# Patient Record
Sex: MALE | Race: WHITE | NOT HISPANIC OR LATINO | Employment: FULL TIME | URBAN - METROPOLITAN AREA
[De-identification: names, ages, dates, MRNs, and addresses within clinical notes are randomized per-mention and may not be internally consistent; named-entity substitution may affect disease eponyms.]

---

## 2017-08-07 ENCOUNTER — HOSPITAL ENCOUNTER (OUTPATIENT)
Dept: RADIOLOGY | Facility: MEDICAL CENTER | Age: 57
End: 2017-08-07
Attending: SURGERY
Payer: COMMERCIAL

## 2017-08-07 DIAGNOSIS — I83.811 VARICOSE VEINS OF RIGHT LOWER EXTREMITY WITH PAIN: ICD-10-CM

## 2017-08-07 PROCEDURE — 93971 EXTREMITY STUDY: CPT

## 2018-04-10 ENCOUNTER — OFFICE VISIT (OUTPATIENT)
Dept: MEDICAL GROUP | Facility: PHYSICIAN GROUP | Age: 58
End: 2018-04-10
Payer: COMMERCIAL

## 2018-04-10 VITALS
BODY MASS INDEX: 38.09 KG/M2 | HEIGHT: 68 IN | WEIGHT: 251.3 LBS | DIASTOLIC BLOOD PRESSURE: 72 MMHG | RESPIRATION RATE: 18 BRPM | HEART RATE: 83 BPM | TEMPERATURE: 98.7 F | OXYGEN SATURATION: 95 % | SYSTOLIC BLOOD PRESSURE: 124 MMHG

## 2018-04-10 DIAGNOSIS — E66.9 OBESITY (BMI 30-39.9): ICD-10-CM

## 2018-04-10 DIAGNOSIS — I82.411 DVT OF DEEP FEMORAL VEIN, RIGHT (HCC): ICD-10-CM

## 2018-04-10 DIAGNOSIS — E78.00 PURE HYPERCHOLESTEROLEMIA: ICD-10-CM

## 2018-04-10 DIAGNOSIS — E11.9 TYPE 2 DIABETES MELLITUS WITHOUT COMPLICATION, WITHOUT LONG-TERM CURRENT USE OF INSULIN (HCC): ICD-10-CM

## 2018-04-10 PROBLEM — Z95.828 PRESENCE OF INFERIOR VENA CAVA FILTER: Status: ACTIVE | Noted: 2018-04-10

## 2018-04-10 LAB
HBA1C MFR BLD: 6.7 % (ref ?–5.8)
INT CON NEG: NEGATIVE
INT CON POS: POSITIVE

## 2018-04-10 PROCEDURE — 99204 OFFICE O/P NEW MOD 45 MIN: CPT | Performed by: FAMILY MEDICINE

## 2018-04-10 PROCEDURE — 83036 HEMOGLOBIN GLYCOSYLATED A1C: CPT | Performed by: FAMILY MEDICINE

## 2018-04-10 RX ORDER — ATORVASTATIN CALCIUM 20 MG/1
20 TABLET, FILM COATED ORAL DAILY
Qty: 30 TAB | Refills: 5 | Status: SHIPPED | OUTPATIENT
Start: 2018-04-10 | End: 2018-09-21 | Stop reason: SDUPTHER

## 2018-04-10 RX ORDER — ATORVASTATIN CALCIUM 20 MG/1
20 TABLET, FILM COATED ORAL NIGHTLY
COMMUNITY
End: 2018-04-10 | Stop reason: SDUPTHER

## 2018-04-10 ASSESSMENT — PATIENT HEALTH QUESTIONNAIRE - PHQ9: CLINICAL INTERPRETATION OF PHQ2 SCORE: 0

## 2018-04-10 NOTE — LETTER
[facility]  Juanito Valdes M.D.  [address]  [place] [address]  Fax: [phone]   Authorization for Release/Disclosure of   Protected Health Information   Name: JENSEN BOB : 1960 SSN: [id number]   Address: [address] [place] [address] [place] [address] Phone:    [phone] (home)    I authorize the entity listed below to release/disclose the PHI below to:   [facility]/Juanito Valdes M.D. and Juanito Valdes M.D.   Provider or Entity Name:     Address   City, State, [facility]   Phone:      Fax:     Reason for request: continuity of care   Information to be released:    [  ] LAST COLONOSCOPY,  including any PATH REPORT and follow-up  [  ] LAST FIT/COLOGUARD RESULT [  ] LAST DEXA  [  ] LAST MAMMOGRAM  [  ] LAST PAP  [  ] LAST LABS [  ] RETINA EXAM REPORT  [  ] IMMUNIZATION RECORDS  [  ] Release all info      [  ] Check here and initial the line next to each item to release ALL health information INCLUDING  _____ Care and treatment for drug and / or alcohol abuse  _____ HIV testing, infection status, or AIDS  _____ Genetic Testing    DATES OF SERVICE OR TIME PERIOD TO BE DISCLOSED: _____________  I understand and acknowledge that:  * This Authorization may be revoked at any time by you in writing, except if your health information has already been used or disclosed.  * Your health information that will be used or disclosed as a result of you signing this authorization could be re-disclosed by the recipient. If this occurs, your re-disclosed health information may no longer be protected by State or Federal laws.  * You may refuse to sign this Authorization. Your refusal will not affect your ability to obtain treatment.  * This Authorization becomes effective upon signing and will  on (date) __________.      If no date is indicated, this Authorization will  one (1) year from the signature date.    Name: Jensen Bob    Signature:   Date:     4/10/2018       PLEASE FAX REQUESTED  RECORDS BACK TO: [phone]

## 2018-04-10 NOTE — ASSESSMENT & PLAN NOTE
History of present illness A1c 6.6 today. He has been compliant with taking his Januvia and Jardiance. He has not had his eyes checked in 2 years. He is edentulous. He gets occasionally itching and rash in his groins. Denies any tingling and numbness in his feet. Has gotten athletes feet in the past but not currently.

## 2018-04-10 NOTE — PROGRESS NOTES
Complaint: Medication refills        Subjective:   Jensen Leigh is a 58 y.o. male here today forFurther medication refills upon the request of his wife. Previously followed at [facility] here in [place].    DVT of deep femoral vein, right (CMS-[facility])  Patient developed a deep venous thrombosis of the right femoral vein last year. Currently on Xarelto and has a vena cava filter in place. Right lower leg is still larger than left. Was unable to wear thigh-high compression stockings for his varicose veins, kept slipping off.    Type 2 diabetes mellitus without complication (CMS-[facility])  History of present illness A1c 6.6 today. He has been compliant with taking his Januvia and Jardiance. He has not had his eyes checked in 2 years. He is edentulous. He gets occasionally itching and rash in his groins. Denies any tingling and numbness in his feet. Has gotten athletes feet in the past but not currently.    Pure hypercholesterolemia  On atorvastatin for his high cholesterol. Complaint with taking this medication.    Obesity (BMI 30-39.9)  Sits in a car hours at a time. He snacks on different kinds of food, mainly nothing healthy. Seen a nutritionist in the past but has been able to comply with recommendations.     He is , works as a security/. Does not smoke    Current medicines (including changes today)  Current Outpatient Prescriptions   Medication Sig Dispense Refill   • Multiple Vitamin (MULTI VITAMIN MENS PO) Take 1 Tab by mouth every day.     • rivaroxaban (XARELTO) 20 MG Tab tablet Take 1 Tab by mouth with dinner. 30 Tab 5   • Empagliflozin 10 MG Tab Take 10 mg by mouth every day. 30 Tab 5   • SITagliptin (JANUVIA) 100 MG Tab Take 1 Tab by mouth every day. 30 Tab 5   • atorvastatin (LIPITOR) 20 MG Tab Take 1 Tab by mouth every day. 30 Tab 5     No current facility-administered medications for this visit.      He  has a past medical history of Eczema; GERD  (gastroesophageal reflux disease); Type II or unspecified type diabetes mellitus without mention of complication, not stated as uncontrolled; and Umbilical hernia.     Health Maintenance: Up-to-date according to patient's file.      Allergies: Patient has no known allergies.    Current Outpatient Prescriptions Ordered in [facility]   Medication Sig Dispense Refill   • Multiple Vitamin (MULTI VITAMIN MENS PO) Take 1 Tab by mouth every day.     • rivaroxaban (XARELTO) 20 MG Tab tablet Take 1 Tab by mouth with dinner. 30 Tab 5   • Empagliflozin 10 MG Tab Take 10 mg by mouth every day. 30 Tab 5   • SITagliptin (JANUVIA) 100 MG Tab Take 1 Tab by mouth every day. 30 Tab 5   • atorvastatin (LIPITOR) 20 MG Tab Take 1 Tab by mouth every day. 30 Tab 5     No current Epic-ordered facility-administered medications on file.        Past Medical History:   Diagnosis Date   • Eczema    • GERD (gastroesophageal reflux disease)    • Type II or unspecified type diabetes mellitus without mention of complication, not stated as uncontrolled    • Umbilical hernia        Past Surgical History:   Procedure Laterality Date   • ROTATOR CUFF REPAIR      rt shoulder   • TONSILLECTOMY AND ADENOIDECTOMY         Social History   Substance Use Topics   • Smoking status: Never Smoker   • Smokeless tobacco: Never Used   • Alcohol use Yes      Comment: rare       Social History     Social History Narrative   • No narrative on file       Family History   Problem Relation Age of Onset   • Cancer Mother      liver   • Cancer Father      lung         ROS [place] of some pain and swelling in the right lower extremity  Patient denies any stroke symptoms, dizziness, headache, nasal congestion, sore-throat, cough, heartburn, chest pain, difficulty breathing, abdominal discomfort, diarrhea/constipation, joint or back pain, skin rashes, depression or anxiety.       Objective:     Blood pressure 124/72, pulse 83, temperature 37.1 °C (98.7 °F), resp. rate 18, height  "1.727 m (5' 8\"), weight 114 kg (251 lb 4.8 oz), SpO2 95 %. Body mass index is 38.21 kg/m².   Physical Exam:  Constitutional: Alert, no distress. Morbidly obese  Skin: Warm, dry, good turgor, no rashes in visible areas.  Eye: Equal, round and reactive, conjunctiva clear, lids normal.  ENMT: Lips without lesions,Edentulous, oropharynx clear.  Neck: Trachea midline, no masses, no thyromegaly. No cervical or supraclavicular lymphadenopathy  Respiratory: Unlabored respiratory effort, lungs clear to auscultation, no wheezes, no ronchi.  Cardiovascular: Normal S1, S2, no murmur, no extremity edema.  Abdomen: Obese soft with large umbilical hernia.   RLE: significantly larger calf than left one. 1+ pre-tibial edemas, some varicose veins bilaterally.  Psych: Alert and oriented x3, appropriate affect and mood.        Assessment and Plan:   The following treatment plan was discussed    1. DVT of deep femoral vein, right (CMS-HCC)  Chronic problem, stable. Recommended patient where at least knee-high stockings to prevent stasis ulcers and other complications. Continue Xarelto. Explained to patient and his wife that it can take a long time before such a large clot is dissolved or recanalized. Since last ultrasound was done in August 2017, will recheck the situation now. Will contact him with results.  - US-EXTREMITY VENOUS UNILATERAL-LOWER RIGHT; Future  - rivaroxaban (XARELTO) 20 MG Tab tablet; Take 1 Tab by mouth with dinner.  Dispense: 30 Tab; Refill: 5    2. Type 2 diabetes mellitus without complication, without long-term current use of insulin (CMS-HCC)  Chronicproblem, controlled on current medical management. Recommended he see an optometrist/ophthalmologist for retinal check. Commended he check his feet daily for breakdown. Recommended he apply Gold Bond or such fady his groins daily to prevent breakdown and infection.  - Empagliflozin 10 MG Tab; Take 10 mg by mouth every day.  Dispense: 30 Tab; Refill: 5  - SITagliptin " (LUCEROUVIA) 100 MG Tab; Take 1 Tab by mouth every day.  Dispense: 30 Tab; Refill: 5  - POCT Hemoglobin A1C    3. Obesity (BMI 30-39.9)  Chronic problem requiring intervention. I recommended patient to cut back on his caloric intake. This applies to treat kindest back seats during the daytime. He should switch to low calorie vegetables.    Refill all his medications today.    Followup: Return in about 4 months (around 8/10/2018), or DM.

## 2018-04-10 NOTE — ASSESSMENT & PLAN NOTE
Sits in a car hours at a time. He snacks on different kinds of food, mainly nothing healthy. Seen a nutritionist in the past but has been able to comply with recommendations.

## 2018-04-10 NOTE — ASSESSMENT & PLAN NOTE
Patient developed a deep venous thrombosis of the right femoral vein last year. Currently on Xarelto and has a vena cava filter in place. Right lower leg is still larger than left. Was unable to wear thigh-high compression stockings for his varicose veins, kept slipping off.

## 2018-04-26 ENCOUNTER — HOSPITAL ENCOUNTER (OUTPATIENT)
Dept: RADIOLOGY | Facility: MEDICAL CENTER | Age: 58
End: 2018-04-26
Attending: FAMILY MEDICINE
Payer: COMMERCIAL

## 2018-04-26 DIAGNOSIS — I82.411 DVT OF DEEP FEMORAL VEIN, RIGHT (HCC): ICD-10-CM

## 2018-04-26 PROCEDURE — 93971 EXTREMITY STUDY: CPT | Mod: RT

## 2018-05-09 DIAGNOSIS — E13.39: ICD-10-CM

## 2018-05-09 NOTE — TELEPHONE ENCOUNTER
----- Message from Jensen Leigh sent at 5/9/2018 10:07 AM PDT -----  Regarding: Non-Urgent Medical Question  Contact: [phone]  Can you please tell me if current blood test strips also need prescription for. Will they be covered I am currently using the True Metrix Montior and have had to order online since buying them over the counter is expensive.    Also can you tell me since Dr. Valdes said no change in current treatment for blood clot in leg does he feel that the Xarelto is working?    This is Molly's  wife asking cause I have concerns I know that the body has to dissolve the clot but it has been over 2 years in June.

## 2018-05-15 ENCOUNTER — TELEPHONE (OUTPATIENT)
Dept: MEDICAL GROUP | Facility: PHYSICIAN GROUP | Age: 58
End: 2018-05-15

## 2018-05-15 NOTE — TELEPHONE ENCOUNTER
P's wife called and states that her  has been having a lot of cramps in his legs. She wanted to know if her  will have to stay on xarelto forever and wanted to know if she should be concerned that the left side of his neck has been bothering him, she states he says he feels like he has pulled a muscle that will not stop hurting

## 2018-05-25 ENCOUNTER — OFFICE VISIT (OUTPATIENT)
Dept: MEDICAL GROUP | Facility: PHYSICIAN GROUP | Age: 58
End: 2018-05-25
Payer: COMMERCIAL

## 2018-05-25 VITALS
DIASTOLIC BLOOD PRESSURE: 82 MMHG | TEMPERATURE: 98.4 F | OXYGEN SATURATION: 96 % | RESPIRATION RATE: 18 BRPM | SYSTOLIC BLOOD PRESSURE: 110 MMHG | BODY MASS INDEX: 38 KG/M2 | HEIGHT: 68 IN | HEART RATE: 83 BPM | WEIGHT: 250.7 LBS

## 2018-05-25 DIAGNOSIS — I82.411 DVT OF DEEP FEMORAL VEIN, RIGHT (HCC): ICD-10-CM

## 2018-05-25 DIAGNOSIS — R25.2 LEG CRAMPS: ICD-10-CM

## 2018-05-25 DIAGNOSIS — S16.1XXA STRAIN OF NECK MUSCLE, INITIAL ENCOUNTER: ICD-10-CM

## 2018-05-25 PROCEDURE — 99214 OFFICE O/P EST MOD 30 MIN: CPT | Performed by: FAMILY MEDICINE

## 2018-05-26 PROBLEM — R25.2 LEG CRAMPS: Status: ACTIVE | Noted: 2018-05-26

## 2018-05-27 NOTE — ASSESSMENT & PLAN NOTE
Needs to know if he needs to continue oral anticoagulation. Latest US shows that clot has not resolved. Had VCF in place.

## 2018-05-27 NOTE — ASSESSMENT & PLAN NOTE
Strained left side of neck doing chores around the house. Hurts to turn head to right. Has not tried any treatment. No pain down arm, no tingling or numbness.

## 2018-05-27 NOTE — PROGRESS NOTES
Complaint: Leg cramps     Subjective:     Jensen Leigh is a 58 y.o. male here today for a couple medical issues.    Leg cramps  Had leg cramps for a couple days. Increased po fluid intake, sxs resolved.    Neck strain  Strained left side of neck doing chores around the house. Hurts to turn head to right. Has not tried any treatment. No pain down arm, no tingling or numbness.    DVT of deep femoral vein, right (HCC)  Needs to know if he needs to continue oral anticoagulation. Latest US shows that clot has not resolved. Had VCF in place.     Has moved to [place], will transfer care to our clinic there.      No other concerns or complaints today.    Current medicines (including changes today)  Current Outpatient Prescriptions   Medication Sig Dispense Refill   • glucose blood (TRUE METRIX BLOOD GLUCOSE TEST) strip 1 Strip by Other route as needed. 300 Strip 1   • Multiple Vitamin (MULTI VITAMIN MENS PO) Take 1 Tab by mouth every day.     • rivaroxaban (XARELTO) 20 MG Tab tablet Take 1 Tab by mouth with dinner. 30 Tab 5   • Empagliflozin 10 MG Tab Take 10 mg by mouth every day. 30 Tab 5   • SITagliptin (JANUVIA) 100 MG Tab Take 1 Tab by mouth every day. 30 Tab 5   • atorvastatin (LIPITOR) 20 MG Tab Take 1 Tab by mouth every day. 30 Tab 5     No current facility-administered medications for this visit.      He  has a past medical history of Eczema; GERD (gastroesophageal reflux disease); Type II or unspecified type diabetes mellitus without mention of complication, not stated as uncontrolled; and Umbilical hernia.      Allergies: Patient has no known allergies.    Current Outpatient Prescriptions Ordered in [facility]   Medication Sig Dispense Refill   • glucose blood (TRUE METRIX BLOOD GLUCOSE TEST) strip 1 Strip by Other route as needed. 300 Strip 1   • Multiple Vitamin (MULTI VITAMIN MENS PO) Take 1 Tab by mouth every day.     • rivaroxaban (XARELTO) 20 MG Tab tablet Take 1 Tab by mouth with dinner. 30 Tab 5   •  "Empagliflozin 10 MG Tab Take 10 mg by mouth every day. 30 Tab 5   • SITagliptin (JANUVIA) 100 MG Tab Take 1 Tab by mouth every day. 30 Tab 5   • atorvastatin (LIPITOR) 20 MG Tab Take 1 Tab by mouth every day. 30 Tab 5     No current Epic-ordered facility-administered medications on file.        Past Medical History:   Diagnosis Date   • Eczema    • GERD (gastroesophageal reflux disease)    • Type II or unspecified type diabetes mellitus without mention of complication, not stated as uncontrolled    • Umbilical hernia        Past Surgical History:   Procedure Laterality Date   • ROTATOR CUFF REPAIR      rt shoulder   • TONSILLECTOMY AND ADENOIDECTOMY         Social History   Substance Use Topics   • Smoking status: Never Smoker   • Smokeless tobacco: Never Used   • Alcohol use Yes      Comment: rare       Social History     Social History Narrative   • No narrative on file       Family History   Problem Relation Age of Onset   • Cancer Mother      liver   • Cancer Father      lung         ROS neck pain, continued swelling RLE  Patient denies any fever, chills, unintentional weight gain/loss, fatigue, stroke symptoms, dizziness, headache, nasal congestion, sore-throat, cough, heartburn, chest pain, difficulty breathing, abdominal discomfort, diarrhea/constipation, burning with urination or frequency, joint or back pain, skin rashes, depression or anxiety.       Objective:     Blood pressure 110/82, pulse 83, temperature 36.9 °C (98.4 °F), resp. rate 18, height 1.727 m (5' 8\"), weight 113.7 kg (250 lb 11.2 oz), SpO2 96 %. Body mass index is 38.12 kg/m².   Physical Exam:    Neck: Trachea midline, no masses, no thyromegaly. No cervical or supraclavicular lymphadenopathy. FROM, tender left trap from base of skull to shoulder.  Respiratory: Unlabored respiratory effort, lungs clear to auscultation, no wheezes, no ronchi.  Cardiovascular: Normal S1, S2, no murmur, no extremity edema.  Abdomen: Soft, non-tender, no masses, " no hepatosplenomegaly.  Psych: Alert and oriented x3, appropriate affect and mood.        Assessment and Plan:     The following treatment plan was discussed    1. DVT of deep femoral vein, right (HCC)  Chronic problem, stable. On Xarelto. Question ongoing need.  - REFERRAL TO VASCULAR SURGERY    2. Leg cramps  Acute problem, resolved. Reminded to keep himself well hydrated. Try MagOx if recurs.    3. Strain of neck muscle, initial encounter  New problem. Offered trigger point injection, pt declines. Recommend light massage, Arnica Gel.      Followup: Return if symptoms worsen or fail to improve.    Please note that this dictation was created using voice recognition software. I have made every reasonable attempt to correct obvious errors, but I expect that there are errors of grammar and possibly content that I did not discover before finalizing the note.

## 2018-06-04 ENCOUNTER — TELEPHONE (OUTPATIENT)
Dept: MEDICAL GROUP | Facility: PHYSICIAN GROUP | Age: 58
End: 2018-06-04

## 2018-06-04 DIAGNOSIS — E13.39: Primary | ICD-10-CM

## 2018-06-04 NOTE — TELEPHONE ENCOUNTER
Pt wife called regarding the rx for the test strips. They state the [facility] in [place], states they have never received that. The rx was sent earlier in May. They still state they dont have the rx. The wife is requesting we resend this to [facility] in [place].

## 2018-06-20 DIAGNOSIS — E11.9 TYPE 2 DIABETES MELLITUS WITHOUT COMPLICATION, WITHOUT LONG-TERM CURRENT USE OF INSULIN (HCC): ICD-10-CM

## 2018-09-21 DIAGNOSIS — E11.9 TYPE 2 DIABETES MELLITUS WITHOUT COMPLICATION, WITHOUT LONG-TERM CURRENT USE OF INSULIN (HCC): ICD-10-CM

## 2018-09-21 DIAGNOSIS — I82.411 DVT OF DEEP FEMORAL VEIN, RIGHT (HCC): ICD-10-CM

## 2018-09-21 DIAGNOSIS — E78.00 PURE HYPERCHOLESTEROLEMIA: ICD-10-CM

## 2018-09-21 RX ORDER — ATORVASTATIN CALCIUM 20 MG/1
20 TABLET, FILM COATED ORAL DAILY
Qty: 30 TAB | Refills: 5 | Status: SHIPPED | OUTPATIENT
Start: 2018-09-21 | End: 2018-12-13 | Stop reason: SDUPTHER

## 2018-09-21 NOTE — TELEPHONE ENCOUNTER
----- Message from JI Loza sent at 9/20/2018 12:17 PM PDT -----  Regarding: FW:Medication refills  Contact: [phone]      ----- Message -----  From: Jensen Leigh  Sent: 9/19/2018   5:57 PM  To: Hannah Tena  Subject: RE:Medication refills                            Atorvastatin 20 mg  Jardiance 10 mg  Xarelto 20mg    all say no refills left    Please advise because he filled these and I don't want him to run out.     Thank You    Hayley Leigh  ----- Message -----  From: Breeanne R. Yeoman, Med Ass't  Sent: 9/19/2018  5:45 PM PDT  To: Jensen Leigh  Subject: Medication refills  Josep Styles,    We have received an call regarding medication refills. Please advise us of what medication you are needing refilled and I will send it to the doctor. Thank you!

## 2018-12-06 ENCOUNTER — TELEPHONE (OUTPATIENT)
Dept: MEDICAL GROUP | Facility: PHYSICIAN GROUP | Age: 58
End: 2018-12-06

## 2018-12-07 ENCOUNTER — TELEPHONE (OUTPATIENT)
Dept: MEDICAL GROUP | Facility: PHYSICIAN GROUP | Age: 58
End: 2018-12-07

## 2018-12-07 DIAGNOSIS — E78.00 PURE HYPERCHOLESTEROLEMIA: ICD-10-CM

## 2018-12-07 DIAGNOSIS — E11.9 TYPE 2 DIABETES MELLITUS WITHOUT COMPLICATION, WITHOUT LONG-TERM CURRENT USE OF INSULIN (HCC): ICD-10-CM

## 2018-12-07 DIAGNOSIS — R25.2 LEG CRAMPS: ICD-10-CM

## 2018-12-07 NOTE — TELEPHONE ENCOUNTER
Patients wife left a message stating his Jardiance is on back order and only has 4 days left. Is there something else that can be prescribed and sent to his pharmacy? Please advise.

## 2018-12-08 NOTE — TELEPHONE ENCOUNTER
Was the patient seen in the last year in this department? Yes    Does patient have an active prescription for medications requested? Yes    Received Request Via: Pharmacy     Last Visit: 5/25/18  Last Lab: n/a

## 2018-12-08 NOTE — TELEPHONE ENCOUNTER
Patient is sscheduled to f/u with you in [place]. Patients wife is requesting a lab order prior but stated he has a hard time fasting. Just an fyi to you.

## 2018-12-10 DIAGNOSIS — E11.9 TYPE 2 DIABETES MELLITUS WITHOUT COMPLICATION, WITHOUT LONG-TERM CURRENT USE OF INSULIN (HCC): ICD-10-CM

## 2018-12-10 NOTE — TELEPHONE ENCOUNTER
The pharmacy this was sent to on Friday does not accept his insurance. They would like this sent to [facility] in [place] today please.    Was the patient seen in the last year in this department? Yes    Does patient have an active prescription for medications requested? Yes    Received Request Via: Patient    Last Visit: 5/25/18  Last Lab: n/a

## 2018-12-13 ENCOUNTER — OFFICE VISIT (OUTPATIENT)
Dept: MEDICAL GROUP | Facility: CLINIC | Age: 58
End: 2018-12-13
Payer: COMMERCIAL

## 2018-12-13 VITALS
WEIGHT: 252 LBS | OXYGEN SATURATION: 94 % | TEMPERATURE: 99.1 F | HEART RATE: 74 BPM | HEIGHT: 68 IN | BODY MASS INDEX: 38.19 KG/M2 | SYSTOLIC BLOOD PRESSURE: 140 MMHG | DIASTOLIC BLOOD PRESSURE: 86 MMHG

## 2018-12-13 DIAGNOSIS — R03.0 ELEVATED BLOOD PRESSURE READING: ICD-10-CM

## 2018-12-13 DIAGNOSIS — R25.2 LEG CRAMPS: ICD-10-CM

## 2018-12-13 DIAGNOSIS — M72.2 PLANTAR FASCIITIS OF LEFT FOOT: ICD-10-CM

## 2018-12-13 DIAGNOSIS — E11.9 TYPE 2 DIABETES MELLITUS WITHOUT COMPLICATION, WITHOUT LONG-TERM CURRENT USE OF INSULIN (HCC): ICD-10-CM

## 2018-12-13 DIAGNOSIS — E78.00 PURE HYPERCHOLESTEROLEMIA: ICD-10-CM

## 2018-12-13 DIAGNOSIS — I82.411 DVT OF DEEP FEMORAL VEIN, RIGHT (HCC): ICD-10-CM

## 2018-12-13 LAB
HBA1C MFR BLD: 7.1 % (ref ?–5.8)
INT CON NEG: NORMAL
INT CON POS: NORMAL

## 2018-12-13 PROCEDURE — 83036 HEMOGLOBIN GLYCOSYLATED A1C: CPT | Performed by: FAMILY MEDICINE

## 2018-12-13 PROCEDURE — 99214 OFFICE O/P EST MOD 30 MIN: CPT | Performed by: FAMILY MEDICINE

## 2018-12-13 RX ORDER — ATORVASTATIN CALCIUM 20 MG/1
20 TABLET, FILM COATED ORAL DAILY
Qty: 30 TAB | Refills: 5 | Status: SHIPPED | OUTPATIENT
Start: 2018-12-13 | End: 2019-10-16 | Stop reason: SDUPTHER

## 2018-12-14 NOTE — PROGRESS NOTES
Complaint: f/u diabetes     Subjective:     Jensen Leigh is a 58 y.o. male here today accompanied by his wife.    Type 2 diabetes mellitus without complication (CMS-[facility])  Did not get his labs done. Compliant with taking his meds. Has not had his eyes checked yet.    DVT of deep femoral vein, right ([facility])  Followed by vascular medicine, scheduled to get angioplasty on right leg vein to increase blood flow.    Plantar fasciitis of left foot  Pain bottom left foot not relieved by inserts. Worse in AM.    Leg cramps  Experiences aching in both thighs in evening after days work.      Current medicines (including changes today)  Current Outpatient Prescriptions   Medication Sig Dispense Refill   • atorvastatin (LIPITOR) 20 MG Tab Take 1 Tab by mouth every day. 30 Tab 5   • Empagliflozin (JARDIANCE) 10 MG Tab Take 1 Tab by mouth every day. 30 Tab 5   • rivaroxaban (XARELTO) 20 MG Tab tablet Take 1 Tab by mouth with dinner. 30 Tab 5   • SITagliptin (JANUVIA) 100 MG Tab Take 1 Tab by mouth every day. 30 Tab 5   • Blood Glucose Monitoring Suppl Supplies Misc Test strips order: Test strips for Contour Next meter. Sig: use 2x/d and prn ssx high or low sugar #100 RF x 3 100 Strip 3   • Blood Glucose Monitoring Suppl Device Meter: Dispense 1 Contour Next Device . Sig. Use as directed for blood sugar monitoring. #1. NR. 1 Device 0   • glucose blood (TRUE METRIX BLOOD GLUCOSE TEST) strip 1 Strip by Other route as needed. 300 Strip 1   • Multiple Vitamin (MULTI VITAMIN MENS PO) Take 1 Tab by mouth every day.       No current facility-administered medications for this visit.      He  has a past medical history of Eczema; GERD (gastroesophageal reflux disease); Type II or unspecified type diabetes mellitus without mention of complication, not stated as uncontrolled; and Umbilical hernia.    Health Maintenance: declines immunizations      Allergies: Patient has no known allergies.    Current Outpatient Prescriptions Ordered in [facility]    Medication Sig Dispense Refill   • atorvastatin (LIPITOR) 20 MG Tab Take 1 Tab by mouth every day. 30 Tab 5   • Empagliflozin (JARDIANCE) 10 MG Tab Take 1 Tab by mouth every day. 30 Tab 5   • rivaroxaban (XARELTO) 20 MG Tab tablet Take 1 Tab by mouth with dinner. 30 Tab 5   • SITagliptin (JANUVIA) 100 MG Tab Take 1 Tab by mouth every day. 30 Tab 5   • Blood Glucose Monitoring Suppl Supplies Misc Test strips order: Test strips for Contour Next meter. Sig: use 2x/d and prn ssx high or low sugar #100 RF x 3 100 Strip 3   • Blood Glucose Monitoring Suppl Device Meter: Dispense 1 Contour Next Device . Sig. Use as directed for blood sugar monitoring. #1. NR. 1 Device 0   • glucose blood (TRUE METRIX BLOOD GLUCOSE TEST) strip 1 Strip by Other route as needed. 300 Strip 1   • Multiple Vitamin (MULTI VITAMIN MENS PO) Take 1 Tab by mouth every day.       No current [facility]-ordered facility-administered medications on file.        Past Medical History:   Diagnosis Date   • Eczema    • GERD (gastroesophageal reflux disease)    • Type II or unspecified type diabetes mellitus without mention of complication, not stated as uncontrolled    • Umbilical hernia        Past Surgical History:   Procedure Laterality Date   • ROTATOR CUFF REPAIR      rt shoulder   • TONSILLECTOMY AND ADENOIDECTOMY         Social History   Substance Use Topics   • Smoking status: Never Smoker   • Smokeless tobacco: Never Used   • Alcohol use Yes      Comment: rare       Social History     Social History Narrative   • No narrative on file       Family History   Problem Relation Age of Onset   • Cancer Mother         liver   • Cancer Father         lung         ROS Positive for pain in sole left foot, swelling in right lower leg, cramps in thighs  Patient denies any fever, chills, unintentional weight gain/loss, fatigue, stroke symptoms, dizziness, headache, nasal congestion, sore-throat, cough, heartburn, chest pain, difficulty breathing, abdominal  "discomfort, diarrhea/constipation, burning with urination or frequency, joint or back pain, skin rashes, depression or anxiety.       Objective:     Blood pressure 140/86, pulse 74, temperature 37.3 °C (99.1 °F), temperature source Temporal, height 1.727 m (5' 8\"), weight 114.3 kg (252 lb), SpO2 94 %. Body mass index is 38.32 kg/m².   Physical Exam:  Constitutional: Alert, no distress.    Respiratory: Unlabored respiratory effort, lungs clear to auscultation, no wheezes, no ronchi.  Cardiovascular: Normal S1, S2, no murmur, no extremity edema.  Left foot: tender insertion plantar fascia at calcaneus.      Assessment and Plan:   The following treatment plan was discussed    1. Pure hypercholesterolemia  Chronic problem. Will notify with FLP result.  - atorvastatin (LIPITOR) 20 MG Tab; Take 1 Tab by mouth every day.  Dispense: 30 Tab; Refill: 5    2. Type 2 diabetes mellitus without complication, without long-term current use of insulin ([facility])  A1c today 7.1%. Needs to lose weight, stick to diet. Increase Jardiance at f/u if over 7% again. Recommend he see optometrist soon for eye exam  - Empagliflozin (JARDIANCE) 10 MG Tab; Take 1 Tab by mouth every day.  Dispense: 30 Tab; Refill: 5  - SITagliptin (JANUVIA) 100 MG Tab; Take 1 Tab by mouth every day.  Dispense: 30 Tab; Refill: 5  - POCT Hemoglobin A1C  - MICROALBUMIN CREAT RATIO URINE; Future    3. DVT of deep femoral vein, right (HCC)  Chronic problem. F/u with vascular medicine.  - rivaroxaban (XARELTO) 20 MG Tab tablet; Take 1 Tab by mouth with dinner.  Dispense: 30 Tab; Refill: 5    4. Plantar fasciitis of left foot  Chronic problem. Discussed options. Will massage locally, stretch foot as demonstrated.    5. Leg cramps  Chronic problem. Recommend stretching exercises, increase fluid intake, otc magnesium supplementation.    6. Elevated blood pressure reading  Recheck at f/u. Treat to keep under 130/80 if still elevated.    Patient to get labs drawn soon. "     Followup: Return in about 3 months (around 3/13/2019).    Please note that this dictation was created using voice recognition software. I have made every reasonable attempt to correct obvious errors, but I expect that there are errors of grammar and possibly content that I did not discover before finalizing the note.

## 2018-12-14 NOTE — ASSESSMENT & PLAN NOTE
Followed by vascular medicine, scheduled to get angioplasty on right leg vein to increase blood flow.

## 2019-01-26 ENCOUNTER — HOSPITAL ENCOUNTER (OUTPATIENT)
Dept: LAB | Facility: MEDICAL CENTER | Age: 59
End: 2019-01-26
Attending: FAMILY MEDICINE
Payer: COMMERCIAL

## 2019-01-26 DIAGNOSIS — R25.2 LEG CRAMPS: ICD-10-CM

## 2019-01-26 DIAGNOSIS — E11.9 TYPE 2 DIABETES MELLITUS WITHOUT COMPLICATION, WITHOUT LONG-TERM CURRENT USE OF INSULIN (HCC): ICD-10-CM

## 2019-01-26 DIAGNOSIS — E78.00 PURE HYPERCHOLESTEROLEMIA: ICD-10-CM

## 2019-01-26 LAB
ALBUMIN SERPL BCP-MCNC: 4.1 G/DL (ref 3.2–4.9)
ALBUMIN/GLOB SERPL: 1.4 G/DL
ALP SERPL-CCNC: 79 U/L (ref 30–99)
ALT SERPL-CCNC: 25 U/L (ref 2–50)
ANION GAP SERPL CALC-SCNC: 6 MMOL/L (ref 0–11.9)
AST SERPL-CCNC: 18 U/L (ref 12–45)
BASOPHILS # BLD AUTO: 0.6 % (ref 0–1.8)
BASOPHILS # BLD: 0.05 K/UL (ref 0–0.12)
BILIRUB SERPL-MCNC: 0.6 MG/DL (ref 0.1–1.5)
BUN SERPL-MCNC: 15 MG/DL (ref 8–22)
CALCIUM SERPL-MCNC: 9.9 MG/DL (ref 8.5–10.5)
CHLORIDE SERPL-SCNC: 104 MMOL/L (ref 96–112)
CHOLEST SERPL-MCNC: 135 MG/DL (ref 100–199)
CO2 SERPL-SCNC: 25 MMOL/L (ref 20–33)
CREAT SERPL-MCNC: 0.83 MG/DL (ref 0.5–1.4)
CREAT UR-MCNC: 75.6 MG/DL
EOSINOPHIL # BLD AUTO: 0.17 K/UL (ref 0–0.51)
EOSINOPHIL NFR BLD: 2.1 % (ref 0–6.9)
ERYTHROCYTE [DISTWIDTH] IN BLOOD BY AUTOMATED COUNT: 47.5 FL (ref 35.9–50)
EST. AVERAGE GLUCOSE BLD GHB EST-MCNC: 171 MG/DL
GLOBULIN SER CALC-MCNC: 3 G/DL (ref 1.9–3.5)
GLUCOSE SERPL-MCNC: 137 MG/DL (ref 65–99)
HBA1C MFR BLD: 7.6 % (ref 0–5.6)
HCT VFR BLD AUTO: 52 % (ref 42–52)
HDLC SERPL-MCNC: 44 MG/DL
HGB BLD-MCNC: 16.4 G/DL (ref 14–18)
IMM GRANULOCYTES # BLD AUTO: 0.02 K/UL (ref 0–0.11)
IMM GRANULOCYTES NFR BLD AUTO: 0.2 % (ref 0–0.9)
LDLC SERPL CALC-MCNC: 72 MG/DL
LYMPHOCYTES # BLD AUTO: 2.78 K/UL (ref 1–4.8)
LYMPHOCYTES NFR BLD: 34.3 % (ref 22–41)
MCH RBC QN AUTO: 28.7 PG (ref 27–33)
MCHC RBC AUTO-ENTMCNC: 31.5 G/DL (ref 33.7–35.3)
MCV RBC AUTO: 90.9 FL (ref 81.4–97.8)
MICROALBUMIN UR-MCNC: <0.7 MG/DL
MICROALBUMIN/CREAT UR: NORMAL MG/G (ref 0–30)
MONOCYTES # BLD AUTO: 0.81 K/UL (ref 0–0.85)
MONOCYTES NFR BLD AUTO: 10 % (ref 0–13.4)
NEUTROPHILS # BLD AUTO: 4.28 K/UL (ref 1.82–7.42)
NEUTROPHILS NFR BLD: 52.8 % (ref 44–72)
NRBC # BLD AUTO: 0 K/UL
NRBC BLD-RTO: 0 /100 WBC
PHOSPHATE SERPL-MCNC: 3.5 MG/DL (ref 2.5–4.5)
PLATELET # BLD AUTO: 186 K/UL (ref 164–446)
PMV BLD AUTO: 10.4 FL (ref 9–12.9)
POTASSIUM SERPL-SCNC: 4.2 MMOL/L (ref 3.6–5.5)
PROT SERPL-MCNC: 7.1 G/DL (ref 6–8.2)
RBC # BLD AUTO: 5.72 M/UL (ref 4.7–6.1)
SODIUM SERPL-SCNC: 135 MMOL/L (ref 135–145)
TRIGL SERPL-MCNC: 96 MG/DL (ref 0–149)
WBC # BLD AUTO: 8.1 K/UL (ref 4.8–10.8)

## 2019-01-26 PROCEDURE — 80061 LIPID PANEL: CPT

## 2019-01-26 PROCEDURE — 82570 ASSAY OF URINE CREATININE: CPT

## 2019-01-26 PROCEDURE — 82043 UR ALBUMIN QUANTITATIVE: CPT

## 2019-01-26 PROCEDURE — 36415 COLL VENOUS BLD VENIPUNCTURE: CPT

## 2019-01-26 PROCEDURE — 85025 COMPLETE CBC W/AUTO DIFF WBC: CPT

## 2019-01-26 PROCEDURE — 80053 COMPREHEN METABOLIC PANEL: CPT

## 2019-01-26 PROCEDURE — 84100 ASSAY OF PHOSPHORUS: CPT

## 2019-01-26 PROCEDURE — 83036 HEMOGLOBIN GLYCOSYLATED A1C: CPT

## 2019-01-28 DIAGNOSIS — E11.9 TYPE 2 DIABETES MELLITUS WITHOUT COMPLICATION, WITHOUT LONG-TERM CURRENT USE OF INSULIN (HCC): ICD-10-CM

## 2019-01-29 DIAGNOSIS — R53.82 CHRONIC FATIGUE: ICD-10-CM

## 2019-02-02 ENCOUNTER — HOSPITAL ENCOUNTER (OUTPATIENT)
Dept: LAB | Facility: MEDICAL CENTER | Age: 59
End: 2019-02-02
Attending: FAMILY MEDICINE
Payer: COMMERCIAL

## 2019-02-02 DIAGNOSIS — R53.82 CHRONIC FATIGUE: ICD-10-CM

## 2019-02-02 LAB — TSH SERPL DL<=0.005 MIU/L-ACNC: 1.29 UIU/ML (ref 0.38–5.33)

## 2019-02-02 PROCEDURE — 36415 COLL VENOUS BLD VENIPUNCTURE: CPT

## 2019-02-02 PROCEDURE — 84443 ASSAY THYROID STIM HORMONE: CPT

## 2019-02-04 ENCOUNTER — TELEPHONE (OUTPATIENT)
Dept: MEDICAL GROUP | Facility: PHYSICIAN GROUP | Age: 59
End: 2019-02-04

## 2019-02-05 NOTE — TELEPHONE ENCOUNTER
Patient notified via phone call. He did hear from the oxygen company and is having the stuff mailed to him.

## 2019-02-05 NOTE — TELEPHONE ENCOUNTER
----- Message from Juanito Valdes M.D. sent at 2/3/2019  5:51 PM PST -----  Notify patient: normal thyroid function. Has he  heard from oxygen re overnight oximetry test yet?

## 2019-02-07 ENCOUNTER — TELEPHONE (OUTPATIENT)
Dept: MEDICAL GROUP | Facility: PHYSICIAN GROUP | Age: 59
End: 2019-02-07

## 2019-02-08 NOTE — TELEPHONE ENCOUNTER
----- Message from Jensen Leigh sent at 2/6/2019  7:13 PM PST -----  Regarding: Non-Urgent Medical Question  Contact: [phone]  I did the sleep test on 02/05/2019 sending back tomorrow. I was wondering if you can give me something for the yeast infection in groin area and for the itch. Also was wondering if any of the medication I am taking could cause erectile dysfunction or is it from the diabetes   if so is there some thing I can take since I have tried other avenues with no success. I had Levitra prescribed several years ago way before the blood clot issue. It didnt appear to be a big problem until several months ago and have been trying other ways with no success. So is there something I can take. Please advise.    Thank You  Molly Leigh

## 2019-02-13 NOTE — TELEPHONE ENCOUNTER
Spoke to the patient;s wife and she stated that they are going to try gold bond and he also had more questions regarding his previous email.

## 2019-02-19 ENCOUNTER — TELEPHONE (OUTPATIENT)
Dept: MEDICAL GROUP | Facility: PHYSICIAN GROUP | Age: 59
End: 2019-02-19

## 2019-02-19 DIAGNOSIS — N52.1 ERECTILE DYSFUNCTION DUE TO DISEASES CLASSIFIED ELSEWHERE: ICD-10-CM

## 2019-02-19 RX ORDER — SILDENAFIL 50 MG/1
50 TABLET, FILM COATED ORAL PRN
Qty: 4 TAB | Refills: 3 | Status: SHIPPED | OUTPATIENT
Start: 2019-02-19 | End: 2019-05-10

## 2019-02-19 NOTE — TELEPHONE ENCOUNTER
----- Message from Jensen Leigh sent at 2/18/2019 10:34 AM PST -----  Regarding: Prescription Question  Contact: [phone]  Received your message regarding the generic Viagra I sent message on 02/14/2019 and have not heard back. was wondering if you got test results back yet on sleep apnia and I asked if you could write prescription for the generic Viagra.       Thank You  Molly Leigh

## 2019-02-19 NOTE — TELEPHONE ENCOUNTER
----- Message from Jensen Leigh sent at 2/14/2019  6:54 PM PST -----  Regarding: RE:(No subject)  Contact: [phone]  Can you write prescription for the generic viagra for me? Also have test results come back from sleep study.     Thank you,  Molly  ----- Message -----  From: Laverne Peters, Med Ass't  Sent: 2/14/2019  4:53 PM PST  To: Jensen Leigh  Subject: (No subject)  None of the current meds is associated with ED.     Generic Viagra is pretty cheap right now.     Elin

## 2019-04-05 ENCOUNTER — TELEPHONE (OUTPATIENT)
Dept: MEDICAL GROUP | Facility: PHYSICIAN GROUP | Age: 59
End: 2019-04-05

## 2019-04-05 NOTE — TELEPHONE ENCOUNTER
----- Message from Jensen Reese sent at 4/4/2019  8:26 PM PDT -----  Regarding: Prescription Question  Contact: [phone]  Ok I had to send 2 messages sorry. I am also very concerned because Molly will come home eat dinner and 20 minutes later will be in the kitchen eating again if I say anything he gets very upset. I read that there are some new appetite suppressants out for diabetics and was wondering if you could possibly give them to him before he ends up having bigger problems. I read the new ones are Qsymia and Belviq I dont know what to do I got him off soda but he will eat then eat more them sleep then eat. He wont say he has a problem but I am concerned he is also craving the sweets bad. Please is there something we can do???????    Thanks   Hayley

## 2019-04-05 NOTE — TELEPHONE ENCOUNTER
----- Message from Jensen Leigh sent at 4/4/2019  8:21 PM PDT -----  Regarding: Prescription Question  Contact: [phone]  This is Molly's wife and I have a couple of questions if you can help. I am starting to get concerned when Molly is at work he is averaging walking about 2 to 3 miler per day and has been complaining about upper thigh cramps. He is completely off all soda and drinking water thru out the day now so I was wondering is it ok to give him Magnesium supplements I read that helps with leg cramps but I didnt want to give to him without asking if it would it would have have problems with other medications like Xarelto etc...

## 2019-05-10 ENCOUNTER — OFFICE VISIT (OUTPATIENT)
Dept: MEDICAL GROUP | Facility: CLINIC | Age: 59
End: 2019-05-10
Payer: COMMERCIAL

## 2019-05-10 VITALS
BODY MASS INDEX: 37.28 KG/M2 | WEIGHT: 246 LBS | RESPIRATION RATE: 14 BRPM | SYSTOLIC BLOOD PRESSURE: 108 MMHG | DIASTOLIC BLOOD PRESSURE: 74 MMHG | TEMPERATURE: 98 F | HEIGHT: 68 IN | HEART RATE: 87 BPM | OXYGEN SATURATION: 94 %

## 2019-05-10 DIAGNOSIS — L60.2 ONYCHOGRYPHOSIS: ICD-10-CM

## 2019-05-10 DIAGNOSIS — N52.1 ERECTILE DYSFUNCTION DUE TO DISEASES CLASSIFIED ELSEWHERE: ICD-10-CM

## 2019-05-10 DIAGNOSIS — E11.9 TYPE 2 DIABETES MELLITUS WITHOUT COMPLICATION, WITHOUT LONG-TERM CURRENT USE OF INSULIN (HCC): ICD-10-CM

## 2019-05-10 DIAGNOSIS — I82.411 DVT OF DEEP FEMORAL VEIN, RIGHT (HCC): ICD-10-CM

## 2019-05-10 PROBLEM — L60.3 DYSTROPHIC NAIL: Status: ACTIVE | Noted: 2019-05-10

## 2019-05-10 LAB
HBA1C MFR BLD: 6.7 % (ref 0–5.6)
INT CON NEG: NEGATIVE
INT CON POS: POSITIVE

## 2019-05-10 PROCEDURE — 99214 OFFICE O/P EST MOD 30 MIN: CPT | Performed by: FAMILY MEDICINE

## 2019-05-10 PROCEDURE — 83036 HEMOGLOBIN GLYCOSYLATED A1C: CPT | Performed by: FAMILY MEDICINE

## 2019-05-10 RX ORDER — TADALAFIL 10 MG/1
10 TABLET ORAL PRN
Qty: 10 TAB | Refills: 3 | Status: SHIPPED | OUTPATIENT
Start: 2019-05-10 | End: 2019-05-14 | Stop reason: SDUPTHER

## 2019-05-10 ASSESSMENT — PATIENT HEALTH QUESTIONNAIRE - PHQ9: CLINICAL INTERPRETATION OF PHQ2 SCORE: 0

## 2019-05-12 DIAGNOSIS — I82.411 DVT OF DEEP FEMORAL VEIN, RIGHT (HCC): ICD-10-CM

## 2019-05-12 NOTE — PROGRESS NOTES
Complaint: f/u DM.     Subjective:     Jensen Leigh is a 59 y.o. male here today for f/u. Doing well.    Type 2 diabetes mellitus without complication ([facility])  Currently on Januvia and Jardiance only. No low spells, No rashes. No tingling or numbness in feet.    DVT of deep femoral vein, right ([facility])  Has been on Xarelto over 6 months now. Unclear how long he should stay on it, given he has a IVC filter in place. Denies any bleeding.    Erectile dysfunction due to diseases classified elsewhere  Viagra has not worked for him, wants to try something else.         Current medicines (including changes today)  Current Outpatient Prescriptions   Medication Sig Dispense Refill   • tadalafil (CIALIS) 10 MG tablet Take 1 Tab by mouth as needed for Erectile Dysfunction. 10 Tab 3   • atorvastatin (LIPITOR) 20 MG Tab Take 1 Tab by mouth every day. 30 Tab 5   • rivaroxaban (XARELTO) 20 MG Tab tablet Take 1 Tab by mouth with dinner. 30 Tab 5   • SITagliptin (JANUVIA) 100 MG Tab Take 1 Tab by mouth every day. 30 Tab 5   • Blood Glucose Monitoring Suppl Supplies Misc Test strips order: Test strips for Contour Next meter. Sig: use 2x/d and prn ssx high or low sugar #100 RF x 3 100 Strip 3   • Blood Glucose Monitoring Suppl Device Meter: Dispense 1 Contour Next Device . Sig. Use as directed for blood sugar monitoring. #1. NR. 1 Device 0   • glucose blood (TRUE METRIX BLOOD GLUCOSE TEST) strip 1 Strip by Other route as needed. 300 Strip 1   • Multiple Vitamin (MULTI VITAMIN MENS PO) Take 1 Tab by mouth every day.     • Empagliflozin 25 MG Tab Take 1 Tab by mouth every day. (Patient not taking: Reported on 5/10/2019) 30 Tab 5     No current facility-administered medications for this visit.      He  has a past medical history of Eczema; GERD (gastroesophageal reflux disease); Type II or unspecified type diabetes mellitus without mention of complication, not stated as uncontrolled; and Umbilical hernia.    Health Maintenance: see below.  Declines vaccinations.      Allergies: Patient has no known allergies.    Current Outpatient Prescriptions Ordered in [facility]   Medication Sig Dispense Refill   • tadalafil (CIALIS) 10 MG tablet Take 1 Tab by mouth as needed for Erectile Dysfunction. 10 Tab 3   • atorvastatin (LIPITOR) 20 MG Tab Take 1 Tab by mouth every day. 30 Tab 5   • rivaroxaban (XARELTO) 20 MG Tab tablet Take 1 Tab by mouth with dinner. 30 Tab 5   • SITagliptin (JANUVIA) 100 MG Tab Take 1 Tab by mouth every day. 30 Tab 5   • Blood Glucose Monitoring Suppl Supplies Misc Test strips order: Test strips for Contour Next meter. Sig: use 2x/d and prn ssx high or low sugar #100 RF x 3 100 Strip 3   • Blood Glucose Monitoring Suppl Device Meter: Dispense 1 Contour Next Device . Sig. Use as directed for blood sugar monitoring. #1. NR. 1 Device 0   • glucose blood (TRUE METRIX BLOOD GLUCOSE TEST) strip 1 Strip by Other route as needed. 300 Strip 1   • Multiple Vitamin (MULTI VITAMIN MENS PO) Take 1 Tab by mouth every day.     • Empagliflozin 25 MG Tab Take 1 Tab by mouth every day. (Patient not taking: Reported on 5/10/2019) 30 Tab 5     No current [facility]-ordered facility-administered medications on file.        Past Medical History:   Diagnosis Date   • Eczema    • GERD (gastroesophageal reflux disease)    • Type II or unspecified type diabetes mellitus without mention of complication, not stated as uncontrolled    • Umbilical hernia        Past Surgical History:   Procedure Laterality Date   • ROTATOR CUFF REPAIR      rt shoulder   • TONSILLECTOMY AND ADENOIDECTOMY         Social History   Substance Use Topics   • Smoking status: Never Smoker   • Smokeless tobacco: Never Used   • Alcohol use Yes      Comment: rare       Social History     Social History Narrative   • No narrative on file       Family History   Problem Relation Age of Onset   • Cancer Mother         liver   • Cancer Father         lung         ROS   Patient denies any fever, chills,  "unintentional weight gain/loss, fatigue, stroke symptoms, dizziness, headache, nasal congestion, sore-throat, cough, heartburn, chest pain, difficulty breathing, abdominal discomfort, diarrhea/constipation, burning with urination or frequency, joint or back pain, skin rashes, depression or anxiety.       Objective:     /74 (BP Location: Left arm, Patient Position: Sitting, BP Cuff Size: Large adult)   Pulse 87   Temp 36.7 °C (98 °F) (Temporal)   Resp 14   Ht 1.727 m (5' 8\")   Wt 111.6 kg (246 lb)   SpO2 94%  Body mass index is 37.4 kg/m².   Physical Exam:  Constitutional: Alert, no distress.  Skin: Warm, dry, good turgor, no rashes in visible areas.  Eye: Equal, round and reactive, conjunctiva clear, lids normal.  ENMT: Lips without lesions, good dentition, oropharynx clear.  Neck: Trachea midline, no masses, no thyromegaly. No cervical or supraclavicular lymphadenopathy  Respiratory: Unlabored respiratory effort, lungs clear to auscultation, no wheezes, no ronchi.  Cardiovascular: Normal S1, S2, no murmur, no extremity edema.  Abdomen: Soft, non-tender, no masses, no hepatosplenomegaly.  Psych: Alert and oriented x3, appropriate affect and mood.    Diabetic Foot Exam:     Normal gross anatomy of bilateral feet without abnormal curvature or arch, no toe deformities  No ulcers/laceration/blisters present on bilateral feet,   Nails on right foot thickened, yellow.  No difference in skin coloration or temperature between feet,   Bilateral dorsalis pedis and posterior tibial pulses 2+ and equal, Refill less than 2 seconds bilaterally,   Howard 10 g monofilament testing normal in bilateral great toes, bilateral balls of feet at medial/lateral/mid ball of foot      Assessment and Plan:   The following treatment plan was discussed    1. Type 2 diabetes mellitus without complication, without long-term current use of insulin (HCC)  A1 6.7% today. Patient congratulated. No change in meds. Monitor only as needed. " Watch diet. Probably would not tolerate ACE or ARB due to low BP.  - POCT A1C  - Diabetic Monofilament Lower Extremity Exam  - REFERRAL TO OPHTHALMOLOGY  - REFERRAL TO PODIATRY    2. Onychogryphosis  - REFERRAL TO PODIATRY    3. Erectile dysfunction due to diseases classified elsewhere  Trial of therapy. May need higher dose.  - tadalafil (CIALIS) 10 MG tablet; Take 1 Tab by mouth as needed for Erectile Dysfunction.  Dispense: 10 Tab; Refill: 3    4. DVT of deep femoral vein, right (HCC)  Will refer to vascular medicine to determine if he can come of Xarelto.      Followup: Return in about 4 months (around 9/10/2019).    Please note that this dictation was created using voice recognition software. I have made every reasonable attempt to correct obvious errors, but I expect that there are errors of grammar and possibly content that I did not discover before finalizing the note.

## 2019-05-12 NOTE — ASSESSMENT & PLAN NOTE
Has been on Xarelto over 6 months now. Unclear how long he should stay on it, given he has a IVC filter in place. Denies any bleeding.

## 2019-05-12 NOTE — ASSESSMENT & PLAN NOTE
Currently on Januvia and Jardiance only. No low spells, No rashes. No tingling or numbness in feet.

## 2019-05-14 DIAGNOSIS — N52.1 ERECTILE DYSFUNCTION DUE TO DISEASES CLASSIFIED ELSEWHERE: ICD-10-CM

## 2019-05-14 RX ORDER — TADALAFIL 10 MG/1
10 TABLET ORAL PRN
Qty: 10 TAB | Refills: 3 | Status: SHIPPED
Start: 2019-05-14 | End: 2020-01-31 | Stop reason: SDUPTHER

## 2019-05-15 ENCOUNTER — TELEPHONE (OUTPATIENT)
Dept: VASCULAR LAB | Facility: MEDICAL CENTER | Age: 59
End: 2019-05-15

## 2019-05-23 ENCOUNTER — OFFICE VISIT (OUTPATIENT)
Dept: VASCULAR LAB | Facility: MEDICAL CENTER | Age: 59
End: 2019-05-23
Attending: FAMILY MEDICINE
Payer: COMMERCIAL

## 2019-05-23 VITALS
HEART RATE: 77 BPM | HEIGHT: 68 IN | DIASTOLIC BLOOD PRESSURE: 73 MMHG | WEIGHT: 247.7 LBS | BODY MASS INDEX: 37.54 KG/M2 | SYSTOLIC BLOOD PRESSURE: 130 MMHG

## 2019-05-23 DIAGNOSIS — Z95.828 PRESENCE OF INFERIOR VENA CAVA FILTER: ICD-10-CM

## 2019-05-23 DIAGNOSIS — I82.411 DVT OF DEEP FEMORAL VEIN, RIGHT (HCC): ICD-10-CM

## 2019-05-23 DIAGNOSIS — E78.00 PURE HYPERCHOLESTEROLEMIA: ICD-10-CM

## 2019-05-23 DIAGNOSIS — E11.9 TYPE 2 DIABETES MELLITUS WITHOUT COMPLICATION, WITHOUT LONG-TERM CURRENT USE OF INSULIN (HCC): ICD-10-CM

## 2019-05-23 DIAGNOSIS — E66.9 OBESITY (BMI 30-39.9): ICD-10-CM

## 2019-05-23 PROCEDURE — 99212 OFFICE O/P EST SF 10 MIN: CPT | Performed by: FAMILY MEDICINE

## 2019-05-23 PROCEDURE — 99204 OFFICE O/P NEW MOD 45 MIN: CPT | Performed by: FAMILY MEDICINE

## 2019-05-23 ASSESSMENT — ENCOUNTER SYMPTOMS
SHORTNESS OF BREATH: 0
HEADACHES: 0
DOUBLE VISION: 0
BLURRED VISION: 0
ORTHOPNEA: 0
SORE THROAT: 0
WEAKNESS: 0
NERVOUS/ANXIOUS: 0
COUGH: 0
INSOMNIA: 0
NAUSEA: 0
BLOOD IN STOOL: 0
FEVER: 0
DEPRESSION: 0
DIARRHEA: 0
FOCAL WEAKNESS: 0
HEMOPTYSIS: 0
DIZZINESS: 0
TREMORS: 0
SEIZURES: 0
CHILLS: 0
PALPITATIONS: 0
BRUISES/BLEEDS EASILY: 0
ABDOMINAL PAIN: 0
VOMITING: 0
MYALGIAS: 0
WHEEZING: 0

## 2019-05-23 NOTE — PATIENT INSTRUCTIONS
"1) stop xarelto, start aspirin 162mg daily   2) check labs in about 3 weeks   3) HERBERT Rose - will call you about the [facility] IVC filter retrieval program [phone]  As reviewed with you, we have opted for IVC filter retrieval. HERBERT Rose, will coordinate your appointment with our interventional radiology staff to complete the retrieval.  She can be reached at [phone] for questions.    - if there are any complexities with retrieval of the filter locally, our staff will discuss other options with you including coordination with your insurance for filter retrieval to be completed at [facility] IVC filter retrieval program   - please review the educational materials I have provided     General healthly nutrition advice:  - the USDA food pattern (https://www.cnpp.usda.gov/USDAFoodPatterns)  - plate method (https://www.choosemyplate.gov/)  - consume diet that emphasizes intake of vegetables, fruits, and whole grains,  - use low fat diary products, poultry, fish, legumes, nontropical vegetable oils, nuts  - limit intake of sweets, sugar-sweetned beverages, and red meats   - reduce saturated and trans fats to <6% of your daily calories   - consume no more than 2,400mg of sodium daily (look at food labels) or if you have high BP then reduce to no more than 1,500mg of sodium daily     BP lowering diet:   - DASH diet (https://www.heart.org/en/health-topics/high-blood-pressure/changes-you-can-make-to-manage-high-blood-pressure/managing-blood-pressure-with-a-heart-healthy-diet)    Diabetes diet:  - visit diabetes.org to review \"food and fitness\" section and \"Create your plate\" for plate method education (http://www.diabetes.org/food-and-fitness/)     Cholesterol-reducing diets:   - AHA diet  (http://www.heart.org/en/healthy-living/healthy-eating/eat-smart/nutrition-basics/aha-diet-and-lifestyle-recommendations)   - Mediterranean diet " (http://www.heart.org/en/healthy-living/healthy-eating/eat-smart/nutrition-basics/mediterranean-diet)   - lowering triglycerides: (http://my.americanheart.org/idc/groups/ahamah-public/@wcm/@sop/@[facility]/documents/downloadable/[facility]_425988.pdf)     - engage in moderate to vigorous physical activity such as brisk walking, swimming, cycling, >150 minutes per week at 30-40 minutes per session, 3 to 5 times weekly or as directed at today's visit

## 2019-05-23 NOTE — PROGRESS NOTES
INITIAL VASCULAR MED VISIT  Subjective:   Jensen Leigh is a 59 y.o. male who presents today 5/23/2019 for   Chief Complaint   Patient presents with   • New Patient     Length of Therapy   Referred for LOT determination of anticoagulation in context of acute RLE DVT    HPI:    IVC filter:  Has a Ritchie retrievable in place  Date of placement: 2016  Reason for placement: After extensive DVT for PE prevention     VTE disease / Anticoagulation:   Current symptoms:  Denies current SOB, CP, leg swelling, edema, leg pain, cyanosis of digits, redness of extremities.  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  Was unable to walk had severe swelling of leg after 72h.  No CP, SOB.    VTE tx course:  Admitted to ICU in [place], had thrombolysis.  Tried VKA unable to attain.    Any personal VTE hx? No  Any family VTE hx? No    UNPROVOKED VS PROVOKED:   Recent surgery ? No  Recent trauma ? No  Smoker? No  Extended travel? Yes, Details: as noted, intermittent long drives for work 2-3h  MEN:  Colorectal CA screening:no              Prostate CA screening: no   Hx of Testosterone therapy: no  Hypercoaguability work-up completed?  NO    Past Medical History:   Diagnosis Date   • Eczema    • GERD (gastroesophageal reflux disease)    • Type II or unspecified type diabetes mellitus without mention of complication, not stated as uncontrolled    • Umbilical hernia      Past Surgical History:   Procedure Laterality Date   • ROTATOR CUFF REPAIR      rt shoulder   • TONSILLECTOMY AND ADENOIDECTOMY       Family History   Problem Relation Age of Onset   • Cancer Mother         liver   • Cancer Father         lung     History   Smoking  Status   • Never Smoker   Smokeless Tobacco   • Never Used     Social History   Substance Use Topics   • Smoking status: Never Smoker   • Smokeless tobacco: Never Used   • Alcohol use Yes      Comment: rare     Outpatient Encounter Prescriptions as of 5/23/2019   Medication Sig Dispense Refill   • aspirin EC (ECOTRIN) 81 MG Tablet Delayed Response Take 2 Tabs by mouth every day. 30 Tab    • tadalafil (CIALIS) 10 MG tablet Take 1 Tab by mouth as needed for Erectile Dysfunction. 10 Tab 3   • Empagliflozin 25 MG Tab Take 1 Tab by mouth every day. 30 Tab 5   • atorvastatin (LIPITOR) 20 MG Tab Take 1 Tab by mouth every day. 30 Tab 5   • SITagliptin (JANUVIA) 100 MG Tab Take 1 Tab by mouth every day. 30 Tab 5   • Multiple Vitamin (MULTI VITAMIN MENS PO) Take 1 Tab by mouth every day.     • [DISCONTINUED] rivaroxaban (XARELTO) 20 MG Tab tablet Take 1 Tab by mouth with dinner. 30 Tab 5   • Blood Glucose Monitoring Suppl Supplies Misc Test strips order: Test strips for Contour Next meter. Sig: use 2x/d and prn ssx high or low sugar #100 RF x 3 100 Strip 3   • Blood Glucose Monitoring Suppl Device Meter: Dispense 1 Contour Next Device . Sig. Use as directed for blood sugar monitoring. #1. NR. 1 Device 0   • glucose blood (TRUE METRIX BLOOD GLUCOSE TEST) strip 1 Strip by Other route as needed. 300 Strip 1     No facility-administered encounter medications on file as of 5/23/2019.      No Known Allergies     DIET AND EXERCISE:  Weight Change: stable   BMI Readings from Last 4 Encounters:   05/23/19 37.66 kg/m²   05/10/19 37.40 kg/m²   12/13/18 38.32 kg/m²   05/25/18 38.12 kg/m²      Diet: common adult  Exercise: sporadic irregular exercise     Review of Systems   Constitutional: Negative for chills, fever and malaise/fatigue.   HENT: Negative for nosebleeds, sore throat and tinnitus.    Eyes: Negative for blurred vision and double vision.   Respiratory: Negative for cough, hemoptysis, shortness of breath and wheezing.   "  Cardiovascular: Negative for chest pain, palpitations, orthopnea and leg swelling.   Gastrointestinal: Negative for abdominal pain, blood in stool, diarrhea, melena, nausea and vomiting.   Genitourinary: Negative for hematuria.   Musculoskeletal: Negative for joint pain and myalgias.   Skin: Negative for itching and rash.   Neurological: Negative for dizziness, tremors, focal weakness, seizures, weakness and headaches.   Endo/Heme/Allergies: Does not bruise/bleed easily.   Psychiatric/Behavioral: Negative for depression. The patient is not nervous/anxious and does not have insomnia.       Objective:     Vitals:    05/23/19 1619   BP: 130/73   BP Location: Left arm   Patient Position: Sitting   BP Cuff Size: Adult   Pulse: 77   Weight: 112.4 kg (247 lb 11.2 oz)   Height: 1.727 m (5' 8\")      Body mass index is 37.66 kg/m².  Physical Exam   Constitutional: He is oriented to person, place, and time. He appears well-developed and well-nourished. He is cooperative. No distress.   HENT:   Head: Normocephalic and atraumatic.   Mouth/Throat: Oropharynx is clear and moist and mucous membranes are normal.   Eyes: Pupils are equal, round, and reactive to light. Conjunctivae are normal.   Neck: Trachea normal and normal range of motion. Neck supple. Normal carotid pulses and no JVD present. Carotid bruit is not present. No thyromegaly present.   Cardiovascular: Normal rate, regular rhythm, normal heart sounds and intact distal pulses.  Exam reveals no gallop and no friction rub.    No murmur heard.  Pulses:       Carotid pulses are 2+ on the right side, and 2+ on the left side.       Radial pulses are 2+ on the right side, and 2+ on the left side.        Dorsalis pedis pulses are 2+ on the right side, and 2+ on the left side.        Posterior tibial pulses are 2+ on the right side, and 2+ on the left side.   Edema:    RLE: none     LLE: none   Spider telangectasia:       RLE:  None      LLE: none   Varicosities:         RLE: " none      LLE: none   Corona phlebectatica:      RLE:  None        LLE:  None   Cording:         RLE:  None     LLE: None    Pulmonary/Chest: Effort normal and breath sounds normal. No respiratory distress.   Abdominal: Soft. Bowel sounds are normal. He exhibits no distension and no mass. There is no hepatosplenomegaly. There is no tenderness. There is no rebound and no guarding.   Musculoskeletal: He exhibits no edema.   Lymphadenopathy:     He has no cervical adenopathy.   Neurological: He is alert and oriented to person, place, and time. No cranial nerve deficit. Coordination and gait normal.   Skin: Skin is warm and dry. He is not diaphoretic. No cyanosis. No pallor. Nails show no clubbing.   Psychiatric: He has a normal mood and affect.     Lab Results   Component Value Date    CHOLSTRLTOT 135 01/26/2019    LDL 72 01/26/2019    HDL 44 01/26/2019    TRIGLYCERIDE 96 01/26/2019         Lab Results   Component Value Date    HBA1C 6.7 (A) 05/10/2019      Lab Results   Component Value Date    SODIUM 135 01/26/2019    POTASSIUM 4.2 01/26/2019    CHLORIDE 104 01/26/2019    CO2 25 01/26/2019    GLUCOSE 137 (H) 01/26/2019    BUN 15 01/26/2019    CREATININE 0.83 01/26/2019    IFAFRICA >60 01/26/2019    IFNOTAFR >60 01/26/2019        Lab Results   Component Value Date    WBC 8.1 01/26/2019    RBC 5.72 01/26/2019    HEMOGLOBIN 16.4 01/26/2019    HEMATOCRIT 52.0 01/26/2019    MCV 90.9 01/26/2019    MCH 28.7 01/26/2019    MCHC 31.5 (L) 01/26/2019    MPV 10.4 01/26/2019      VASCULAR IMAGING:     Last EKG: No results found for this or any previous visit.    RLE venous 4/26/18  1.  No evidence of acute right lower extremity deep venous thrombosis.  2.  There is evidence of chronic thrombus or fibrosis in the common femoral vein, proximal femoral vein and profunda.    Medical Decision Making:  Today's Assessment / Status / Plan:     1. DVT of deep femoral vein, right (HCC)  FACTOR II DNA ANALYSIS    FACTOR V LEIDEN MUTATION     BETA-2 GLYCOPROTEIN AB,IGG,IGM    ANTICARDIOLIPIN AB IGG IGM    D-DIMER    ANTITHROMBIN III FUNC/IMMUNOL    PROTEIN C FUNCTIONAL    PROTEIN S FUNCTIONAL    LUPUS ANTICOAGULANT   2. Presence of inferior vena cava filter     3. Pure hypercholesterolemia     4. Obesity (BMI 30-39.9)     5. Type 2 diabetes mellitus without complication, without long-term current use of insulin (HCC)       Patient Type: Primary Prevention    Etiology of Established CVD if Present: none     Anticoagulation:  Indication for anticoagulation: acute LLE DVT   Anti-Platelet/Anti-Coagulant Tx: yes  Pt had extensive iliofem DVT requiring thrombolysis in 2016.  Had no prior VTE.  Appear unprovoked but possibly minor provoking of long drives and sedentary.  Had completed close to 3 years of consistent AC.  Standard guideline would be 3-6mo and then determination if further tx needed based upon risks.  At this time pt does have risk of estimated 15% over next 5 years of recurrent VTE off AC, however, he no longer wishes to take the medication.  Through shared decision-making and reviewing of tx options, pt acknowledges risk of d/c of AC and wishes to proceed.  In light of the large and potentially unprovoked nature of the DVT, we have opted to complete a hypercoag w/u as well.    Anti-Coagulation Plan:  - stop xarelto, start asa 162mg daily indefinitely for now   - update hypercoag labs   - counseled on signs and symptoms of acute VTE that require seeking prompt attention in the ED to include shortness of breath, chest pain, pain with deep inhalation, acute leg swelling and/or pain in calf or leg     IVC filter  Discussed the risks and benefits of leaving the IVC filter in place versus removing it.  Risks of leaving filter in place, although rare in occurrence, include filter fracture, development of clot above filter causing PE, and/or mobility of filter.  Explained procedure for removal.  Discussed possibility of complications such as inability  to remove filter if a thrombosis is found within filter or if it is embeded in the vessel it will be left in place.    Future Surgeries: none  Bleeding complications while on anticoagulation: none, completed   Decision:  Pt has retained a retrievable izzy IVC filter placed in 6/2016.    - recommended for IVC filter retrieval as no longer has indications  - stable for IVC filter removal.    - reviewed of options including maintenance of filter and retrieval, and through [id number] has opted for retrieval in line with current FDA recommendations for filter retrieval when no longer indicated   - APRN will coordinate with IR to verify if they can remove locally and/or coordinate with [place] IVC filter retrieval program.   - I have provided info on the [place] program, and pt will actively review    - provided review of anatomy and basics of retrieval procedure in addition to a pt educ handout.    Lipid Management: Qualifies for Statin Therapy Based on 2013 ACC/AHA Guidelines: yes  Calculated 10-Year Risk of ASCVD: N/A  Currently on Statin: yes  NLA Risk Category:   High:  3+ major RFs, T2D and 0-1 RF and no evidence of end-org damage  Tx threshold: non-HDL-C >129, LDL-C >99  Tx goal:  non-HDL <130, LDL-C <100 (optional: apoB<90)   At goal:  yes  Plan:  - reinforced ongoing TLC measures   - continue atorva 20mg   - defer mgmt to PCP for ongoing care     Blood Pressure Management:Goal: ACC/AHA (2017) goal <130/80  Home BP at goal:  yes  Office BP at goal:  yes  Echo: N/A  ACR: normal  Plan:   Monitoring:   - continue home BP monitoring, reviewed correct technique:  Yes   - order 24h ABPM:  NO  - monitor lytes/gfr routinely   - defer ongoing mgmt to PCP   Medications:  ACEi/ARB:  A low dose RAAS blocker agent would be considered in the setting of T2D especially if elevated ACR   DHP-CCB: none  Thiazide: none  Other: none     Glycemic Status: Diabetic, T2  Last A1c = 6.7  Goal A1c <7.0  ACR:  normal  Plan:  - continue current meds  - recommmend for routine care with PCP (or endocrine) to include regular A1c monitoring, annual albumin/creatinine ratio (ACR), annual diabetic retinopathy screening, foot exams, annual flu vaccine, and updates to pneumonia vaccines as appropriate     Smoking: continued complete avoidance of all tobacco products     Physical Activity: continue healthy activity to improve CV fitness, see care instructions for additional details     Weight Management and Nutrition: Dietary plan was discussed with patient at this visit including DASH, low sodium and/or as outlined in care instructions     Instructed to follow-up with PCP for remainder of adult medical needs: yes  We will partner with other providers in the management of established vascular disease and cardiometabolic risk factors.    Studies to Be Obtained: none   Labs to Be Obtained:  Defer to PCP     Follow up in: [facility] to coordinate for IVC retrieval as noted above     Bal Medley M.D.

## 2019-05-28 ENCOUNTER — TELEPHONE (OUTPATIENT)
Dept: VASCULAR LAB | Facility: MEDICAL CENTER | Age: 59
End: 2019-05-28

## 2019-05-28 DIAGNOSIS — Z95.828 PRESENCE OF IVC FILTER: ICD-10-CM

## 2019-05-28 NOTE — TELEPHONE ENCOUNTER
Spoke with pt over phone regarding IVC filter retrieval program at [facility].  Explained referral process and placed referral to [place] [facility].    Christi GODINEZ  [place] [facility]

## 2019-06-14 ENCOUNTER — TELEPHONE (OUTPATIENT)
Dept: VASCULAR LAB | Facility: MEDICAL CENTER | Age: 59
End: 2019-06-14

## 2019-06-14 DIAGNOSIS — Z95.828 PRESENCE OF IVC FILTER: ICD-10-CM

## 2019-06-14 NOTE — TELEPHONE ENCOUNTER
Pt originally referred to [place] for IVC filter retrieval.    Recommend pt be evaluated here in [place] by Vascular surgery.  Order placed in system and left msg for pt regarding plan.    Christi GODINEZ  [place] [facility]

## 2019-06-17 ENCOUNTER — HOSPITAL ENCOUNTER (OUTPATIENT)
Dept: LAB | Facility: MEDICAL CENTER | Age: 59
End: 2019-06-17
Attending: FAMILY MEDICINE
Payer: COMMERCIAL

## 2019-06-17 DIAGNOSIS — I82.411 DVT OF DEEP FEMORAL VEIN, RIGHT (HCC): ICD-10-CM

## 2019-06-17 LAB — D DIMER PPP IA.FEU-MCNC: 1 UG/ML (FEU) (ref 0–0.5)

## 2019-06-17 PROCEDURE — 81240 F2 GENE: CPT

## 2019-06-17 PROCEDURE — 86146 BETA-2 GLYCOPROTEIN ANTIBODY: CPT | Mod: 91

## 2019-06-17 PROCEDURE — 85610 PROTHROMBIN TIME: CPT

## 2019-06-17 PROCEDURE — 85300 ANTITHROMBIN III ACTIVITY: CPT

## 2019-06-17 PROCEDURE — 86147 CARDIOLIPIN ANTIBODY EA IG: CPT

## 2019-06-17 PROCEDURE — 36415 COLL VENOUS BLD VENIPUNCTURE: CPT

## 2019-06-17 PROCEDURE — 81241 F5 GENE: CPT

## 2019-06-17 PROCEDURE — 85520 HEPARIN ASSAY: CPT

## 2019-06-17 PROCEDURE — 85306 CLOT INHIBIT PROT S FREE: CPT

## 2019-06-17 PROCEDURE — 85303 CLOT INHIBIT PROT C ACTIVITY: CPT

## 2019-06-17 PROCEDURE — 85613 RUSSELL VIPER VENOM DILUTED: CPT

## 2019-06-17 PROCEDURE — 85379 FIBRIN DEGRADATION QUANT: CPT

## 2019-06-17 PROCEDURE — 85730 THROMBOPLASTIN TIME PARTIAL: CPT

## 2019-06-18 LAB
APTT PPP: 28.7 SEC (ref 24.7–36)
INR PPP: 1.01 (ref 0.87–1.13)
LA PPP-IMP: NORMAL
PROTHROMBIN TIME: 13.5 SEC (ref 12–14.6)
SCREEN DRVVT: 38.1 SEC (ref 28–48)
UFH PPP CHRO-ACNC: <0.1 U/ML

## 2019-06-19 LAB
AT III ACT/NOR PPP CHRO: 90 % (ref 76–128)
B2 GLYCOPROT1 IGG SERPL IA-ACNC: 2 SGU (ref 0–20)
B2 GLYCOPROT1 IGM SERPL IA-ACNC: 1 SMU (ref 0–20)
CARDIOLIPIN IGG SER IA-ACNC: 14 GPL (ref 0–14)
CARDIOLIPIN IGM SER IA-ACNC: 5 MPL (ref 0–12)
PROT C ACT/NOR PPP: 120 % (ref 83–168)
PROT S ACT/NOR PPP: 107 % (ref 66–143)

## 2019-06-21 LAB
F2 C.20210G>A GENO BLD/T: NEGATIVE
F5 P.R506Q BLD/T QL: NEGATIVE

## 2019-06-26 ENCOUNTER — TELEPHONE (OUTPATIENT)
Dept: VASCULAR LAB | Facility: MEDICAL CENTER | Age: 59
End: 2019-06-26

## 2019-06-26 NOTE — TELEPHONE ENCOUNTER
Spoke with pt's wife over phone regarding recent hypercoag panel which came back negative.    Recommended he continue ASA with close monitoring for any s/s of recurrent VTE as discussed.  He also has an IVC filter which he would like to see a vascular surgeon for possible removal.  His insurance requires the referral come from his PCP-- his wife states she will contact him for the referral.    Christi Snider APRMONY  [place] [facility]

## 2019-07-17 ENCOUNTER — OFFICE VISIT (OUTPATIENT)
Dept: MEDICAL GROUP | Facility: CLINIC | Age: 59
End: 2019-07-17
Payer: COMMERCIAL

## 2019-07-17 VITALS
HEIGHT: 68 IN | DIASTOLIC BLOOD PRESSURE: 60 MMHG | TEMPERATURE: 98.8 F | OXYGEN SATURATION: 94 % | HEART RATE: 77 BPM | BODY MASS INDEX: 37.13 KG/M2 | SYSTOLIC BLOOD PRESSURE: 110 MMHG | WEIGHT: 245 LBS

## 2019-07-17 DIAGNOSIS — L60.2 ONYCHOGRYPHOSIS: ICD-10-CM

## 2019-07-17 DIAGNOSIS — R63.2 EXCESSIVE HUNGER: ICD-10-CM

## 2019-07-17 DIAGNOSIS — R45.86 MOOD SWINGS: ICD-10-CM

## 2019-07-17 PROBLEM — R41.840 ATTENTION DEFICIT: Status: ACTIVE | Noted: 2019-07-17

## 2019-07-17 PROBLEM — F15.11 HISTORY OF METHAMPHETAMINE ABUSE (HCC): Status: ACTIVE | Noted: 2019-07-17

## 2019-07-17 PROCEDURE — 99214 OFFICE O/P EST MOD 30 MIN: CPT | Performed by: FAMILY MEDICINE

## 2019-07-17 RX ORDER — TERBINAFINE HYDROCHLORIDE 250 MG/1
TABLET ORAL
Qty: 28 TAB | Refills: 0 | Status: SHIPPED
Start: 2019-07-17 | End: 2020-01-31

## 2019-07-18 DIAGNOSIS — E11.9 TYPE 2 DIABETES MELLITUS WITHOUT COMPLICATION, WITHOUT LONG-TERM CURRENT USE OF INSULIN (HCC): ICD-10-CM

## 2019-07-18 NOTE — ASSESSMENT & PLAN NOTE
Wife concerned about his eating habits. Compulsive eater. I reviewed his BS diary, all running in low 100's.

## 2019-07-18 NOTE — ASSESSMENT & PLAN NOTE
Wife concerned about his mood swings. Trouble concentrating. He did not have this problem in past when he was on meth and cocaine. Recently took one of her stimulants, became calm, less argumentative, more focussed. Might have adult adhd. Is however very functional and performs well at his job.

## 2019-07-18 NOTE — PROGRESS NOTES
Complaint: Eating too much, grumpy, toenail fungus     Subjective:     Jensen Leigh is a 59 y.o. male here today accompanied by his wife.    Excessive hunger  Wife concerned about his eating habits. Compulsive eater. I reviewed his BS diary, all running in low 100's.     Onychogryphosis  Saw podiatrist, did not want to start antifungal treatment, concerned about his DM.    Mood swings  Wife concerned about his mood swings. Trouble concentrating. He did not have this problem in past when he was on meth and cocaine. Recently took one of her stimulants, became calm, less argumentative, more focussed. Might have adult adhd. Is however very functional and performs well at his job.     No other concerns or complaints today.    Current medicines (including changes today)  Current Outpatient Prescriptions   Medication Sig Dispense Refill   • terbinafine (LAMISIL) 250 MG Tab Take 1 tab daily for 1 week /month, x 4 months. 28 Tab 0   • aspirin EC (ECOTRIN) 81 MG Tablet Delayed Response Take 2 Tabs by mouth every day. 30 Tab    • Empagliflozin 25 MG Tab Take 1 Tab by mouth every day. 30 Tab 5   • atorvastatin (LIPITOR) 20 MG Tab Take 1 Tab by mouth every day. 30 Tab 5   • SITagliptin (JANUVIA) 100 MG Tab Take 1 Tab by mouth every day. 30 Tab 5   • Blood Glucose Monitoring Suppl Supplies Misc Test strips order: Test strips for Contour Next meter. Sig: use 2x/d and prn ssx high or low sugar #100 RF x 3 100 Strip 3   • Blood Glucose Monitoring Suppl Device Meter: Dispense 1 Contour Next Device . Sig. Use as directed for blood sugar monitoring. #1. NR. 1 Device 0   • Multiple Vitamin (MULTI VITAMIN MENS PO) Take 1 Tab by mouth every day.     • tadalafil (CIALIS) 10 MG tablet Take 1 Tab by mouth as needed for Erectile Dysfunction. 10 Tab 3   • glucose blood (TRUE METRIX BLOOD GLUCOSE TEST) strip 1 Strip by Other route as needed. 300 Strip 1     No current facility-administered medications for this visit.      He  has a past medical  history of Eczema; GERD (gastroesophageal reflux disease); Type II or unspecified type diabetes mellitus without mention of complication, not stated as uncontrolled; and Umbilical hernia.    Health Maintenance: making appointment to get eyes checked.      Allergies: Patient has no known allergies.    Current Outpatient Prescriptions Ordered in [facility]   Medication Sig Dispense Refill   • terbinafine (LAMISIL) 250 MG Tab Take 1 tab daily for 1 week /month, x 4 months. 28 Tab 0   • aspirin EC (ECOTRIN) 81 MG Tablet Delayed Response Take 2 Tabs by mouth every day. 30 Tab    • Empagliflozin 25 MG Tab Take 1 Tab by mouth every day. 30 Tab 5   • atorvastatin (LIPITOR) 20 MG Tab Take 1 Tab by mouth every day. 30 Tab 5   • SITagliptin (JANUVIA) 100 MG Tab Take 1 Tab by mouth every day. 30 Tab 5   • Blood Glucose Monitoring Suppl Supplies Misc Test strips order: Test strips for Contour Next meter. Sig: use 2x/d and prn ssx high or low sugar #100 RF x 3 100 Strip 3   • Blood Glucose Monitoring Suppl Device Meter: Dispense 1 Contour Next Device . Sig. Use as directed for blood sugar monitoring. #1. NR. 1 Device 0   • Multiple Vitamin (MULTI VITAMIN MENS PO) Take 1 Tab by mouth every day.     • tadalafil (CIALIS) 10 MG tablet Take 1 Tab by mouth as needed for Erectile Dysfunction. 10 Tab 3   • glucose blood (TRUE METRIX BLOOD GLUCOSE TEST) strip 1 Strip by Other route as needed. 300 Strip 1     No current Epic-ordered facility-administered medications on file.        Past Medical History:   Diagnosis Date   • Eczema    • GERD (gastroesophageal reflux disease)    • Type II or unspecified type diabetes mellitus without mention of complication, not stated as uncontrolled    • Umbilical hernia        Past Surgical History:   Procedure Laterality Date   • ROTATOR CUFF REPAIR      rt shoulder   • TONSILLECTOMY AND ADENOIDECTOMY         Social History   Substance Use Topics   • Smoking status: Never Smoker   • Smokeless tobacco: Never  "Used   • Alcohol use Yes      Comment: rare       Social History     Social History Narrative   • No narrative on file       Family History   Problem Relation Age of Onset   • Cancer Mother         liver   • Cancer Father         lung         ROS Positive for inattentiveness, mood swings, discolored and thickended nails.  Patient denies any fever, chills, unintentional weight gain/loss, fatigue, stroke symptoms, dizziness, headache, nasal congestion, sore-throat, cough, heartburn, chest pain, difficulty breathing, abdominal discomfort, diarrhea/constipation, burning with urination or frequency, joint or back pain, skin rashes, depression or anxiety.       Objective:     /60 (BP Location: Left arm, Patient Position: Sitting, BP Cuff Size: Large adult)   Pulse 77   Temp 37.1 °C (98.8 °F) (Temporal)   Ht 1.727 m (5' 8\")   Wt 111.1 kg (245 lb)   SpO2 94%  Body mass index is 37.25 kg/m².   Physical Exam:  Constitutional: Alert, no distress.  No formal examPsych: Alert and oriented x3, appropriate affect and mood.        Assessment and Plan:     The following treatment plan was discussed    1. Mood swings  Discussed options. Patient agreeable to formal evaluation by psych. I would be ok to assume rx of stimulants if psych ok's.  - REFERRAL TO PSYCHIATRY    2. Excessive hunger  Since no affect on lipids or BS, no action for time being.    3. Onychogryphosis  Discussed treatment options. Continue daily trimming of nail. Pulse therapy for fungus infection. Made aware of effects on liver, when to stop. Last CMP with normal TA's.  - terbinafine (LAMISIL) 250 MG Tab; Take 1 tab daily for 1 week /month, x 4 months.  Dispense: 28 Tab; Refill: 0      Followup: Return in about 3 months (around 10/17/2019).    Please note that this dictation was created using voice recognition software. I have made every reasonable attempt to correct obvious errors, but I expect that there are errors of grammar and possibly content that I " did not discover before finalizing the note.

## 2019-07-22 DIAGNOSIS — E11.9 TYPE 2 DIABETES MELLITUS WITHOUT COMPLICATION, WITHOUT LONG-TERM CURRENT USE OF INSULIN (HCC): ICD-10-CM

## 2019-07-22 NOTE — TELEPHONE ENCOUNTER
----- Message from Jensen Leigh sent at 7/19/2019  4:24 PM PDT -----  Regarding: Prescription Question  Contact: [phone]  Can you please call in refill of Jardiance he has no refills left and pharmacy says they have not received anything from your office. Thank you  Hayley Leigh

## 2019-10-16 DIAGNOSIS — E78.00 PURE HYPERCHOLESTEROLEMIA: ICD-10-CM

## 2019-10-17 RX ORDER — ATORVASTATIN CALCIUM 20 MG/1
TABLET, FILM COATED ORAL
Qty: 90 TAB | Refills: 0 | Status: SHIPPED | OUTPATIENT
Start: 2019-10-17 | End: 2020-01-15

## 2019-11-04 ENCOUNTER — TELEMEDICINE2 (OUTPATIENT)
Dept: BEHAVIORAL HEALTH | Facility: CLINIC | Age: 59
End: 2019-11-04
Payer: COMMERCIAL

## 2019-11-04 VITALS
HEART RATE: 88 BPM | SYSTOLIC BLOOD PRESSURE: 134 MMHG | HEIGHT: 68 IN | OXYGEN SATURATION: 98 % | TEMPERATURE: 98.8 F | BODY MASS INDEX: 36.98 KG/M2 | RESPIRATION RATE: 16 BRPM | DIASTOLIC BLOOD PRESSURE: 78 MMHG | WEIGHT: 244 LBS

## 2019-11-04 DIAGNOSIS — F32.A DEPRESSIVE DISORDER: ICD-10-CM

## 2019-11-04 DIAGNOSIS — F90.2 ADHD (ATTENTION DEFICIT HYPERACTIVITY DISORDER), COMBINED TYPE: ICD-10-CM

## 2019-11-04 DIAGNOSIS — F63.81 INTERMITTENT EXPLOSIVE DISORDER: ICD-10-CM

## 2019-11-04 PROCEDURE — 99204 OFFICE O/P NEW MOD 45 MIN: CPT | Performed by: NURSE PRACTITIONER

## 2019-11-04 RX ORDER — DEXTROAMPHETAMINE SACCHARATE, AMPHETAMINE ASPARTATE MONOHYDRATE, DEXTROAMPHETAMINE SULFATE AND AMPHETAMINE SULFATE 1.25; 1.25; 1.25; 1.25 MG/1; MG/1; MG/1; MG/1
5 CAPSULE, EXTENDED RELEASE ORAL EVERY MORNING
Qty: 30 CAP | Refills: 0 | Status: SHIPPED
Start: 2019-11-04 | End: 2019-11-04 | Stop reason: CLARIF

## 2019-11-04 RX ORDER — DEXTROAMPHETAMINE SACCHARATE, AMPHETAMINE ASPARTATE MONOHYDRATE, DEXTROAMPHETAMINE SULFATE AND AMPHETAMINE SULFATE 1.25; 1.25; 1.25; 1.25 MG/1; MG/1; MG/1; MG/1
5 CAPSULE, EXTENDED RELEASE ORAL EVERY MORNING
Qty: 30 CAP | Refills: 0 | Status: SHIPPED | OUTPATIENT
Start: 2019-11-04 | End: 2019-12-02 | Stop reason: SDUPTHER

## 2019-11-04 ASSESSMENT — PATIENT HEALTH QUESTIONNAIRE - PHQ9
SUM OF ALL RESPONSES TO PHQ QUESTIONS 1-9: 7
CLINICAL INTERPRETATION OF PHQ2 SCORE: 2
5. POOR APPETITE OR OVEREATING: 2 - MORE THAN HALF THE DAYS
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
## 2019-12-02 DIAGNOSIS — F90.2 ADHD (ATTENTION DEFICIT HYPERACTIVITY DISORDER), COMBINED TYPE: ICD-10-CM

## 2019-12-02 RX ORDER — DEXTROAMPHETAMINE SACCHARATE, AMPHETAMINE ASPARTATE MONOHYDRATE, DEXTROAMPHETAMINE SULFATE AND AMPHETAMINE SULFATE 1.25; 1.25; 1.25; 1.25 MG/1; MG/1; MG/1; MG/1
5 CAPSULE, EXTENDED RELEASE ORAL EVERY MORNING
Qty: 15 CAP | Refills: 0 | Status: SHIPPED | OUTPATIENT
Start: 2019-12-09 | End: 2019-12-17

## 2019-12-02 RX ORDER — DEXTROAMPHETAMINE SACCHARATE, AMPHETAMINE ASPARTATE MONOHYDRATE, DEXTROAMPHETAMINE SULFATE AND AMPHETAMINE SULFATE 1.25; 1.25; 1.25; 1.25 MG/1; MG/1; MG/1; MG/1
5 CAPSULE, EXTENDED RELEASE ORAL EVERY MORNING
Qty: 10 CAP | Refills: 0 | Status: SHIPPED | OUTPATIENT
Start: 2019-12-02 | End: 2019-12-02

## 2019-12-02 NOTE — TELEPHONE ENCOUNTER
15-day supply refill of Adderall to get him prior to his next appointment with this provider.  Mailed to [facility], [id number] in [facility].    Was the patient seen in the last year in this department? Yes    Does patient have an active prescription for medications requested? Yes    Received Request Via: Patient

## 2019-12-17 ENCOUNTER — OFFICE VISIT (OUTPATIENT)
Dept: BEHAVIORAL HEALTH | Facility: CLINIC | Age: 59
End: 2019-12-17
Payer: COMMERCIAL

## 2019-12-17 VITALS
BODY MASS INDEX: 35.92 KG/M2 | RESPIRATION RATE: 16 BRPM | DIASTOLIC BLOOD PRESSURE: 89 MMHG | SYSTOLIC BLOOD PRESSURE: 128 MMHG | HEART RATE: 79 BPM | HEIGHT: 68 IN | WEIGHT: 237 LBS

## 2019-12-17 DIAGNOSIS — F90.2 ADHD (ATTENTION DEFICIT HYPERACTIVITY DISORDER), COMBINED TYPE: ICD-10-CM

## 2019-12-17 PROCEDURE — 99213 OFFICE O/P EST LOW 20 MIN: CPT | Performed by: NURSE PRACTITIONER

## 2019-12-17 RX ORDER — DEXTROAMPHETAMINE SACCHARATE, AMPHETAMINE ASPARTATE MONOHYDRATE, DEXTROAMPHETAMINE SULFATE AND AMPHETAMINE SULFATE 2.5; 2.5; 2.5; 2.5 MG/1; MG/1; MG/1; MG/1
10 CAPSULE, EXTENDED RELEASE ORAL EVERY MORNING
Qty: 30 CAP | Refills: 0 | Status: SHIPPED | OUTPATIENT
Start: 2019-12-17 | End: 2020-01-16

## 2019-12-17 RX ORDER — DEXTROAMPHETAMINE SACCHARATE, AMPHETAMINE ASPARTATE MONOHYDRATE, DEXTROAMPHETAMINE SULFATE AND AMPHETAMINE SULFATE 2.5; 2.5; 2.5; 2.5 MG/1; MG/1; MG/1; MG/1
10 CAPSULE, EXTENDED RELEASE ORAL EVERY MORNING
Qty: 30 CAP | Refills: 0 | Status: SHIPPED | OUTPATIENT
Start: 2020-01-15 | End: 2020-01-28
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
## 2020-01-27 PROBLEM — F63.81 INTERMITTENT EXPLOSIVE DISORDER: Status: ACTIVE | Noted: 2020-01-27
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
## 2020-01-28 ENCOUNTER — OFFICE VISIT (OUTPATIENT)
Dept: BEHAVIORAL HEALTH | Facility: CLINIC | Age: 60
End: 2020-01-28
Payer: COMMERCIAL

## 2020-01-28 VITALS
HEART RATE: 74 BPM | SYSTOLIC BLOOD PRESSURE: 125 MMHG | RESPIRATION RATE: 16 BRPM | DIASTOLIC BLOOD PRESSURE: 87 MMHG | HEIGHT: 68 IN | WEIGHT: 236 LBS | BODY MASS INDEX: 35.77 KG/M2

## 2020-01-28 DIAGNOSIS — F90.2 ADHD (ATTENTION DEFICIT HYPERACTIVITY DISORDER), COMBINED TYPE: ICD-10-CM

## 2020-01-28 DIAGNOSIS — F32.A DEPRESSIVE DISORDER: ICD-10-CM

## 2020-01-28 DIAGNOSIS — F63.81 INTERMITTENT EXPLOSIVE DISORDER: ICD-10-CM

## 2020-01-28 PROCEDURE — 99213 OFFICE O/P EST LOW 20 MIN: CPT | Performed by: NURSE PRACTITIONER

## 2020-01-28 RX ORDER — DEXTROAMPHETAMINE SACCHARATE, AMPHETAMINE ASPARTATE MONOHYDRATE, DEXTROAMPHETAMINE SULFATE AND AMPHETAMINE SULFATE 5; 5; 5; 5 MG/1; MG/1; MG/1; MG/1
20 CAPSULE, EXTENDED RELEASE ORAL EVERY MORNING
Qty: 30 CAP | Refills: 0 | Status: SHIPPED | OUTPATIENT
Start: 2020-02-05 | End: 2020-03-04 | Stop reason: SDUPTHER

## 2020-01-31 ENCOUNTER — OFFICE VISIT (OUTPATIENT)
Dept: MEDICAL GROUP | Facility: CLINIC | Age: 60
End: 2020-01-31
Payer: COMMERCIAL

## 2020-01-31 VITALS
HEIGHT: 68 IN | DIASTOLIC BLOOD PRESSURE: 74 MMHG | TEMPERATURE: 97.3 F | RESPIRATION RATE: 16 BRPM | SYSTOLIC BLOOD PRESSURE: 120 MMHG | OXYGEN SATURATION: 95 % | BODY MASS INDEX: 35.61 KG/M2 | WEIGHT: 235 LBS | HEART RATE: 82 BPM

## 2020-01-31 DIAGNOSIS — N52.1 ERECTILE DYSFUNCTION DUE TO DISEASES CLASSIFIED ELSEWHERE: ICD-10-CM

## 2020-01-31 DIAGNOSIS — E78.00 PURE HYPERCHOLESTEROLEMIA: ICD-10-CM

## 2020-01-31 DIAGNOSIS — E11.9 TYPE 2 DIABETES MELLITUS WITHOUT COMPLICATION, WITHOUT LONG-TERM CURRENT USE OF INSULIN (HCC): ICD-10-CM

## 2020-01-31 DIAGNOSIS — L60.2 ONYCHOGRYPHOSIS: ICD-10-CM

## 2020-01-31 PROBLEM — R63.2 EXCESSIVE HUNGER: Status: RESOLVED | Noted: 2019-07-17 | Resolved: 2020-01-31

## 2020-01-31 PROBLEM — R45.86 MOOD SWINGS: Status: RESOLVED | Noted: 2019-07-17 | Resolved: 2020-01-31

## 2020-01-31 PROBLEM — R25.2 LEG CRAMPS: Status: RESOLVED | Noted: 2018-05-26 | Resolved: 2020-01-31

## 2020-01-31 PROBLEM — S16.1XXA NECK STRAIN: Status: RESOLVED | Noted: 2018-05-25 | Resolved: 2020-01-31

## 2020-01-31 PROBLEM — M72.2 PLANTAR FASCIITIS OF LEFT FOOT: Status: RESOLVED | Noted: 2018-12-13 | Resolved: 2020-01-31

## 2020-01-31 LAB
HBA1C MFR BLD: 6.2 % (ref 0–5.6)
INT CON NEG: ABNORMAL
INT CON POS: ABNORMAL

## 2020-01-31 PROCEDURE — 83036 HEMOGLOBIN GLYCOSYLATED A1C: CPT | Performed by: FAMILY MEDICINE

## 2020-01-31 PROCEDURE — 99214 OFFICE O/P EST MOD 30 MIN: CPT | Performed by: FAMILY MEDICINE

## 2020-01-31 RX ORDER — LISINOPRIL 2.5 MG/1
2.5 TABLET ORAL DAILY
Qty: 90 TAB | Refills: 1 | Status: SHIPPED | OUTPATIENT
Start: 2020-01-31 | End: 2020-08-04 | Stop reason: SDUPTHER

## 2020-01-31 RX ORDER — ATORVASTATIN CALCIUM 20 MG/1
TABLET, FILM COATED ORAL
Qty: 90 TAB | Refills: 1 | Status: SHIPPED | OUTPATIENT
Start: 2020-01-31 | End: 2020-08-12 | Stop reason: SDUPTHER

## 2020-01-31 RX ORDER — TADALAFIL 10 MG/1
10 TABLET ORAL PRN
Qty: 10 TAB | Refills: 3 | Status: SHIPPED | OUTPATIENT
Start: 2020-01-31 | End: 2020-08-12 | Stop reason: SDUPTHER

## 2020-01-31 NOTE — ASSESSMENT & PLAN NOTE
On Januvia and Jardaince. Checks BS twice a day. No rashes, no tingling or numbness in hands or feet.

## 2020-01-31 NOTE — PROGRESS NOTES
Complaint: 6 month f/u DM, needs refills     Subjective:     Jensen Leigh is a 59 y.o. male here today for f/u. Doing well. Sees Tom at [facility], now on Adderall. No more mood swings or explosive reactions.    Type 2 diabetes mellitus without complication (CMS-HCC)  On Januvia and Jardaince. Checks BS twice a day. No rashes, no tingling or numbness in hands or feet.    Pure hypercholesterolemia  Currently on Lipitor 20 mg qd. Needs refill.    Onychogryphosis  Went thru his complete Lamisil treatment, still yellow and flaky.     Erectile dysfunction due to diseases classified elsewhere  Needs refill Cialis.     No other concerns or complaints today.    Current medicines (including changes today)  Current Outpatient Medications   Medication Sig Dispense Refill   • tadalafil (CIALIS) 10 MG tablet Take 1 Tab by mouth as needed for Erectile Dysfunction. 10 Tab 3   • atorvastatin (LIPITOR) 20 MG Tab TAKE 1 TABLET BY MOUTH ONCE DAILY 90 Tab 1   • Empagliflozin (JARDIANCE) 25 MG Tab Take 1 Tab by mouth every morning. 90 Tab 1   • SITagliptin (JANUVIA) 100 MG Tab Take 1 Tab by mouth every day. 90 Tab 1   • lisinopril (PRINIVIL) 2.5 MG Tab Take 1 Tab by mouth every day. (for kidney protection) 90 Tab 1   • [START ON 2/5/2020] amphetamine-dextroamphetamine (ADDERALL XR) 20 MG per XR capsule Take 1 Cap by mouth every morning for 30 days. 30 Cap 0   • sertraline (ZOLOFT) 50 MG Tab Take 1 Tab by mouth every day. 90 Tab 0   • aspirin EC (ECOTRIN) 81 MG Tablet Delayed Response Take 2 Tabs by mouth every day. 30 Tab    • Multiple Vitamin (MULTI VITAMIN MENS PO) Take 1 Tab by mouth every day.     • Blood Glucose Test Strips Test strips order: Test strips for Contour Next meter. Sig: use 2x/d and prn ssx high or low sugar #100 RF x 3 100 Strip 3   • Blood Glucose Monitoring Suppl Device Meter: Dispense 1 Contour Next Device . Sig. Use as directed for blood sugar monitoring. #1. NR. 1 Device 0   • glucose blood (TRUE METRIX BLOOD GLUCOSE  TEST) strip 1 Strip by Other route as needed. 300 Strip 1     No current facility-administered medications for this visit.      He  has a past medical history of Eczema, GERD (gastroesophageal reflux disease), Type II or unspecified type diabetes mellitus without mention of complication, not stated as uncontrolled, and Umbilical hernia.    Health Maintenance: declines all vacciantions      Allergies: Patient has no known allergies.    Current Outpatient Medications Ordered in Epic   Medication Sig Dispense Refill   • tadalafil (CIALIS) 10 MG tablet Take 1 Tab by mouth as needed for Erectile Dysfunction. 10 Tab 3   • atorvastatin (LIPITOR) 20 MG Tab TAKE 1 TABLET BY MOUTH ONCE DAILY 90 Tab 1   • Empagliflozin (JARDIANCE) 25 MG Tab Take 1 Tab by mouth every morning. 90 Tab 1   • SITagliptin (JANUVIA) 100 MG Tab Take 1 Tab by mouth every day. 90 Tab 1   • lisinopril (PRINIVIL) 2.5 MG Tab Take 1 Tab by mouth every day. (for kidney protection) 90 Tab 1   • [START ON 2/5/2020] amphetamine-dextroamphetamine (ADDERALL XR) 20 MG per XR capsule Take 1 Cap by mouth every morning for 30 days. 30 Cap 0   • sertraline (ZOLOFT) 50 MG Tab Take 1 Tab by mouth every day. 90 Tab 0   • aspirin EC (ECOTRIN) 81 MG Tablet Delayed Response Take 2 Tabs by mouth every day. 30 Tab    • Multiple Vitamin (MULTI VITAMIN MENS PO) Take 1 Tab by mouth every day.     • Blood Glucose Test Strips Test strips order: Test strips for Contour Next meter. Sig: use 2x/d and prn ssx high or low sugar #100 RF x 3 100 Strip 3   • Blood Glucose Monitoring Suppl Device Meter: Dispense 1 Contour Next Device . Sig. Use as directed for blood sugar monitoring. #1. NR. 1 Device 0   • glucose blood (TRUE METRIX BLOOD GLUCOSE TEST) strip 1 Strip by Other route as needed. 300 Strip 1     No current Epic-ordered facility-administered medications on file.        Past Medical History:   Diagnosis Date   • Eczema    • GERD (gastroesophageal reflux disease)    • Type II or  "unspecified type diabetes mellitus without mention of complication, not stated as uncontrolled    • Umbilical hernia        Past Surgical History:   Procedure Laterality Date   • ROTATOR CUFF REPAIR      rt shoulder   • TONSILLECTOMY AND ADENOIDECTOMY         Social History     Tobacco Use   • Smoking status: Never Smoker   • Smokeless tobacco: Never Used   Substance Use Topics   • Alcohol use: Not Currently     Comment: 1 drink/year   • Drug use: No       Social History     Patient does not qualify to have social determinant information on file (likely too young).   Social History Narrative   • Not on file       Family History   Problem Relation Age of Onset   • Cancer Mother         liver   • Depression Mother    • Alcohol abuse Mother    • Cancer Father         lung   • Alcohol abuse Father    • Depression Sister          ROS   Patient denies any fever, chills, unintentional weight gain/loss, fatigue, stroke symptoms, dizziness, headache, nasal congestion, sore-throat, cough, heartburn, chest pain, difficulty breathing, abdominal discomfort, diarrhea/constipation, burning with urination or frequency, joint or back pain, skin rashes, depression or anxiety.       Objective:     /74   Pulse 82   Temp 36.3 °C (97.3 °F)   Resp 16   Ht 1.727 m (5' 8\")   Wt 106.6 kg (235 lb)   SpO2 95%  Body mass index is 35.73 kg/m².   Physical Exam:  Constitutional: Alert, no distress.  Skin: Warm, dry, good turgor, no rashes in visible areas. Right big toe nail yellow easily crumbling.    Psych: Alert and oriented x3, appropriate affect and mood.        Assessment and Plan:   The following treatment plan was discussed    1. Erectile dysfunction due to diseases classified elsewhere  Improved with drug.  - tadalafil (CIALIS) 10 MG tablet; Take 1 Tab by mouth as needed for Erectile Dysfunction.  Dispense: 10 Tab; Refill: 3    2. Pure hypercholesterolemia  Controlled, will notify if dosage increase needed.  - atorvastatin " (LIPITOR) 20 MG Tab; TAKE 1 TABLET BY MOUTH ONCE DAILY  Dispense: 90 Tab; Refill: 1  - Lipid Profile; Future    3. Type 2 diabetes mellitus without complication, without long-term current use of insulin ([facility])  A1c 6.2% today. May d/c monitoring. Will notify with lab test results  - Empagliflozin (JARDIANCE) 25 MG Tab; Take 1 Tab by mouth every morning.  Dispense: 90 Tab; Refill: 1  - SITagliptin (JANUVIA) 100 MG Tab; Take 1 Tab by mouth every day.  Dispense: 90 Tab; Refill: 1  - CBC WITH DIFFERENTIAL; Future  - Comp Metabolic Panel; Future  - MICROALBUMIN CREAT RATIO URINE; Future  - lisinopril (PRINIVIL) 2.5 MG Tab; Take 1 Tab by mouth every day. (for kidney protection)  Dispense: 90 Tab; Refill: 1    4. Onychogryphosis & - mycosis.  Continue trimming daily.      Followup: Return in about 6 months (around 7/31/2020), or if symptoms worsen or fail to improve.    Please note that this dictation was created using voice recognition software. I have made every reasonable attempt to correct obvious errors, but I expect that there are errors of grammar and possibly content that I did not discover before finalizing the note.
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
## 2020-02-20 ENCOUNTER — HOSPITAL ENCOUNTER (OUTPATIENT)
Dept: LAB | Facility: MEDICAL CENTER | Age: 60
End: 2020-02-20
Attending: FAMILY MEDICINE
Payer: COMMERCIAL

## 2020-02-20 DIAGNOSIS — E11.9 TYPE 2 DIABETES MELLITUS WITHOUT COMPLICATION, WITHOUT LONG-TERM CURRENT USE OF INSULIN (HCC): ICD-10-CM

## 2020-02-20 DIAGNOSIS — E78.00 PURE HYPERCHOLESTEROLEMIA: ICD-10-CM

## 2020-02-20 LAB
ALBUMIN SERPL BCP-MCNC: 4.3 G/DL (ref 3.2–4.9)
ALBUMIN/GLOB SERPL: 1.3 G/DL
ALP SERPL-CCNC: 82 U/L (ref 30–99)
ALT SERPL-CCNC: 21 U/L (ref 2–50)
ANION GAP SERPL CALC-SCNC: 9 MMOL/L (ref 0–11.9)
AST SERPL-CCNC: 21 U/L (ref 12–45)
BASOPHILS # BLD AUTO: 0.6 % (ref 0–1.8)
BASOPHILS # BLD: 0.05 K/UL (ref 0–0.12)
BILIRUB SERPL-MCNC: 0.6 MG/DL (ref 0.1–1.5)
BUN SERPL-MCNC: 18 MG/DL (ref 8–22)
CALCIUM SERPL-MCNC: 9.5 MG/DL (ref 8.5–10.5)
CHLORIDE SERPL-SCNC: 105 MMOL/L (ref 96–112)
CHOLEST SERPL-MCNC: 136 MG/DL (ref 100–199)
CO2 SERPL-SCNC: 26 MMOL/L (ref 20–33)
CREAT SERPL-MCNC: 1 MG/DL (ref 0.5–1.4)
CREAT UR-MCNC: 83.3 MG/DL
EOSINOPHIL # BLD AUTO: 0.15 K/UL (ref 0–0.51)
EOSINOPHIL NFR BLD: 1.9 % (ref 0–6.9)
ERYTHROCYTE [DISTWIDTH] IN BLOOD BY AUTOMATED COUNT: 46.4 FL (ref 35.9–50)
FASTING STATUS PATIENT QL REPORTED: NORMAL
GLOBULIN SER CALC-MCNC: 3.4 G/DL (ref 1.9–3.5)
GLUCOSE SERPL-MCNC: 132 MG/DL (ref 65–99)
HCT VFR BLD AUTO: 51.8 % (ref 42–52)
HDLC SERPL-MCNC: 45 MG/DL
HGB BLD-MCNC: 17.2 G/DL (ref 14–18)
IMM GRANULOCYTES # BLD AUTO: 0.02 K/UL (ref 0–0.11)
IMM GRANULOCYTES NFR BLD AUTO: 0.3 % (ref 0–0.9)
LDLC SERPL CALC-MCNC: 76 MG/DL
LYMPHOCYTES # BLD AUTO: 2.19 K/UL (ref 1–4.8)
LYMPHOCYTES NFR BLD: 27.7 % (ref 22–41)
MCH RBC QN AUTO: 30.1 PG (ref 27–33)
MCHC RBC AUTO-ENTMCNC: 33.2 G/DL (ref 33.7–35.3)
MCV RBC AUTO: 90.7 FL (ref 81.4–97.8)
MICROALBUMIN UR-MCNC: <0.7 MG/DL
MICROALBUMIN/CREAT UR: NORMAL MG/G (ref 0–30)
MONOCYTES # BLD AUTO: 0.73 K/UL (ref 0–0.85)
MONOCYTES NFR BLD AUTO: 9.2 % (ref 0–13.4)
NEUTROPHILS # BLD AUTO: 4.76 K/UL (ref 1.82–7.42)
NEUTROPHILS NFR BLD: 60.3 % (ref 44–72)
NRBC # BLD AUTO: 0 K/UL
NRBC BLD-RTO: 0 /100 WBC
PLATELET # BLD AUTO: 208 K/UL (ref 164–446)
PMV BLD AUTO: 10.4 FL (ref 9–12.9)
POTASSIUM SERPL-SCNC: 4.4 MMOL/L (ref 3.6–5.5)
PROT SERPL-MCNC: 7.7 G/DL (ref 6–8.2)
RBC # BLD AUTO: 5.71 M/UL (ref 4.7–6.1)
SODIUM SERPL-SCNC: 140 MMOL/L (ref 135–145)
TRIGL SERPL-MCNC: 76 MG/DL (ref 0–149)
WBC # BLD AUTO: 7.9 K/UL (ref 4.8–10.8)

## 2020-02-20 PROCEDURE — 80061 LIPID PANEL: CPT

## 2020-02-20 PROCEDURE — 85025 COMPLETE CBC W/AUTO DIFF WBC: CPT

## 2020-02-20 PROCEDURE — 36415 COLL VENOUS BLD VENIPUNCTURE: CPT

## 2020-02-20 PROCEDURE — 82570 ASSAY OF URINE CREATININE: CPT

## 2020-02-20 PROCEDURE — 82043 UR ALBUMIN QUANTITATIVE: CPT

## 2020-02-20 PROCEDURE — 80053 COMPREHEN METABOLIC PANEL: CPT

## 2020-03-04 ENCOUNTER — OFFICE VISIT (OUTPATIENT)
Dept: BEHAVIORAL HEALTH | Facility: CLINIC | Age: 60
End: 2020-03-04
Payer: COMMERCIAL

## 2020-03-04 VITALS
DIASTOLIC BLOOD PRESSURE: 83 MMHG | BODY MASS INDEX: 35.61 KG/M2 | HEART RATE: 76 BPM | WEIGHT: 235 LBS | RESPIRATION RATE: 15 BRPM | HEIGHT: 68 IN | SYSTOLIC BLOOD PRESSURE: 143 MMHG

## 2020-03-04 DIAGNOSIS — F90.2 ADHD (ATTENTION DEFICIT HYPERACTIVITY DISORDER), COMBINED TYPE: ICD-10-CM

## 2020-03-04 DIAGNOSIS — F63.81 INTERMITTENT EXPLOSIVE DISORDER: ICD-10-CM

## 2020-03-04 PROCEDURE — 99213 OFFICE O/P EST LOW 20 MIN: CPT | Performed by: NURSE PRACTITIONER

## 2020-03-04 RX ORDER — DEXTROAMPHETAMINE SACCHARATE, AMPHETAMINE ASPARTATE MONOHYDRATE, DEXTROAMPHETAMINE SULFATE AND AMPHETAMINE SULFATE 5; 5; 5; 5 MG/1; MG/1; MG/1; MG/1
20 CAPSULE, EXTENDED RELEASE ORAL EVERY MORNING
Qty: 30 CAP | Refills: 0 | Status: SHIPPED | OUTPATIENT
Start: 2020-03-04 | End: 2020-04-03

## 2020-03-04 RX ORDER — DEXTROAMPHETAMINE SACCHARATE, AMPHETAMINE ASPARTATE MONOHYDRATE, DEXTROAMPHETAMINE SULFATE AND AMPHETAMINE SULFATE 5; 5; 5; 5 MG/1; MG/1; MG/1; MG/1
20 CAPSULE, EXTENDED RELEASE ORAL EVERY MORNING
Qty: 30 CAP | Refills: 0 | Status: SHIPPED | OUTPATIENT
Start: 2020-04-02 | End: 2020-05-02

## 2020-03-04 ASSESSMENT — FIBROSIS 4 INDEX: FIB4 SCORE: 1.32

## 2020-05-04 ENCOUNTER — TELEMEDICINE (OUTPATIENT)
Dept: BEHAVIORAL HEALTH | Facility: CLINIC | Age: 60
End: 2020-05-04
Payer: COMMERCIAL

## 2020-05-04 VITALS — BODY MASS INDEX: 34.86 KG/M2 | WEIGHT: 230 LBS | HEIGHT: 68 IN

## 2020-05-04 DIAGNOSIS — F90.2 ADHD (ATTENTION DEFICIT HYPERACTIVITY DISORDER), COMBINED TYPE: ICD-10-CM

## 2020-05-04 DIAGNOSIS — F32.A DEPRESSIVE DISORDER: ICD-10-CM

## 2020-05-04 DIAGNOSIS — F63.81 INTERMITTENT EXPLOSIVE DISORDER: ICD-10-CM

## 2020-05-04 PROCEDURE — 99214 OFFICE O/P EST MOD 30 MIN: CPT | Mod: 95,CR | Performed by: PSYCHIATRY & NEUROLOGY

## 2020-05-04 RX ORDER — DEXTROAMPHETAMINE SACCHARATE, AMPHETAMINE ASPARTATE MONOHYDRATE, DEXTROAMPHETAMINE SULFATE AND AMPHETAMINE SULFATE 6.25; 6.25; 6.25; 6.25 MG/1; MG/1; MG/1; MG/1
25 CAPSULE, EXTENDED RELEASE ORAL EVERY MORNING
Qty: 30 CAP | Refills: 0 | Status: SHIPPED | OUTPATIENT
Start: 2020-07-06 | End: 2020-06-05 | Stop reason: SDUPTHER

## 2020-05-04 RX ORDER — DEXTROAMPHETAMINE SACCHARATE, AMPHETAMINE ASPARTATE MONOHYDRATE, DEXTROAMPHETAMINE SULFATE AND AMPHETAMINE SULFATE 6.25; 6.25; 6.25; 6.25 MG/1; MG/1; MG/1; MG/1
25 CAPSULE, EXTENDED RELEASE ORAL EVERY MORNING
Qty: 30 CAP | Refills: 0 | Status: SHIPPED | OUTPATIENT
Start: 2020-05-05 | End: 2020-06-04

## 2020-05-04 RX ORDER — DEXTROAMPHETAMINE SACCHARATE, AMPHETAMINE ASPARTATE MONOHYDRATE, DEXTROAMPHETAMINE SULFATE AND AMPHETAMINE SULFATE 6.25; 6.25; 6.25; 6.25 MG/1; MG/1; MG/1; MG/1
25 CAPSULE, EXTENDED RELEASE ORAL EVERY MORNING
Qty: 30 CAP | Refills: 0 | Status: SHIPPED | OUTPATIENT
Start: 2020-06-05 | End: 2020-06-05 | Stop reason: SDUPTHER

## 2020-05-04 ASSESSMENT — FIBROSIS 4 INDEX: FIB4 SCORE: 1.34

## 2020-05-04 NOTE — PROGRESS NOTES
"This encounter was conducted via Zoom .   Verbal consent was obtained. Patient's identity was verified.    INITIAL PSYCHIATRIC EVALUATION      This provider informed the patient their medical records are totally confidential except for the use by other providers involved in their care, or if the patient signs a release, or to report instances of child or elder abuse, or if it is determined they are an immediate risk to harm themselves or others.      CHIEF COMPLAINT  \"dong better\" \"adderall wears off soon\"    HISTORY OF PRESENT ILLNESS  Jensen Leigh is a 60 y.o. old male comes in today to establish care and for evaluation of depression, intermittent explosive disorder and ADHD.  I did reviewed all outpatient psychiatry follow up notes over last 3 years. Patient was following with Tom Barton in this clinic, with following treatment planning recommendation done during last visit on 3/4/2020:  1. Continue Adderall XR 20 mg p.o. every morning for continued symptoms of ADHD, 2 month supply given.  2. Continue Zoloft 50 mg p.o. daily for improved symptoms of depression and anxiety.    Patient is stable on this combination and describes no signs consistent with depression, anxiety, lola or psychosis.  Patient described Zoloft as helpful to keep his mood stable.  Patient also has a diagnosis of intermittent explosive disorder but he denies any episodes suggestive of intermittent explosive episodes on these medication combination.    Patient was started on Adderall XR for ADHD management with gradual improvement in symptoms of inattention and hyperactivity.  With Adderall XR 20 mg daily dose he reports marked improvement but this medication dose wears off between 12-1 PM every day and reports struggling with attention after this time.  Patient denies any side effect from Adderall including changes in appetite, weight changes, sleep, psychosis or new onset of obsessive/compulsive behaviors.  Patient agreed with plan of " considering increasing Adderall XR dosing to 25 mg daily and monitoring for both response and side effects.    Adult ADHD Self-Report Scale (ASRS-v1.1) Symptom    1. How often do you have trouble wrapping up the final details of a project, once the challenging parts have been done?   Rarely (5/4/20)  2. How often do you have difficulty getting things in order when you have to do a task that requires organization?   Sometime (5/4/20)  3. How often do you have problems remembering appointments or obligations? Often (5/4/20)  4. When you have a task that requires a lot of thought, how often do you avoid or delay getting started?   Never (5/4/20)  5. How often do you fidget or squirm with your hands or feet when you have to sit down for a long time?   Very often (5/4/20)  6. How often do you feel overly active and compelled to do things, like you were driven by a motor?   Rarely (5/4/20)  7. How often do you make careless mistakes when you have to work on a boring or difficult project?   Sometime (5/4/20)  8. How often do you have difficulty keeping your attention when you are doing boring or repetitive work?   Sometime (5/4/20)  9. How often do you have difficulty concentrating on what people say to you, even when they are speaking to you directly?   Sometime (5/4/20)  10. How often do you misplace or have difficulty finding things at home or at work?   Rarely (5/4/20)  11. How often are you distracted by activity or noise around you?   Rarely (5/4/20)  12. How often do you leave your seat in meetings or other situations in which you are expected to remain seated?   Never (5/4/20)  13. How often do you feel restless or fidgety?   Sometime (5/4/20)  14. How often do you have difficulty unwinding and relaxing when you have time to yourself?   Never (5/4/20)  15. How often do you find yourself talking too much when you are in social situations?   Rarely (5/4/20)  16. When you're in a conversation, how often do you find  yourself finishing the sentences of the people you are talking to, before they can finish them themselves?   Never (5/4/20)  17. How often do you have difficulty waiting your turn in situations when turn taking is required?   Rarely (5/4/20)  18. How often do you interrupt others when they are busy?   Rarely (5/4/20)      PSYCHIATRIC REVIEW OF SYSTEMS: denies depressive symptoms, denies symptoms of anxiety that interfere with functioning or are overwhelming, denies manic symptoms, denies psychotic symptoms including AH / VH, denies OCD symptoms, denies restrictive eating or purging and denies trauma related symptoms      MEDICAL REVIEW OF SYSTEMS:   Constitutional negative   Eyes negative   Ears/Nose/Mouth/Throat negative   Cardiovascular negative   Respiratory negative   Gastrointestinal negative   Genitourinary negative   Muscular negative   Integumentary negative   Neurological negative   Endocrine negative   Hematologic/Lymphatic negative     CURRENT MEDICATIONS:  Current Outpatient Medications   Medication Sig Dispense Refill   • sertraline (ZOLOFT) 50 MG Tab Take 1 Tab by mouth every day. 30 Tab 2   • [START ON 5/5/2020] amphetamine-dextroamphetamine (ADDERALL XR, 25MG,) 25 MG XR capsule Take 1 Cap by mouth every morning for 30 days. 30 Cap 0   • [START ON 6/5/2020] amphetamine-dextroamphetamine (ADDERALL XR, 25MG,) 25 MG XR capsule Take 1 Cap by mouth every morning for 30 days. 30 Cap 0   • [START ON 7/6/2020] amphetamine-dextroamphetamine (ADDERALL XR, 25MG,) 25 MG XR capsule Take 1 Cap by mouth every morning for 30 days. 30 Cap 0   • tadalafil (CIALIS) 10 MG tablet Take 1 Tab by mouth as needed for Erectile Dysfunction. 10 Tab 3   • atorvastatin (LIPITOR) 20 MG Tab TAKE 1 TABLET BY MOUTH ONCE DAILY 90 Tab 1   • Empagliflozin (JARDIANCE) 25 MG Tab Take 1 Tab by mouth every morning. 90 Tab 1   • SITagliptin (JANUVIA) 100 MG Tab Take 1 Tab by mouth every day. 90 Tab 1   • lisinopril (PRINIVIL) 2.5 MG Tab Take  1 Tab by mouth every day. (for kidney protection) 90 Tab 1   • Blood Glucose Test Strips Test strips order: Test strips for Contour Next meter. Sig: use 2x/d and prn ssx high or low sugar #100 RF x 3 100 Strip 3   • aspirin EC (ECOTRIN) 81 MG Tablet Delayed Response Take 2 Tabs by mouth every day. 30 Tab    • Blood Glucose Monitoring Suppl Device Meter: Dispense 1 Contour Next Device . Sig. Use as directed for blood sugar monitoring. #1. NR. 1 Device 0   • glucose blood (TRUE METRIX BLOOD GLUCOSE TEST) strip 1 Strip by Other route as needed. 300 Strip 1   • Multiple Vitamin (MULTI VITAMIN MENS PO) Take 1 Tab by mouth every day.       No current facility-administered medications for this visit.          ALLERGIES:  Patient has no known allergies.      PAST PSYCHIATRIC HISTORY  Prior psychiatric hospitalization: none  Prior Self harm/suicide attempt: no  Prior Diagnosis: Depression, IED, ADHD    PAST PSYCHIATRIC MEDICATIONS  One antidepressant in the past     FAMILY HISTORY  Psychiatric diagnosis:  none  History of suicide attempts:  no  Substance abuse history:  no    SUBSTANCE USE HISTORY:  ALCOHOL: no  TOBACCO: no  CANNABIS: no  OPIOIDS: no  PRESCRIPTION MEDICATIONS: no  OTHERS: experimented with methamphetamine in his 20s.  History of inpatient/outpatient rehab treatment: none    SOCIAL HISTORY  Childhood: born in [place] and describes childhood as ok  Education: [employer] (dropped out early)  in Special Education: no  Intellectual Disability: no  Employment: employed  at [employer] (for > 40 yr)  Relationship:  for > 40 yr  Kids: no  Current living situation: lives with wife  Current/past legal issues: no  History of emotional/physical/sexual abuse - no    MEDICAL HISTORY  Past Medical History:   Diagnosis Date   • Eczema    • GERD (gastroesophageal reflux disease)    • Type II or unspecified type diabetes mellitus without mention of complication, not stated as uncontrolled    • Umbilical hernia   "    Past Surgical History:   Procedure Laterality Date   • ROTATOR CUFF REPAIR      rt shoulder   • TONSILLECTOMY AND ADENOIDECTOMY           PHYSICAL EXAMINAION:  Vital signs: Ht 1.727 m (5' 8\") Comment: pt stated  Wt 104.3 kg (230 lb) Comment: pt stated  BMI 34.97 kg/m²   Musculoskeletal: Normal gait.   Abnormal movements: none    MENTAL STATUS EXAMINATION      General:   - Grooming and hygiene: Casual,   - Apparent distress: none,   - Behavior: Calm  - Eye Contact:  Good,   - no psychomotor agitation or retardation    - Participation: Active verbal participation  Orientation: Alert and Fully Oriented to person, place and time  Mood: Euthymic  Affect: Full range,  Thought Process: Logical and Goal-directed  Thought Content: Denies suicidal or homicidal ideations, intent or plan Within normal limits  Perception: Denies auditory or visual hallucinations. No delusions noted Within normal limits  Attention span and concentration: Intact   Speech:Rate within normal limits and Volume within normal limits  Language: Appropriate   Insight: Good  Judgment: Good  Recent and remote memory: No gross evidence of memory deficits      DEPRESSION SCREENING:  Depression Screen (PHQ-2/PHQ-9) 4/10/2018 5/10/2019 11/4/2019   PHQ-2 Total Score 0 0 2   PHQ-9 Total Score - - 7       Interpretation of PHQ-9 Total Score   Score Severity   1-4 No Depression   5-9 Mild Depression   10-14 Moderate Depression   15-19 Moderately Severe Depression   20-27 Severe Depression      SAFETY ASSESSMENT - SELF:    Does patient acknowledge current or past symptoms of dangerousness to self? no  History of suicide by family member: no  History of suicide by friend/significant other: no  Recent change in amount/specificity/intensity of suicidal thoughts or self-harm behavior? no  Current access to firearms, medications, or other identified means of suicide/self-harm? no  Protective factors present: wife, job       SAFETY ASSESSMENT - OTHERS:    Does " patient acknowledge current or past symptoms of aggressive behavior or risk to others? no  Recent change in amount/specificity/intensity of thoughts or threats to harm others? no  Current access to firearms/other identified means of harm? no       CURRENT RISK:       Suicidal: Low       Homicidal: Low       Self-Harm: Low       Relapse: Low       Crisis Safety Plan Reviewed Not Indicated    MEDICAL RECORDS/LABS/DIAGNOSTIC TESTS REVIEWED:  Labs from 2/20/2020 reviewed: CMP, CBC      [place]  records -     Fill Date ID Written Drug Qty Days Prescriber Rx # Pharmacy Refill Daily Dose * Pymt Type    04/06/2020  1   03/04/2020  Dextroamp-Amphet Er 20 Mg Cap  30.00 30 No Yae  [id number]  [facility] ([facility])  0/0  [facility]  [place]   03/06/2020  1   03/04/2020  Dextroamp-Amphet Er 20 Mg Cap  30.00 30 No Yae  [id number]  [facility] (2500)  0/0  [facility]  [place]   02/05/2020  1   01/28/2020  Dextroamp-Amphet Er 20 Mg Cap  30.00 30 No Yae  [id number]  [facility] (2500)  0/0  [facility]  [place]   01/17/2020  1   12/17/2019  Dextroamp-Amphet Er 10 Mg Cap  30.00 30 No Yae  [id number]  [facility] (2500)  0/0  [facility]  [place]   12/18/2019  1   12/17/2019  Dextroamp-Amphet Er 10 Mg Cap  30.00 30 No Yae  [id number]  [facility] (2500)  0/0  [facility]  [place]   11/10/2019  1   11/04/2019  Dextroamp-Amphet Er 5 Mg Cap  30.00 30 No Yae  [id number]  [facility] (2500)  0/0  [facility]  [place]         ASSESSMENT  Patient is a 60-year-old male with history of depressive disorder, intermittent explosive disorder and ADHD is stable on combination of sertraline and Adderall XR.  Patient is showing stability of depression, anxiety and ADHD symptoms but reports Adderall X are showing benefit until 12-1 PM and it wears off.  We will give patient a trial of increased dose of Adderall XR to assess for persistence of improvement.      DIFFERENTIAL DIAGNOSES  1. Depressive Disorder  2. Intermittent Explosive Disorder  3. ADHD      PLAN:  (1) Depressive Disorder, in remission  • Stable  • Continue Sertraline 50 mg daily for depression and  anxiety management.  Given 1 month supply with 2 refill.  • Medication options, alternatives (including no medications) and medication risks/benefits/side effects were discussed in detail.  • The patient was advised to call, message provider on MyChart, or come in to the clinic if symptoms worsen or if any future questions/issues regarding their medications arise; the patient verbalized understanding and agreement.    • The patient was educated to call 911, call the suicide hotline, or go to local ER if having thoughts of suicide or homicide; verbalized understanding.    (2) Intermittent Explosive Disorder:  • Stable  • Continue Sertraline 50 mg daily for depression and anxiety management.  Given 1 month supply with 2 refill.    (3) ADHD  • Improving but improvement wearing off by 12-1 pm  • Increase Adderall XR to 25 mg daily for depression and anxiety management.  3 scripts sent to pharmacy: 5/5-6/4; 6/5-7/5; 7/6- 8/5/20.      Return to clinic in 3 months or sooner if symptoms worsen.  Next Appointment: Aug 6 at 3 pm.     The proposed treatment plan was discussed with the patient who was provided the opportunity to ask questions and make suggestions regarding alternative treatment. Patient verbalized understanding and expressed agreement with the plan.     Thank you for allowing me to participate in the care of this patient.    Eliazar Prasad M.D.  05/04/20    CC:   Juanito Valdes M.D.    This note was created using voice recognition software (Dragon). The accuracy of the dictation is limited by the abilities of the software. I have reviewed the note prior to signing, however some errors in grammar and context are still possible. If you have any questions related to this note please do not hesitate to contact our office.

## 2020-06-05 DIAGNOSIS — F90.2 ADHD (ATTENTION DEFICIT HYPERACTIVITY DISORDER), COMBINED TYPE: ICD-10-CM

## 2020-06-05 RX ORDER — DEXTROAMPHETAMINE SACCHARATE, AMPHETAMINE ASPARTATE MONOHYDRATE, DEXTROAMPHETAMINE SULFATE AND AMPHETAMINE SULFATE 6.25; 6.25; 6.25; 6.25 MG/1; MG/1; MG/1; MG/1
25 CAPSULE, EXTENDED RELEASE ORAL EVERY MORNING
Qty: 30 CAP | Refills: 0 | Status: SHIPPED | OUTPATIENT
Start: 2020-06-06 | End: 2020-07-06

## 2020-06-05 RX ORDER — DEXTROAMPHETAMINE SACCHARATE, AMPHETAMINE ASPARTATE MONOHYDRATE, DEXTROAMPHETAMINE SULFATE AND AMPHETAMINE SULFATE 6.25; 6.25; 6.25; 6.25 MG/1; MG/1; MG/1; MG/1
25 CAPSULE, EXTENDED RELEASE ORAL EVERY MORNING
Qty: 30 CAP | Refills: 0 | Status: SHIPPED | OUTPATIENT
Start: 2020-07-07 | End: 2020-08-06 | Stop reason: SDUPTHER

## 2020-06-05 NOTE — PROGRESS NOTES
Patient requested paper script because his pharmacy did not have the adderall 25 mg dose supply. He will try other pharmacies.

## 2020-08-04 DIAGNOSIS — E11.9 TYPE 2 DIABETES MELLITUS WITHOUT COMPLICATION, WITHOUT LONG-TERM CURRENT USE OF INSULIN (HCC): ICD-10-CM

## 2020-08-04 RX ORDER — LISINOPRIL 2.5 MG/1
2.5 TABLET ORAL DAILY
Qty: 90 TAB | Refills: 0 | Status: SHIPPED | OUTPATIENT
Start: 2020-08-04 | End: 2020-08-12 | Stop reason: SDUPTHER

## 2020-08-04 NOTE — TELEPHONE ENCOUNTER
Received request via: Patient    Was the patient seen in the last year in this department? Yes    Does the patient have an active prescription (recently filled or refills available) for medication(s) requested? No     Last OV 1/2020

## 2020-08-06 ENCOUNTER — TELEMEDICINE (OUTPATIENT)
Dept: BEHAVIORAL HEALTH | Facility: CLINIC | Age: 60
End: 2020-08-06
Payer: COMMERCIAL

## 2020-08-06 VITALS — WEIGHT: 228 LBS | HEIGHT: 68 IN | BODY MASS INDEX: 34.56 KG/M2

## 2020-08-06 DIAGNOSIS — F90.2 ADHD (ATTENTION DEFICIT HYPERACTIVITY DISORDER), COMBINED TYPE: ICD-10-CM

## 2020-08-06 DIAGNOSIS — F32.A DEPRESSIVE DISORDER: ICD-10-CM

## 2020-08-06 DIAGNOSIS — F63.81 INTERMITTENT EXPLOSIVE DISORDER: ICD-10-CM

## 2020-08-06 PROCEDURE — 99213 OFFICE O/P EST LOW 20 MIN: CPT | Mod: 95,CR | Performed by: PSYCHIATRY & NEUROLOGY

## 2020-08-06 RX ORDER — DEXTROAMPHETAMINE SACCHARATE, AMPHETAMINE ASPARTATE MONOHYDRATE, DEXTROAMPHETAMINE SULFATE AND AMPHETAMINE SULFATE 6.25; 6.25; 6.25; 6.25 MG/1; MG/1; MG/1; MG/1
25 CAPSULE, EXTENDED RELEASE ORAL EVERY MORNING
Qty: 30 CAP | Refills: 0 | Status: SHIPPED | OUTPATIENT
Start: 2020-08-24 | End: 2020-09-22 | Stop reason: SDUPTHER

## 2020-08-06 RX ORDER — DEXTROAMPHETAMINE SACCHARATE, AMPHETAMINE ASPARTATE MONOHYDRATE, DEXTROAMPHETAMINE SULFATE AND AMPHETAMINE SULFATE 6.25; 6.25; 6.25; 6.25 MG/1; MG/1; MG/1; MG/1
25 CAPSULE, EXTENDED RELEASE ORAL EVERY MORNING
Qty: 30 CAP | Refills: 0 | Status: SHIPPED | OUTPATIENT
Start: 2020-10-25 | End: 2020-08-12

## 2020-08-06 RX ORDER — DEXTROAMPHETAMINE SACCHARATE, AMPHETAMINE ASPARTATE MONOHYDRATE, DEXTROAMPHETAMINE SULFATE AND AMPHETAMINE SULFATE 6.25; 6.25; 6.25; 6.25 MG/1; MG/1; MG/1; MG/1
25 CAPSULE, EXTENDED RELEASE ORAL EVERY MORNING
Qty: 30 CAP | Refills: 0 | Status: SHIPPED | OUTPATIENT
Start: 2020-09-24 | End: 2020-08-12

## 2020-08-06 ASSESSMENT — FIBROSIS 4 INDEX: FIB4 SCORE: 1.34

## 2020-08-06 NOTE — PROGRESS NOTES
This encounter was conducted via Zoom .   Verbal consent was obtained. Patient's identity was verified.    PSYCHIATRY FOLLOW-UP NOTE      Name: Jensen Leigh  MRN: [id number]  : 1960  Age: 60 y.o.  Date of assessment: 2020  PCP: Juanito Valdes M.D.  Persons in attendance: Patient  Total face-to-face time: 10 minutes    REASON FOR VISIT/CHIEF COMPLAINT (as stated by Patient):  Jensen Leigh is a 60 y.o., White male, attending follow-up appointment for mood and anxiety management.      HISTORY OF PRESENT ILLNESS:  Jensen Leigh is a 60 y.o. old male with depressive disorder, IED & ADHD comes in today for follow up. Patient was last seen 3 months ago, and following treatment planning recommendations were done:  · Continue Sertraline 50 mg daily for depression and anxiety management.  Given 1 month supply with 2 refill  · Increase Adderall XR to 25 mg daily for depression and anxiety management.  3 scripts sent to pharmacy: -; -; - 20.    Patient is compliant with medications with no acute side effects.  Patient describes mood, anxiety and anger as stable on Zoloft 50 mg.  Patient is able to tolerate recent increase in Adderall XR dosing to 25 mg daily with improvement in symptoms of inattention.  Patient is describing no symptoms associated with changes in sleep, appetite, irritability or new onset of physical symptom, psychosis or obsessive/compulsive behaviors.  Patient is able to deal with stressors more effectively and describes doing well at his current job.  Patient agreed with plan of not titrating medications further and continuing them at current dosage.    RESPONSE TO TREATMENT:  [facility]      MEDICATION SIDE EFFECTS:  none    Adult ADHD Self-Report Scale (ASRS-v1.1) Symptom     1. How often do you have trouble wrapping up the final details of a project, once the challenging parts have been done?   Rarely (20)  2. How often do you have difficulty getting things in order when  you have to do a task that requires organization?   Sometime (5/4/20)  3. How often do you have problems remembering appointments or obligations? Often (5/4/20)  4. When you have a task that requires a lot of thought, how often do you avoid or delay getting started?   Never (5/4/20)  5. How often do you fidget or squirm with your hands or feet when you have to sit down for a long time?   Very often (5/4/20)  6. How often do you feel overly active and compelled to do things, like you were driven by a motor?   Rarely (5/4/20)  7. How often do you make careless mistakes when you have to work on a boring or difficult project?   Sometime (5/4/20)  8. How often do you have difficulty keeping your attention when you are doing boring or repetitive work?   Sometime (5/4/20)  9. How often do you have difficulty concentrating on what people say to you, even when they are speaking to you directly?   Sometime (5/4/20)  10. How often do you misplace or have difficulty finding things at home or at work?   Rarely (5/4/20)  11. How often are you distracted by activity or noise around you?   Rarely (5/4/20)  12. How often do you leave your seat in meetings or other situations in which you are expected to remain seated?   Never (5/4/20)  13. How often do you feel restless or fidgety?   Sometime (5/4/20)  14. How often do you have difficulty unwinding and relaxing when you have time to yourself?   Never (5/4/20)  15. How often do you find yourself talking too much when you are in social situations?   Rarely (5/4/20)  16. When you're in a conversation, how often do you find yourself finishing the sentences of the people you are talking to, before they can finish them themselves?   Never (5/4/20)  17. How often do you have difficulty waiting your turn in situations when turn taking is required?   Rarely (5/4/20)  18. How often do you interrupt others when they are busy?   Rarely (5/4/20)    CURRENT MEDICATIONS:  Current Outpatient  Medications   Medication Sig Dispense Refill   • lisinopril (PRINIVIL) 2.5 MG Tab Take 1 Tab by mouth every day. (for kidney protection) 90 Tab 0   • amphetamine-dextroamphetamine (ADDERALL XR, 25MG,) 25 MG XR capsule Take 1 Cap by mouth every morning for 30 days. 30 Cap 0   • sertraline (ZOLOFT) 50 MG Tab Take 1 Tab by mouth every day. 30 Tab 2   • tadalafil (CIALIS) 10 MG tablet Take 1 Tab by mouth as needed for Erectile Dysfunction. 10 Tab 3   • atorvastatin (LIPITOR) 20 MG Tab TAKE 1 TABLET BY MOUTH ONCE DAILY 90 Tab 1   • Empagliflozin (JARDIANCE) 25 MG Tab Take 1 Tab by mouth every morning. 90 Tab 1   • SITagliptin (JANUVIA) 100 MG Tab Take 1 Tab by mouth every day. 90 Tab 1   • Blood Glucose Test Strips Test strips order: Test strips for Contour Next meter. Sig: use 2x/d and prn ssx high or low sugar #100 RF x 3 100 Strip 3   • aspirin EC (ECOTRIN) 81 MG Tablet Delayed Response Take 2 Tabs by mouth every day. 30 Tab    • Blood Glucose Monitoring Suppl Device Meter: Dispense 1 Contour Next Device . Sig. Use as directed for blood sugar monitoring. #1. NR. 1 Device 0   • glucose blood (TRUE METRIX BLOOD GLUCOSE TEST) strip 1 Strip by Other route as needed. 300 Strip 1   • Multiple Vitamin (MULTI VITAMIN MENS PO) Take 1 Tab by mouth every day.       No current facility-administered medications for this visit.        MEDICAL HISTORY  Past Medical History:   Diagnosis Date   • Eczema    • GERD (gastroesophageal reflux disease)    • Type II or unspecified type diabetes mellitus without mention of complication, not stated as uncontrolled    • Umbilical hernia      Past Surgical History:   Procedure Laterality Date   • ROTATOR CUFF REPAIR      rt shoulder   • TONSILLECTOMY AND ADENOIDECTOMY         PAST PSYCHIATRIC HISTORY  Prior psychiatric hospitalization: none  Prior Self harm/suicide attempt: no  Prior Diagnosis: Depression, IED, ADHD     PAST PSYCHIATRIC MEDICATIONS  One antidepressant in the past      FAMILY  "HISTORY  Psychiatric diagnosis:  none  History of suicide attempts:  no  Substance abuse history:  no     SUBSTANCE USE HISTORY:  ALCOHOL: no  TOBACCO: no  CANNABIS: no  OPIOIDS: no  PRESCRIPTION MEDICATIONS: no  OTHERS: experimented with methamphetamine in his 20s.  History of inpatient/outpatient rehab treatment: none     SOCIAL HISTORY  Childhood: born in [place] and describes childhood as ok  Education: [employer] (dropped out early)  in Special Education: no  Intellectual Disability: no  Employment: employed  at [employer] (for > 40 yr)  Relationship:  for > 40 yr  Kids: no  Current living situation: lives with wife  Current/past legal issues: no  History of emotional/physical/sexual abuse - no    REVIEW OF SYSTEMS:        Constitutional negative   Eyes negative   Ears/Nose/Mouth/Throat negative   Cardiovascular negative   Respiratory negative   Gastrointestinal negative   Genitourinary negative   Muscular negative   Integumentary negative   Neurological negative   Endocrine negative   Hematologic/Lymphatic negative     PHYSICAL EXAMINAION:  Vital signs: Ht 1.727 m (5' 8\")   Wt 103.4 kg (228 lb)   BMI 34.67 kg/m²   Musculoskeletal: Normal gait.   Abnormal movements: none      MENTAL STATUS EXAMINATION      General:   - Grooming and hygiene: Casual,   - Apparent distress: none,   - Behavior: Calm  - Eye Contact:  Good,   - no psychomotor agitation or retardation    - Participation: Active verbal participation  Orientation: Alert and Fully Oriented to person, place and time  Mood: Euthymic  Affect: Flexible and Full range,  Thought Process: Logical and Goal-directed  Thought Content: Denies suicidal or homicidal ideations, intent or plan Within normal limits  Perception: Denies auditory or visual hallucinations. No delusions noted Within normal limits  Attention span and concentration: Intact   Speech:Rate within normal limits and Volume within normal limits  Language: Appropriate   Insight: " Good  Judgment: Good  Recent and remote memory: No gross evidence of memory deficits        DEPRESSION SCREENING:  Depression Screen (PHQ-2/PHQ-9) 4/10/2018 5/10/2019 11/4/2019   PHQ-2 Total Score 0 0 2   PHQ-9 Total Score - - 7       Interpretation of PHQ-9 Total Score   Score Severity   1-4 No Depression   5-9 Mild Depression   10-14 Moderate Depression   15-19 Moderately Severe Depression   20-27 Severe Depression    CURRENT RISK:       Suicidal: Low       Homicidal: Low       Self-Harm: Low       Relapse: Low       Crisis Safety Plan Reviewed Not Indicated       If evidence of imminent risk is present, intervention/plan:      MEDICAL RECORDS/LABS/DIAGNOSTIC TESTS REVIEWED:  No new lab since last visit     [place]  records -   Fill Date ID   Written Drug Qty Days Prescriber Rx # Pharmacy Refill   Daily Dose* Pymt Type      07/25/2020  2   05/04/2020  Dextroamp-Amphet Er 25 MG Cap  30.00 30 Mathew Sin   [id number]   [facility] (2500)   0   [facility]   [place]   07/06/2020  2   05/04/2020  Dextroamp-Amphet Er 25 MG Cap  20.00 20 Ha Sin   [id number]   [facility] (2500)   0   [facility]   [place]   06/06/2020  2   06/05/2020  Dextroamp-Amphet Er 25 MG Cap  30.00 30 Mathew Sin   [id number]   [facility] (5479)   0   [facility]   [place]   05/05/2020  1   05/04/2020  Dextroamp-Amphet Er 25 MG Cap  30.00 30 Ha Sin   [id number]   [facility] (2500)   0   [facility]   [place]   04/06/2020  1   03/04/2020  Dextroamp-Amphet Er 20 MG Cap  30.00 30 No Yae   [id number]   [facility] (2500)   0   [facility]   [place]   03/06/2020  1   03/04/2020  Dextroamp-Amphet Er 20 MG Cap  30.00 30 No Yae   [id number]   [facility] (2500)   0   [facility]   [place]   02/05/2020  1   01/28/2020  Dextroamp-Amphet Er 20 MG Cap  30.00 30 No Yae   [id number]   [facility] (2500)   0   [facility]   [place]   01/17/2020  1   12/17/2019  Dextroamp-Amphet Er 10 MG Cap  30.00 30 No Yae   [id number]   [facility] (2500)   0   [facility]   [place]   12/18/2019  1   12/17/2019  Dextroamp-Amphet Er 10 MG Cap  30.00 30 No Yae   [id number]   [facility] (2100)   0   [facility]   [place]   11/10/2019  1   11/04/2019   Dextroamp-Amphet Er 5 MG Cap  30.00 30 No Yae   [id number]   [facility] (2500)   0   [facility]   [place]       DIAGNOSTIC IMPRESSION(S):  1. Depressive Disorder  2. Intermittent Explosive Disorder  3. ADHD        PLAN:  (1) Depressive Disorder, in remission  · Stable  · Continue Sertraline 50 mg daily for depression and anxiety management.  Given 3month supply with 0 refill.  · Medication options, alternatives (including no medications) and medication risks/benefits/side effects were discussed in detail.  · The patient was advised to call, message provider on [facility], or come in to the clinic if symptoms worsen or if any future questions/issues regarding their medications arise; the patient verbalized understanding and agreement.    · The patient was educated to call 911, call the suicide hotline, or go to local ER if having thoughts of suicide or homicide; verbalized understanding.     (2) Intermittent Explosive Disorder:  · Stable  · Continue Sertraline 50 mg daily for depression and anxiety management.  Given 3 month supply with 0 refill.     (3) ADHD  · Improving but improvement wearing off by 12-1 pm  · Increase Adderall XR to 25 mg daily for depression and anxiety management.  3 scripts sent to pharmacy: 8/24-9/23/20; 9/24-10/24/20; 10/25-11/24/20.       Return to clinic in 3 months or sooner if symptoms worsen.  Next Appointment: instruction provided on how to make the next appointment.     The proposed treatment plan was discussed with the patient who was provided the opportunity to ask questions and make suggestions regarding alternative treatment. Patient verbalized understanding and expressed agreement with the plan.       Eliazar Prasad M.D.  08/06/20    This note was created using voice recognition software (Dragon). The accuracy of the dictation is limited by the abilities of the software. I have reviewed the note prior to signing, however some errors in grammar and context are still possible. If you have any  questions related to this note please do not hesitate to contact our office.

## 2020-08-12 ENCOUNTER — OFFICE VISIT (OUTPATIENT)
Dept: MEDICAL GROUP | Facility: CLINIC | Age: 60
End: 2020-08-12
Payer: COMMERCIAL

## 2020-08-12 VITALS
WEIGHT: 236 LBS | RESPIRATION RATE: 16 BRPM | DIASTOLIC BLOOD PRESSURE: 78 MMHG | HEIGHT: 68 IN | TEMPERATURE: 98.1 F | SYSTOLIC BLOOD PRESSURE: 122 MMHG | HEART RATE: 88 BPM | OXYGEN SATURATION: 94 % | BODY MASS INDEX: 35.77 KG/M2

## 2020-08-12 DIAGNOSIS — F63.81 INTERMITTENT EXPLOSIVE DISORDER: ICD-10-CM

## 2020-08-12 DIAGNOSIS — E78.00 PURE HYPERCHOLESTEROLEMIA: ICD-10-CM

## 2020-08-12 DIAGNOSIS — E11.9 TYPE 2 DIABETES MELLITUS WITHOUT COMPLICATION, WITHOUT LONG-TERM CURRENT USE OF INSULIN (HCC): ICD-10-CM

## 2020-08-12 DIAGNOSIS — Z12.5 PROSTATE CANCER SCREENING: ICD-10-CM

## 2020-08-12 DIAGNOSIS — F32.A DEPRESSIVE DISORDER: ICD-10-CM

## 2020-08-12 DIAGNOSIS — N52.1 ERECTILE DYSFUNCTION DUE TO DISEASES CLASSIFIED ELSEWHERE: ICD-10-CM

## 2020-08-12 DIAGNOSIS — Z12.11 COLON CANCER SCREENING: ICD-10-CM

## 2020-08-12 PROCEDURE — 99214 OFFICE O/P EST MOD 30 MIN: CPT | Performed by: PHYSICIAN ASSISTANT

## 2020-08-12 RX ORDER — LISINOPRIL 2.5 MG/1
2.5 TABLET ORAL DAILY
Qty: 90 TAB | Refills: 1 | Status: SHIPPED | OUTPATIENT
Start: 2020-08-12 | End: 2021-04-26 | Stop reason: SDUPTHER

## 2020-08-12 RX ORDER — TADALAFIL 10 MG/1
10 TABLET ORAL PRN
Qty: 10 TAB | Refills: 3 | Status: SHIPPED | OUTPATIENT
Start: 2020-08-12 | End: 2021-04-26 | Stop reason: SDUPTHER

## 2020-08-12 RX ORDER — ATORVASTATIN CALCIUM 20 MG/1
TABLET, FILM COATED ORAL
Qty: 90 TAB | Refills: 1 | Status: SHIPPED | OUTPATIENT
Start: 2020-08-12 | End: 2021-03-15 | Stop reason: SDUPTHER

## 2020-08-12 RX ORDER — EMPAGLIFLOZIN 25 MG/1
1 TABLET, FILM COATED ORAL EVERY MORNING
Qty: 90 TAB | Refills: 1 | Status: SHIPPED | OUTPATIENT
Start: 2020-08-12 | End: 2021-03-15 | Stop reason: SDUPTHER

## 2020-08-12 ASSESSMENT — FIBROSIS 4 INDEX: FIB4 SCORE: 1.34

## 2020-08-12 NOTE — PROGRESS NOTES
cc:  Medication refills    Subjective:     Jensen Leigh is a 60 y.o. male presenting for medication refills      Patient presents to the office for medication refills .  Patient states that he sees psychiatry for his adderrall refill.  He denies any symptoms such as fever, chills and nausea.  He sees ophthamology next year.     Patient does have a history of type 2 diabetes without complications, hyperlipidemia and erectile dysfunction due to other diseases/complications.  His last lab work drawn in February indicates that his sugar levels and his cholesterol are well controlled at this time.  He will be due for repeat lab work within the next couple months but is needing medication refills at this time.    Patient is also needing a refill of Zoloft as he has a history of intermittent explosive disorder as well as depressive disorder.    Review of systems:  See above.   Denies any symptoms unless previously indicated.        Current Outpatient Medications:   •  Empagliflozin (JARDIANCE) 25 MG Tab, Take 1 Tab by mouth every morning., Disp: 90 Tab, Rfl: 1  •  tadalafil (CIALIS) 10 MG tablet, Take 1 Tab by mouth as needed for Erectile Dysfunction., Disp: 10 Tab, Rfl: 3  •  lisinopril (PRINIVIL) 2.5 MG Tab, Take 1 Tab by mouth every day. (for kidney protection), Disp: 90 Tab, Rfl: 1  •  sertraline (ZOLOFT) 50 MG Tab, Take 1 Tab by mouth every day., Disp: 90 Tab, Rfl: 1  •  atorvastatin (LIPITOR) 20 MG Tab, TAKE 1 TABLET BY MOUTH ONCE DAILY, Disp: 90 Tab, Rfl: 1  •  [START ON 8/24/2020] amphetamine-dextroamphetamine (ADDERALL XR, 25MG,) 25 MG XR capsule, Take 1 Cap by mouth every morning for 30 days., Disp: 30 Cap, Rfl: 0  •  SITagliptin (JANUVIA) 100 MG Tab, Take 1 Tab by mouth every day., Disp: 90 Tab, Rfl: 1  •  Blood Glucose Test Strips, Test strips order: Test strips for Contour Next meter. Sig: use 2x/d and prn ssx high or low sugar #100 RF x 3, Disp: 100 Strip, Rfl: 3  •  aspirin EC (ECOTRIN) 81 MG Tablet Delayed  "Response, Take 2 Tabs by mouth every day., Disp: 30 Tab, Rfl:   •  Blood Glucose Monitoring Suppl Device, Meter: Dispense 1 Contour Next Device . Sig. Use as directed for blood sugar monitoring. #1. NR., Disp: 1 Device, Rfl: 0  •  glucose blood (TRUE METRIX BLOOD GLUCOSE TEST) strip, 1 Strip by Other route as needed., Disp: 300 Strip, Rfl: 1  •  Multiple Vitamin (MULTI VITAMIN MENS PO), Take 1 Tab by mouth every day., Disp: , Rfl:     Allergies, past medical history, past surgical history, family history, social history reviewed and updated    Objective:     Vitals: /78 (BP Location: Left arm, Patient Position: Sitting, BP Cuff Size: Adult)   Pulse 88   Temp 36.7 °C (98.1 °F) (Temporal)   Resp 16   Ht 1.727 m (5' 8\")   Wt 107 kg (236 lb)   SpO2 94%   BMI 35.88 kg/m²   General: Alert, pleasant, NAD  EYES:   PERRL, EOMI, no icterus or pallor.  Conjunctivae and lids normal.   HENT:  Normocephalic.  External ears normal.  Facial mask in place.  Neck supple.    Heart: Regular rate and rhythm.  S1 and S2 normal.  No murmurs appreciated.  Respiratory: Normal respiratory effort.  Clear to auscultation bilaterally.  Abdomen:  obese  Skin: Warm, dry, no rashes.  Musculoskeletal: Gait is normal.  Moves all extremities well.    Extremities: normal range of motion all extremities.   Neurological: No tremors, sensation grossly intact,  gait is normal, CN2-12 intact.  Psych:  Affect/mood is normal, judgement is good, memory is intact, grooming is appropriate.    Assessment/Plan:     Jensen was seen today for establish care and medication refill.    Diagnoses and all orders for this visit:    Type 2 diabetes mellitus without complication, without long-term current use of insulin (HCC)  -     Empagliflozin (JARDIANCE) 25 MG Tab; Take 1 Tab by mouth every morning.  -     lisinopril (PRINIVIL) 2.5 MG Tab; Take 1 Tab by mouth every day. (for kidney protection)  -     Lipid Profile; Future  -     Comp Metabolic Panel; " Future  -     MICROALBUMIN CREAT RATIO URINE; Future  -     HEMOGLOBIN A1C; Future  Pure hypercholesterolemia  -     atorvastatin (LIPITOR) 20 MG Tab; TAKE 1 TABLET BY MOUTH ONCE DAILY  -     Lipid Profile; Future  -     Comp Metabolic Panel; Future  Erectile dysfunction due to diseases classified elsewhere  -     tadalafil (CIALIS) 10 MG tablet; Take 1 Tab by mouth as needed for Erectile Dysfunction.    Labs as indicated.  Medication refills as indicated.  The plan is to follow-up in 6 months.  Labs will be drawn sooner and will notify patient of any issues or changes that need to be made as a result of results    Depressive disorder  -     sertraline (ZOLOFT) 50 MG Tab; Take 1 Tab by mouth every day.  Intermittent explosive disorder  -     sertraline (ZOLOFT) 50 MG Tab; Take 1 Tab by mouth every day.    Medication refills provided.    Prostate cancer screening  -     PROSTATE SPECIFIC AG SCREENING; Future  Colon cancer screening  -     OCCULT BLOOD FECES IMMUNOASSAY; Future    Patient declines colon cancer screening.  He is willing to proceed with FIT testing which is been ordered.  PSA ordered for prostate cancer screening.  Will notify patient of results when received.        No follow-ups on file.    Please note that this dictation was created using voice recognition software. I have made every reasonable attempt to correct obvious errors, but expect that there are errors of grammar and possible content that I did not discover before finalizing note.

## 2020-09-22 DIAGNOSIS — F90.2 ADHD (ATTENTION DEFICIT HYPERACTIVITY DISORDER), COMBINED TYPE: ICD-10-CM

## 2020-09-22 RX ORDER — DEXTROAMPHETAMINE SACCHARATE, AMPHETAMINE ASPARTATE MONOHYDRATE, DEXTROAMPHETAMINE SULFATE AND AMPHETAMINE SULFATE 6.25; 6.25; 6.25; 6.25 MG/1; MG/1; MG/1; MG/1
25 CAPSULE, EXTENDED RELEASE ORAL EVERY MORNING
Qty: 30 CAP | Refills: 0 | Status: SHIPPED | OUTPATIENT
Start: 2020-10-25 | End: 2020-11-06

## 2020-09-22 RX ORDER — DEXTROAMPHETAMINE SACCHARATE, AMPHETAMINE ASPARTATE MONOHYDRATE, DEXTROAMPHETAMINE SULFATE AND AMPHETAMINE SULFATE 6.25; 6.25; 6.25; 6.25 MG/1; MG/1; MG/1; MG/1
25 CAPSULE, EXTENDED RELEASE ORAL EVERY MORNING
Qty: 30 CAP | Refills: 0 | OUTPATIENT
Start: 2020-09-22 | End: 2020-10-22

## 2020-09-22 RX ORDER — DEXTROAMPHETAMINE SACCHARATE, AMPHETAMINE ASPARTATE MONOHYDRATE, DEXTROAMPHETAMINE SULFATE AND AMPHETAMINE SULFATE 6.25; 6.25; 6.25; 6.25 MG/1; MG/1; MG/1; MG/1
25 CAPSULE, EXTENDED RELEASE ORAL EVERY MORNING
Qty: 30 CAP | Refills: 0 | Status: SHIPPED | OUTPATIENT
Start: 2020-09-24 | End: 2020-10-24

## 2020-09-30 ENCOUNTER — TELEMEDICINE (OUTPATIENT)
Dept: BEHAVIORAL HEALTH | Facility: CLINIC | Age: 60
End: 2020-09-30
Payer: COMMERCIAL

## 2020-09-30 VITALS — HEIGHT: 68 IN | WEIGHT: 236 LBS | BODY MASS INDEX: 35.77 KG/M2

## 2020-09-30 DIAGNOSIS — F32.A DEPRESSIVE DISORDER: ICD-10-CM

## 2020-09-30 DIAGNOSIS — F63.81 INTERMITTENT EXPLOSIVE DISORDER: ICD-10-CM

## 2020-09-30 DIAGNOSIS — F90.2 ADHD (ATTENTION DEFICIT HYPERACTIVITY DISORDER), COMBINED TYPE: ICD-10-CM

## 2020-09-30 PROCEDURE — 99213 OFFICE O/P EST LOW 20 MIN: CPT | Mod: 95,CR | Performed by: PSYCHIATRY & NEUROLOGY

## 2020-09-30 ASSESSMENT — FIBROSIS 4 INDEX: FIB4 SCORE: 1.34

## 2020-09-30 NOTE — PROGRESS NOTES
This evaluation was conducted via Zoom using secure and encrypted videoconferencing technology. The patient was in a private location in the [facility].    The patient's identity was confirmed and verbal consent was obtained for this virtual visit.     PSYCHIATRY FOLLOW-UP NOTE      Name: Jensen Leigh  MRN: [id number]  : 1960  Age: 60 y.o.  Date of assessment: 2020  PCP: Aubrie Bryatn P.A.-C.  Persons in attendance: Patient  Total face-to-face time: 15 minutes    REASON FOR VISIT/CHIEF COMPLAINT (as stated by Patient):  Jensen Leigh is a 60 y.o., White male, attending follow-up appointment for mood and anxiety management.      HISTORY OF PRESENT ILLNESS:  Jensen Leigh is a 60 y.o. old male with depression, IED and ADHD comes in today for follow up. Patient was last seen 1 month ago, and following treatment planning recommendations were done:  · Continue Sertraline 50 mg daily for depression and anxiety management.  Given 3month supply with 0 refill.  · Increase Adderall XR to 25 mg daily for depression and anxiety management.  3 scripts sent to pharmacy: -20; -10/24/20; 10/25-20.    Patient later contacted the clinic for having issues with getting refills.  Pharmacy informed us that the future refills were discontinued.  Following 30-day prescriptions were sent to pharmacy with following dates: -10/24/20; 10/25-20.    Patient is compliant with both medications sertraline and Adderall with no side effects including any worsening of mood, anxiety or changes in sleep, appetite and denies onset of new physical symptoms including chest pain, racing heart.  Patient reports no episode of irritability and is able to handle recent increase in stress from work with no mood destabilization.  Patient reports making this appointment because of issues with the prescription but now he was able to fill the recent prescription and was educated on second prescription that he can take on  10/25/2020.  Instructed to make her next appointment at the last week of November for further treatment planning.  Patient reports understanding and remained appropriate during entire evaluation with no questions or concerns.    CURRENT MEDICATIONS:  Current Outpatient Medications   Medication Sig Dispense Refill   • amphetamine-dextroamphetamine (ADDERALL XR, 25MG,) 25 MG XR capsule Take 1 Cap by mouth every morning for 30 days. 30 Cap 0   • [START ON 10/25/2020] amphetamine-dextroamphetamine (ADDERALL XR, 25MG,) 25 MG XR capsule Take 1 Cap by mouth every morning for 30 days. 30 Cap 0   • Empagliflozin (JARDIANCE) 25 MG Tab Take 1 Tab by mouth every morning. 90 Tab 1   • tadalafil (CIALIS) 10 MG tablet Take 1 Tab by mouth as needed for Erectile Dysfunction. 10 Tab 3   • lisinopril (PRINIVIL) 2.5 MG Tab Take 1 Tab by mouth every day. (for kidney protection) 90 Tab 1   • sertraline (ZOLOFT) 50 MG Tab Take 1 Tab by mouth every day. 90 Tab 1   • atorvastatin (LIPITOR) 20 MG Tab TAKE 1 TABLET BY MOUTH ONCE DAILY 90 Tab 1   • SITagliptin (JANUVIA) 100 MG Tab Take 1 Tab by mouth every day. 90 Tab 1   • Blood Glucose Test Strips Test strips order: Test strips for Contour Next meter. Sig: use 2x/d and prn ssx high or low sugar #100 RF x 3 100 Strip 3   • aspirin EC (ECOTRIN) 81 MG Tablet Delayed Response Take 2 Tabs by mouth every day. 30 Tab    • Blood Glucose Monitoring Suppl Device Meter: Dispense 1 Contour Next Device . Sig. Use as directed for blood sugar monitoring. #1. NR. 1 Device 0   • glucose blood (TRUE METRIX BLOOD GLUCOSE TEST) strip 1 Strip by Other route as needed. 300 Strip 1   • Multiple Vitamin (MULTI VITAMIN MENS PO) Take 1 Tab by mouth every day.       No current facility-administered medications for this visit.        MEDICAL HISTORY  Past Medical History:   Diagnosis Date   • Eczema    • GERD (gastroesophageal reflux disease)    • Type II or unspecified type diabetes mellitus without mention of  "complication, not stated as uncontrolled    • Umbilical hernia      Past Surgical History:   Procedure Laterality Date   • ROTATOR CUFF REPAIR      rt shoulder   • TONSILLECTOMY AND ADENOIDECTOMY         PAST PSYCHIATRIC HISTORY  Prior psychiatric hospitalization: none  Prior Self harm/suicide attempt: no  Prior Diagnosis: Depression, IED, ADHD     PAST PSYCHIATRIC MEDICATIONS  One antidepressant in the past      FAMILY HISTORY  Psychiatric diagnosis:  none  History of suicide attempts:  no  Substance abuse history:  no     SUBSTANCE USE HISTORY:  ALCOHOL: no  TOBACCO: no  CANNABIS: no  OPIOIDS: no  PRESCRIPTION MEDICATIONS: no  OTHERS: experimented with methamphetamine in his 20s.  History of inpatient/outpatient rehab treatment: none     SOCIAL HISTORY  Childhood: born in [place] and describes childhood as ok  Education: [employer] (dropped out early)  in Special Education: no  Intellectual Disability: no  Employment: employed  at [employer] (for > 40 yr)  Relationship:  for > 40 yr  Kids: no  Current living situation: lives with wife  Current/past legal issues: no  History of emotional/physical/sexual abuse - no      REVIEW OF SYSTEMS:        Constitutional negative   Eyes negative   Ears/Nose/Mouth/Throat negative   Cardiovascular negative   Respiratory negative   Gastrointestinal negative   Genitourinary negative   Muscular negative   Integumentary negative   Neurological negative   Endocrine negative   Hematologic/Lymphatic negative     PHYSICAL EXAMINAION:  Vital signs: Ht 1.727 m (5' 8\")   Wt 107 kg (236 lb)   BMI 35.88 kg/m²   Musculoskeletal: Normal gait.   Abnormal movements: none      MENTAL STATUS EXAMINATION      General:   - Grooming and hygiene: Casual,   - Apparent distress: none,   - Behavior: Calm  - Eye Contact:  Good,   - no psychomotor agitation or retardation    - Participation: Active verbal participation  Orientation: Alert and Fully Oriented to person, place and " time  Mood: Euthymic  Affect: Flexible and Full range,  Thought Process: Logical and Goal-directed  Thought Content: Denies suicidal or homicidal ideations, intent or plan Within normal limits  Perception: Denies auditory or visual hallucinations. No delusions noted Within normal limits  Attention span and concentration: Intact   Speech:Rate within normal limits and Volume within normal limits  Language: Appropriate   Insight: Good  Judgment: Good  Recent and remote memory: No gross evidence of memory deficits        DEPRESSION SCREENING:  Depression Screen (PHQ-2/PHQ-9) 4/10/2018 5/10/2019 11/4/2019   PHQ-2 Total Score 0 0 2   PHQ-9 Total Score - - 7       Interpretation of PHQ-9 Total Score   Score Severity   1-4 No Depression   5-9 Mild Depression   10-14 Moderate Depression   15-19 Moderately Severe Depression   20-27 Severe Depression    CURRENT RISK:       Suicidal: Low       Homicidal: Low       Self-Harm: Low       Relapse: Low       Crisis Safety Plan Reviewed Not Indicated       If evidence of imminent risk is present, intervention/plan:      MEDICAL RECORDS/LABS/DIAGNOSTIC TESTS REVIEWED:  No new lab since last visit     [place]  records -   Reviewed       DIAGNOSTIC IMPRESSION(S):  1. Depressive Disorder  2. Intermittent Explosive Disorder  3. ADHD        PLAN:  (1) Depressive Disorder, in remission  · Stable  · Continue Sertraline 50 mg daily for depression and anxiety management.  Given 3 month supply with 0 refill in last session 1 month ago.  · Medication options, alternatives (including no medications) and medication risks/benefits/side effects were discussed in detail.  · The patient was advised to call, message provider on [employer], or come in to the clinic if symptoms worsen or if any future questions/issues regarding their medications arise; the patient verbalized understanding and agreement.    · The patient was educated to call 911, call the suicide hotline, or go to local ER if having thoughts  of suicide or homicide; verbalized understanding.     (2) Intermittent Explosive Disorder:  · Stable  · Continue Sertraline 50 mg daily for depression and anxiety management.  Given 3 month supply with 0 refill in last session 1 month ago.     (3) ADHD  · stable  · Continue Adderall XR 25 mg daily for depression and anxiety management.  2 scripts were sent to pharmacy last week: 9/24-10/24/20; 10/25-11/24/20..       Return to clinic in 2 months or sooner if symptoms worsen.  Next Appointment: instruction provided on how to make the next appointment.     The proposed treatment plan was discussed with the patient who was provided the opportunity to ask questions and make suggestions regarding alternative treatment. Patient verbalized understanding and expressed agreement with the plan.       Eliazar Prasad M.D.  09/30/20    This note was created using voice recognition software (Dragon). The accuracy of the dictation is limited by the abilities of the software. I have reviewed the note prior to signing, however some errors in grammar and context are still possible. If you have any questions related to this note please do not hesitate to contact our office.

## 2020-11-06 ENCOUNTER — TELEMEDICINE (OUTPATIENT)
Dept: BEHAVIORAL HEALTH | Facility: CLINIC | Age: 60
End: 2020-11-06
Payer: COMMERCIAL

## 2020-11-06 VITALS — HEIGHT: 68 IN | WEIGHT: 230 LBS | BODY MASS INDEX: 34.86 KG/M2

## 2020-11-06 DIAGNOSIS — F63.81 INTERMITTENT EXPLOSIVE DISORDER: ICD-10-CM

## 2020-11-06 DIAGNOSIS — F32.A DEPRESSIVE DISORDER: ICD-10-CM

## 2020-11-06 DIAGNOSIS — F90.2 ADHD (ATTENTION DEFICIT HYPERACTIVITY DISORDER), COMBINED TYPE: Primary | ICD-10-CM

## 2020-11-06 PROCEDURE — 99442 PR PHYSICIAN TELEPHONE EVALUATION 11-20 MIN: CPT | Mod: CR | Performed by: PSYCHIATRY & NEUROLOGY

## 2020-11-06 RX ORDER — DEXTROAMPHETAMINE SACCHARATE, AMPHETAMINE ASPARTATE MONOHYDRATE, DEXTROAMPHETAMINE SULFATE AND AMPHETAMINE SULFATE 7.5; 7.5; 7.5; 7.5 MG/1; MG/1; MG/1; MG/1
30 CAPSULE, EXTENDED RELEASE ORAL EVERY MORNING
Qty: 30 CAP | Refills: 0 | Status: SHIPPED | OUTPATIENT
Start: 2020-11-25 | End: 2020-12-25

## 2020-11-06 RX ORDER — DEXTROAMPHETAMINE SACCHARATE, AMPHETAMINE ASPARTATE MONOHYDRATE, DEXTROAMPHETAMINE SULFATE AND AMPHETAMINE SULFATE 7.5; 7.5; 7.5; 7.5 MG/1; MG/1; MG/1; MG/1
30 CAPSULE, EXTENDED RELEASE ORAL EVERY MORNING
Qty: 30 CAP | Refills: 0 | Status: SHIPPED | OUTPATIENT
Start: 2020-12-26 | End: 2021-01-22 | Stop reason: SDUPTHER

## 2020-11-06 ASSESSMENT — PATIENT HEALTH QUESTIONNAIRE - PHQ9: CLINICAL INTERPRETATION OF PHQ2 SCORE: 0

## 2020-11-06 ASSESSMENT — FIBROSIS 4 INDEX: FIB4 SCORE: 1.34

## 2020-11-06 NOTE — PROGRESS NOTES
Telephone Appointment Visit   As a means of avoiding spread of COVID-19, this visit is being conducted by telephone. This telephone visit was initiated by the patient and they verbally consented.    Time at start of call: 11:25  Time at end of call: 11:38  Total Time Spent: 11-20 minutes      PSYCHIATRY FOLLOW-UP NOTE      Name: Jensen Leigh  MRN: [id number]  : 1960  Age: 60 y.o.  Date of assessment: 2020  PCP: Aubrie Bryant P.A.-C.  Persons in attendance: Patient    REASON FOR VISIT/CHIEF COMPLAINT (as stated by Patient):  Jensen Leigh is a 60 y.o., White male, attending follow-up appointment for mood and anxiety management.      HISTORY OF PRESENT ILLNESS:  Jensen Leigh is a 60 y.o. old male with depression and ADHD comes in today for follow up. Patient was last seen 2 months ago, and following treatment planning recommendations were done:  · Continue Sertraline 50 mg daily for depression and anxiety management.  Given 3 month supply with 0 refill in last session 1 month ago.  · Continue Adderall XR 25 mg daily for depression and anxiety management.  2 scripts were sent to pharmacy last week: -10/24/20; 10/25-20.    Patient is compliant with Zoloft with no side effect and reports stable mood and anxiety and able to deal with work-related stressors more effectively with no irritability or impulsive behaviors.  Patient does report taking Adderall XR close to 6 in the morning and medication is wearing off between 1-2 PM.  Discussed that mostly Adderall lasts for 7 to 8 hours but agreed with plan of considering slow increase in medication dose to assess if improvement can last longer but extensive education given on importance of monitoring for side effects.  Patient is denying any side effects from Adderall including changes in sleep, decline in appetite, weight loss, psychosis, worsening anxiety, obsessive/compulsive behavior or any new physical symptoms including chest pain or racing  heart.    CURRENT MEDICATIONS:  Current Outpatient Medications   Medication Sig Dispense Refill   • SITagliptin (JANUVIA) 100 MG Tab Take 1 Tab by mouth every day. 90 Tab 1   • amphetamine-dextroamphetamine (ADDERALL XR, 25MG,) 25 MG XR capsule Take 1 Cap by mouth every morning for 30 days. 30 Cap 0   • Empagliflozin (JARDIANCE) 25 MG Tab Take 1 Tab by mouth every morning. 90 Tab 1   • tadalafil (CIALIS) 10 MG tablet Take 1 Tab by mouth as needed for Erectile Dysfunction. 10 Tab 3   • lisinopril (PRINIVIL) 2.5 MG Tab Take 1 Tab by mouth every day. (for kidney protection) 90 Tab 1   • sertraline (ZOLOFT) 50 MG Tab Take 1 Tab by mouth every day. 90 Tab 1   • atorvastatin (LIPITOR) 20 MG Tab TAKE 1 TABLET BY MOUTH ONCE DAILY 90 Tab 1   • Blood Glucose Test Strips Test strips order: Test strips for Contour Next meter. Sig: use 2x/d and prn ssx high or low sugar #100 RF x 3 100 Strip 3   • aspirin EC (ECOTRIN) 81 MG Tablet Delayed Response Take 2 Tabs by mouth every day. 30 Tab    • Blood Glucose Monitoring Suppl Device Meter: Dispense 1 Contour Next Device . Sig. Use as directed for blood sugar monitoring. #1. NR. 1 Device 0   • glucose blood (TRUE METRIX BLOOD GLUCOSE TEST) strip 1 Strip by Other route as needed. 300 Strip 1   • Multiple Vitamin (MULTI VITAMIN MENS PO) Take 1 Tab by mouth every day.       No current facility-administered medications for this visit.        MEDICAL HISTORY  Past Medical History:   Diagnosis Date   • Eczema    • GERD (gastroesophageal reflux disease)    • Type II or unspecified type diabetes mellitus without mention of complication, not stated as uncontrolled    • Umbilical hernia      Past Surgical History:   Procedure Laterality Date   • ROTATOR CUFF REPAIR      rt shoulder   • TONSILLECTOMY AND ADENOIDECTOMY         PAST PSYCHIATRIC HISTORY  Prior psychiatric hospitalization: none  Prior Self harm/suicide attempt: no  Prior Diagnosis: Depression, IED, ADHD     PAST PSYCHIATRIC  MEDICATIONS  One antidepressant in the past      FAMILY HISTORY  Psychiatric diagnosis:  none  History of suicide attempts:  no  Substance abuse history:  no     SUBSTANCE USE HISTORY:  ALCOHOL: no  TOBACCO: no  CANNABIS: no  OPIOIDS: no  PRESCRIPTION MEDICATIONS: no  OTHERS: experimented with methamphetamine in his 20s.  History of inpatient/outpatient rehab treatment: none     SOCIAL HISTORY  Childhood: born in [place] and describes childhood as ok  Education: [employer] (dropped out early)  in Special Education: no  Intellectual Disability: no  Employment: employed  at [employer] (for > 40 yr)  Relationship:  for > 40 yr  Kids: no  Current living situation: lives with wife  Current/past legal issues: no  History of emotional/physical/sexual abuse - no    REVIEW OF SYSTEMS:        Constitutional negative   Eyes negative   Ears/Nose/Mouth/Throat negative   Cardiovascular negative   Respiratory negative   Gastrointestinal negative   Genitourinary negative   Muscular negative   Integumentary negative   Neurological negative   Endocrine negative   Hematologic/Lymphatic negative     PHYSICAL EXAMINAION:  Vital signs: There were no vitals taken for this visit.  Musculoskeletal: not done (phone session)  Abnormal movements: not done (phone session)      MENTAL STATUS EXAMINATION      General:   - Grooming and hygiene: not done (phone session),   - Apparent distress: none,   - Behavior: Calm  - Eye Contact:  not done (phone session),   - no psychomotor agitation or retardation    - Participation: Active verbal participation  Orientation: Alert and Fully Oriented to person, place and time  Mood: Euthymic  Affect: not done (phone session),  Thought Process: Logical and Goal-directed  Thought Content: Denies suicidal or homicidal ideations, intent or plan Within normal limits  Perception: Denies auditory or visual hallucinations. No delusions noted Within normal limits  Attention span and concentration:  Intact   Speech:Rate within normal limits and Volume within normal limits  Language: Appropriate   Insight: Good  Judgment: Good  Recent and remote memory: No gross evidence of memory deficits        DEPRESSION SCREENING:  Depression Screen (PHQ-2/PHQ-9) 4/10/2018 5/10/2019 11/4/2019   PHQ-2 Total Score 0 0 2   PHQ-9 Total Score - - 7       Interpretation of PHQ-9 Total Score   Score Severity   1-4 No Depression   5-9 Mild Depression   10-14 Moderate Depression   15-19 Moderately Severe Depression   20-27 Severe Depression    CURRENT RISK:       Suicidal: Low       Homicidal: Low       Self-Harm: Low       Relapse: Low       Crisis Safety Plan Reviewed Not Indicated       If evidence of imminent risk is present, intervention/plan:      MEDICAL RECORDS/LABS/DIAGNOSTIC TESTS REVIEWED:  No new lab since last visit     [place]  records -   Reviewed       DIAGNOSTIC IMPRESSION(S):  1. Depressive Disorder  2. Intermittent Explosive Disorder  3. ADHD        PLAN:  (1) Depressive Disorder, in remission  · Stable  · Continue Sertraline 50 mg daily for depression and anxiety management.  Given 3 month supply with 1 refill.  · Medication options, alternatives (including no medications) and medication risks/benefits/side effects were discussed in detail.  · The patient was advised to call, message provider on [employer], or come in to the clinic if symptoms worsen or if any future questions/issues regarding their medications arise; the patient verbalized understanding and agreement.    · The patient was educated to call 911, call the suicide hotline, or go to local ER if having thoughts of suicide or homicide; verbalized understanding.     (2) Intermittent Explosive Disorder:  · Stable  · Continue Sertraline 50 mg daily for depression and anxiety management.  Given 3 month supply with 1 refill.     (3) ADHD  · Medication wearing off by 1-2 pm  · Increase Adderall XR to 30 mg daily for depression and anxiety management. Recent refill  of 25 mg dose on 10/25/20. Following two 1 month prescriptions sent to pharmacy: 11/25-12/25/20; 12/26/20-1/25/21.  · Controlled medication treatment agreement mailed to him by my chart.       Return to clinic in 3 months or sooner if symptoms worsen.  Next Appointment: instruction provided on how to make the next appointment.     The proposed treatment plan was discussed with the patient who was provided the opportunity to ask questions and make suggestions regarding alternative treatment. Patient verbalized understanding and expressed agreement with the plan.       Eliazar Prasad M.D.  11/06/20    This note was created using voice recognition software (Dragon). The accuracy of the dictation is limited by the abilities of the software. I have reviewed the note prior to signing, however some errors in grammar and context are still possible. If you have any questions related to this note please do not hesitate to contact our office.

## 2021-01-14 ENCOUNTER — NURSE TRIAGE (OUTPATIENT)
Dept: HEALTH INFORMATION MANAGEMENT | Facility: OTHER | Age: 61
End: 2021-01-14

## 2021-01-14 ENCOUNTER — HOSPITAL ENCOUNTER (OUTPATIENT)
Facility: MEDICAL CENTER | Age: 61
End: 2021-01-14
Attending: PHYSICIAN ASSISTANT
Payer: COMMERCIAL

## 2021-01-14 ENCOUNTER — OFFICE VISIT (OUTPATIENT)
Dept: URGENT CARE | Facility: PHYSICIAN GROUP | Age: 61
End: 2021-01-14
Payer: COMMERCIAL

## 2021-01-14 VITALS
HEIGHT: 68 IN | HEART RATE: 86 BPM | SYSTOLIC BLOOD PRESSURE: 128 MMHG | RESPIRATION RATE: 16 BRPM | OXYGEN SATURATION: 94 % | DIASTOLIC BLOOD PRESSURE: 80 MMHG | WEIGHT: 234 LBS | TEMPERATURE: 99.2 F | BODY MASS INDEX: 35.46 KG/M2

## 2021-01-14 DIAGNOSIS — R05.9 COUGH: ICD-10-CM

## 2021-01-14 DIAGNOSIS — Z20.822 SUSPECTED COVID-19 VIRUS INFECTION: ICD-10-CM

## 2021-01-14 DIAGNOSIS — Z20.822 EXPOSURE TO COVID-19 VIRUS: ICD-10-CM

## 2021-01-14 LAB
COVID ORDER STATUS COVID19: NORMAL
SARS-COV-2 RNA RESP QL NAA+PROBE: DETECTED
SPECIMEN SOURCE: ABNORMAL

## 2021-01-14 PROCEDURE — U0005 INFEC AGEN DETEC AMPLI PROBE: HCPCS

## 2021-01-14 PROCEDURE — U0003 INFECTIOUS AGENT DETECTION BY NUCLEIC ACID (DNA OR RNA); SEVERE ACUTE RESPIRATORY SYNDROME CORONAVIRUS 2 (SARS-COV-2) (CORONAVIRUS DISEASE [COVID-19]), AMPLIFIED PROBE TECHNIQUE, MAKING USE OF HIGH THROUGHPUT TECHNOLOGIES AS DESCRIBED BY CMS-2020-01-R: HCPCS

## 2021-01-14 PROCEDURE — 99203 OFFICE O/P NEW LOW 30 MIN: CPT | Mod: CS | Performed by: PHYSICIAN ASSISTANT

## 2021-01-14 RX ORDER — DEXTROMETHORPHAN HYDROBROMIDE AND PROMETHAZINE HYDROCHLORIDE 15; 6.25 MG/5ML; MG/5ML
5 SYRUP ORAL
Qty: 50 ML | Refills: 0 | Status: SHIPPED | OUTPATIENT
Start: 2021-01-14 | End: 2021-04-26

## 2021-01-14 ASSESSMENT — ENCOUNTER SYMPTOMS
PALPITATIONS: 0
EYE PAIN: 0
NECK PAIN: 0
DIZZINESS: 0
BLURRED VISION: 0
SINUS PAIN: 0
CHILLS: 0
WEAKNESS: 0
FOCAL WEAKNESS: 0
SORE THROAT: 0
ABDOMINAL PAIN: 0
TINGLING: 0
EYE REDNESS: 0
FEVER: 0
SHORTNESS OF BREATH: 0
WHEEZING: 0
HEADACHES: 1
VOMITING: 0
MYALGIAS: 0
SENSORY CHANGE: 0
DIARRHEA: 0
NAUSEA: 0
SPUTUM PRODUCTION: 0
COUGH: 1

## 2021-01-14 ASSESSMENT — FIBROSIS 4 INDEX: FIB4 SCORE: 1.34

## 2021-01-14 NOTE — PROGRESS NOTES
Subjective:   Jensen Leigh is a 60 y.o. male who presents for Cough and Headache      HPI  60 y.o. male presents to urgent care with new problem to provider of cough, mild congestion, and headache onset about 1 week ago.  Patient denies fever, body aches, sore throat, chest pain, shortness of breath, or nausea/vomiting/diarrhea.  He has been taking Tylenol for headache with minimal relief.  He denies associated dizziness, visual acuity changes, or unilateral weakness.  Patient denies history of hypertension.  Patient denies confirmed exposure to COVID-19 or sick contacts in his home. Denies other associated aggravating or alleviating factors.     Review of Systems   Constitutional: Negative for chills, fever and malaise/fatigue.   HENT: Positive for congestion. Negative for ear pain, sinus pain and sore throat.    Eyes: Negative for blurred vision, pain and redness.   Respiratory: Positive for cough. Negative for sputum production, shortness of breath and wheezing.    Cardiovascular: Negative for chest pain, palpitations and leg swelling.   Gastrointestinal: Negative for abdominal pain, diarrhea, nausea and vomiting.   Musculoskeletal: Negative for myalgias and neck pain.   Skin: Negative for rash.   Neurological: Positive for headaches. Negative for dizziness, tingling, sensory change, focal weakness and weakness.   Endo/Heme/Allergies: Negative for environmental allergies.   All other systems reviewed and are negative.      Patient Active Problem List   Diagnosis   • Umbilical hernia   • Type 2 diabetes mellitus without complication (CMS-HCC)   • DVT of deep femoral vein, right ([facility])   • Presence of inferior vena cava filter   • Obesity (BMI 30-39.9)   • Pure hypercholesterolemia   • Onychogryphosis   • Erectile dysfunction due to diseases classified elsewhere   • Attention deficit   • History of methamphetamine abuse ([facility])   • ADHD (attention deficit hyperactivity disorder), combined type   • Depressive disorder  "  • Intermittent explosive disorder   • Prostate cancer screening   • Colon cancer screening     Past Surgical History:   Procedure Laterality Date   • ROTATOR CUFF REPAIR      rt shoulder   • TONSILLECTOMY AND ADENOIDECTOMY       Social History     Tobacco Use   • Smoking status: Never Smoker   • Smokeless tobacco: Never Used   Substance Use Topics   • Alcohol use: Not Currently     Comment: 1 drink/year   • Drug use: No      Family History   Problem Relation Age of Onset   • Cancer Mother         liver   • Depression Mother    • Alcohol abuse Mother    • Cancer Father         lung   • Alcohol abuse Father    • Depression Sister       (Allergies, Medications, & Tobacco/Substance Use were reconciled by the Medical Assistant and reviewed by myself. The family history is prepopulated)     Objective:     /80   Pulse 86   Temp 37.3 °C (99.2 °F)   Resp 16   Ht 1.727 m (5' 8\")   Wt 106.1 kg (234 lb)   SpO2 94%   BMI 35.58 kg/m²     Physical Exam  Vitals signs reviewed.   Constitutional:       General: He is not in acute distress.     Appearance: Normal appearance. He is well-developed. He is not ill-appearing or diaphoretic.   HENT:      Head: Normocephalic and atraumatic.      Nose: Nose normal.      Mouth/Throat:      Mouth: Mucous membranes are moist.      Pharynx: Posterior oropharyngeal erythema present. No oropharyngeal exudate.   Eyes:      General: No scleral icterus.     Conjunctiva/sclera: Conjunctivae normal.   Neck:      Musculoskeletal: Normal range of motion and neck supple.   Cardiovascular:      Rate and Rhythm: Normal rate and regular rhythm.      Heart sounds: Normal heart sounds.   Pulmonary:      Effort: Pulmonary effort is normal. No respiratory distress.      Breath sounds: Normal breath sounds. No wheezing, rhonchi or rales.   Lymphadenopathy:      Cervical: No cervical adenopathy.   Skin:     General: Skin is warm and dry.      Capillary Refill: Capillary refill takes less than 2 " seconds.      Findings: No rash.   Neurological:      General: No focal deficit present.      Mental Status: He is alert and oriented to person, place, and time.   Psychiatric:         Mood and Affect: Mood normal.         Behavior: Behavior normal.         Thought Content: Thought content normal.         Judgment: Judgment normal.         Assessment/Plan:     1. Cough  COVID/SARS CoV-2 PCR    promethazine-dextromethorphan (PROMETHAZINE-DM) 6.25-15 MG/5ML syrup   2. Suspected COVID-19 virus infection  COVID/SARS CoV-2 PCR   3. Exposure to COVID-19 virus       Patient instructed to self-isolate/quarantine per CDC guidelines.  I will follow-up pending COVID-19 testing. Discussed with patient may obtain hard copy of results on [employer].   Advised patient symptoms are most likely viral in etiology. Increased fluids and rest. Discussed use of OTC cough and cold medication and Tylenol/Motrin for symptomatic relief.  Return for reevaluation or proceed to ED if symptoms persist or worsen. Supportive care, differential diagnoses, and indications for immediate follow-up discussed with patient. Patient should to proceed to ED for development of symptoms including but not limited to shortness of breath breath, difficulty breathing, or worsening symptoms not manageable at home.   The patient demonstrated a good understanding and agreed with the treatment plan and has no further questions regarding care.   Vital signs stable, patient in no acute respiratory distress.  Discussed with patient at length importance of communal effort to help decrease the infection rate of COVID 19. Patient advised to avoid large gatherings of people and practice good hand hygiene and respiratory precautions. COVID-19 discharge instructions and CDC guidelines provided to patient in AVS.      This patient is evaluated under [facility] isolation protocols in urgent care.  Out of an abundance of caution I am wearing a N95 mask, protective eye gear, gloves and  gown through all interaction with patient.    Please note that this dictation was created using voice recognition software. I have made a reasonable attempt to correct obvious errors, but I expect that there are errors of grammar and possibly content that I did not discover before finalizing the note.    This note was electronically signed by Philly Valenzuela PA-C

## 2021-01-14 NOTE — TELEPHONE ENCOUNTER
1. Caller Name: Jensen Leigh                 Call Back Number: [phone]  [facility] PCP or Specialty Provider: Yes Aubrie Bryant        2.  Has the patient previously tested positive for COVID-19? No    3.  In the last two weeks, has the patient had any new or worsening symptoms (not explained by alternative diagnosis)? Yes, the patient reports the following COVID-19 consistent symptoms: cough, congestion or runny nose and headache.    4.  Does patient have any comoribidities? DM    5.  Has the patient had any known contact with someone who is suspected or confirmed to have COVID-19? No.    5. Disposition: Offered patient virtual visit to limit potential exposure to others; patient also given self care instructions    Note routed to [facility] Provider: ALBERTA only.     DECLINED VV AT 1600 TODAY W/ PCP  SCHEDULED AT     Reason for Disposition  • Continuous (nonstop) coughing interferes with work or school and no improvement using cough treatment per Care Advice    Additional Information  • Negative: Bluish (or gray) lips or face  • Negative: SEVERE difficulty breathing (e.g., struggling for each breath, speaks in single words)  • Negative: Rapid onset of cough and has hives  • Negative: Coughing started suddenly after medicine, an allergic food or bee sting  • Negative: Difficulty breathing after exposure to flames, smoke, or fumes  • Negative: Sounds like a life-threatening emergency to the triager  • Negative: Previous asthma attacks and this feels like asthma attack  • Negative: Chest pain present when not coughing  • Negative: Difficulty breathing  • Negative: Passed out (i.e., fainted, collapsed and was not responding)  • Negative: Patient sounds very sick or weak to the triager  • Negative: Coughed up > 1 tablespoon (15 ml) blood (Exception: blood-tinged sputum)  • Negative: Fever > 103 F (39.4 C)  • Negative: Fever > 101 F (38.3 C) and over 60 years of age  • Negative: Fever > 100.0 F (37.8 C) and has diabetes  "mellitus or a weak immune system (e.g., HIV positive, cancer chemotherapy, organ transplant, splenectomy, chronic steroids)  • Negative: Fever > 100.0 F (37.8 C) and bedridden (e.g., nursing home patient, stroke, chronic illness, recovering from surgery)  • Negative: Increasing ankle swelling  • Negative: Wheezing is present  • Negative: SEVERE coughing spells (e.g., whooping sound after coughing, vomiting after coughing)  • Negative: Coughing up connor-colored (reddish-brown) or blood-tinged sputum  • Negative: Fever present > 3 days (72 hours)  • Negative: Fever returns after gone for over 24 hours and symptoms worse or not improved  • Negative: Using nasal washes and pain medicine > 24 hours and sinus pain persists  • Negative: Known COPD or other severe lung disease (i.e., bronchiectasis, cystic fibrosis, lung surgery) and worsening symptoms (i.e., increased sputum purulence or amount, increased breathing difficulty)    Answer Assessment - Initial Assessment Questions  1. ONSET: \"When did the cough begin?\"       Approx. 2 days ago  2. SEVERITY: \"How bad is the cough today?\"       moderate  3. RESPIRATORY DISTRESS: \"Describe your breathing.\"       No problems  4. FEVER: \"Do you have a fever?\" If so, ask: \"What is your temperature, how was it measured, and when did it start?\"      No   5. HEMOPTYSIS: \"Are you coughing up any blood?\" If so ask: \"How much?\" (flecks, streaks, tablespoons, etc.)      no  6. TREATMENT: \"What have you done so far to treat the cough?\" (e.g., meds, fluids, humidifier)      nothing  7. CARDIAC HISTORY: \"Do you have any history of heart disease?\" (e.g., heart attack, congestive heart failure)       no  8. LUNG HISTORY: \"Do you have any history of lung disease?\"  (e.g., pulmonary embolus, asthma, emphysema)      No   9. PE RISK FACTORS: \"Do you have a history of blood clots?\" (or: recent major surgery, recent prolonged travel, bedridden)      Hx. DVT, off blood thinners  10. OTHER SYMPTOMS: " "\"Do you have any other symptoms? (e.g., runny nose, wheezing, chest pain)        Runny nose  11. PREGNANCY: \"Is there any chance you are pregnant?\" \"When was your last menstrual period?\"        n/a  12. TRAVEL: \"Have you traveled out of the country in the last month?\" (e.g., travel history, exposures)        no    Protocols used: COUGH-A-[place]      "

## 2021-01-22 DIAGNOSIS — F90.2 ADHD (ATTENTION DEFICIT HYPERACTIVITY DISORDER), COMBINED TYPE: ICD-10-CM

## 2021-01-22 RX ORDER — DEXTROAMPHETAMINE SACCHARATE, AMPHETAMINE ASPARTATE MONOHYDRATE, DEXTROAMPHETAMINE SULFATE AND AMPHETAMINE SULFATE 7.5; 7.5; 7.5; 7.5 MG/1; MG/1; MG/1; MG/1
30 CAPSULE, EXTENDED RELEASE ORAL EVERY MORNING
Qty: 30 CAP | Refills: 0 | Status: SHIPPED | OUTPATIENT
Start: 2021-01-22 | End: 2021-02-21

## 2021-02-08 ENCOUNTER — DOCUMENTATION (OUTPATIENT)
Dept: BEHAVIORAL HEALTH | Facility: CLINIC | Age: 61
End: 2021-02-08

## 2021-02-10 ENCOUNTER — TELEMEDICINE (OUTPATIENT)
Dept: BEHAVIORAL HEALTH | Facility: CLINIC | Age: 61
End: 2021-02-10
Payer: COMMERCIAL

## 2021-02-10 DIAGNOSIS — F63.81 INTERMITTENT EXPLOSIVE DISORDER: ICD-10-CM

## 2021-02-10 DIAGNOSIS — F90.2 ADHD (ATTENTION DEFICIT HYPERACTIVITY DISORDER), COMBINED TYPE: ICD-10-CM

## 2021-02-10 DIAGNOSIS — F32.A DEPRESSIVE DISORDER: ICD-10-CM

## 2021-02-10 PROCEDURE — 99213 OFFICE O/P EST LOW 20 MIN: CPT | Mod: 95,CR | Performed by: PSYCHIATRY & NEUROLOGY

## 2021-02-10 RX ORDER — DEXTROAMPHETAMINE SACCHARATE, AMPHETAMINE ASPARTATE MONOHYDRATE, DEXTROAMPHETAMINE SULFATE AND AMPHETAMINE SULFATE 7.5; 7.5; 7.5; 7.5 MG/1; MG/1; MG/1; MG/1
30 CAPSULE, EXTENDED RELEASE ORAL EVERY MORNING
Qty: 30 CAPSULE | Refills: 0 | Status: SHIPPED | OUTPATIENT
Start: 2021-02-22 | End: 2021-03-24

## 2021-02-10 RX ORDER — DEXTROAMPHETAMINE SACCHARATE, AMPHETAMINE ASPARTATE MONOHYDRATE, DEXTROAMPHETAMINE SULFATE AND AMPHETAMINE SULFATE 7.5; 7.5; 7.5; 7.5 MG/1; MG/1; MG/1; MG/1
30 CAPSULE, EXTENDED RELEASE ORAL EVERY MORNING
Qty: 30 CAPSULE | Refills: 0 | Status: SHIPPED | OUTPATIENT
Start: 2021-04-25 | End: 2021-05-25

## 2021-02-10 RX ORDER — DEXTROAMPHETAMINE SACCHARATE, AMPHETAMINE ASPARTATE MONOHYDRATE, DEXTROAMPHETAMINE SULFATE AND AMPHETAMINE SULFATE 7.5; 7.5; 7.5; 7.5 MG/1; MG/1; MG/1; MG/1
30 CAPSULE, EXTENDED RELEASE ORAL EVERY MORNING
Qty: 30 CAPSULE | Refills: 0 | Status: SHIPPED | OUTPATIENT
Start: 2021-03-25 | End: 2021-04-24

## 2021-02-10 NOTE — PROGRESS NOTES
This evaluation was conducted via [employer] using secure and encrypted videoconferencing technology. The patient was in a private location in the [facility].    The patient's identity was confirmed and verbal consent was obtained for this virtual visit.     PSYCHIATRY FOLLOW-UP NOTE      Name: Jensen Leigh  MRN: [id number]  : 1960  Age: 60 y.o.  Date of assessment: 2/10/2021  PCP: Aubrie Bryant P.A.-C.  Persons in attendance: Patient  Total face-to-face time: 15 minutes  Time for documentation and reviewing past records:  15 minutes    REASON FOR VISIT/CHIEF COMPLAINT (as stated by Patient):  Jensen Leigh is a 60 y.o., White male, attending follow-up appointment for mood and anxiety management.      HISTORY OF PRESENT ILLNESS:  Jensen Leigh is a 60 y.o. old male with depression, IED and ADHD comes in today for follow up. Patient was last seen 3 months ago, and following treatment planning recommendations were done:  · Continue Sertraline 50 mg daily for depression and anxiety management.  Given 3 month supply with 1 refill.  · Increase Adderall XR to 30 mg daily for depression and anxiety management. Recent refill of 25 mg dose on 10/25/20. Following two 1 month prescriptions sent to pharmacy: -20; 20-21.  · Controlled medication treatment agreement mailed to him by my chart.      Patient is compliant with medicines with no side effect.  Patient has noticed improvement in focus and inattention after increased dose of Adderall to 30 mg and denies any side effects including changes in sleep, appetite, weight, anxiety, obsessive/compulsive behavior, psychosis or any new physical symptoms including chest pain or racing heart.  Patient describes mood and anxiety as stable on Zoloft 50 mg daily with no signs of worsening depression or changes in energy, motivation or interest.  Patient denies any signs of increased irritability or impulsivity.  Patient describes work is busy and describes  this as positive and reports a good relationship with wife at home.  Patient recently had a COVID-19 3 weeks ago and is recovering from that.  Patient agreed with plan of not titrating medications further and continuing 3 monthly follow-up at this time.      CURRENT MEDICATIONS:  Current Outpatient Medications   Medication Sig Dispense Refill   • amphetamine-dextroamphetamine ER (ADDERALL XR, 30MG,) 30 MG XR capsule Take 1 Cap by mouth every morning for 30 days. 30 Cap 0   • promethazine-dextromethorphan (PROMETHAZINE-DM) 6.25-15 MG/5ML syrup Take 5 mL by mouth at bedtime as needed.  May repeat 1 time. for Cough. 50 mL 0   • sertraline (ZOLOFT) 50 MG Tab Take 1 Tab by mouth every day. 90 Tab 1   • SITagliptin (JANUVIA) 100 MG Tab Take 1 Tab by mouth every day. 90 Tab 1   • Empagliflozin (JARDIANCE) 25 MG Tab Take 1 Tab by mouth every morning. 90 Tab 1   • tadalafil (CIALIS) 10 MG tablet Take 1 Tab by mouth as needed for Erectile Dysfunction. 10 Tab 3   • lisinopril (PRINIVIL) 2.5 MG Tab Take 1 Tab by mouth every day. (for kidney protection) 90 Tab 1   • atorvastatin (LIPITOR) 20 MG Tab TAKE 1 TABLET BY MOUTH ONCE DAILY 90 Tab 1   • Blood Glucose Test Strips Test strips order: Test strips for Contour Next meter. Sig: use 2x/d and prn ssx high or low sugar #100 RF x 3 100 Strip 3   • aspirin EC (ECOTRIN) 81 MG Tablet Delayed Response Take 2 Tabs by mouth every day. 30 Tab    • Blood Glucose Monitoring Suppl Device Meter: Dispense 1 Contour Next Device . Sig. Use as directed for blood sugar monitoring. #1. NR. 1 Device 0   • glucose blood (TRUE METRIX BLOOD GLUCOSE TEST) strip 1 Strip by Other route as needed. 300 Strip 1   • Multiple Vitamin (MULTI VITAMIN MENS PO) Take 1 Tab by mouth every day.       No current facility-administered medications for this visit.       MEDICAL HISTORY  Past Medical History:   Diagnosis Date   • Eczema    • GERD (gastroesophageal reflux disease)    • Type II or unspecified type diabetes  mellitus without mention of complication, not stated as uncontrolled    • Umbilical hernia      Past Surgical History:   Procedure Laterality Date   • ROTATOR CUFF REPAIR      rt shoulder   • TONSILLECTOMY AND ADENOIDECTOMY         PAST PSYCHIATRIC HISTORY  Prior psychiatric hospitalization: none  Prior Self harm/suicide attempt: no  Prior Diagnosis: Depression, IED, ADHD     PAST PSYCHIATRIC MEDICATIONS  One antidepressant in the past      FAMILY HISTORY  Psychiatric diagnosis:  none  History of suicide attempts:  no  Substance abuse history:  no     SUBSTANCE USE HISTORY:  ALCOHOL: no  TOBACCO: no  CANNABIS: no  OPIOIDS: no  PRESCRIPTION MEDICATIONS: no  OTHERS: experimented with methamphetamine in his 20s.  History of inpatient/outpatient rehab treatment: none     SOCIAL HISTORY  Childhood: born in [place] and describes childhood as ok  Education: [employer] (dropped out early)  in Special Education: no  Intellectual Disability: no  Employment: employed  at [employer] (for > 40 yr)  Relationship:  for > 40 yr  Kids: no  Current living situation: lives with wife  Current/past legal issues: no  History of emotional/physical/sexual abuse - no      REVIEW OF SYSTEMS:        Constitutional negative   Eyes negative   Ears/Nose/Mouth/Throat negative   Cardiovascular negative   Respiratory negative   Gastrointestinal negative   Genitourinary negative   Muscular negative   Integumentary negative   Neurological negative   Endocrine negative   Hematologic/Lymphatic negative     PHYSICAL EXAMINAION:  Vital signs: There were no vitals taken for this visit.  Musculoskeletal: Normal gait.   Abnormal movements: none      MENTAL STATUS EXAMINATION      General:   - Grooming and hygiene: Casual,   - Apparent distress: none,   - Behavior: Calm  - Eye Contact:  Good,   - no psychomotor agitation or retardation    - Participation: Active verbal participation  Orientation: Alert and Fully Oriented to person, place and  time  Mood: Euthymic  Affect: Flexible and Full range,  Thought Process: Logical and Goal-directed  Thought Content: Denies suicidal or homicidal ideations, intent or plan Within normal limits  Perception: Denies auditory or visual hallucinations. No delusions noted Within normal limits  Attention span and concentration: Intact   Speech:Rate within normal limits and Volume within normal limits  Language: Appropriate   Insight: Good  Judgment: Good  Recent and remote memory: No gross evidence of memory deficits        DEPRESSION SCREENING:  Depression Screen (PHQ-2/PHQ-9) 5/10/2019 11/4/2019 11/6/2020   PHQ-2 Total Score 0 2 0   PHQ-9 Total Score - 7 -       Interpretation of PHQ-9 Total Score   Score Severity   1-4 No Depression   5-9 Mild Depression   10-14 Moderate Depression   15-19 Moderately Severe Depression   20-27 Severe Depression    CURRENT RISK:       Suicidal: Low       Homicidal: Low       Self-Harm: Low       Relapse: Low       Crisis Safety Plan Reviewed Not Indicated       If evidence of imminent risk is present, intervention/plan:      MEDICAL RECORDS/LABS/DIAGNOSTIC TESTS REVIEWED:  No new lab since last visit     [place]  records -   Reviewed     DIAGNOSTIC IMPRESSION(S):  1. Depressive Disorder  2. Intermittent Explosive Disorder  3. ADHD        PLAN:  (1) Depressive Disorder, in remission  · Stable  · Continue Sertraline 50 mg daily for depression and anxiety management.  Given 3 month supply with 1 refill.  · Medication options, alternatives (including no medications) and medication risks/benefits/side effects were discussed in detail.  · The patient was advised to call, message provider on [employer], or come in to the clinic if symptoms worsen or if any future questions/issues regarding their medications arise; the patient verbalized understanding and agreement.    · The patient was educated to call 911, call the suicide hotline, or go to local ER if having thoughts of suicide or homicide;  verbalized understanding.     (2) Intermittent Explosive Disorder:  · Stable  · Continue Sertraline 50 mg daily for depression and anxiety management.  Given 3 month supply with 1 refill.     (3) ADHD  · Stable  · Continue Adderall XR 30 mg daily for depression and anxiety management. Recent refill on 1/22/21. Following three 1 month prescriptions sent to pharmacy: 2/22-3/24/21 (fill on 2/21/21); 3/25-4/24/21 (fill on 3/24/21); 4/25-5/25 (fill on 4/24/21)  · Controlled medication treatment agreement mailed to him by my chart.       Return to clinic in 3 months or sooner if symptoms worsen.  Next Appointment: instruction provided on how to make the next appointment.     The proposed treatment plan was discussed with the patient who was provided the opportunity to ask questions and make suggestions regarding alternative treatment. Patient verbalized understanding and expressed agreement with the plan.       Eliazar Prasad M.D.  02/10/21    This note was created using voice recognition software (Dragon). The accuracy of the dictation is limited by the abilities of the software. I have reviewed the note prior to signing, however some errors in grammar and context are still possible. If you have any questions related to this note please do not hesitate to contact our office.

## 2021-04-15 ENCOUNTER — PATIENT MESSAGE (OUTPATIENT)
Dept: MEDICAL GROUP | Facility: CLINIC | Age: 61
End: 2021-04-15

## 2021-04-15 DIAGNOSIS — E78.00 PURE HYPERCHOLESTEROLEMIA: ICD-10-CM

## 2021-04-15 DIAGNOSIS — E11.9 TYPE 2 DIABETES MELLITUS WITHOUT COMPLICATION, WITHOUT LONG-TERM CURRENT USE OF INSULIN (HCC): ICD-10-CM

## 2021-04-15 RX ORDER — EMPAGLIFLOZIN 25 MG/1
TABLET, FILM COATED ORAL
Qty: 30 TABLET | Refills: 0 | Status: SHIPPED | OUTPATIENT
Start: 2021-04-15 | End: 2021-04-26 | Stop reason: SDUPTHER

## 2021-04-15 RX ORDER — ATORVASTATIN CALCIUM 20 MG/1
20 TABLET, FILM COATED ORAL DAILY
Qty: 30 TABLET | Refills: 0 | Status: SHIPPED | OUTPATIENT
Start: 2021-04-15 | End: 2021-12-20

## 2021-04-16 ENCOUNTER — TELEPHONE (OUTPATIENT)
Dept: MEDICAL GROUP | Facility: CLINIC | Age: 61
End: 2021-04-16

## 2021-04-16 NOTE — TELEPHONE ENCOUNTER
VOICEMAIL  1. Caller Name: Jensen Leigh        Call Back Number: [phone]      2. Message: Pt wife called stating pt needs his RX for his labs. Pt is out of med's and pharmacy is wanting to charge 80.00 for 14 tabs. Pt wife states she will have to pay 80.00 again with the start of the new month coming up and does not want to pay 80.00 for 14 tabs. Would like to know if he needs the rx to do blood work or what should they do?     3. Patient approves office to leave a detailed voicemail/MyChart message: no

## 2021-04-19 ENCOUNTER — TELEPHONE (OUTPATIENT)
Dept: MEDICAL GROUP | Facility: CLINIC | Age: 61
End: 2021-04-19

## 2021-04-19 NOTE — TELEPHONE ENCOUNTER
Pt wife would like a note on my chart stating what the pt needs to have done before his next visit. Pt wife would like to make sure everything is ready for pt at the time of visit.

## 2021-04-20 ENCOUNTER — TELEPHONE (OUTPATIENT)
Dept: MEDICAL GROUP | Facility: CLINIC | Age: 61
End: 2021-04-20

## 2021-04-20 DIAGNOSIS — R53.83 OTHER FATIGUE: ICD-10-CM

## 2021-04-20 NOTE — TELEPHONE ENCOUNTER
Spoke to pt wife and she would like to see if there is anything that you could do about pt being so tired. She would also like to know what the normals for his blood sugar should be in the morning and how could she help pt getting his sugar levels down. Would like a mychart message.

## 2021-04-20 NOTE — TELEPHONE ENCOUNTER
I ordered further labs for fatigue, but this would all need to be discussed in office. I recommend wife also come to the visit if patient permits as it sounds as though she has questions.

## 2021-04-22 ENCOUNTER — HOSPITAL ENCOUNTER (OUTPATIENT)
Dept: LAB | Facility: MEDICAL CENTER | Age: 61
End: 2021-04-22
Attending: PHYSICIAN ASSISTANT
Payer: COMMERCIAL

## 2021-04-22 DIAGNOSIS — E11.9 TYPE 2 DIABETES MELLITUS WITHOUT COMPLICATION, WITHOUT LONG-TERM CURRENT USE OF INSULIN (HCC): ICD-10-CM

## 2021-04-22 DIAGNOSIS — R53.83 OTHER FATIGUE: ICD-10-CM

## 2021-04-22 PROCEDURE — 84439 ASSAY OF FREE THYROXINE: CPT

## 2021-04-22 PROCEDURE — 84443 ASSAY THYROID STIM HORMONE: CPT

## 2021-04-22 PROCEDURE — 36415 COLL VENOUS BLD VENIPUNCTURE: CPT

## 2021-04-22 PROCEDURE — 83036 HEMOGLOBIN GLYCOSYLATED A1C: CPT

## 2021-04-22 PROCEDURE — 80061 LIPID PANEL: CPT

## 2021-04-22 PROCEDURE — 80053 COMPREHEN METABOLIC PANEL: CPT

## 2021-04-22 PROCEDURE — 85025 COMPLETE CBC W/AUTO DIFF WBC: CPT

## 2021-04-23 ENCOUNTER — HOSPITAL ENCOUNTER (OUTPATIENT)
Facility: MEDICAL CENTER | Age: 61
End: 2021-04-23
Attending: PHYSICIAN ASSISTANT
Payer: COMMERCIAL

## 2021-04-23 DIAGNOSIS — E11.9 TYPE 2 DIABETES MELLITUS WITHOUT COMPLICATION, WITHOUT LONG-TERM CURRENT USE OF INSULIN (HCC): ICD-10-CM

## 2021-04-23 LAB
ALBUMIN SERPL BCP-MCNC: 4.4 G/DL (ref 3.2–4.9)
ALBUMIN/GLOB SERPL: 1.3 G/DL
ALP SERPL-CCNC: 100 U/L (ref 30–99)
ALT SERPL-CCNC: 27 U/L (ref 2–50)
ANION GAP SERPL CALC-SCNC: 7 MMOL/L (ref 7–16)
APPEARANCE UR: CLEAR
AST SERPL-CCNC: 24 U/L (ref 12–45)
BASOPHILS # BLD AUTO: 0.6 % (ref 0–1.8)
BASOPHILS # BLD: 0.04 K/UL (ref 0–0.12)
BILIRUB SERPL-MCNC: 0.6 MG/DL (ref 0.1–1.5)
BILIRUB UR QL STRIP.AUTO: NEGATIVE
BUN SERPL-MCNC: 14 MG/DL (ref 8–22)
CALCIUM SERPL-MCNC: 9.6 MG/DL (ref 8.5–10.5)
CHLORIDE SERPL-SCNC: 104 MMOL/L (ref 96–112)
CHOLEST SERPL-MCNC: 146 MG/DL (ref 100–199)
CO2 SERPL-SCNC: 29 MMOL/L (ref 20–33)
COLOR UR: YELLOW
CREAT SERPL-MCNC: 0.82 MG/DL (ref 0.5–1.4)
EOSINOPHIL # BLD AUTO: 0.12 K/UL (ref 0–0.51)
EOSINOPHIL NFR BLD: 1.8 % (ref 0–6.9)
ERYTHROCYTE [DISTWIDTH] IN BLOOD BY AUTOMATED COUNT: 49 FL (ref 35.9–50)
EST. AVERAGE GLUCOSE BLD GHB EST-MCNC: 151 MG/DL
FASTING STATUS PATIENT QL REPORTED: NORMAL
GLOBULIN SER CALC-MCNC: 3.3 G/DL (ref 1.9–3.5)
GLUCOSE SERPL-MCNC: 132 MG/DL (ref 65–99)
GLUCOSE UR STRIP.AUTO-MCNC: >=1000 MG/DL
HBA1C MFR BLD: 6.9 % (ref 4–5.6)
HCT VFR BLD AUTO: 53.7 % (ref 42–52)
HDLC SERPL-MCNC: 41 MG/DL
HGB BLD-MCNC: 17.1 G/DL (ref 14–18)
IMM GRANULOCYTES # BLD AUTO: 0.01 K/UL (ref 0–0.11)
IMM GRANULOCYTES NFR BLD AUTO: 0.2 % (ref 0–0.9)
KETONES UR STRIP.AUTO-MCNC: NEGATIVE MG/DL
LDLC SERPL CALC-MCNC: 84 MG/DL
LEUKOCYTE ESTERASE UR QL STRIP.AUTO: NEGATIVE
LYMPHOCYTES # BLD AUTO: 1.99 K/UL (ref 1–4.8)
LYMPHOCYTES NFR BLD: 30 % (ref 22–41)
MCH RBC QN AUTO: 29.7 PG (ref 27–33)
MCHC RBC AUTO-ENTMCNC: 31.8 G/DL (ref 33.7–35.3)
MCV RBC AUTO: 93.4 FL (ref 81.4–97.8)
MICRO URNS: ABNORMAL
MONOCYTES # BLD AUTO: 0.76 K/UL (ref 0–0.85)
MONOCYTES NFR BLD AUTO: 11.4 % (ref 0–13.4)
NEUTROPHILS # BLD AUTO: 3.72 K/UL (ref 1.82–7.42)
NEUTROPHILS NFR BLD: 56 % (ref 44–72)
NITRITE UR QL STRIP.AUTO: NEGATIVE
NRBC # BLD AUTO: 0 K/UL
NRBC BLD-RTO: 0 /100 WBC
PH UR STRIP.AUTO: 6.5 [PH] (ref 5–8)
PLATELET # BLD AUTO: 206 K/UL (ref 164–446)
PMV BLD AUTO: 10.6 FL (ref 9–12.9)
POTASSIUM SERPL-SCNC: 4.8 MMOL/L (ref 3.6–5.5)
PROT SERPL-MCNC: 7.7 G/DL (ref 6–8.2)
PROT UR QL STRIP: NEGATIVE MG/DL
RBC # BLD AUTO: 5.75 M/UL (ref 4.7–6.1)
RBC UR QL AUTO: NEGATIVE
SODIUM SERPL-SCNC: 140 MMOL/L (ref 135–145)
SP GR UR STRIP.AUTO: 1.02
T4 FREE SERPL-MCNC: 0.93 NG/DL (ref 0.93–1.7)
TRIGL SERPL-MCNC: 104 MG/DL (ref 0–149)
TSH SERPL DL<=0.005 MIU/L-ACNC: 1.42 UIU/ML (ref 0.38–5.33)
UROBILINOGEN UR STRIP.AUTO-MCNC: 0.2 MG/DL
WBC # BLD AUTO: 6.6 K/UL (ref 4.8–10.8)

## 2021-04-23 PROCEDURE — 81003 URINALYSIS AUTO W/O SCOPE: CPT

## 2021-04-23 PROCEDURE — 82570 ASSAY OF URINE CREATININE: CPT

## 2021-04-23 PROCEDURE — 82043 UR ALBUMIN QUANTITATIVE: CPT

## 2021-04-24 LAB
CREAT UR-MCNC: 101.2 MG/DL
MICROALBUMIN UR-MCNC: <1.2 MG/DL
MICROALBUMIN/CREAT UR: NORMAL MG/G (ref 0–30)

## 2021-04-26 ENCOUNTER — OFFICE VISIT (OUTPATIENT)
Dept: MEDICAL GROUP | Facility: CLINIC | Age: 61
End: 2021-04-26
Payer: COMMERCIAL

## 2021-04-26 VITALS
BODY MASS INDEX: 36.98 KG/M2 | HEART RATE: 85 BPM | DIASTOLIC BLOOD PRESSURE: 72 MMHG | SYSTOLIC BLOOD PRESSURE: 136 MMHG | WEIGHT: 244 LBS | TEMPERATURE: 97.2 F | RESPIRATION RATE: 16 BRPM | OXYGEN SATURATION: 96 % | HEIGHT: 68 IN

## 2021-04-26 DIAGNOSIS — Z12.5 PROSTATE CANCER SCREENING: ICD-10-CM

## 2021-04-26 DIAGNOSIS — Z11.59 ENCOUNTER FOR HEPATITIS C SCREENING TEST FOR LOW RISK PATIENT: ICD-10-CM

## 2021-04-26 DIAGNOSIS — E78.00 PURE HYPERCHOLESTEROLEMIA: ICD-10-CM

## 2021-04-26 DIAGNOSIS — I82.411 DVT OF DEEP FEMORAL VEIN, RIGHT (HCC): ICD-10-CM

## 2021-04-26 DIAGNOSIS — F90.2 ADHD (ATTENTION DEFICIT HYPERACTIVITY DISORDER), COMBINED TYPE: ICD-10-CM

## 2021-04-26 DIAGNOSIS — E11.9 TYPE 2 DIABETES MELLITUS WITHOUT COMPLICATION, WITHOUT LONG-TERM CURRENT USE OF INSULIN (HCC): ICD-10-CM

## 2021-04-26 DIAGNOSIS — N52.1 ERECTILE DYSFUNCTION DUE TO DISEASES CLASSIFIED ELSEWHERE: ICD-10-CM

## 2021-04-26 DIAGNOSIS — Z12.11 COLON CANCER SCREENING: ICD-10-CM

## 2021-04-26 DIAGNOSIS — Z95.828 PRESENCE OF INFERIOR VENA CAVA FILTER: ICD-10-CM

## 2021-04-26 DIAGNOSIS — F32.A DEPRESSIVE DISORDER: ICD-10-CM

## 2021-04-26 PROCEDURE — 99214 OFFICE O/P EST MOD 30 MIN: CPT | Performed by: PHYSICIAN ASSISTANT

## 2021-04-26 RX ORDER — ATORVASTATIN CALCIUM 40 MG/1
40 TABLET, FILM COATED ORAL DAILY
Qty: 30 TABLET | Refills: 9 | Status: SHIPPED | OUTPATIENT
Start: 2021-04-26 | End: 2022-12-14 | Stop reason: SDUPTHER

## 2021-04-26 RX ORDER — EMPAGLIFLOZIN 25 MG/1
TABLET, FILM COATED ORAL
Qty: 30 TABLET | Refills: 9 | Status: SHIPPED | OUTPATIENT
Start: 2021-04-26 | End: 2021-12-20 | Stop reason: SDUPTHER

## 2021-04-26 RX ORDER — TADALAFIL 10 MG/1
10 TABLET ORAL PRN
Qty: 10 TABLET | Refills: 9 | Status: SHIPPED | OUTPATIENT
Start: 2021-04-26 | End: 2024-01-23

## 2021-04-26 RX ORDER — LISINOPRIL 2.5 MG/1
2.5 TABLET ORAL DAILY
Qty: 30 TABLET | Refills: 9 | Status: SHIPPED | OUTPATIENT
Start: 2021-04-26 | End: 2021-12-20 | Stop reason: SDUPTHER

## 2021-04-26 ASSESSMENT — FIBROSIS 4 INDEX: FIB4 SCORE: 1.39

## 2021-04-26 NOTE — PROGRESS NOTES
cc:  diabetes    Subjective:     Jensen Leigh is a 61 y.o. male presenting for diabetes      Patient presents to the office for diabetes.  He presents for his 6-month checkup.  He does have hypercholesterolemia along with type 2 diabetes without insulin use.  He did have blood work drawn before coming in to be seen today.  He is also needing medications refilled.  Labs do show that his LDL is mildly elevated above 80.    Patient has ED.  He is requesting a refill of Cialis.    Patient is due for routine screening including hepatitis C screening, colon cancer screening and a PSA for prostate cancer.  He declines doing a colonoscopy at this time but is willing to proceed with FIT testing.    Patient has a reported DVT.  He states that this is a continual issue and has an implanted IVC filter.  He indicates that he is not taking any type of blood thinner at this time.    Patient sees psychiatry for ADHD and depression medication refills.    Review of systems:  See above.   Denies any symptoms unless previously indicated.        Current Outpatient Medications:   •  atorvastatin (LIPITOR) 40 MG Tab, Take 1 tablet by mouth every day., Disp: 30 tablet, Rfl: 9  •  Empagliflozin (JARDIANCE) 25 MG Tab, TAKE 1 TABLET BY MOUTH ONCE DAILY IN THE MORNING ., Disp: 30 tablet, Rfl: 9  •  tadalafil (CIALIS) 10 MG tablet, Take 1 tablet by mouth as needed for Erectile Dysfunction., Disp: 10 tablet, Rfl: 9  •  SITagliptin (JANUVIA) 100 MG Tab, Take 1 tablet by mouth every day., Disp: 30 tablet, Rfl: 9  •  lisinopril (PRINIVIL) 2.5 MG Tab, Take 1 tablet by mouth every day. (for kidney protection), Disp: 30 tablet, Rfl: 9  •  atorvastatin (LIPITOR) 20 MG Tab, Take 1 tablet by mouth every day., Disp: 30 tablet, Rfl: 0  •  sertraline (ZOLOFT) 50 MG Tab, Take 1 tablet by mouth every day., Disp: 90 tablet, Rfl: 1  •  amphetamine-dextroamphetamine ER (ADDERALL XR) 30 MG XR capsule, Take 1 capsule by mouth every morning for 30 days., Disp: 30  "capsule, Rfl: 0  •  Blood Glucose Test Strips, Test strips order: Test strips for Contour Next meter. Sig: use 2x/d and prn ssx high or low sugar #100 RF x 3, Disp: 100 Strip, Rfl: 3  •  aspirin EC (ECOTRIN) 81 MG Tablet Delayed Response, Take 2 Tabs by mouth every day., Disp: 30 Tab, Rfl:   •  Blood Glucose Monitoring Suppl Device, Meter: Dispense 1 Contour Next Device . Sig. Use as directed for blood sugar monitoring. #1. NR., Disp: 1 Device, Rfl: 0  •  glucose blood (TRUE METRIX BLOOD GLUCOSE TEST) strip, 1 Strip by Other route as needed., Disp: 300 Strip, Rfl: 1  •  Multiple Vitamin (MULTI VITAMIN MENS PO), Take 1 Tab by mouth every day., Disp: , Rfl:     Allergies, past medical history, past surgical history, family history, social history reviewed and updated    Objective:     Vitals: /72   Pulse 85   Temp 36.2 °C (97.2 °F) (Temporal)   Resp 16   Ht 1.727 m (5' 8\")   Wt 111 kg (244 lb)   SpO2 96%   BMI 37.10 kg/m²   General: Alert, pleasant, NAD  EYES:   PERRL, EOMI, no icterus or pallor.  Conjunctivae and lids normal.   HENT:  Normocephalic.  External ears normal.  Neck supple.     Respiratory: Normal respiratory effort.    Abdomen: obese.   Skin: Warm, dry, no rashes.  Musculoskeletal: Gait is normal.  Moves all extremities well.    Extremities: Negative monofilament screening with multiple point discrimination.  2+ DP pulses bilaterally..   Neurological: No tremors, sensation grossly intact,  gait is normal, CN2-12 intact.  Psych:  Affect/mood is normal, judgement is good, memory is intact, grooming is appropriate.    Assessment/Plan:     Jensen was seen today for medication refill.    Diagnoses and all orders for this visit:    Pure hypercholesterolemia  -     atorvastatin (LIPITOR) 40 MG Tab; Take 1 tablet by mouth every day.  -     Lipid Profile; Future  -     Comp Metabolic Panel; Future  Type 2 diabetes mellitus without complication, without long-term current use of insulin (HCC)  -     " Empagliflozin (JARDIANCE) 25 MG Tab; TAKE 1 TABLET BY MOUTH ONCE DAILY IN THE MORNING .  -     SITagliptin (JANUVIA) 100 MG Tab; Take 1 tablet by mouth every day.  -     lisinopril (PRINIVIL) 2.5 MG Tab; Take 1 tablet by mouth every day. (for kidney protection)  -     Comp Metabolic Panel; Future  -     HEMOGLOBIN A1C; Future  Erectile dysfunction due to diseases classified elsewhere  -     tadalafil (CIALIS) 10 MG tablet; Take 1 tablet by mouth as needed for Erectile Dysfunction.    Labs have been ordered to be repeated in 6 months.  Medications have been refilled at this time.    Prostate cancer screening  -     PROSTATE SPECIFIC AG SCREENING; Future  Encounter for hepatitis C screening test for low risk patient  -     HEP C VIRUS ANTIBODY; Future  Colon cancer screening  -     OCCULT BLOOD FECES IMMUNOASSAY; Future    Hepatitis C and PSA have been ordered.  Fecal occult testing has been ordered as patient declines colonoscopy.    DVT of deep femoral vein, right (HCC)  Presence of inferior vena cava filter    Patient is no longer on blood thinner and patient does have an implanted IVC.  No further treatment needed at this time.    ADHD (attention deficit hyperactivity disorder), combined type  Depressive disorder    Follow-up with psychiatry        No follow-ups on file.    Please note that this dictation was created using voice recognition software. I have made every reasonable attempt to correct obvious errors, but expect that there are errors of grammar and possible content that I did not discover before finalizing note.

## 2021-05-11 ENCOUNTER — TELEMEDICINE (OUTPATIENT)
Dept: BEHAVIORAL HEALTH | Facility: CLINIC | Age: 61
End: 2021-05-11
Payer: COMMERCIAL

## 2021-05-11 DIAGNOSIS — F90.2 ADHD (ATTENTION DEFICIT HYPERACTIVITY DISORDER), COMBINED TYPE: ICD-10-CM

## 2021-05-11 DIAGNOSIS — F32.A DEPRESSIVE DISORDER: ICD-10-CM

## 2021-05-11 DIAGNOSIS — F63.81 INTERMITTENT EXPLOSIVE DISORDER: ICD-10-CM

## 2021-05-11 PROCEDURE — 99214 OFFICE O/P EST MOD 30 MIN: CPT | Mod: 95 | Performed by: PSYCHIATRY & NEUROLOGY

## 2021-05-11 RX ORDER — DEXTROAMPHETAMINE SACCHARATE, AMPHETAMINE ASPARTATE MONOHYDRATE, DEXTROAMPHETAMINE SULFATE AND AMPHETAMINE SULFATE 7.5; 7.5; 7.5; 7.5 MG/1; MG/1; MG/1; MG/1
30 CAPSULE, EXTENDED RELEASE ORAL EVERY MORNING
Qty: 30 CAPSULE | Refills: 0 | Status: SHIPPED | OUTPATIENT
Start: 2021-07-26 | End: 2021-08-25

## 2021-05-11 RX ORDER — DEXTROAMPHETAMINE SACCHARATE, AMPHETAMINE ASPARTATE MONOHYDRATE, DEXTROAMPHETAMINE SULFATE AND AMPHETAMINE SULFATE 7.5; 7.5; 7.5; 7.5 MG/1; MG/1; MG/1; MG/1
30 CAPSULE, EXTENDED RELEASE ORAL EVERY MORNING
Qty: 30 CAPSULE | Refills: 0 | Status: SHIPPED | OUTPATIENT
Start: 2021-06-25 | End: 2021-07-25

## 2021-05-11 RX ORDER — DEXTROAMPHETAMINE SACCHARATE, AMPHETAMINE ASPARTATE MONOHYDRATE, DEXTROAMPHETAMINE SULFATE AND AMPHETAMINE SULFATE 7.5; 7.5; 7.5; 7.5 MG/1; MG/1; MG/1; MG/1
30 CAPSULE, EXTENDED RELEASE ORAL EVERY MORNING
Qty: 30 CAPSULE | Refills: 0 | Status: SHIPPED | OUTPATIENT
Start: 2021-05-25 | End: 2021-06-24

## 2021-05-11 NOTE — PROGRESS NOTES
This evaluation was conducted via Zoom using secure and encrypted videoconferencing technology. The patient was in a private location in the [facility].    The patient's identity was confirmed and verbal consent was obtained for this virtual visit.     PSYCHIATRY FOLLOW-UP NOTE      Name: Jensen Leigh  MRN: [id number]  : 1960  Age: 61 y.o.  Date of assessment: 2021  PCP: Aubrie Bryant P.A.-C.  Persons in attendance: Patient      REASON FOR VISIT/CHIEF COMPLAINT (as stated by Patient):  Jensen Leigh is a 61 y.o., White male, attending follow-up appointment for mood and anxiety management.      HISTORY OF PRESENT ILLNESS:  Jensen Leigh is a 61 y.o. old male with depression, IED & ADHD comes in today for follow up. Patient was last seen 3 months2 ago, and following treatment planning recommendations were done:  · Continue Sertraline 50 mg daily for depression and anxiety management.  Given 3 month supply with 1 refill.  · Continue Adderall XR 30 mg daily for depression and anxiety management. Recent refill on 21. Following three 1 month prescriptions sent to pharmacy: -3/24/21 (fill on 21); 3/25-21 (fill on 3/24/21); - (fill on 21)  · Controlled medication treatment agreement mailed to him by my chart.      Patient is compliant with medication with no side effect.  He describes mood, anxiety, anger and impulsivity as stable on Zoloft.  Patient describes Adderall is helpful for focus and inattention and is able to function well at his job.  He describes job is stressful but he is able to handle stressors more effectively on these medications.  Patient agreed with plan of continuing current medications.  Patient denies any side effect from Adderall including changes in sleep, appetite, weight, anger, anxiety, obsession, compulsion, psychosis or any new physical symptoms including chest pain or racing heart.      CURRENT MEDICATIONS:  Current Outpatient Medications    Medication Sig Dispense Refill   • atorvastatin (LIPITOR) 40 MG Tab Take 1 tablet by mouth every day. 30 tablet 9   • Empagliflozin (JARDIANCE) 25 MG Tab TAKE 1 TABLET BY MOUTH ONCE DAILY IN THE MORNING . 30 tablet 9   • tadalafil (CIALIS) 10 MG tablet Take 1 tablet by mouth as needed for Erectile Dysfunction. 10 tablet 9   • SITagliptin (JANUVIA) 100 MG Tab Take 1 tablet by mouth every day. 30 tablet 9   • lisinopril (PRINIVIL) 2.5 MG Tab Take 1 tablet by mouth every day. (for kidney protection) 30 tablet 9   • atorvastatin (LIPITOR) 20 MG Tab Take 1 tablet by mouth every day. 30 tablet 0   • sertraline (ZOLOFT) 50 MG Tab Take 1 tablet by mouth every day. 90 tablet 1   • amphetamine-dextroamphetamine ER (ADDERALL XR) 30 MG XR capsule Take 1 capsule by mouth every morning for 30 days. 30 capsule 0   • Blood Glucose Test Strips Test strips order: Test strips for Contour Next meter. Sig: use 2x/d and prn ssx high or low sugar #100 RF x 3 100 Strip 3   • aspirin EC (ECOTRIN) 81 MG Tablet Delayed Response Take 2 Tabs by mouth every day. 30 Tab    • Blood Glucose Monitoring Suppl Device Meter: Dispense 1 Contour Next Device . Sig. Use as directed for blood sugar monitoring. #1. NR. 1 Device 0   • glucose blood (TRUE METRIX BLOOD GLUCOSE TEST) strip 1 Strip by Other route as needed. 300 Strip 1   • Multiple Vitamin (MULTI VITAMIN MENS PO) Take 1 Tab by mouth every day.       No current facility-administered medications for this visit.       MEDICAL HISTORY  Past Medical History:   Diagnosis Date   • Eczema    • GERD (gastroesophageal reflux disease)    • Type II or unspecified type diabetes mellitus without mention of complication, not stated as uncontrolled    • Umbilical hernia      Past Surgical History:   Procedure Laterality Date   • ROTATOR CUFF REPAIR      rt shoulder   • TONSILLECTOMY AND ADENOIDECTOMY         PAST PSYCHIATRIC HISTORY  Prior psychiatric hospitalization: none  Prior Self harm/suicide  attempt: no  Prior Diagnosis: Depression, IED, ADHD     PAST PSYCHIATRIC MEDICATIONS  One antidepressant in the past      FAMILY HISTORY  Psychiatric diagnosis:  none  History of suicide attempts:  no  Substance abuse history:  no     SUBSTANCE USE HISTORY:  ALCOHOL: no  TOBACCO: no  CANNABIS: no  OPIOIDS: no  PRESCRIPTION MEDICATIONS: no  OTHERS: experimented with methamphetamine in his 20s.  History of inpatient/outpatient rehab treatment: none     SOCIAL HISTORY  Childhood: born in [place] and describes childhood as ok  Education: [employer] (dropped out early)  in Special Education: no  Intellectual Disability: no  Employment: employed  at [employer] (for > 40 yr)  Relationship:  for > 40 yr  Kids: no  Current living situation: lives with wife  Current/past legal issues: no  History of emotional/physical/sexual abuse - no      REVIEW OF SYSTEMS:        Constitutional negative   Eyes negative   Ears/Nose/Mouth/Throat negative   Cardiovascular negative   Respiratory negative   Gastrointestinal negative   Genitourinary negative   Muscular negative   Integumentary negative   Neurological negative   Endocrine negative   Hematologic/Lymphatic negative     PHYSICAL EXAMINAION:  Vital signs: There were no vitals taken for this visit.  Musculoskeletal: Normal gait.   Abnormal movements: none      MENTAL STATUS EXAMINATION      General:   - Grooming and hygiene: Casual,   - Apparent distress: none,   - Behavior: Calm  - Eye Contact:  Good,   - no psychomotor agitation or retardation    - Participation: Active verbal participation  Orientation: Alert and Fully Oriented to person, place and time  Mood: Euthymic  Affect: Flexible and Full range,  Thought Process: Logical and Goal-directed  Thought Content: Denies suicidal or homicidal ideations, intent or plan Within normal limits  Perception: Denies auditory or visual hallucinations. No delusions noted Within normal limits  Attention span and concentration:  Intact   Speech:Rate within normal limits and Volume within normal limits  Language: Appropriate   Insight: Good  Judgment: Good  Recent and remote memory: No gross evidence of memory deficits        DEPRESSION SCREENING:  Depression Screen (PHQ-2/PHQ-9) 5/10/2019 11/4/2019 11/6/2020   PHQ-2 Total Score 0 2 0   PHQ-9 Total Score - 7 -       Interpretation of PHQ-9 Total Score   Score Severity   1-4 No Depression   5-9 Mild Depression   10-14 Moderate Depression   15-19 Moderately Severe Depression   20-27 Severe Depression    CURRENT RISK:       Suicidal: Low       Homicidal: Low       Self-Harm: Low       Relapse: Low       Crisis Safety Plan Reviewed Not Indicated       If evidence of imminent risk is present, intervention/plan:      MEDICAL RECORDS/LABS/DIAGNOSTIC TESTS REVIEWED:  Component      Latest Ref Rng & Units 4/22/2021           6:39 AM   Sodium      135 - 145 mmol/L 140   Potassium      3.6 - 5.5 mmol/L 4.8   Chloride      96 - 112 mmol/L 104   Co2      20 - 33 mmol/L 29   Anion Gap      7.0 - 16.0 7.0   Glucose      65 - 99 mg/dL 132 (H)   Bun      8 - 22 mg/dL 14   Creatinine      0.50 - 1.40 mg/dL 0.82   Calcium      8.5 - 10.5 mg/dL 9.6   AST(SGOT)      12 - 45 U/L 24   ALT(SGPT)      2 - 50 U/L 27   Alkaline Phosphatase      30 - 99 U/L 100 (H)   Total Bilirubin      0.1 - 1.5 mg/dL 0.6   Albumin      3.2 - 4.9 g/dL 4.4   Total Protein      6.0 - 8.2 g/dL 7.7   Globulin      1.9 - 3.5 g/dL 3.3   A-G Ratio      g/dL 1.3     Component      Latest Ref Rng & Units 4/22/2021           6:39 AM   TSH      0.380 - 5.330 uIU/mL 1.420     Component      Latest Ref Rng & Units 4/22/2021           6:39 AM   WBC      4.8 - 10.8 K/uL 6.6   RBC      4.70 - 6.10 M/uL 5.75   Hemoglobin      14.0 - 18.0 g/dL 17.1   Hematocrit      42.0 - 52.0 % 53.7 (H)   MCV      81.4 - 97.8 fL 93.4   MCH      27.0 - 33.0 pg 29.7   MCHC      33.7 - 35.3 g/dL 31.8 (L)   RDW      35.9 - 50.0 fL 49.0   Platelet Count      164 - 446  K/uL 206   MPV      9.0 - 12.9 fL 10.6   Neutrophils-Polys      44.00 - 72.00 % 56.00   Lymphocytes      22.00 - 41.00 % 30.00   Monocytes      0.00 - 13.40 % 11.40   Eosinophils      0.00 - 6.90 % 1.80   Basophils      0.00 - 1.80 % 0.60   Immature Granulocytes      0.00 - 0.90 % 0.20   Nucleated RBC      /100 WBC 0.00   Neutrophils (Absolute)      1.82 - 7.42 K/uL 3.72   Lymphs (Absolute)      1.00 - 4.80 K/uL 1.99   Monos (Absolute)      0.00 - 0.85 K/uL 0.76   Eos (Absolute)      0.00 - 0.51 K/uL 0.12   Baso (Absolute)      0.00 - 0.12 K/uL 0.04   Immature Granulocytes (abs)      0.00 - 0.11 K/uL 0.01   NRBC (Absolute)      K/uL 0.00       [place]  records -   Reviewed     DIAGNOSTIC IMPRESSION(S):  1. Depressive Disorder  2. Intermittent Explosive Disorder  3. ADHD        PLAN:  (1) Depressive Disorder, in remission  · Stable  · Continue Sertraline 50 mg daily for depression and anxiety management.  Given 3 month supply with 1 refill.  · Medication options, alternatives (including no medications) and medication risks/benefits/side effects were discussed in detail.  · The patient was advised to call, message provider on [facility], or come in to the clinic if symptoms worsen or if any future questions/issues regarding their medications arise; the patient verbalized understanding and agreement.    · The patient was educated to call 911, call the suicide hotline, or go to local ER if having thoughts of suicide or homicide; verbalized understanding.     (2) Intermittent Explosive Disorder:  · Stable  · Continue Sertraline 50 mg daily for depression and anxiety management.  Given 3 month supply with 1 refill.     (3) ADHD  · Stable  · Continue Adderall XR 30 mg daily for depression and anxiety management. Recent refill on 4/24/21. Following three 1 month prescriptions sent to pharmacy: 5/25-6/24; 6/25-7/25; 7/26-8/25/21  · Controlled medication treatment agreement signed in Feb 2021.     Billing Coding based on:  06634:  based on MDM    Return to clinic in 3 months or sooner if symptoms worsen.  Next Appointment: instruction provided on how to make the next appointment.     The proposed treatment plan was discussed with the patient who was provided the opportunity to ask questions and make suggestions regarding alternative treatment. Patient verbalized understanding and expressed agreement with the plan.       Eliazar Prasad M.D.  05/11/21    This note was created using voice recognition software (Dragon). The accuracy of the dictation is limited by the abilities of the software. I have reviewed the note prior to signing, however some errors in grammar and context are still possible. If you have any questions related to this note please do not hesitate to contact our office.

## 2021-08-03 ENCOUNTER — TELEMEDICINE (OUTPATIENT)
Dept: BEHAVIORAL HEALTH | Facility: CLINIC | Age: 61
End: 2021-08-03
Payer: COMMERCIAL

## 2021-08-03 DIAGNOSIS — F32.A DEPRESSIVE DISORDER: ICD-10-CM

## 2021-08-03 DIAGNOSIS — F90.2 ADHD (ATTENTION DEFICIT HYPERACTIVITY DISORDER), COMBINED TYPE: ICD-10-CM

## 2021-08-03 DIAGNOSIS — F63.81 INTERMITTENT EXPLOSIVE DISORDER: ICD-10-CM

## 2021-08-03 PROCEDURE — 99214 OFFICE O/P EST MOD 30 MIN: CPT | Mod: 95 | Performed by: PSYCHIATRY & NEUROLOGY

## 2021-08-03 RX ORDER — DEXTROAMPHETAMINE SACCHARATE, AMPHETAMINE ASPARTATE MONOHYDRATE, DEXTROAMPHETAMINE SULFATE AND AMPHETAMINE SULFATE 7.5; 7.5; 7.5; 7.5 MG/1; MG/1; MG/1; MG/1
30 CAPSULE, EXTENDED RELEASE ORAL EVERY MORNING
Qty: 30 CAPSULE | Refills: 0 | Status: SHIPPED | OUTPATIENT
Start: 2021-08-26 | End: 2021-09-23

## 2021-08-03 RX ORDER — DEXTROAMPHETAMINE SACCHARATE, AMPHETAMINE ASPARTATE MONOHYDRATE, DEXTROAMPHETAMINE SULFATE AND AMPHETAMINE SULFATE 7.5; 7.5; 7.5; 7.5 MG/1; MG/1; MG/1; MG/1
30 CAPSULE, EXTENDED RELEASE ORAL EVERY MORNING
Qty: 30 CAPSULE | Refills: 0 | Status: SHIPPED | OUTPATIENT
Start: 2021-09-26 | End: 2021-09-23

## 2021-08-03 RX ORDER — DEXTROAMPHETAMINE SACCHARATE, AMPHETAMINE ASPARTATE MONOHYDRATE, DEXTROAMPHETAMINE SULFATE AND AMPHETAMINE SULFATE 7.5; 7.5; 7.5; 7.5 MG/1; MG/1; MG/1; MG/1
30 CAPSULE, EXTENDED RELEASE ORAL EVERY MORNING
Qty: 30 CAPSULE | Refills: 0 | Status: SHIPPED | OUTPATIENT
Start: 2021-10-27 | End: 2021-09-23

## 2021-08-03 NOTE — PROGRESS NOTES
This evaluation was conducted via Zoom using secure and encrypted videoconferencing technology. The patient was in a private location in the [facility].    The patient's identity was confirmed and verbal consent was obtained for this virtual visit.     PSYCHIATRY FOLLOW-UP NOTE      Name: Jensen Leigh  MRN: [id number]  : 1960  Age: 61 y.o.  Date of assessment: 8/3/2021  PCP: Aubrie Bryant P.A.-C.  Persons in attendance: Patient      REASON FOR VISIT/CHIEF COMPLAINT (as stated by Patient):  Jensen Leigh is a 61 y.o., White male, attending follow-up appointment for ADHD, mood and anxiety management.      HISTORY OF PRESENT ILLNESS:  Jensen Leigh is a 61 y.o. old male with depression, IED and ADHD comes in today for follow up. Patient was last seen 3 months ago, and following treatment planning recommendations were done:  · Continue Sertraline 50 mg daily for depression and anxiety management.  Given 3 month supply with 1 refill.  · Continue Adderall XR 30 mg daily for depression and anxiety management. Recent refill on 21. Following three 1 month prescriptions sent to pharmacy: -; -; -21  · Controlled medication treatment agreement signed in 2021.    Patient is compliant with medication with no side effect and reports mood, anger and anxiety is stable on Zoloft.  Patient reports Adderall is effective for attention and focus at work.  Denies any negative effect on sleep, anger, anxiety, mood, obsession, psychosis or any new physical symptoms including chest pain or racing heart.  Patient agreed with plan of continuing current medication at current dosage with no adjustment.    Patient reports his work is busy but reports doing well and able to deal with stressors more effectively.  Discussed the future plan of retiring and how life will be different and patient acknowledged that he likes to keep self busy and will work on new projects after retiring in future.      CURRENT  MEDICATIONS:  Current Outpatient Medications   Medication Sig Dispense Refill   • sertraline (ZOLOFT) 50 MG Tab Take 1 tablet by mouth every day. 90 tablet 1   • amphetamine-dextroamphetamine ER (ADDERALL XR) 30 MG XR capsule Take 1 capsule by mouth every morning for 30 days. 30 capsule 0   • atorvastatin (LIPITOR) 40 MG Tab Take 1 tablet by mouth every day. 30 tablet 9   • Empagliflozin (JARDIANCE) 25 MG Tab TAKE 1 TABLET BY MOUTH ONCE DAILY IN THE MORNING . 30 tablet 9   • tadalafil (CIALIS) 10 MG tablet Take 1 tablet by mouth as needed for Erectile Dysfunction. 10 tablet 9   • SITagliptin (JANUVIA) 100 MG Tab Take 1 tablet by mouth every day. 30 tablet 9   • lisinopril (PRINIVIL) 2.5 MG Tab Take 1 tablet by mouth every day. (for kidney protection) 30 tablet 9   • atorvastatin (LIPITOR) 20 MG Tab Take 1 tablet by mouth every day. 30 tablet 0   • Blood Glucose Test Strips Test strips order: Test strips for Contour Next meter. Sig: use 2x/d and prn ssx high or low sugar #100 RF x 3 100 Strip 3   • aspirin EC (ECOTRIN) 81 MG Tablet Delayed Response Take 2 Tabs by mouth every day. 30 Tab    • Blood Glucose Monitoring Suppl Device Meter: Dispense 1 Contour Next Device . Sig. Use as directed for blood sugar monitoring. #1. NR. 1 Device 0   • glucose blood (TRUE METRIX BLOOD GLUCOSE TEST) strip 1 Strip by Other route as needed. 300 Strip 1   • Multiple Vitamin (MULTI VITAMIN MENS PO) Take 1 Tab by mouth every day.       No current facility-administered medications for this visit.       MEDICAL HISTORY  Past Medical History:   Diagnosis Date   • Eczema    • GERD (gastroesophageal reflux disease)    • Type II or unspecified type diabetes mellitus without mention of complication, not stated as uncontrolled    • Umbilical hernia      Past Surgical History:   Procedure Laterality Date   • ROTATOR CUFF REPAIR      rt shoulder   • TONSILLECTOMY AND ADENOIDECTOMY            PAST PSYCHIATRIC HISTORY  Prior psychiatric  hospitalization: none  Prior Self harm/suicide attempt: no  Prior Diagnosis: Depression, IED, ADHD     PAST PSYCHIATRIC MEDICATIONS  One antidepressant in the past      FAMILY HISTORY  Psychiatric diagnosis:  none  History of suicide attempts:  no  Substance abuse history:  no     SUBSTANCE USE HISTORY:  ALCOHOL: no  TOBACCO: no  CANNABIS: no  OPIOIDS: no  PRESCRIPTION MEDICATIONS: no  OTHERS: experimented with methamphetamine in his 20s.  History of inpatient/outpatient rehab treatment: none     SOCIAL HISTORY  Childhood: born in [place] and describes childhood as ok  Education: [employer] (dropped out early)  in Special Education: no  Intellectual Disability: no  Employment: employed  at [employer] (for > 40 yr)  Relationship:  for > 40 yr  Kids: no  Current living situation: lives with wife  Current/past legal issues: no  History of emotional/physical/sexual abuse - no    REVIEW OF SYSTEMS:        Constitutional negative   Eyes negative   Ears/Nose/Mouth/Throat negative   Cardiovascular negative   Respiratory negative   Gastrointestinal negative   Genitourinary negative   Muscular negative   Integumentary negative   Neurological negative   Endocrine negative   Hematologic/Lymphatic negative     PHYSICAL EXAMINAION:  Vital signs: There were no vitals taken for this visit.  Musculoskeletal: Normal gait.   Abnormal movements: none      MENTAL STATUS EXAMINATION      General:   - Grooming and hygiene: Casual,   - Apparent distress: none,   - Behavior: Calm  - Eye Contact:  Good,   - no psychomotor agitation or retardation    - Participation: Active verbal participation  Orientation: Alert and Fully Oriented to person, place and time  Mood: Euthymic  Affect: Flexible and Full range,  Thought Process: Logical and Goal-directed  Thought Content: Denies suicidal or homicidal ideations, intent or plan Within normal limits  Perception: Denies auditory or visual hallucinations. No delusions noted Within  normal limits  Attention span and concentration: Intact   Speech:Rate within normal limits and Volume within normal limits  Language: Appropriate   Insight: Good  Judgment: Good  Recent and remote memory: No gross evidence of memory deficits        DEPRESSION SCREENING:  Depression Screen (PHQ-2/PHQ-9) 5/10/2019 11/4/2019 11/6/2020   PHQ-2 Total Score 0 2 0   PHQ-9 Total Score - 7 -       Interpretation of PHQ-9 Total Score   Score Severity   1-4 No Depression   5-9 Mild Depression   10-14 Moderate Depression   15-19 Moderately Severe Depression   20-27 Severe Depression    CURRENT RISK:       Suicidal: Low       Homicidal: Low       Self-Harm: Low       Relapse: Low       Crisis Safety Plan Reviewed Not Indicated       If evidence of imminent risk is present, intervention/plan:      MEDICAL RECORDS/LABS/DIAGNOSTIC TESTS REVIEWED:  No new lab since last visit     [facility] records -   Reviewed     DIAGNOSTIC IMPRESSION(S):  1. Depressive Disorder  2. Intermittent Explosive Disorder  3. ADHD        PLAN:  (1) Depressive Disorder, in remission  · Stable  · Continue Sertraline 50 mg daily for depression and anxiety management.  Given 3 month supply with 1 refill.  · Medication options, alternatives (including no medications) and medication risks/benefits/side effects were discussed in detail.  · The patient was advised to call, message provider on [employer], or come in to the clinic if symptoms worsen or if any future questions/issues regarding their medications arise; the patient verbalized understanding and agreement.    · The patient was educated to call 911, call the suicide hotline, or go to local ER if having thoughts of suicide or homicide; verbalized understanding.     (2) Intermittent Explosive Disorder:  · Stable  · Continue Sertraline 50 mg daily for depression and anxiety management.  Given 3 month supply with 1 refill.     (3) ADHD  · Stable  · Continue Adderall XR 30 mg daily for depression and anxiety  management. Recent refill on 4/24/21. Following three 1 month prescriptions sent to pharmacy: 8/26-9/25; 9/26-10/26; 10/27-11/26/21.  · Controlled medication treatment agreement signed in Feb 2021.     Billing Coding based on:  66724: based on MDM    Return to clinic in 3 months or sooner if symptoms worsen.  Next Appointment: instruction provided on how to make the next appointment.     The proposed treatment plan was discussed with the patient who was provided the opportunity to ask questions and make suggestions regarding alternative treatment. Patient verbalized understanding and expressed agreement with the plan.       Eliazar Prasad M.D.  08/03/21    This note was created using voice recognition software (Dragon). The accuracy of the dictation is limited by the abilities of the software. I have reviewed the note prior to signing, however some errors in grammar and context are still possible. If you have any questions related to this note please do not hesitate to contact our office.

## 2021-09-23 DIAGNOSIS — F90.2 ADHD (ATTENTION DEFICIT HYPERACTIVITY DISORDER), COMBINED TYPE: ICD-10-CM

## 2021-09-23 RX ORDER — DEXTROAMPHETAMINE SACCHARATE, AMPHETAMINE ASPARTATE MONOHYDRATE, DEXTROAMPHETAMINE SULFATE AND AMPHETAMINE SULFATE 7.5; 7.5; 7.5; 7.5 MG/1; MG/1; MG/1; MG/1
30 CAPSULE, EXTENDED RELEASE ORAL EVERY MORNING
Qty: 30 CAPSULE | Refills: 0 | Status: SHIPPED | OUTPATIENT
Start: 2021-10-26 | End: 2021-09-24

## 2021-09-23 RX ORDER — DEXTROAMPHETAMINE SACCHARATE, AMPHETAMINE ASPARTATE MONOHYDRATE, DEXTROAMPHETAMINE SULFATE AND AMPHETAMINE SULFATE 7.5; 7.5; 7.5; 7.5 MG/1; MG/1; MG/1; MG/1
30 CAPSULE, EXTENDED RELEASE ORAL EVERY MORNING
Qty: 30 CAPSULE | Refills: 0 | Status: SHIPPED | OUTPATIENT
Start: 2021-09-25 | End: 2021-10-20 | Stop reason: SDUPTHER

## 2021-09-23 RX ORDER — DEXTROAMPHETAMINE SACCHARATE, AMPHETAMINE ASPARTATE MONOHYDRATE, DEXTROAMPHETAMINE SULFATE AND AMPHETAMINE SULFATE 7.5; 7.5; 7.5; 7.5 MG/1; MG/1; MG/1; MG/1
30 CAPSULE, EXTENDED RELEASE ORAL EVERY MORNING
Qty: 30 CAPSULE | Refills: 0 | Status: SHIPPED | OUTPATIENT
Start: 2021-11-26 | End: 2021-09-24

## 2021-09-23 NOTE — PROGRESS NOTES
New Adderall XR 30 mg prescription sent to pharmacy for following duration and fill date:    · 9/25-10/25 (fill on 9/24/21)  · 10/26-11/25 (fill on 10/25/21)  · 11/26-12/26 (fill on 11/25/21)

## 2021-10-20 DIAGNOSIS — F90.2 ADHD (ATTENTION DEFICIT HYPERACTIVITY DISORDER), COMBINED TYPE: ICD-10-CM

## 2021-10-20 RX ORDER — DEXTROAMPHETAMINE SACCHARATE, AMPHETAMINE ASPARTATE MONOHYDRATE, DEXTROAMPHETAMINE SULFATE AND AMPHETAMINE SULFATE 7.5; 7.5; 7.5; 7.5 MG/1; MG/1; MG/1; MG/1
30 CAPSULE, EXTENDED RELEASE ORAL EVERY MORNING
Qty: 30 CAPSULE | Refills: 0 | Status: SHIPPED | OUTPATIENT
Start: 2021-10-25 | End: 2021-11-22 | Stop reason: SDUPTHER

## 2021-11-03 ENCOUNTER — TELEMEDICINE (OUTPATIENT)
Dept: BEHAVIORAL HEALTH | Facility: CLINIC | Age: 61
End: 2021-11-03
Payer: COMMERCIAL

## 2021-11-03 DIAGNOSIS — F32.A DEPRESSIVE DISORDER: ICD-10-CM

## 2021-11-03 DIAGNOSIS — F90.2 ADHD (ATTENTION DEFICIT HYPERACTIVITY DISORDER), COMBINED TYPE: ICD-10-CM

## 2021-11-03 DIAGNOSIS — F63.81 INTERMITTENT EXPLOSIVE DISORDER: ICD-10-CM

## 2021-11-03 PROCEDURE — 99214 OFFICE O/P EST MOD 30 MIN: CPT | Mod: 95 | Performed by: PSYCHIATRY & NEUROLOGY

## 2021-11-03 ASSESSMENT — PATIENT HEALTH QUESTIONNAIRE - PHQ9: CLINICAL INTERPRETATION OF PHQ2 SCORE: 0

## 2021-11-22 DIAGNOSIS — F90.2 ADHD (ATTENTION DEFICIT HYPERACTIVITY DISORDER), COMBINED TYPE: ICD-10-CM

## 2021-11-22 NOTE — TELEPHONE ENCOUNTER
Patient is asking for an early pick-up since the pharmacy will be closing during the holidays.     Received request via: Patient    Was the patient seen in the last year in this department? Yes    Does the patient have an active prescription (recently filled or refills available) for medication(s) requested? No

## 2021-11-23 RX ORDER — DEXTROAMPHETAMINE SACCHARATE, AMPHETAMINE ASPARTATE MONOHYDRATE, DEXTROAMPHETAMINE SULFATE AND AMPHETAMINE SULFATE 7.5; 7.5; 7.5; 7.5 MG/1; MG/1; MG/1; MG/1
30 CAPSULE, EXTENDED RELEASE ORAL EVERY MORNING
Qty: 30 CAPSULE | Refills: 0 | Status: SHIPPED | OUTPATIENT
Start: 2021-11-24 | End: 2021-12-20 | Stop reason: SDUPTHER

## 2021-12-09 ENCOUNTER — HOSPITAL ENCOUNTER (OUTPATIENT)
Dept: LAB | Facility: MEDICAL CENTER | Age: 61
End: 2021-12-09
Attending: PHYSICIAN ASSISTANT
Payer: COMMERCIAL

## 2021-12-09 DIAGNOSIS — E11.9 TYPE 2 DIABETES MELLITUS WITHOUT COMPLICATION, WITHOUT LONG-TERM CURRENT USE OF INSULIN (HCC): ICD-10-CM

## 2021-12-09 DIAGNOSIS — Z11.59 ENCOUNTER FOR HEPATITIS C SCREENING TEST FOR LOW RISK PATIENT: ICD-10-CM

## 2021-12-09 DIAGNOSIS — E78.00 PURE HYPERCHOLESTEROLEMIA: ICD-10-CM

## 2021-12-09 LAB
ALBUMIN SERPL BCP-MCNC: 4.3 G/DL (ref 3.2–4.9)
ALBUMIN/GLOB SERPL: 1.3 G/DL
ALP SERPL-CCNC: 98 U/L (ref 30–99)
ALT SERPL-CCNC: 25 U/L (ref 2–50)
ANION GAP SERPL CALC-SCNC: 10 MMOL/L (ref 7–16)
AST SERPL-CCNC: 21 U/L (ref 12–45)
BILIRUB SERPL-MCNC: 0.7 MG/DL (ref 0.1–1.5)
BUN SERPL-MCNC: 16 MG/DL (ref 8–22)
CALCIUM SERPL-MCNC: 9.3 MG/DL (ref 8.5–10.5)
CHLORIDE SERPL-SCNC: 104 MMOL/L (ref 96–112)
CHOLEST SERPL-MCNC: 162 MG/DL (ref 100–199)
CO2 SERPL-SCNC: 24 MMOL/L (ref 20–33)
CREAT SERPL-MCNC: 0.8 MG/DL (ref 0.5–1.4)
FASTING STATUS PATIENT QL REPORTED: NORMAL
GLOBULIN SER CALC-MCNC: 3.2 G/DL (ref 1.9–3.5)
GLUCOSE SERPL-MCNC: 149 MG/DL (ref 65–99)
HCV AB SER QL: NORMAL
HDLC SERPL-MCNC: 45 MG/DL
LDLC SERPL CALC-MCNC: 104 MG/DL
POTASSIUM SERPL-SCNC: 4.6 MMOL/L (ref 3.6–5.5)
PROT SERPL-MCNC: 7.5 G/DL (ref 6–8.2)
SODIUM SERPL-SCNC: 138 MMOL/L (ref 135–145)
TRIGL SERPL-MCNC: 63 MG/DL (ref 0–149)

## 2021-12-09 PROCEDURE — 86803 HEPATITIS C AB TEST: CPT

## 2021-12-09 PROCEDURE — 36415 COLL VENOUS BLD VENIPUNCTURE: CPT

## 2021-12-09 PROCEDURE — 80053 COMPREHEN METABOLIC PANEL: CPT

## 2021-12-09 PROCEDURE — 80061 LIPID PANEL: CPT

## 2021-12-20 ENCOUNTER — OFFICE VISIT (OUTPATIENT)
Dept: MEDICAL GROUP | Facility: CLINIC | Age: 61
End: 2021-12-20
Payer: COMMERCIAL

## 2021-12-20 VITALS
HEART RATE: 83 BPM | DIASTOLIC BLOOD PRESSURE: 64 MMHG | HEIGHT: 68 IN | TEMPERATURE: 98.1 F | SYSTOLIC BLOOD PRESSURE: 114 MMHG | RESPIRATION RATE: 16 BRPM | WEIGHT: 249 LBS | BODY MASS INDEX: 37.74 KG/M2 | OXYGEN SATURATION: 94 %

## 2021-12-20 DIAGNOSIS — E66.9 OBESITY (BMI 30-39.9): ICD-10-CM

## 2021-12-20 DIAGNOSIS — F90.2 ADHD (ATTENTION DEFICIT HYPERACTIVITY DISORDER), COMBINED TYPE: ICD-10-CM

## 2021-12-20 DIAGNOSIS — E78.00 PURE HYPERCHOLESTEROLEMIA: ICD-10-CM

## 2021-12-20 DIAGNOSIS — Z01.83 ENCOUNTER FOR BLOOD TYPING: ICD-10-CM

## 2021-12-20 DIAGNOSIS — E11.9 TYPE 2 DIABETES MELLITUS WITHOUT COMPLICATION, WITHOUT LONG-TERM CURRENT USE OF INSULIN (HCC): ICD-10-CM

## 2021-12-20 PROBLEM — Z11.59 ENCOUNTER FOR HEPATITIS C SCREENING TEST FOR LOW RISK PATIENT: Status: RESOLVED | Noted: 2021-04-26 | Resolved: 2021-12-20

## 2021-12-20 PROCEDURE — 99214 OFFICE O/P EST MOD 30 MIN: CPT | Performed by: PHYSICIAN ASSISTANT

## 2021-12-20 RX ORDER — LISINOPRIL 2.5 MG/1
2.5 TABLET ORAL DAILY
Qty: 30 TABLET | Refills: 9 | Status: SHIPPED | OUTPATIENT
Start: 2021-12-20 | End: 2022-12-14 | Stop reason: SDUPTHER

## 2021-12-20 RX ORDER — BLOOD-GLUCOSE METER
EACH MISCELLANEOUS
Qty: 1 EACH | Refills: 0 | Status: SHIPPED | OUTPATIENT
Start: 2021-12-20 | End: 2024-01-23

## 2021-12-20 RX ORDER — ATORVASTATIN CALCIUM 40 MG/1
40 TABLET, FILM COATED ORAL DAILY
Qty: 30 TABLET | Refills: 9 | Status: SHIPPED | OUTPATIENT
Start: 2021-12-20 | End: 2022-12-14

## 2021-12-20 RX ORDER — EMPAGLIFLOZIN 25 MG/1
TABLET, FILM COATED ORAL
Qty: 30 TABLET | Refills: 9 | Status: SHIPPED | OUTPATIENT
Start: 2021-12-20 | End: 2022-12-14 | Stop reason: SDUPTHER

## 2021-12-20 ASSESSMENT — FIBROSIS 4 INDEX: FIB4 SCORE: 1.26

## 2021-12-20 NOTE — PROGRESS NOTES
cc: Follow-up labs    Subjective:     Jensen Leigh is a 61 y.o. male presenting for follow-up labs    Patient presents to the office for follow-up labs.  He denies any symptoms or issues at this time.  He also acknowledges needing medications refilled.  He is a type II diabetic who is basically been well controlled.  He also has hypercholesterolemia and obesity.  He is interested in having blood typing done.    Review of systems:  See above.   Denies any symptoms unless previously indicated.        Current Outpatient Medications:   •  Empagliflozin (JARDIANCE) 25 MG Tab, TAKE 1 TABLET BY MOUTH ONCE DAILY IN THE MORNING ., Disp: 30 Tablet, Rfl: 9  •  Lancet Devices (LANCET DEVICE WITH EJECTOR) Misc, True metrix lancet ejector device., Disp: 1 Each, Rfl: 0  •  SITagliptin (JANUVIA) 100 MG Tab, Take 1 Tablet by mouth every day., Disp: 30 Tablet, Rfl: 9  •  lisinopril (PRINIVIL) 2.5 MG Tab, Take 1 Tablet by mouth every day. (for kidney protection), Disp: 30 Tablet, Rfl: 9  •  atorvastatin (LIPITOR) 40 MG Tab, Take 1 Tablet by mouth every day., Disp: 30 Tablet, Rfl: 9  •  amphetamine-dextroamphetamine ER (ADDERALL XR) 30 MG XR capsule, Take 1 Capsule by mouth every morning for 30 days., Disp: 30 Capsule, Rfl: 0  •  sertraline (ZOLOFT) 50 MG Tab, Take 1 Tablet by mouth every day., Disp: 90 Tablet, Rfl: 1  •  atorvastatin (LIPITOR) 40 MG Tab, Take 1 tablet by mouth every day., Disp: 30 tablet, Rfl: 9  •  tadalafil (CIALIS) 10 MG tablet, Take 1 tablet by mouth as needed for Erectile Dysfunction., Disp: 10 tablet, Rfl: 9  •  Blood Glucose Test Strips, Test strips order: Test strips for Contour Next meter. Sig: use 2x/d and prn ssx high or low sugar #100 RF x 3, Disp: 100 Strip, Rfl: 3  •  aspirin EC (ECOTRIN) 81 MG Tablet Delayed Response, Take 2 Tabs by mouth every day., Disp: 30 Tab, Rfl:   •  Blood Glucose Monitoring Suppl Device, Meter: Dispense 1 Contour Next Device . Sig. Use as directed for blood sugar monitoring. #1.  "NR., Disp: 1 Device, Rfl: 0  •  glucose blood (TRUE METRIX BLOOD GLUCOSE TEST) strip, 1 Strip by Other route as needed., Disp: 300 Strip, Rfl: 1  •  Multiple Vitamin (MULTI VITAMIN MENS PO), Take 1 Tab by mouth every day., Disp: , Rfl:     Allergies, past medical history, past surgical history, family history, social history reviewed and updated    Objective:     Vitals: /64 (BP Location: Left arm, Patient Position: Sitting, BP Cuff Size: Large adult)   Pulse 83   Temp 36.7 °C (98.1 °F) (Temporal)   Resp 16   Ht 1.727 m (5' 8\") Comment: stated by pt  Wt 113 kg (249 lb) Comment: with shoes on  SpO2 94%   BMI 37.86 kg/m²   General: Alert, pleasant, NAD  EYES:   PERRL, EOMI, no icterus or pallor.  Conjunctivae and lids normal.   HENT:  Normocephalic.  External ears normal.   Neck supple.     Heart: Regular rate and rhythm.  S1 and S2 normal.  No murmurs appreciated.  Respiratory: Normal respiratory effort.  Clear to auscultation bilaterally.  Abdomen: Obese.  Skin: Warm, dry, no rashes.  Musculoskeletal: Gait is normal.  Moves all extremities well.    Extremities: Normal range of motion all extremities.   Neurological: No tremors, sensation grossly intact, gait is normal, CN2-12 intact.  Psych:  Affect/mood is normal, judgement is good, memory is intact, grooming is appropriate.    Assessment/Plan:     Jensen was seen today for medication refill.    Diagnoses and all orders for this visit:    Type 2 diabetes mellitus without complication, without long-term current use of insulin (HCC)  -     Empagliflozin (JARDIANCE) 25 MG Tab; TAKE 1 TABLET BY MOUTH ONCE DAILY IN THE MORNING .  -     Lancet Devices (LANCET DEVICE WITH EJECTOR) Misc; True metrix lancet ejector device.  -     Lipid Profile; Future  -     Comp Metabolic Panel; Future  -     HEMOGLOBIN A1C; Future  -     SITagliptin (JANUVIA) 100 MG Tab; Take 1 Tablet by mouth every day.  -     lisinopril (PRINIVIL) 2.5 MG Tab; Take 1 Tablet by mouth every " day. (for kidney protection)  Pure hypercholesterolemia  -     Lipid Profile; Future  -     Comp Metabolic Panel; Future  -     atorvastatin (LIPITOR) 40 MG Tab; Take 1 Tablet by mouth every day.  Obesity (BMI 30-39.9)  -     Lipid Profile; Future  -     Comp Metabolic Panel; Future    Labs discussed with patient.  We have increased atorvastatin due to elevated LDL.  Repeat labs in 6 to 9 months sooner if needed.  Medications refilled.    Encounter for blood typing  -     ABO AND RH DETERMINATION; Future    ABO testing has been ordered for patient.        Return in about 6 months (around 6/20/2022), or if symptoms worsen or fail to improve.    Please note that this dictation was created using voice recognition software. I have made every reasonable attempt to correct obvious errors, but expect that there are errors of grammar and possible content that I did not discover before finalizing note.

## 2021-12-20 NOTE — TELEPHONE ENCOUNTER
Patient runs out 12/24/21 and would like an early refill since he does not know if the pharmacy would be open.       Received request via: Patient    Was the patient seen in the last year in this department? Yes    Does the patient have an active prescription (recently filled or refills available) for medication(s) requested? No

## 2021-12-21 RX ORDER — DEXTROAMPHETAMINE SACCHARATE, AMPHETAMINE ASPARTATE MONOHYDRATE, DEXTROAMPHETAMINE SULFATE AND AMPHETAMINE SULFATE 7.5; 7.5; 7.5; 7.5 MG/1; MG/1; MG/1; MG/1
30 CAPSULE, EXTENDED RELEASE ORAL EVERY MORNING
Qty: 30 CAPSULE | Refills: 0 | Status: SHIPPED | OUTPATIENT
Start: 2021-12-21 | End: 2022-01-17 | Stop reason: SDUPTHER

## 2022-01-17 DIAGNOSIS — F90.2 ADHD (ATTENTION DEFICIT HYPERACTIVITY DISORDER), COMBINED TYPE: ICD-10-CM

## 2022-01-18 RX ORDER — DEXTROAMPHETAMINE SACCHARATE, AMPHETAMINE ASPARTATE MONOHYDRATE, DEXTROAMPHETAMINE SULFATE AND AMPHETAMINE SULFATE 7.5; 7.5; 7.5; 7.5 MG/1; MG/1; MG/1; MG/1
30 CAPSULE, EXTENDED RELEASE ORAL EVERY MORNING
Qty: 30 CAPSULE | Refills: 0 | Status: SHIPPED | OUTPATIENT
Start: 2022-01-22 | End: 2022-02-15 | Stop reason: SDUPTHER

## 2022-02-15 DIAGNOSIS — F90.2 ADHD (ATTENTION DEFICIT HYPERACTIVITY DISORDER), COMBINED TYPE: ICD-10-CM

## 2022-02-15 RX ORDER — DEXTROAMPHETAMINE SACCHARATE, AMPHETAMINE ASPARTATE MONOHYDRATE, DEXTROAMPHETAMINE SULFATE AND AMPHETAMINE SULFATE 7.5; 7.5; 7.5; 7.5 MG/1; MG/1; MG/1; MG/1
30 CAPSULE, EXTENDED RELEASE ORAL EVERY MORNING
Qty: 30 CAPSULE | Refills: 0 | Status: SHIPPED | OUTPATIENT
Start: 2022-02-21 | End: 2022-03-16 | Stop reason: SDUPTHER

## 2022-02-18 ENCOUNTER — HOSPITAL ENCOUNTER (EMERGENCY)
Facility: MEDICAL CENTER | Age: 62
End: 2022-02-18
Attending: EMERGENCY MEDICINE
Payer: COMMERCIAL

## 2022-02-18 VITALS
RESPIRATION RATE: 18 BRPM | WEIGHT: 243.39 LBS | HEART RATE: 74 BPM | TEMPERATURE: 97 F | SYSTOLIC BLOOD PRESSURE: 166 MMHG | DIASTOLIC BLOOD PRESSURE: 98 MMHG | HEIGHT: 68 IN | BODY MASS INDEX: 36.89 KG/M2 | OXYGEN SATURATION: 97 %

## 2022-02-18 DIAGNOSIS — H57.12 LEFT EYE PAIN: ICD-10-CM

## 2022-02-18 DIAGNOSIS — H20.9 IRITIS: ICD-10-CM

## 2022-02-18 DIAGNOSIS — H57.89 REDNESS OF LEFT EYE: ICD-10-CM

## 2022-02-18 PROCEDURE — 700101 HCHG RX REV CODE 250: Performed by: EMERGENCY MEDICINE

## 2022-02-18 PROCEDURE — 99283 EMERGENCY DEPT VISIT LOW MDM: CPT

## 2022-02-18 RX ORDER — PROPARACAINE HYDROCHLORIDE 5 MG/ML
2 SOLUTION/ DROPS OPHTHALMIC ONCE
Status: COMPLETED | OUTPATIENT
Start: 2022-02-18 | End: 2022-02-18

## 2022-02-18 RX ADMIN — PROPARACAINE HYDROCHLORIDE 2 DROP: 5 SOLUTION/ DROPS OPHTHALMIC at 10:13

## 2022-02-18 RX ADMIN — FLUORESCEIN SODIUM 1 MG: 1 STRIP OPHTHALMIC at 10:14

## 2022-02-18 ASSESSMENT — FIBROSIS 4 INDEX: FIB4 SCORE: 1.26

## 2022-02-18 NOTE — LETTER
"  FORM C-4:  EMPLOYEE’S CLAIM FOR COMPENSATION/ REPORT OF INITIAL TREATMENT  EMPLOYEE’S CLAIM - PROVIDE ALL INFORMATION REQUESTED   First Name Jensen Last Name Reese Birthdate 1960  Sex male Claim Number   Home Address [address]   [place]             [place] [address]                                   Age  61 y.o. Height  1.727 m (5' 8\") Weight  110 kg (243 lb 6.2 oz) N  [id number]   Mailing Address [address]  [facility]              Zip [address] Telephone  [phone] (home)  Primary Language Spoken  english   Insurer   Third Party   [employer] Employee's Occupation (Job Title) When Injury or Occupational Disease Occurred      Employer's Name  [employer] Telephone [phone]    Employer [address] [facility] [place] [address]   Date of Injury  2/17/2022       Hour of Injury  5:00 AM Date Employer Notified  2/18/2022 Last Day of Work after Injury or Occupational Disease  2/17/2022 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 Be specific and answer in detail. 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 N/A Witnesses to the Accident  N/A   Nature of Injury or Occupational Disease  Workers' Compensation Part(s) of Body Injured or Affected  Eye (L), N/A, N/A    I CERTIFY THAT THE ABOVE IS TRUE AND CORRECT TO THE BEST OF MY KNOWLEDGE AND THAT I HAVE PROVIDED THIS INFORMATION IN ORDER TO OBTAIN THE BENEFITS OF [place]  I HEREBY AUTHORIZE ANY " PHYSICIAN, CHIROPRACTOR, SURGEON, PRACTITIONER, OR OTHER PERSON, ANY HOSPITAL, INCLUDING [facility] OR [facility], ANY MEDICAL SERVICE ORGANIZATION, ANY INSURANCE COMPANY, OR OTHER INSTITUTION OR ORGANIZATION TO RELEASE TO EACH OTHER, ANY MEDICAL OR OTHER INFORMATION, INCLUDING BENEFITS PAID OR PAYABLE, PERTINENT TO THIS INJURY OR DISEASE, EXCEPT INFORMATION RELATIVE TO DIAGNOSIS, TREATMENT AND/OR COUNSELING FOR AIDS, PSYCHOLOGICAL CONDITIONS, ALCOHOL OR CONTROLLED SUBSTANCES, FOR WHICH I MUST GIVE SPECIFIC AUTHORIZATION.  A PHOTOSTAT OF THIS AUTHORIZATION SHALL BE AS VALID AS THE ORIGINAL.  Date 02/18/2022                   Place    [facility]                                                       Employee’s Signature   THIS REPORT MUST BE COMPLETED AND MAILED WITHIN 3 WORKING DAYS OF TREATMENT   Place [facility], EMERGENCY DEPT                       Name of Facility [facility]   Date  2/18/2022 Diagnosis  (H57.12) Left eye pain  (H57.89) Redness of left eye  (H20.9) Iritis Is there evidence the injured employee was under the influence of alcohol and/or another controlled substance at the time of accident?   Hour  12:17 PM Description of Injury or Disease  Left eye pain  Redness of left eye  Iritis No   Treatment  Referral to ophthalmology  Have you advised the patient to remain off work five days or more?             X-Ray Findings    If Yes   From Date    To Date      From information given by the employee, together with medical evidence, can you directly connect this injury or occupational disease as job incurred? No If No, is employee capable of: Full Duty    Modified Duty      Is additional medical care by a physician indicated? Yes  Comments:Referral now to ophthalmology for final diagnosis If Modified Duty, Specify any Limitations / Restrictions   To be determined by specialist, ophthalmologist   Do you know of any previous injury or disease contributing  "to this condition or occupational disease? No    Date 2/18/2022 Print Doctor’s Name JunoSuzetteJasiel H I certify the employer’s copy of this form was mailed on:   Address [address] [place] [address]  [place] [address] [phone] INSURER’S USE ONLY   Provider’s Tax ID Number   Telephone Dept: [phone]    Doctor’s Signature JASIEL Thrasher D.O. Degree  M.D      Form C-4 (rev.10/07)                                                                         BRIEF DESCRIPTION OF RIGHTS AND BENEFITS  (Pursuant to [place] 616[place].050)    Notice of Injury or Occupational Disease (Incident Report Form C-1): If an injury or occupational disease (OD) arises out of and in the course of employment, you must provide written notice to your employer as soon as practicable, but no later than 7 days after the accident or OD. Your employer shall maintain a sufficient supply of the required forms.    Claim for Compensation (Form C-4): If medical treatment is sought, the form C-4 is available at the place of initial treatment. A completed \"Claim for Compensation\" (Form C-4) must be filed within 90 days after an accident or OD. The treating physician or chiropractor must, within 3 working days after treatment, complete and mail to the employer, the employer's insurer and third-party , the Claim for Compensation.    Medical Treatment: If you require medical treatment for your on-the-job injury or OD, you may be required to select a physician or chiropractor from a list provided by your workers’ compensation insurer, if it has contracted with an Organization for Managed Care (MCO) or Preferred Provider Organization (PPO) or providers of health care. If your employer has not entered into a contract with an MCO or PPO, you may select a physician or chiropractor from the Panel of Physicians and Chiropractors. Any medical costs related to your industrial injury or OD will be paid by your insurer.    Temporary Total Disability (TTD): If " your doctor has certified that you are unable to work for a period of at least 5 consecutive days, or 5 cumulative days in a 20-day period, or places restrictions on you that your employer does not accommodate, you may be entitled to TTD compensation.    Temporary Partial Disability (TPD): If the wage you receive upon reemployment is less than the compensation for TTD to which you are entitled, the insurer may be required to pay you TPD compensation to make up the difference. TPD can only be paid for a maximum of 24 months.    Permanent Partial Disability (PPD): When your medical condition is stable and there is an indication of a PPD as a result of your injury or OD, within 30 days, your insurer must arrange for an evaluation by a rating physician or chiropractor to determine the degree of your PPD. The amount of your PPD award depends on the date of injury, the results of the PPD evaluation, your age and wage.    Permanent Total Disability (PTD): If you are medically certified by a treating physician or chiropractor as permanently and totally disabled and have been granted a PTD status by your insurer, you are entitled to receive monthly benefits not to exceed 66 2/3% of your average monthly wage. The amount of your PTD payments is subject to reduction if you previously received a lump-sum PPD award.    Vocational Rehabilitation Services: You may be eligible for vocational rehabilitation services if you are unable to return to the job due to a permanent physical impairment or permanent restrictions as a result of your injury or occupational disease.    Transportation and Per Taina Reimbursement: You may be eligible for travel expenses and per taina associated with medical treatment.    Reopening: You may be able to reopen your claim if your condition worsens after claim closure.     Appeal Process: If you disagree with a written determination issued by the insurer or the insurer does not respond to your request, you  may appeal to the Department of Administration, , by following the instructions contained in your determination letter. You must appeal the determination within 70 days from the date of the determination letter at [address], [address], [place], [place] [address], or [address], [address], [place], [place] [address]. If you disagree with the  decision, you may appeal to the Department of Administration, . You must file your appeal within 30 days from the date of the  decision letter at [address], [address], [place], [place] [address], or [address], [address], [place], [place] [address]. If you disagree with a decision of an , you may file a petition for judicial review with the District Court. You must do so within 30 days of the Appeal Officer’s decision. You may be represented by an  at your own expense or you may contact the [facility] for possible representation.    [place]  for Injured Workers (NAIW): If you disagree with a  decision, you may request that NAIW represent you without charge at an  Hearing. For information regarding denial of benefits, you may contact the [facility] at: [address], [address], [place] [address], [phone], or [address], [place] [address], [phone]    To File a Complaint with the Division: If you wish to file a complaint with the  of the Division of Industrial Relations (DIR),  please contact the Workers’ Compensation Section, [address], [address], [place], [place] [address], telephone ([phone]) [phone], or [address], [place], [place] [address], telephone [phone].    For assistance with Workers’ Compensation Issues: You may contact the [facility] Office for Consumer Health Assistance, [address], [place],  [place] [address], Toll Free 1-124.671.5888, Web site: http://[facility].[place].gov/Programs/GHASSAN E-mail: ghassan@[facility].[place].gov  D-2 (rev. 10/20)              __________________________________________________________________                                    ______02/18/2022____            Employee Name / Signature                                                                                                                            Date

## 2022-02-18 NOTE — ED NOTES
Discharge instructions and follow up reviewed. Pt communicated understanding all questions answered at this time

## 2022-02-18 NOTE — ED PROVIDER NOTES
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 Something fell in the eye from ceiling, possible metal or fiberglass. Redness of sclera, blurred vision, 5/10 pain in left eye. Unrelieved by eye wash or drops.       HPI  Jensen Leigh is a 61 y.o. male who presents to the emergency department through triage with wife for left eye pain, redness.  Patient states that yesterday while he was working below a fire alarm/smoke detector he got some debris in his eyes.  He irrigated them and thought he got it all out.  His eyes were \"sore\" but states this is not infrequent for him yesterday evening and before bed.  Awoke feeling okay this morning.  He was watching TV and rubbed his left eye and had immediate pain.  Felt as though he had a foreign body.  Worse with any extraocular movement.  Redness.  Mild tearing.  Light sensitivity.  Now with some blurry vision.  Denies headache, vomiting or fever.  Denies history of similar symptoms.    REVIEW OF SYSTEMS  See HPI for further details. All other systems are negative.     PAST MEDICAL HISTORY   has a past medical history of Eczema, GERD (gastroesophageal reflux disease), Type II or unspecified type diabetes mellitus without mention of complication, not stated as uncontrolled, and Umbilical hernia.    SOCIAL HISTORY  Social History     Tobacco Use   • Smoking status: Never Smoker   • Smokeless tobacco: Never Used   Vaping Use   • Vaping Use: Never used   Substance and Sexual Activity   • Alcohol use: Not Currently     Comment: 1 drink/year   • Drug use: No   • Sexual activity: Yes       SURGICAL HISTORY   has a past surgical history that includes rotator cuff repair and tonsillectomy and adenoidectomy.    CURRENT MEDICATIONS  Home Medications     Reviewed by Ross Wong R.N. (Registered Nurse) on 02/18/22 at 0908  Med List Status: Partial   Medication Last Dose Status " "  amphetamine-dextroamphetamine ER (ADDERALL XR) 30 MG XR capsule  Active   aspirin EC (ECOTRIN) 81 MG Tablet Delayed Response  Active   atorvastatin (LIPITOR) 40 MG Tab  Active   atorvastatin (LIPITOR) 40 MG Tab  Active   Blood Glucose Monitoring Suppl Device  Active   Blood Glucose Test Strips  Active   Empagliflozin (JARDIANCE) 25 MG Tab  Active   glucose blood (TRUE METRIX BLOOD GLUCOSE TEST) strip  Active   Lancet Devices (LANCET DEVICE WITH EJECTOR) Misc  Active   lisinopril (PRINIVIL) 2.5 MG Tab  Active   Multiple Vitamin (MULTI VITAMIN MENS PO)  Active   sertraline (ZOLOFT) 50 MG Tab  Active   SITagliptin (JANUVIA) 100 MG Tab  Active   tadalafil (CIALIS) 10 MG tablet  Active                ALLERGIES  No Known Allergies    PHYSICAL EXAM  VITAL SIGNS: /91   Pulse 79   Temp 36.1 °C (96.9 °F) (Temporal)   Resp 16   Ht 1.727 m (5' 8\")   Wt 110 kg (243 lb 6.2 oz)   SpO2 96%   BMI 37.01 kg/m²   Pulse ox interpretation: I interpret this pulse ox as normal.  Constitutional: Alert in no apparent distress.  HENT: Normocephalic, atraumatic. Bilateral external ears normal, Nose normal. Moist mucous membranes.    Eyes: Pupils are equal and reactive bilaterally.  Left sclera injected.  No chemosis.  No gross foreign body or corneal defect.  No periorbital swelling, erythema.  Patient does have pain of the left eye with extraocular movement with eye open and closed.  No proptosis.  Patient does have mild discomfort with compression over the left eye.  Eye exam: Very temporary relief with proparacaine.  Intraocular pressures left: 20, 20, 23 and right 15, 18, 20.  Fluorescein stain without uptake.  Lids everted and swiped without evidence for foreign body.  Patient poor tolerance with irrigation.  Neck: Normal range of motion, Supple  Cardiovascular: Normal peripheral perfusion.  Thorax & Lungs: Nonlabored respirations.  Skin: Warm, Dry, No erythema, No rash.   Musculoskeletal: Good range of motion in all major " joints.   Neurologic: Alert and orient x4.  Ambulates dependently  Psychiatric: Affect normal, Judgment normal, Mood normal.       COURSE & MEDICAL DECISION MAKING  ED evaluation for painful red left eye, now with some blurry vision is unrevealing.  There is no evidence for foreign body, abrasion or ulceration.  Pressures are just upper limits of normal bilaterally.  Normal pupillary reaction.  Pain is poorly controlled with proparacaine.  Patient did not tolerate more than 50 cc irrigation with normal saline.  Concern for iritis/anterior uveitis.  Will discuss with ophthalmology.    11:59 AM , ophthalmology, is aware of the patient and agreeable consultation.  She will see the patient at her office directly following discharge from this facility now.    Patient is stable for discharge at this time, anticipatory guidance provided, close follow-up is encouraged, and strict ED return instructions have been detailed. Patient and his wife are agreeable to the disposition and plan.    Patient's blood pressure was elevated in the emergency department, and has been referred to primary care for close monitoring.    FINAL IMPRESSION  (H57.12) Left eye pain  (H57.89) Redness of left eye      Electronically signed by: Sarah Fraire D.O., 2/18/2022 11:22 AM      This dictation was created using voice recognition software. The accuracy of the dictation is limited to the abilities of the software. I expect there may be some errors of grammar and possibly content. The nursing notes were reviewed and certain aspects of this information were incorporated into this note.

## 2022-02-18 NOTE — DISCHARGE INSTRUCTIONS
Go directly to Dr. Blanco's (ophthalmologist) office for further evaluation of your left eye pain and redness.

## 2022-02-18 NOTE — ED TRIAGE NOTES
Jensen Leigh  61 y.o. 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 Something fell in the eye from ceiling, possible metal or fiberglass. Redness of sclera, blurred vision, 5/10 pain in left eye. Unrelieved by eye wash or drops.       Pt ambulatory to triage with steady gait for above complaint.     Pt is alert and oriented, speaking in full sentences, follows commands and responds appropriately to questions. Resp are even and unlabored.      Pt placed in lobby. Pt educated on triage process. Pt encouraged to alert staff for any changes. This RN masked and in appropriate PPE during encounter.     Vitals:    02/18/22 0905   BP: 145/99   Pulse: 94   Resp: 16   Temp: (!) 35.7 °C (96.3 °F)   SpO2: 97%

## 2022-02-18 NOTE — LETTER
"  FORM C-4:  EMPLOYEE’S CLAIM FOR COMPENSATION/ REPORT OF INITIAL TREATMENT  EMPLOYEE’S CLAIM - PROVIDE ALL INFORMATION REQUESTED   First Name Jensen Last Name Reese Birthdate 1960  Sex male Claim Number   Home Address [address]             Zip [address]                                   Age  61 y.o. 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 Be specific and answer in detail. 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 N/A Witnesses to the Accident  N/A   Nature of Injury or Occupational Disease  Workers' Compensation Part(s) of Body Injured or Affected  Eye (L), N/A, N/A    I CERTIFY THAT THE ABOVE IS TRUE AND CORRECT TO THE BEST OF MY KNOWLEDGE AND THAT I HAVE PROVIDED THIS INFORMATION IN ORDER TO OBTAIN THE BENEFITS OF [place]  I HEREBY AUTHORIZE ANY PHYSICIAN, CHIROPRACTOR, SURGEON, PRACTITIONER, OR OTHER PERSON, " ANY HOSPITAL, INCLUDING [facility] OR [facility], ANY MEDICAL SERVICE ORGANIZATION, ANY INSURANCE COMPANY, OR OTHER INSTITUTION OR ORGANIZATION TO RELEASE TO EACH OTHER, ANY MEDICAL OR OTHER INFORMATION, INCLUDING BENEFITS PAID OR PAYABLE, PERTINENT TO THIS INJURY OR DISEASE, EXCEPT INFORMATION RELATIVE TO DIAGNOSIS, TREATMENT AND/OR COUNSELING FOR AIDS, PSYCHOLOGICAL CONDITIONS, ALCOHOL OR CONTROLLED SUBSTANCES, FOR WHICH I MUST GIVE SPECIFIC AUTHORIZATION.  A PHOTOSTAT OF THIS AUTHORIZATION SHALL BE AS VALID AS THE ORIGINAL.  Date                                      Place                                                                             Employee’s Signature   THIS REPORT MUST BE COMPLETED AND MAILED WITHIN 3 WORKING DAYS OF TREATMENT   Place [facility], EMERGENCY DEPT                       Name of Facility [facility]   Date  2/18/2022 Diagnosis  (H57.12) Left eye pain  (H57.89) Redness of left eye  (H20.9) Iritis Is there evidence the injured employee was under the influence of alcohol and/or another controlled substance at the time of accident?   Hour  12:03 PM Description of Injury or Disease  Left eye pain  Redness of left eye  Iritis     Treatment     Have you advised the patient to remain off work five days or more?             X-Ray Findings    If Yes   From Date    To Date      From information given by the employee, together with medical evidence, can you directly connect this injury or occupational disease as job incurred?   If No, is employee capable of: Full Duty    Modified Duty      Is additional medical care by a physician indicated?   If Modified Duty, Specify any Limitations / Restrictions       Do you know of any previous injury or disease contributing to this condition or occupational disease?      Date 2/18/2022 Print Doctor’s Name Sarah Fraire I certify the employer’s copy of this form was mailed on:   Address [address]  [place] [address]  [phone] INSURER’S USE ONLY   Provider’s Tax ID Number   Telephone Dept: [phone]    Doctor’s Signature   Degree        Form C-4 (rev.10/07)                                                                         BRIEF DESCRIPTION OF RIGHTS AND BENEFITS  ([place]ursuant to [place] 616C.050)    Notice of Injury or Occupational Disease (Incident Report Form C-1): If an injury or occupational disease (OD) arises out of and in the course of employment, you must provide written notice to your employer as soon as practicable, but no later than 7 days after the accident or OD. Your employer shall maintain a sufficient supply of the required forms.    Claim for Compensation (Form C-4): If medical treatment is sought, the form C-4 is available at the place of initial treatment. A completed \"Claim for Compensation\" (Form C-4) must be filed within 90 days after an accident or OD. The treating physician or chiropractor must, within 3 working days after treatment, complete and mail to the employer, the employer's insurer and third-party , the Claim for Compensation.    Medical Treatment: If you require medical treatment for your on-the-job injury or OD, you may be required to select a physician or chiropractor from a list provided by your workers’ compensation insurer, if it has contracted with an Organization for Managed Care (MCO) or Preferred Provider Organization (PPO) or providers of health care. If your employer has not entered into a contract with an MCO or PPO, you may select a physician or chiropractor from the Panel of Physicians and Chiropractors. Any medical costs related to your industrial injury or OD will be paid by your insurer.    Temporary Total Disability (TTD): If your doctor has certified that you are unable to work for a period of at least 5 consecutive days, or 5 cumulative days in a 20-day period, or places restrictions on you that your employer does not " accommodate, you may be entitled to TTD compensation.    Temporary Partial Disability (TPD): If the wage you receive upon reemployment is less than the compensation for TTD to which you are entitled, the insurer may be required to pay you TPD compensation to make up the difference. TPD can only be paid for a maximum of 24 months.    Permanent Partial Disability (PPD): When your medical condition is stable and there is an indication of a PPD as a result of your injury or OD, within 30 days, your insurer must arrange for an evaluation by a rating physician or chiropractor to determine the degree of your PPD. The amount of your PPD award depends on the date of injury, the results of the PPD evaluation, your age and wage.    Permanent Total Disability (PTD): If you are medically certified by a treating physician or chiropractor as permanently and totally disabled and have been granted a PTD status by your insurer, you are entitled to receive monthly benefits not to exceed 66 2/3% of your average monthly wage. The amount of your PTD payments is subject to reduction if you previously received a lump-sum PPD award.    Vocational Rehabilitation Services: You may be eligible for vocational rehabilitation services if you are unable to return to the job due to a permanent physical impairment or permanent restrictions as a result of your injury or occupational disease.    Transportation and Per Taina Reimbursement: You may be eligible for travel expenses and per taina associated with medical treatment.    Reopening: You may be able to reopen your claim if your condition worsens after claim closure.     Appeal Process: If you disagree with a written determination issued by the insurer or the insurer does not respond to your request, you may appeal to the Department of Administration, , by following the instructions contained in your determination letter. You must appeal the determination within 70 days from the date  of the determination letter at [address], [address], [place], [place] [address], or [address], [address], [place], [place] [address]. If you disagree with the  decision, you may appeal to the Department of Administration, . You must file your appeal within 30 days from the date of the  decision letter at [address], [address], [place], [place] [address], or [address], [place], [place] [address]. If you disagree with a decision of an , you may file a petition for judicial review with the District Court. You must do so within 30 days of the Appeal Officer’s decision. You may be represented by an  at your own expense or you may contact the [facility] for possible representation.    [place]  for Injured Workers (NAIW): If you disagree with a  decision, you may request that NAIW represent you without charge at an  Hearing. For information regarding denial of benefits, you may contact the [facility] at: [address], [address], [place] [address], [phone], or [address], [address], [place] [address], [phone]    To File a Complaint with the Division: If you wish to file a complaint with the  of the Division of Industrial Relations (DIR),  please contact the Workers’ Compensation Section, [address], [address], [place], [place] [address], telephone [phone], or [address], [address], [place], [place] [address], telephone [phone].    For assistance with Workers’ Compensation Issues: You may contact the [facility] Office for Consumer Health Assistance, [address], [address], [place], [place] [address], Toll Free 1-499.387.5850, Web site: http://[facility].[place].gov/Programs/GHASSAN E-mail: [email or web address].[place].gov  D-2 (rev. 10/20)               __________________________________________________________________                                    _________________            Employee Name / Signature                                                                                                                            Date

## 2022-02-18 NOTE — LETTER
"  FORM C-4:  EMPLOYEE’S CLAIM FOR COMPENSATION/ REPORT OF INITIAL TREATMENT  EMPLOYEE’S CLAIM - PROVIDE ALL INFORMATION REQUESTED   First Name Jensen Last Name Reese Birthdate 1960  Sex male Claim Number   Home Address [address]             [place] [address]                                   Age  61 y.o. 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 Be specific and answer in detail. 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 N/A Witnesses to the Accident  N/A   Nature of Injury or Occupational Disease  Workers' Compensation Part(s) of Body Injured or Affected  Eye (L), N/A, N/A    I CERTIFY THAT THE ABOVE IS TRUE AND CORRECT TO THE BEST OF MY KNOWLEDGE AND THAT I HAVE PROVIDED THIS INFORMATION IN ORDER TO OBTAIN THE B[place]EFITS OF [place]  I HEREBY AUTHORIZE ANY PHYSICIAN, CHIROPRACTOR, SURGEON, PRACTITIONER, OR OTHER PERSON, " ANY HOSPITAL, INCLUDING [facility] OR [facility] HOSPITAL, ANY MEDICAL SERVICE ORGANIZATION, ANY INSURANCE COMPANY, OR OTHER INSTITUTION OR ORGANIZATION TO RELEASE TO EACH OTHER, ANY MEDICAL OR OTHER INFORMATION, INCLUDING BENEFITS PAID OR PAYABLE, PERTINENT TO THIS INJURY OR DISEASE, EXCEPT INFORMATION RELATIVE TO DIAGNOSIS, TREATMENT AND/OR COUNSELING FOR AIDS, PSYCHOLOGICAL CONDITIONS, ALCOHOL OR CONTROLLED SUBSTANCES, FOR WHICH I MUST GIVE SPECIFIC AUTHORIZATION.  A PHOTOSTAT OF THIS AUTHORIZATION SHALL BE AS VALID AS THE ORIGINAL.  Date                                      Place                                                                             Employee’s Signature   THIS REPORT MUST BE COMPLETED AND MAILED WITHIN 3 WORKING DAYS OF TREATMENT   Place [facility], EMERGENCY DEPT                       Name of Facility [facility]   Date  2/18/2022 Diagnosis  No diagnosis found. Is there evidence the injured employee was under the influence of alcohol and/or another controlled substance at the time of accident?   Hour  10:21 AM Description of Injury or Disease       Treatment     Have you advised the patient to remain off work five days or more?             X-Ray Findings    If Yes   From Date    To Date      From information given by the employee, together with medical evidence, can you directly connect this injury or occupational disease as job incurred?   If No, is employee capable of: Full Duty    Modified Duty      Is additional medical care by a physician indicated?   If Modified Duty, Specify any Limitations / Restrictions       Do you know of any previous injury or disease contributing to this condition or occupational disease?      Date 2/18/2022 Print Doctor’s Name Sarah Fraire I certify the employer’s copy of this form was mailed on:   Address [address] [place] [id number]  [phone] INSURER’S USE ONLY   Provider’s Tax ID Number    "Telephone Dept: [phone]    Doctor’s Signature   Degree        Form C-4 (rev.10/07)                                                                         BRIEF DESCRIPTION OF RIGHTS AND BENEFITS  ([place] to [place] 616C.050)    Notice of Injury or Occupational Disease (Incident Report Form C-1): If an injury or occupational disease (OD) arises out of and in the course of employment, you must provide written notice to your employer as soon as practicable, but no later than 7 days after the accident or OD. Your employer shall maintain a sufficient supply of the required forms.    Claim for Compensation (Form C-4): If medical treatment is sought, the form C-4 is available at the place of initial treatment. A completed \"Claim for Compensation\" (Form C-4) must be filed within 90 days after an accident or OD. The treating physician or chiropractor must, within 3 working days after treatment, complete and mail to the employer, the employer's insurer and third-party , the Claim for Compensation.    Medical Treatment: If you require medical treatment for your on-the-job injury or OD, you may be required to select a physician or chiropractor from a list provided by your workers’ compensation insurer, if it has contracted with an Organization for Managed Care (MCO) or Preferred Provider Organization (PPO) or providers of health care. If your employer has not entered into a contract with an MCO or PPO, you may select a physician or chiropractor from the Panel of Physicians and Chiropractors. Any medical costs related to your industrial injury or OD will be paid by your insurer.    Temporary Total Disability (TTD): If your doctor has certified that you are unable to work for a period of at least 5 consecutive days, or 5 cumulative days in a 20-day period, or places restrictions on you that your employer does not accommodate, you may be entitled to TTD compensation.    Temporary Partial Disability (TPD): If the " wage you receive upon reemployment is less than the compensation for TTD to which you are entitled, the insurer may be required to pay you TPD compensation to make up the difference. TPD can only be paid for a maximum of 24 months.    Permanent Partial Disability (PPD): When your medical condition is stable and there is an indication of a PPD as a result of your injury or OD, within 30 days, your insurer must arrange for an evaluation by a rating physician or chiropractor to determine the degree of your PPD. The amount of your PPD award depends on the date of injury, the results of the PPD evaluation, your age and wage.    Permanent Total Disability (PTD): If you are medically certified by a treating physician or chiropractor as permanently and totally disabled and have been granted a PTD status by your insurer, you are entitled to receive monthly benefits not to exceed 66 2/3% of your average monthly wage. The amount of your PTD payments is subject to reduction if you previously received a lump-sum PPD award.    Vocational Rehabilitation Services: You may be eligible for vocational rehabilitation services if you are unable to return to the job due to a permanent physical impairment or permanent restrictions as a result of your injury or occupational disease.    Transportation and Per Taina Reimbursement: You may be eligible for travel expenses and per taina associated with medical treatment.    Reopening: You may be able to reopen your claim if your condition worsens after claim closure.     Appeal Process: If you disagree with a written determination issued by the insurer or the insurer does not respond to your request, you may appeal to the Department of Administration, , by following the instructions contained in your determination letter. You must appeal the determination within 70 days from the date of the determination letter at [address], [place], [place] [address], or  [address], [address], [place], [place] [address]. If you disagree with the  decision, you may appeal to the Department of Administration, . You must file your appeal within 30 days from the date of the  decision letter at [address], [address], [place], [place] [address], or [address], [address], [place], [place] [address]. If you disagree with a decision of an , you may file a petition for judicial review with the District Court. You must do so within 30 days of the Appeal Officer’s decision. You may be represented by an  at your own expense or you may contact the [facility] for possible representation.    [place]  for In[place] Workers ([place]IW): If you disagree with a  decision, you may request that NAIW represent you without charge at an  Hearing. For information regarding denial of benefits, you may contact the [facility] at: [address], [address], [place] [address], [phone], or [address], [place] [address], [phone]    To File a Complaint with the Division: If you wish to file a complaint with the  of the Division of Industrial Relations (DIR),  please contact the Workers’ Compensation Section, [address], [address], [place], [place] [address], telephone (050) [phone], or [address], [place], [place] [address], telephone [phone].    For assistance with Workers’ Compensation Issues: You may contact the [facility] Office for Consumer Health Assistance, [address], [place], [place] [address], Toll Free 1-574.830.8018, Web site: http://[facility].[place].gov/Programs/ROCHELLE E-mail: [email or web address]@[email or web address].[place].gov  D-2 (rev. 10/20)              __________________________________________________________________                                    _________________            Employee Name / Signature                                                                                                                             Date

## 2022-02-18 NOTE — LETTER
"  FORM C-4:  EMPLOYEE’S CLAIM FOR COMPENSATION/ REPORT OF INITIAL TREATMENT  EMPLOYEE’S CLAIM - PROVIDE ALL INFORMATION REQUESTED   First Name Jensen Last Name Reese Birthdate 1960  Sex male Claim Number   Home Address [address]   [facility] [place]             Zip [address]                                   Age  61 y.o. Height  1.727 m (5' 8\") Weight  110 kg (243 lb 6.2 oz) N  [id number]   Mailing Address [address]  [facility] [place]              Zip [address] Telephone  [phone] (home)  Primary Language Spoken  ENGLISH   Insurer   Third Party   [employer]OUARD Employee's Occupation (Job Title) When Injury or Occupational Disease Occurred      Employer's Name [employer] Telephone [phone]    Employ[address] [facility]  [place] [address]   Date of Injury  2/17/2022       Hour of Injury  5:00 AM Date Employer Notified  2/18/2022 Last Day of Work after Injury or Occupational Disease  2/17/2022 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 Be specific and answer in detail. 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 N/A Witnesses to the Accident  N/A   Nature of Injury or Occupational Disease  Workers' Compensation Part(s) of Body Injured or Affected  Eye (L), N/A, N/A    I CERTIFY THAT THE ABOVE IS TRUE AND CORRECT TO THE BEST OF MY KNOWLEDGE AND THAT I HAVE PROVIDED THIS INFORMATION IN ORDER TO OBTAIN THE BENEFITS OF [place] IN[place] OR [place] 617 [place]).  I HEREBY AUTHORIZE ANY " PHYSICIAN, CHIROPRACTOR, SURGEON, PRACTITIONER, OR OTHER PERSON, ANY HOSPITAL, INCLUDING [facility] OR [facility], ANY MEDICAL SERVICE ORGANIZATION, ANY INSURANCE COMPANY, OR OTHER INSTITUTION OR ORGANIZATION TO RELEASE TO EACH OTHER, ANY MEDICAL OR OTHER INFORMATION, INCLUDING BENEFITS PAID OR PAYABLE, PERTINENT TO THIS INJURY OR DISEASE, EXCEPT INFORMATION RELATIVE TO DIAGNOSIS, TREATMENT AND/OR COUNSELING FOR AIDS, PSYCHOLOGICAL CONDITIONS, ALCOHOL OR CONTROLLED SUBSTANCES, FOR WHICH I MUST GIVE SPECIFIC AUTHORIZATION.  A PHOTOSTAT OF THIS AUTHORIZATION SHALL BE AS VALID AS THE ORIGINAL.  Date  02/18/2022                   Place  [facility]                                                          Employee’s Signature   THIS REPORT MUST BE COMPLETED AND MAILED WITHIN 3 WORKING DAYS OF TREATMENT   Place [facility], EMERGENCY DEPT                       Name of Facility [facility]   Date  2/18/2022 Diagnosis  (H57.12) Left eye pain  (H57.89) Redness of left eye  (H20.9) Iritis Is there evidence the injured employee was under the influence of alcohol and/or another controlled substance at the time of accident?   Hour  12:11 PM Description of Injury or Disease  Left eye pain  Redness of left eye  Iritis No   Treatment  Referral to ophthalmology  Have you advised the patient to remain off work five days or more?             X-Ray Findings    If Yes   From Date    To Date      From information given by the employee, together with medical evidence, can you directly connect this injury or occupational disease as job incurred? No If No, is employee capable of: Full Duty    Modified Duty      Is additional medical care by a physician indicated? Yes  Comments:Referral now to ophthalmology for final diagnosis If Modified Duty, Specify any Limitations / Restrictions   To be determined by specialist, ophthalmologist   Do you know of any previous injury or disease  "contributing to this condition or occupational disease? No    Date 2/18/2022 Print Doctor’s Name JunoSuzetteJasiel H I certify the employer’s copy of this form was mailed on:   Address [address] [place] [address]  [place] [address] [phone] INSURER’S USE ONLY   Provider’s Tax ID Number   Telephone Dept: [phone]    Doctor’s Signature JASIEL Thrasher D.O. Degree  M.D      Form C-4 (rev.10/07)                                                                         BRIEF DESCRIPTION OF RIGHTS AND BENEFITS  (Pursuant to [place] 616C.050)    Notice of Injury or Occupational Disease (Incident Report Form C-1): If an injury or occupational disease (OD) arises out of and in the course of employment, you must provide written notice to your employer as soon as practicable, but no later than 7 days after the accident or OD. Your employer shall maintain a sufficient supply of the required forms.    Claim for Compensation (Form C-4): If medical treatment is sought, the form C-4 is available at the place of initial treatment. A completed \"Claim for Compensation\" (Form C-4) must be filed within 90 days after an accident or OD. The treating physician or chiropractor must, within 3 working days after treatment, complete and mail to the employer, the employer's insurer and third-party , the Claim for Compensation.    Medical Treatment: If you require medical treatment for your on-the-job injury or OD, you may be required to select a physician or chiropractor from a list provided by your workers’ compensation insurer, if it has contracted with an Organization for Managed Care (MCO) or Preferred Provider Organization (PPO) or providers of health care. If your employer has not entered into a contract with an MCO or PPO, you may select a physician or chiropractor from the Panel of Physicians and Chiropractors. Any medical costs related to your industrial injury or OD will be paid by your insurer.    Temporary Total " Disability (TTD): If your doctor has certified that you are unable to work for a period of at least 5 consecutive days, or 5 cumulative days in a 20-day period, or places restrictions on you that your employer does not accommodate, you may be entitled to TTD compensation.    Temporary Partial Disability (TPD): If the wage you receive upon reemployment is less than the compensation for TTD to which you are entitled, the insurer may be required to pay you TPD compensation to make up the difference. TPD can only be paid for a maximum of 24 months.    Permanent Partial Disability (PPD): When your medical condition is stable and there is an indication of a PPD as a result of your injury or OD, within 30 days, your insurer must arrange for an evaluation by a rating physician or chiropractor to determine the degree of your PPD. The amount of your PPD award depends on the date of injury, the results of the PPD evaluation, your age and wage.    Permanent Total Disability (PTD): If you are medically certified by a treating physician or chiropractor as permanently and totally disabled and have been granted a PTD status by your insurer, you are entitled to receive monthly benefits not to exceed 66 2/3% of your average monthly wage. The amount of your PTD payments is subject to reduction if you previously received a lump-sum PPD award.    Vocational Rehabilitation Services: You may be eligible for vocational rehabilitation services if you are unable to return to the job due to a permanent physical impairment or permanent restrictions as a result of your injury or occupational disease.    Transportation and Per Taina Reimbursement: You may be eligible for travel expenses and per taina associated with medical treatment.    Reopening: You may be able to reopen your claim if your condition worsens after claim closure.     Appeal Process: If you disagree with a written determination issued by the insurer or the insurer does not respond  to your request, you may appeal to the Department of Administration, , by following the instructions contained in your determination letter. You must appeal the determination within 70 days from the date of the determination letter at [address], [address], [place], [place] [address], or [address], [address], [place], [place] [address]. If you disagree with the  decision, you may appeal to the Department of Administration, . You must file your appeal within 30 days from the date of the  decision letter at [address], [address], [place], [place] [address], or [address], [address], [place], [place] [address]. If you disagree with a decision of an , you may file a petition for judicial review with the District Court. You must do so within 30 days of the Appeal Officer’s decision. You may be represented by an  at your own expense or you may contact the [facility] for possible representation.    [place]  for Injured Workers (NAIW): If you disagree with a  decision, you may request that NAIW represent you without charge at an  Hearing. For information regarding denial of benefits, you may contact the [facility] at: [address], [address], [place] [address], [phone], or [address], [address], [place] [address], [phone]    To File a Complaint with the Division: If you wish to file a complaint with the  of the Division of Industrial Relations (DIR),  please contact the Workers’ Compensation Section, [address], [address], [place], [place] [address], telephone [phone], or [address], [address], [place], [place] [address], telephone [phone].    For assistance with Workers’ Compensation Issues: You may contact the [facility] Office for Consumer Health Assistance, [address], [address], [place] [address], Toll Free 1-675.882.7976, Web site: http://[facility].[place].gov/Programs/GHASSAN E-mail: ghassan@[facility].[place].gov  D-2 (rev. 10/20)              __________________________________________________________________                                    _____02/18/2022_______            Employee Name / Signature                                                                                                                            Date

## 2022-03-16 DIAGNOSIS — F90.2 ADHD (ATTENTION DEFICIT HYPERACTIVITY DISORDER), COMBINED TYPE: ICD-10-CM

## 2022-03-16 RX ORDER — DEXTROAMPHETAMINE SACCHARATE, AMPHETAMINE ASPARTATE MONOHYDRATE, DEXTROAMPHETAMINE SULFATE AND AMPHETAMINE SULFATE 7.5; 7.5; 7.5; 7.5 MG/1; MG/1; MG/1; MG/1
30 CAPSULE, EXTENDED RELEASE ORAL EVERY MORNING
Qty: 30 CAPSULE | Refills: 0 | Status: SHIPPED | OUTPATIENT
Start: 2022-03-22 | End: 2022-03-21 | Stop reason: SDUPTHER

## 2022-03-16 NOTE — TELEPHONE ENCOUNTER
Patient runs out of medication 3/20/22.   Patient has appointment 5/5/22.      Received request via: Patient    Was the patient seen in the last year in this department? Yes    Does the patient have an active prescription (recently filled or refills available) for medication(s) requested? No

## 2022-03-21 DIAGNOSIS — F90.2 ADHD (ATTENTION DEFICIT HYPERACTIVITY DISORDER), COMBINED TYPE: ICD-10-CM

## 2022-03-21 NOTE — TELEPHONE ENCOUNTER
Patient would like for this to go to the [facility] pharmacy. Patient states [facility] is having some computer issues and is unable to refill his medication.

## 2022-03-22 RX ORDER — DEXTROAMPHETAMINE SACCHARATE, AMPHETAMINE ASPARTATE MONOHYDRATE, DEXTROAMPHETAMINE SULFATE AND AMPHETAMINE SULFATE 7.5; 7.5; 7.5; 7.5 MG/1; MG/1; MG/1; MG/1
30 CAPSULE, EXTENDED RELEASE ORAL EVERY MORNING
Qty: 30 CAPSULE | Refills: 0 | Status: SHIPPED | OUTPATIENT
Start: 2022-03-22 | End: 2022-04-15 | Stop reason: SDUPTHER

## 2022-05-05 ENCOUNTER — TELEMEDICINE (OUTPATIENT)
Dept: BEHAVIORAL HEALTH | Facility: CLINIC | Age: 62
End: 2022-05-05
Payer: COMMERCIAL

## 2022-05-05 DIAGNOSIS — F90.2 ADHD (ATTENTION DEFICIT HYPERACTIVITY DISORDER), COMBINED TYPE: ICD-10-CM

## 2022-05-05 DIAGNOSIS — F63.81 INTERMITTENT EXPLOSIVE DISORDER: ICD-10-CM

## 2022-05-05 DIAGNOSIS — F32.A DEPRESSIVE DISORDER: ICD-10-CM

## 2022-05-05 PROCEDURE — 99214 OFFICE O/P EST MOD 30 MIN: CPT | Mod: 95 | Performed by: PSYCHIATRY & NEUROLOGY

## 2022-05-05 RX ORDER — DEXTROAMPHETAMINE SACCHARATE, AMPHETAMINE ASPARTATE MONOHYDRATE, DEXTROAMPHETAMINE SULFATE AND AMPHETAMINE SULFATE 7.5; 7.5; 7.5; 7.5 MG/1; MG/1; MG/1; MG/1
30 CAPSULE, EXTENDED RELEASE ORAL EVERY MORNING
Qty: 30 CAPSULE | Refills: 0 | Status: SHIPPED | OUTPATIENT
Start: 2022-06-21 | End: 2022-07-21

## 2022-05-05 RX ORDER — DEXTROAMPHETAMINE SACCHARATE, AMPHETAMINE ASPARTATE MONOHYDRATE, DEXTROAMPHETAMINE SULFATE AND AMPHETAMINE SULFATE 7.5; 7.5; 7.5; 7.5 MG/1; MG/1; MG/1; MG/1
30 CAPSULE, EXTENDED RELEASE ORAL EVERY MORNING
Qty: 30 CAPSULE | Refills: 0 | Status: SHIPPED | OUTPATIENT
Start: 2022-07-22 | End: 2022-07-20 | Stop reason: SDUPTHER

## 2022-05-05 RX ORDER — DEXTROAMPHETAMINE SACCHARATE, AMPHETAMINE ASPARTATE MONOHYDRATE, DEXTROAMPHETAMINE SULFATE AND AMPHETAMINE SULFATE 7.5; 7.5; 7.5; 7.5 MG/1; MG/1; MG/1; MG/1
30 CAPSULE, EXTENDED RELEASE ORAL EVERY MORNING
Qty: 30 CAPSULE | Refills: 0 | Status: SHIPPED | OUTPATIENT
Start: 2022-05-21 | End: 2022-06-20

## 2022-05-05 NOTE — PROGRESS NOTES
This evaluation was conducted via Zoom using secure and encrypted videoconferencing technology. The patient was in their home in the [facility].    The patient's identity was confirmed and verbal consent was obtained for this virtual visit.      PSYCHIATRY FOLLOW-UP NOTE      Name: Jensen Leigh  MRN: [id number]  : 1960  Age: 62 y.o.  Date of assessment: 2022  PCP: Aubrie Bryant P.A.-C.  Persons in attendance: Patient      REASON FOR VISIT/CHIEF COMPLAINT (as stated by Patient):  Jensen Leigh is a 62 y.o., White male, attending follow-up appointment for mood and anxiety management.      HISTORY OF PRESENT ILLNESS:  Jensen Leigh is a 62 y.o. old male with depression and ADHD comes in today for follow up. Patient was last seen 6 months ago, and following treatment planning recommendations were done:  · Continue Sertraline 50 mg daily for depression and anxiety management.  Given 3 month supply with 1 refill.  · Continue Adderall XR 30 mg daily for depression and anxiety management.  Patient will send me a [employer] message 3 days before upcoming refill dates and prescription will be sent to pharmacy accordingly.  · Controlled medication treatment agreement signed in 2021.    Patient is compliant with Zoloft and Adderall with no side effect and reports stable mood, anxiety and focus.  Patient prefers to stay on the same medication and agreed with plan to receive monthly follow-up but understand the plan of contacting me on [employer] after 3 months for the next 3 months of Adderall prescription.  Patient likes to keep self busy and still works but understand the importance of maintaining worklife balance.      CURRENT MEDICATIONS:  Current Outpatient Medications   Medication Sig Dispense Refill   • amphetamine-dextroamphetamine ER (ADDERALL XR) 30 MG XR capsule Take 1 Capsule by mouth every morning for 30 days. 30 Capsule 0   • Empagliflozin (JARDIANCE) 25 MG Tab TAKE 1 TABLET BY MOUTH ONCE DAILY IN THE  MORNING . 30 Tablet 9   • Lancet Devices (LANCET DEVICE WITH EJECTOR) Misc True metrix lancet ejector device. 1 Each 0   • SITagliptin (JANUVIA) 100 MG Tab Take 1 Tablet by mouth every day. 30 Tablet 9   • lisinopril (PRINIVIL) 2.5 MG Tab Take 1 Tablet by mouth every day. (for kidney protection) 30 Tablet 9   • atorvastatin (LIPITOR) 40 MG Tab Take 1 Tablet by mouth every day. 30 Tablet 9   • sertraline (ZOLOFT) 50 MG Tab Take 1 Tablet by mouth every day. 90 Tablet 1   • atorvastatin (LIPITOR) 40 MG Tab Take 1 tablet by mouth every day. 30 tablet 9   • tadalafil (CIALIS) 10 MG tablet Take 1 tablet by mouth as needed for Erectile Dysfunction. 10 tablet 9   • Blood Glucose Test Strips Test strips order: Test strips for Contour Next meter. Sig: use 2x/d and prn ssx high or low sugar #100 RF x 3 100 Strip 3   • aspirin EC (ECOTRIN) 81 MG Tablet Delayed Response Take 2 Tabs by mouth every day. 30 Tab    • Blood Glucose Monitoring Suppl Device Meter: Dispense 1 Contour Next Device . Sig. Use as directed for blood sugar monitoring. #1. NR. 1 Device 0   • glucose blood (TRUE METRIX BLOOD GLUCOSE TEST) strip 1 Strip by Other route as needed. 300 Strip 1   • Multiple Vitamin (MULTI VITAMIN MENS PO) Take 1 Tab by mouth every day.       No current facility-administered medications for this visit.       MEDICAL HISTORY  Past Medical History:   Diagnosis Date   • Eczema    • GERD (gastroesophageal reflux disease)    • Type II or unspecified type diabetes mellitus without mention of complication, not stated as uncontrolled    • Umbilical hernia      Past Surgical History:   Procedure Laterality Date   • ROTATOR CUFF REPAIR      rt shoulder   • TONSILLECTOMY AND ADENOIDECTOMY         PAST PSYCHIATRIC HISTORY  Prior psychiatric hospitalization: none  Prior Self harm/suicide attempt: no  Prior Diagnosis: Depression, IED, ADHD     PAST PSYCHIATRIC MEDICATIONS  One antidepressant in the past      FAMILY HISTORY  Psychiatric  diagnosis:  none  History of suicide attempts:  no  Substance abuse history:  no     SUBSTANCE USE HISTORY:  ALCOHOL: no  TOBACCO: no  CANNABIS: no  OPIOIDS: no  PRESCRIPTION MEDICATIONS: no  OTHERS: experimented with methamphetamine in his 20s.  History of inpatient/outpatient rehab treatment: none     SOCIAL HISTORY  Childhood: [place] in [place] and describes childhood as ok  Education: [employer] (dropped out early)  in Special Education: no  Intellectual Disability: no  Employment: employed  at [employer] (for > 40 yr)  Relationship:  for > 40 yr  Kids: no  Current living situation: lives with wife  Current/past legal issues: no  History of emotional/physical/sexual abuse - no      REVIEW OF SYSTEMS:        Constitutional negative   Eyes negative   Ears/Nose/Mouth/Throat negative   Cardiovascular negative   Respiratory negative   Gastrointestinal negative   Genitourinary negative   Muscular negative   Integumentary negative   Neurological negative   Endocrine negative   Hematologic/Lymphatic negative     PHYSICAL EXAMINAION:  Vital signs: There were no vitals taken for this visit.  Musculoskeletal: Normal gait.   Abnormal movements: none      MENTAL STATUS EXAMINATION      General:   - Grooming and hygiene: Casual,   - Apparent distress: none,   - Behavior: Calm  - Eye Contact:  Good,   - no psychomotor agitation or retardation    - Participation: Active verbal participation  Orientation: Alert and Fully Oriented to person, place and time  Mood: Euthymic  Affect: Flexible and Full range,  Thought Process: Logical and Goal-directed  Thought Content: Denies suicidal or homicidal ideations, intent or plan Within normal limits  Perception: Denies auditory or visual hallucinations. No delusions noted Within normal limits  Attention span and concentration: Intact   Speech:Rate within normal limits and Volume within normal limits  Language: Appropriate   Insight: Good  Judgment: Good  Recent and remote  memory: No gross evidence of memory deficits        DEPRESSION SCREENING:  Depression Screen (PHQ-2/PHQ-9) 11/4/2019 11/6/2020 11/3/2021   PHQ-2 Total Score 2 0 0   PHQ-9 Total Score 7 - -       Interpretation of PHQ-9 Total Score   Score Severity   1-4 No Depression   5-9 Mild Depression   10-14 Moderate Depression   15-19 Moderately Severe Depression   20-27 Severe Depression    CURRENT RISK:       Suicidal: Low       Homicidal: Low       Self-Harm: Low       Relapse: Low       Crisis Safety Plan Reviewed Not Indicated       If evidence of imminent risk is present, intervention/plan:      MEDICAL RECORDS/LABS/DIAGNOSTIC TESTS REVIEWED:  No new lab since last visit     [place]  records -   Reviewed     PLAN:  (1) Depressive Disorder; (2) Intermittent Explosive Disorder; (3) ADHD  · Stable  · Continue Sertraline 50 mg daily for depression and anxiety management.  Given 3 month supply with 1 refill.  · Continue Adderall XR 30 mg daily for ADHD management. 30 days with 0 refill given for: 5/21-6/20; 6/21-7/21; 7/22-8/21.  Patient will send me a [employer] message 3 days before upcoming refill dates and prescription will be sent to pharmacy accordingly.  · Controlled medication treatment agreement signed in Feb 2021.  · Medication options, alternatives (including no medications) and medication risks/benefits/side effects were discussed in detail.  · The patient was advised to call, message provider on [employer], or come in to the clinic if symptoms worsen or if any future questions/issues regarding their medications arise; the patient verbalized understanding and agreement.    · The patient was educated to call 911, call the suicide hotline, or go to local ER if having thoughts of suicide or homicide; verbalized understanding.    Billing Coding based on:  83624: based on MDM    Return to clinic in 6 months or sooner if symptoms worsen.  Next Appointment: instruction provided on how to make the next appointment.     The  proposed treatment plan was discussed with the patient who was provided the opportunity to ask questions and make suggestions regarding alternative treatment. Patient verbalized understanding and expressed agreement with the plan.       Eliazar Prasad M.D.  05/05/22    This note was created using voice recognition software (Dragon). The accuracy of the dictation is limited by the abilities of the software. I have reviewed the note prior to signing, however some errors in grammar and context are still possible. If you have any questions related to this note please do not hesitate to contact our office.

## 2022-05-10 ENCOUNTER — TELEPHONE (OUTPATIENT)
Dept: MEDICAL GROUP | Facility: CLINIC | Age: 62
End: 2022-05-10
Payer: COMMERCIAL

## 2022-05-10 DIAGNOSIS — S05.90XA EYE INJURY, UNSPECIFIED LATERALITY, INITIAL ENCOUNTER: ICD-10-CM

## 2022-05-10 NOTE — TELEPHONE ENCOUNTER
VOICEMAIL  1. Caller Name: Thelma                      Call Back Number: [phone]    2. Message: Thelma from [facility] stating that she has requested a referral from the office back in February when pt was seen after an ER visit. She is requesting a referral for the days 2/18 & 2/22 for the initial visit as well as a fv    3. Patient approves office to leave a detailed voicemail/MyChart message: N\A

## 2022-05-10 NOTE — TELEPHONE ENCOUNTER
Was not aware a referral was needed.  I have submitted but cannot guarantee a backdated referral will be approved.

## 2022-07-20 ENCOUNTER — PATIENT MESSAGE (OUTPATIENT)
Dept: BEHAVIORAL HEALTH | Facility: CLINIC | Age: 62
End: 2022-07-20
Payer: COMMERCIAL

## 2022-07-20 DIAGNOSIS — F90.2 ADHD (ATTENTION DEFICIT HYPERACTIVITY DISORDER), COMBINED TYPE: ICD-10-CM

## 2022-07-20 RX ORDER — DEXTROAMPHETAMINE SACCHARATE, AMPHETAMINE ASPARTATE MONOHYDRATE, DEXTROAMPHETAMINE SULFATE AND AMPHETAMINE SULFATE 7.5; 7.5; 7.5; 7.5 MG/1; MG/1; MG/1; MG/1
30 CAPSULE, EXTENDED RELEASE ORAL EVERY MORNING
Qty: 30 CAPSULE | Refills: 0 | Status: SHIPPED | OUTPATIENT
Start: 2022-07-22 | End: 2022-08-21

## 2022-08-16 DIAGNOSIS — F90.2 ADHD (ATTENTION DEFICIT HYPERACTIVITY DISORDER), COMBINED TYPE: Primary | ICD-10-CM

## 2022-08-16 RX ORDER — DEXTROAMPHETAMINE SACCHARATE, AMPHETAMINE ASPARTATE MONOHYDRATE, DEXTROAMPHETAMINE SULFATE AND AMPHETAMINE SULFATE 7.5; 7.5; 7.5; 7.5 MG/1; MG/1; MG/1; MG/1
30 CAPSULE, EXTENDED RELEASE ORAL EVERY MORNING
Qty: 30 CAPSULE | Refills: 0 | Status: SHIPPED | OUTPATIENT
Start: 2022-09-20 | End: 2022-10-20

## 2022-08-16 RX ORDER — DEXTROAMPHETAMINE SACCHARATE, AMPHETAMINE ASPARTATE MONOHYDRATE, DEXTROAMPHETAMINE SULFATE AND AMPHETAMINE SULFATE 7.5; 7.5; 7.5; 7.5 MG/1; MG/1; MG/1; MG/1
30 CAPSULE, EXTENDED RELEASE ORAL EVERY MORNING
Qty: 30 CAPSULE | Refills: 0 | Status: SHIPPED | OUTPATIENT
Start: 2022-10-21 | End: 2022-11-20

## 2022-08-16 RX ORDER — DEXTROAMPHETAMINE SACCHARATE, AMPHETAMINE ASPARTATE MONOHYDRATE, DEXTROAMPHETAMINE SULFATE AND AMPHETAMINE SULFATE 7.5; 7.5; 7.5; 7.5 MG/1; MG/1; MG/1; MG/1
30 CAPSULE, EXTENDED RELEASE ORAL EVERY MORNING
Qty: 30 CAPSULE | Refills: 0 | Status: SHIPPED | OUTPATIENT
Start: 2022-08-20 | End: 2022-09-19

## 2022-08-19 DIAGNOSIS — F90.2 ADHD (ATTENTION DEFICIT HYPERACTIVITY DISORDER), COMBINED TYPE: ICD-10-CM

## 2022-08-19 DIAGNOSIS — F32.A DEPRESSIVE DISORDER: ICD-10-CM

## 2022-08-19 RX ORDER — DEXTROAMPHETAMINE SACCHARATE, AMPHETAMINE ASPARTATE MONOHYDRATE, DEXTROAMPHETAMINE SULFATE AND AMPHETAMINE SULFATE 7.5; 7.5; 7.5; 7.5 MG/1; MG/1; MG/1; MG/1
30 CAPSULE, EXTENDED RELEASE ORAL EVERY MORNING
Qty: 30 CAPSULE | Refills: 0 | Status: SHIPPED | OUTPATIENT
Start: 2022-08-19 | End: 2022-09-18

## 2022-10-01 ENCOUNTER — OFFICE VISIT (OUTPATIENT)
Dept: URGENT CARE | Facility: PHYSICIAN GROUP | Age: 62
End: 2022-10-01
Payer: COMMERCIAL

## 2022-10-01 VITALS
WEIGHT: 240 LBS | OXYGEN SATURATION: 98 % | RESPIRATION RATE: 14 BRPM | HEIGHT: 66 IN | DIASTOLIC BLOOD PRESSURE: 78 MMHG | TEMPERATURE: 97.9 F | HEART RATE: 74 BPM | SYSTOLIC BLOOD PRESSURE: 134 MMHG | BODY MASS INDEX: 38.57 KG/M2

## 2022-10-01 DIAGNOSIS — S16.1XXA STRAIN OF NECK MUSCLE, INITIAL ENCOUNTER: ICD-10-CM

## 2022-10-01 PROCEDURE — 99213 OFFICE O/P EST LOW 20 MIN: CPT | Performed by: FAMILY MEDICINE

## 2022-10-01 RX ORDER — TIZANIDINE HYDROCHLORIDE 4 MG/1
4 CAPSULE, GELATIN COATED ORAL EVERY 8 HOURS PRN
Qty: 30 CAPSULE | Refills: 0 | Status: SHIPPED | OUTPATIENT
Start: 2022-10-01 | End: 2024-01-23

## 2022-10-01 ASSESSMENT — FIBROSIS 4 INDEX: FIB4 SCORE: 1.28

## 2022-10-01 ASSESSMENT — PAIN SCALES - GENERAL: PAINLEVEL: 3=SLIGHT PAIN

## 2022-10-01 NOTE — PROGRESS NOTES
Subjective:      Chief Complaint   Patient presents with    Neck Injury    Shoulder Pain     Unknown reason pt states                 Neck Pain   This is a new problem. Episode onset: 3 d ago. The problem occurs constantly. The problem has been unchanged. The pain is associated with - sleep position. The pain is present in the left  side. The quality of the pain is described as achy and stiff.  The pain is moderate. The symptoms are aggravated by position. Stiffness is present in the morning. Associated symptoms include: left shoulder pain. Pertinent negatives include no fever, headaches, numbness, pain with swallowing or tingling. Pt has tried nothing for the symptoms.         Past Medical History:   Diagnosis Date    Eczema     GERD (gastroesophageal reflux disease)     Type II or unspecified type diabetes mellitus without mention of complication, not stated as uncontrolled     Umbilical hernia          Social History     Tobacco Use    Smoking status: Never    Smokeless tobacco: Never   Vaping Use    Vaping Use: Never used   Substance Use Topics    Alcohol use: Not Currently     Comment: 1 drink/year    Drug use: No         Family hx was reviewed - no pertinent past family hx        Review of Systems   Constitutional: Negative for fever, chills and weight loss.   HENT - denies cough, ear pain, congestion, sore throat  Eyes: denies vision changes, discharge  Respiratory: Negative for cough and wheezing.    Cardiovascular: Negative for chest pain or PND.   Gastrointestinal:  No abdominal pain,  nausea, vomiting, diarrhea.  Negative for  blood in stool.     Neurological:  . Negative for tingling, weakness, numbnes.   musculoskeletal - denies myalgias, calf pain  Psych - denies anxiety/depression/mood changes.  Skin: no itching or rash  All other systems reviewed and are negative.           Objective:   /78 (BP Location: Right arm, Patient Position: Sitting, BP Cuff Size: Adult)   Pulse 74   Temp 36.6 °C  "(97.9 °F) (Temporal)   Resp 14   Ht 1.676 m (5' 6\")   Wt 109 kg (240 lb)   SpO2 98%         Physical Exam   Constitutional: pt is oriented to person, place, and time. Pt appears well-developed and well-nourished. No distress.   HENT:   Head: Normocephalic and atraumatic.   Eyes: Conjunctivae are normal.   Cardiovascular: Normal rate and regular rhythm.    Pulmonary/Chest: Effort normal and breath sounds normal. No respiratory distress. Pt has no wheezes.   Musculoskeletal:   Left shoulder - full AROM.   No TTP.   No pain with resisted external rotation       Cervical spine : pt exhibits decreased range of motion, tenderness and spasm (left). Pt exhibits no swelling.   Neurological: pt is alert and oriented to person, place, and time.   Strength:    Deltoid - 5/5 on right  5/5 on left     - 5/5 on right, 5/5 on left   Skin: Skin is warm. Pt is not diaphoretic. No erythema.   Psychiatric:  Pt's behavior is normal.   Nursing note and vitals reviewed.          A/P:    1. Strain of neck muscle, initial encounter     - diclofenac sodium (VOLTAREN) 1 % Gel; Apply 4 g topically in the morning, at noon, and at bedtime.  Dispense: 50 g; Refill: 0  - tizanidine (ZANAFLEX) 4 MG capsule; Take 1 Capsule by mouth every 8 hours as needed (muscle spasms).  Dispense: 30 Capsule; Refill: 0      Follow up in one week if no improvement, sooner if symptoms worsen.       "

## 2022-11-04 ENCOUNTER — TELEMEDICINE (OUTPATIENT)
Dept: BEHAVIORAL HEALTH | Facility: CLINIC | Age: 62
End: 2022-11-04
Payer: COMMERCIAL

## 2022-11-04 DIAGNOSIS — F63.81 INTERMITTENT EXPLOSIVE DISORDER: ICD-10-CM

## 2022-11-04 DIAGNOSIS — F32.A DEPRESSIVE DISORDER: ICD-10-CM

## 2022-11-04 DIAGNOSIS — F90.2 ADHD (ATTENTION DEFICIT HYPERACTIVITY DISORDER), COMBINED TYPE: ICD-10-CM

## 2022-11-04 PROCEDURE — 99214 OFFICE O/P EST MOD 30 MIN: CPT | Mod: 95 | Performed by: PSYCHIATRY & NEUROLOGY

## 2022-11-04 RX ORDER — DEXTROAMPHETAMINE SACCHARATE, AMPHETAMINE ASPARTATE MONOHYDRATE, DEXTROAMPHETAMINE SULFATE AND AMPHETAMINE SULFATE 7.5; 7.5; 7.5; 7.5 MG/1; MG/1; MG/1; MG/1
30 CAPSULE, EXTENDED RELEASE ORAL EVERY MORNING
Qty: 30 CAPSULE | Refills: 0 | Status: SHIPPED | OUTPATIENT
Start: 2022-11-16 | End: 2023-07-31 | Stop reason: SDUPTHER

## 2022-11-04 RX ORDER — DEXTROAMPHETAMINE SACCHARATE, AMPHETAMINE ASPARTATE MONOHYDRATE, DEXTROAMPHETAMINE SULFATE AND AMPHETAMINE SULFATE 7.5; 7.5; 7.5; 7.5 MG/1; MG/1; MG/1; MG/1
30 CAPSULE, EXTENDED RELEASE ORAL EVERY MORNING
Qty: 30 CAPSULE | Refills: 0 | Status: SHIPPED | OUTPATIENT
Start: 2023-01-17 | End: 2022-12-14

## 2022-11-04 RX ORDER — DEXTROAMPHETAMINE SACCHARATE, AMPHETAMINE ASPARTATE MONOHYDRATE, DEXTROAMPHETAMINE SULFATE AND AMPHETAMINE SULFATE 7.5; 7.5; 7.5; 7.5 MG/1; MG/1; MG/1; MG/1
30 CAPSULE, EXTENDED RELEASE ORAL EVERY MORNING
Qty: 30 CAPSULE | Refills: 0 | Status: SHIPPED | OUTPATIENT
Start: 2022-12-17 | End: 2022-12-14

## 2022-11-04 NOTE — PROGRESS NOTES
This evaluation was conducted via Zoom using secure and encrypted videoconferencing technology. The patient was in their home in the [facility].    The patient's identity was confirmed and verbal consent was obtained for this virtual visit.      PSYCHIATRY FOLLOW-UP NOTE      Name: Jensen Leigh  MRN: [id number]  : 1960  Age: 62 y.o.  Date of assessment: 2022  PCP: Aubrie Bryant P.A.-C.  Persons in attendance: Patient      REASON FOR VISIT/CHIEF COMPLAINT (as stated by Patient):  Jensen Leigh is a 62 y.o., White male, attending follow-up appointment for ADHD, mood and anxiety management.      HISTORY OF PRESENT ILLNESS:  Jensen Leigh is a 62 y.o. old male with ADHD, mood and anxiety comes in today for follow up. Patient was last seen 6 months ago, and following treatment planning recommendations were done:  Continue Sertraline 50 mg daily for depression and anxiety management.  Given 3 month supply with 1 refill.  Continue Adderall XR 30 mg daily for ADHD management. 30 days with 0 refill given for: -; -; -.  Patient will send me a [employer] message 3 days before upcoming refill dates and prescription will be sent to pharmacy accordingly.  Controlled medication treatment agreement signed in 2021.    Patient is compliant with medications with no side effects and describes stable mood, anxiety, sleep and focus symptoms with no signs of irritability or impulse control issues.  Patient prefers to stay on the same medication and agreed with not titrating medications further.  Patient understand the plan of contacting me on [employer] after 3 months for the next 3 months of Adderall prescription.      CURRENT MEDICATIONS:  Current Outpatient Medications   Medication Sig Dispense Refill    diclofenac sodium (VOLTAREN) 1 % Gel Apply 4 g topically in the morning, at noon, and at bedtime. 50 g 0    tizanidine (ZANAFLEX) 4 MG capsule Take 1 Capsule by mouth every 8 hours as needed (muscle  spasms). 30 Capsule 0    amphetamine-dextroamphetamine ER (ADDERALL XR) 30 MG XR capsule Take 1 Capsule by mouth every morning for 30 days. 30 Capsule 0    sertraline (ZOLOFT) 50 MG Tab Take 1 Tablet by mouth every day. 90 Tablet 1    Empagliflozin (JARDIANCE) 25 MG Tab TAKE 1 TABLET BY MOUTH ONCE DAILY IN THE MORNING . 30 Tablet 9    Lancet Devices (LANCET DEVICE WITH EJECTOR) Misc True metrix lancet ejector device. 1 Each 0    SITagliptin (JANUVIA) 100 MG Tab Take 1 Tablet by mouth every day. 30 Tablet 9    lisinopril (PRINIVIL) 2.5 MG Tab Take 1 Tablet by mouth every day. (for kidney protection) 30 Tablet 9    atorvastatin (LIPITOR) 40 MG Tab Take 1 Tablet by mouth every day. 30 Tablet 9    atorvastatin (LIPITOR) 40 MG Tab Take 1 tablet by mouth every day. 30 tablet 9    tadalafil (CIALIS) 10 MG tablet Take 1 tablet by mouth as needed for Erectile Dysfunction. 10 tablet 9    Blood Glucose Test Strips Test strips order: Test strips for Contour Next meter. Sig: use 2x/d and prn ssx high or low sugar #100 RF x 3 100 Strip 3    aspirin EC (ECOTRIN) 81 MG Tablet Delayed Response Take 2 Tabs by mouth every day. 30 Tab     Blood Glucose Monitoring Suppl Device Meter: Dispense 1 Contour Next Device . Sig. Use as directed for blood sugar monitoring. #1. NR. 1 Device 0    glucose blood (TRUE METRIX BLOOD GLUCOSE TEST) strip 1 Strip by Other route as needed. 300 Strip 1    Multiple Vitamin (MULTI VITAMIN MENS PO) Take 1 Tab by mouth every day.       No current facility-administered medications for this visit.       MEDICAL HISTORY  Past Medical History:   Diagnosis Date    Eczema     GERD (gastroesophageal reflux disease)     Type II or unspecified type diabetes mellitus without mention of complication, not stated as uncontrolled     Umbilical hernia      Past Surgical History:   Procedure Laterality Date    ROTATOR CUFF REPAIR      rt shoulder    TONSILLECTOMY AND ADENOIDECTOMY         Continue Sertraline 50 mg daily for  depression and anxiety management.  Given 3 month supply with 1 refill.  Continue Adderall XR 30 mg daily for ADHD management. 30 days with 0 refill given for: 5/21-6/20; 6/21-7/21; 7/22-8/21.  Patient will send me a [employer] message 3 days before upcoming refill dates and prescription will be sent to pharmacy accordingly.  Controlled medication treatment agreement signed in Feb 2021.      REVIEW OF SYSTEMS:        Constitutional negative   Eyes negative   Ears/Nose/Mouth/Throat negative   Cardiovascular negative   Respiratory negative   Gastrointestinal negative   Genitourinary negative   Muscular negative   Integumentary negative   Neurological negative   Endocrine negative   Hematologic/Lymphatic negative     PHYSICAL EXAMINAION:  Vital signs: There were no vitals taken for this visit.  Musculoskeletal: Normal gait.   Abnormal movements: none      MENTAL STATUS EXAMINATION      General:   - Grooming and hygiene: Casual,   - Apparent distress: none,   - Behavior: Calm  - Eye Contact:  Good,   - no psychomotor agitation or retardation    - Participation: Active verbal participation  Orientation: Alert and Fully Oriented to person, place and time  Mood: Euthymic  Affect: Flexible and Full range,  Thought Process: Logical and Goal-directed  Thought Content: Denies suicidal or homicidal ideations, intent or plan Within normal limits  Perception: Denies auditory or visual hallucinations. No delusions noted   Attention span and concentration: Intact   Speech:Rate within normal limits and Volume within normal limits  Language: Appropriate   Insight: Good  Judgment: Good  Recent and remote memory: No gross evidence of memory deficits        DEPRESSION SCREENING:  Depression Screen (PHQ-2/PHQ-9) 11/4/2019 11/6/2020 11/3/2021   PHQ-2 Total Score 2 0 0   PHQ-9 Total Score 7 - -       Interpretation of PHQ-9 Total Score   Score Severity   1-4 No Depression   5-9 Mild Depression   10-14 Moderate Depression   15-19 Moderately  Severe Depression   20-27 Severe Depression    CURRENT RISK:       Suicidal: Low       Homicidal: Low       Self-Harm: Low       Relapse: Low       Crisis Safety Plan Reviewed Not Indicated       If evidence of imminent risk is present, intervention/plan:      MEDICAL RECORDS/LABS/DIAGNOSTIC TESTS REVIEWED:  No new lab since last visit     [facility] records -   Reviewed     PLAN:  (1) Depressive Disorder; (2) Intermittent Explosive Disorder; (3) ADHD  Stable  Continue Sertraline 50 mg daily for depression and anxiety management.  Given 3 month supply with 1 refill.  Continue Adderall XR 30 mg daily for ADHD management. Patient will send me a [employer] message 3 days before upcoming refill dates and prescription will be sent to pharmacy accordingly.  Controlled medication treatment agreement signed in Feb 2021.  Medication options, alternatives (including no medications) and medication risks/benefits/side effects were discussed in detail.  The patient was advised to call, message provider on [employer], or come in to the clinic if symptoms worsen or if any future questions/issues regarding their medications arise; the patient verbalized understanding and agreement.    The patient was educated to call 911, call the suicide hotline, or go to local ER if having thoughts of suicide or homicide; verbalized understanding.    Billing Coding based on:  87338 based on MDM    Return to clinic in 6 months or sooner if symptoms worsen.  Next Appointment: instruction provided on how to make the next appointment.     The proposed treatment plan was discussed with the patient who was provided the opportunity to ask questions and make suggestions regarding alternative treatment. Patient verbalized understanding and expressed agreement with the plan.       Eliazar Prasad M.D.  11/04/22    This note was created using voice recognition software (Dragon). The accuracy of the dictation is limited by the abilities of the software. I have  reviewed the note prior to signing, however some errors in grammar and context are still possible. If you have any questions related to this note please do not hesitate to contact our office.

## 2022-11-10 DIAGNOSIS — F90.2 ADHD (ATTENTION DEFICIT HYPERACTIVITY DISORDER), COMBINED TYPE: ICD-10-CM

## 2022-11-10 RX ORDER — METHYLPHENIDATE HYDROCHLORIDE 30 MG/1
30 CAPSULE, EXTENDED RELEASE ORAL EVERY MORNING
Qty: 30 CAPSULE | Refills: 0 | Status: SHIPPED | OUTPATIENT
Start: 2022-11-16 | End: 2023-01-10 | Stop reason: SDUPTHER

## 2022-11-29 ENCOUNTER — HOSPITAL ENCOUNTER (OUTPATIENT)
Dept: LAB | Facility: MEDICAL CENTER | Age: 62
End: 2022-11-29
Attending: PHYSICIAN ASSISTANT
Payer: COMMERCIAL

## 2022-11-29 DIAGNOSIS — E78.00 PURE HYPERCHOLESTEROLEMIA: ICD-10-CM

## 2022-11-29 DIAGNOSIS — E11.9 TYPE 2 DIABETES MELLITUS WITHOUT COMPLICATION, WITHOUT LONG-TERM CURRENT USE OF INSULIN (HCC): ICD-10-CM

## 2022-11-29 DIAGNOSIS — Z01.83 ENCOUNTER FOR BLOOD TYPING: ICD-10-CM

## 2022-11-29 DIAGNOSIS — E66.9 OBESITY (BMI 30-39.9): ICD-10-CM

## 2022-11-29 LAB
ABO GROUP BLD: NORMAL
ALBUMIN SERPL BCP-MCNC: 4.3 G/DL (ref 3.2–4.9)
ALBUMIN/GLOB SERPL: 1.5 G/DL
ALP SERPL-CCNC: 108 U/L (ref 30–99)
ALT SERPL-CCNC: 26 U/L (ref 2–50)
ANION GAP SERPL CALC-SCNC: 13 MMOL/L (ref 7–16)
AST SERPL-CCNC: 26 U/L (ref 12–45)
BILIRUB SERPL-MCNC: 0.7 MG/DL (ref 0.1–1.5)
BUN SERPL-MCNC: 17 MG/DL (ref 8–22)
CALCIUM SERPL-MCNC: 9.3 MG/DL (ref 8.5–10.5)
CHLORIDE SERPL-SCNC: 104 MMOL/L (ref 96–112)
CHOLEST SERPL-MCNC: 138 MG/DL (ref 100–199)
CO2 SERPL-SCNC: 24 MMOL/L (ref 20–33)
CREAT SERPL-MCNC: 0.72 MG/DL (ref 0.5–1.4)
FASTING STATUS PATIENT QL REPORTED: NORMAL
GFR SERPLBLD CREATININE-BSD FMLA CKD-EPI: 102 ML/MIN/1.73 M 2
GLOBULIN SER CALC-MCNC: 2.9 G/DL (ref 1.9–3.5)
GLUCOSE SERPL-MCNC: 144 MG/DL (ref 65–99)
HDLC SERPL-MCNC: 45 MG/DL
LDLC SERPL CALC-MCNC: 77 MG/DL
POTASSIUM SERPL-SCNC: 4.5 MMOL/L (ref 3.6–5.5)
PROT SERPL-MCNC: 7.2 G/DL (ref 6–8.2)
RH BLD: NORMAL
SODIUM SERPL-SCNC: 141 MMOL/L (ref 135–145)
TRIGL SERPL-MCNC: 78 MG/DL (ref 0–149)

## 2022-11-29 PROCEDURE — 80061 LIPID PANEL: CPT

## 2022-11-29 PROCEDURE — 86900 BLOOD TYPING SEROLOGIC ABO: CPT

## 2022-11-29 PROCEDURE — 36415 COLL VENOUS BLD VENIPUNCTURE: CPT

## 2022-11-29 PROCEDURE — 80053 COMPREHEN METABOLIC PANEL: CPT

## 2022-11-29 PROCEDURE — 86901 BLOOD TYPING SEROLOGIC RH(D): CPT

## 2022-12-09 DIAGNOSIS — F90.2 ADHD (ATTENTION DEFICIT HYPERACTIVITY DISORDER), COMBINED TYPE: ICD-10-CM

## 2022-12-09 RX ORDER — METHYLPHENIDATE HYDROCHLORIDE 30 MG/1
30 CAPSULE, EXTENDED RELEASE ORAL EVERY MORNING
Qty: 30 CAPSULE | Refills: 0 | Status: SHIPPED | OUTPATIENT
Start: 2022-12-12 | End: 2022-12-14

## 2022-12-14 ENCOUNTER — OFFICE VISIT (OUTPATIENT)
Dept: MEDICAL GROUP | Facility: CLINIC | Age: 62
End: 2022-12-14
Payer: COMMERCIAL

## 2022-12-14 VITALS
TEMPERATURE: 99.2 F | RESPIRATION RATE: 20 BRPM | OXYGEN SATURATION: 96 % | HEIGHT: 66 IN | BODY MASS INDEX: 38.76 KG/M2 | HEART RATE: 78 BPM | SYSTOLIC BLOOD PRESSURE: 118 MMHG | WEIGHT: 241.2 LBS | DIASTOLIC BLOOD PRESSURE: 68 MMHG

## 2022-12-14 DIAGNOSIS — E78.00 PURE HYPERCHOLESTEROLEMIA: ICD-10-CM

## 2022-12-14 DIAGNOSIS — E11.9 TYPE 2 DIABETES MELLITUS WITHOUT COMPLICATION, WITHOUT LONG-TERM CURRENT USE OF INSULIN (HCC): ICD-10-CM

## 2022-12-14 DIAGNOSIS — Z13.21 ENCOUNTER FOR VITAMIN DEFICIENCY SCREENING: ICD-10-CM

## 2022-12-14 DIAGNOSIS — E11.9 TYPE 2 DIABETES MELLITUS WITHOUT COMPLICATION (HCC): ICD-10-CM

## 2022-12-14 PROBLEM — Z12.5 PROSTATE CANCER SCREENING: Status: RESOLVED | Noted: 2020-08-12 | Resolved: 2022-12-14

## 2022-12-14 PROBLEM — Z12.11 COLON CANCER SCREENING: Status: RESOLVED | Noted: 2020-08-12 | Resolved: 2022-12-14

## 2022-12-14 LAB
HBA1C MFR BLD: 7.4 % (ref 0–5.6)
INT CON NEG: NEGATIVE
INT CON POS: POSITIVE

## 2022-12-14 PROCEDURE — 92250 FUNDUS PHOTOGRAPHY W/I&R: CPT | Mod: TC | Performed by: PHYSICIAN ASSISTANT

## 2022-12-14 PROCEDURE — 99214 OFFICE O/P EST MOD 30 MIN: CPT | Performed by: PHYSICIAN ASSISTANT

## 2022-12-14 PROCEDURE — 83036 HEMOGLOBIN GLYCOSYLATED A1C: CPT | Performed by: PHYSICIAN ASSISTANT

## 2022-12-14 RX ORDER — LISINOPRIL 2.5 MG/1
2.5 TABLET ORAL DAILY
Qty: 30 TABLET | Refills: 9 | Status: SHIPPED | OUTPATIENT
Start: 2022-12-14 | End: 2023-07-12 | Stop reason: SDUPTHER

## 2022-12-14 RX ORDER — EMPAGLIFLOZIN 25 MG/1
TABLET, FILM COATED ORAL
Qty: 30 TABLET | Refills: 9 | Status: SHIPPED | OUTPATIENT
Start: 2022-12-14 | End: 2023-07-12 | Stop reason: SDUPTHER

## 2022-12-14 RX ORDER — ATORVASTATIN CALCIUM 40 MG/1
40 TABLET, FILM COATED ORAL DAILY
Qty: 30 TABLET | Refills: 9 | Status: SHIPPED | OUTPATIENT
Start: 2022-12-14 | End: 2023-07-12 | Stop reason: SDUPTHER

## 2022-12-14 ASSESSMENT — PATIENT HEALTH QUESTIONNAIRE - PHQ9: CLINICAL INTERPRETATION OF PHQ2 SCORE: 0

## 2022-12-14 ASSESSMENT — FIBROSIS 4 INDEX: FIB4 SCORE: 1.56

## 2022-12-14 NOTE — PROGRESS NOTES
cc:  diabetes    Subjective:     Jensen Leigh is a 62 y.o. male presenting for diabetes      Patient presents to the office for diabetes.  Patient also has hypercholesterolemia.  He is here mainly to have his medications refilled and discuss labs.  He denies any other significant issues at this time.  He declines a monofilament exam indicating that he took off his shoes for his height and due to back injury he will not be taking him off again a second time today.    Review of systems:  See above.   Denies any symptoms unless previously indicated.        Current Outpatient Medications:     Empagliflozin (JARDIANCE) 25 MG Tab, TAKE 1 TABLET BY MOUTH ONCE DAILY IN THE MORNING ., Disp: 30 Tablet, Rfl: 9    SITagliptin (JANUVIA) 100 MG Tab, Take 1 Tablet by mouth every day., Disp: 30 Tablet, Rfl: 9    lisinopril (PRINIVIL) 2.5 MG Tab, Take 1 Tablet by mouth every day. (for kidney protection), Disp: 30 Tablet, Rfl: 9    atorvastatin (LIPITOR) 40 MG Tab, Take 1 Tablet by mouth every day., Disp: 30 Tablet, Rfl: 9    methylphenidate (RITALIN LA) 30 MG SR capsule, Take 1 Capsule by mouth every morning for 30 days., Disp: 30 Capsule, Rfl: 0    amphetamine-dextroamphetamine ER (ADDERALL XR) 30 MG XR capsule, Take 1 Capsule by mouth every morning for 30 days., Disp: 30 Capsule, Rfl: 0    sertraline (ZOLOFT) 50 MG Tab, Take 1 Tablet by mouth every day., Disp: 90 Tablet, Rfl: 1    diclofenac sodium (VOLTAREN) 1 % Gel, Apply 4 g topically in the morning, at noon, and at bedtime., Disp: 50 g, Rfl: 0    tizanidine (ZANAFLEX) 4 MG capsule, Take 1 Capsule by mouth every 8 hours as needed (muscle spasms)., Disp: 30 Capsule, Rfl: 0    Lancet Devices (LANCET DEVICE WITH EJECTOR) Misc, True metrix lancet ejector device., Disp: 1 Each, Rfl: 0    tadalafil (CIALIS) 10 MG tablet, Take 1 tablet by mouth as needed for Erectile Dysfunction., Disp: 10 tablet, Rfl: 9    Blood Glucose Test Strips, Test strips order: Test strips for Contour Next  "meter. Sig: use 2x/d and prn ssx high or low sugar #100 RF x 3, Disp: 100 Strip, Rfl: 3    aspirin EC (ECOTRIN) 81 MG Tablet Delayed Response, Take 2 Tabs by mouth every day., Disp: 30 Tab, Rfl:     Blood Glucose Monitoring Suppl Device, Meter: Dispense 1 Contour Next Device . Sig. Use as directed for blood sugar monitoring. #1. NR., Disp: 1 Device, Rfl: 0    glucose blood (TRUE METRIX BLOOD GLUCOSE TEST) strip, 1 Strip by Other route as needed., Disp: 300 Strip, Rfl: 1    Multiple Vitamin (MULTI VITAMIN MENS PO), Take 1 Tab by mouth every day., Disp: , Rfl:     Allergies, past medical history, past surgical history, family history, social history reviewed and updated    Objective:     Vitals: /68 (BP Location: Left arm, Patient Position: Sitting, BP Cuff Size: Large adult)   Pulse 78   Temp 37.3 °C (99.2 °F) (Temporal)   Resp 20   Ht 1.676 m (5' 6\")   Wt 109 kg (241 lb 3.2 oz)   SpO2 96%   BMI 38.93 kg/m²   General: Alert, pleasant, NAD  EYES:   PERRL, EOMI, no icterus or pallor.  Conjunctivae and lids normal.   HENT:  Normocephalic.  External ears normal.   Neck supple.     Respiratory: Normal respiratory effort.    Abdomen: Obese  Skin: Warm, dry, no rashes.  Musculoskeletal: Gait is normal.  Moves all extremities well.    Extremities: Monofilament testing with a 10 gram force:declined testing .   Neurological: No tremors, sensation grossly intact,  gait is normal, CN2-12 intact.  Psych:  Affect/mood is normal, judgement is good, memory is intact, grooming is appropriate.   Latest Reference Range & Units 11/29/22 06:13   Sodium 135 - 145 mmol/L 141   Potassium 3.6 - 5.5 mmol/L 4.5   Chloride 96 - 112 mmol/L 104   Co2 20 - 33 mmol/L 24   Anion Gap 7.0 - 16.0  13.0   Glucose 65 - 99 mg/dL 144 (H)   Bun 8 - 22 mg/dL 17   Creatinine 0.50 - 1.40 mg/dL 0.72   GFR (CKD-EPI) >60 mL/min/1.73 m 2 102   Calcium 8.5 - 10.5 mg/dL 9.3   AST(SGOT) 12 - 45 U/L 26   ALT(SGPT) 2 - 50 U/L 26   Alkaline Phosphatase 30 - " 99 U/L 108 (H)   Total Bilirubin 0.1 - 1.5 mg/dL 0.7   Albumin 3.2 - 4.9 g/dL 4.3   Total Protein 6.0 - 8.2 g/dL 7.2   Globulin 1.9 - 3.5 g/dL 2.9   A-G Ratio g/dL 1.5   Fasting Status  Fasting   Cholesterol,Tot 100 - 199 mg/dL 138   Triglycerides 0 - 149 mg/dL 78   HDL >=40 mg/dL 45   LDL <100 mg/dL 77   (H): Data is abnormally high    A1c 7.4    Assessment/Plan:     Jensen was seen today for medication refill.    Diagnoses and all orders for this visit:    Type 2 diabetes mellitus without complication (HCC)  -     POCT Retinal Eye Exam  -     POCT A1C  -     Lipid Profile; Future  -     Comp Metabolic Panel; Future  -     HEMOGLOBIN A1C; Future    Type 2 diabetes mellitus without complication, without long-term current use of insulin (HCC)  -     Empagliflozin (JARDIANCE) 25 MG Tab; TAKE 1 TABLET BY MOUTH ONCE DAILY IN THE MORNING .  -     SITagliptin (JANUVIA) 100 MG Tab; Take 1 Tablet by mouth every day.  -     lisinopril (PRINIVIL) 2.5 MG Tab; Take 1 Tablet by mouth every day. (for kidney protection)  -     Lipid Profile; Future  -     Comp Metabolic Panel; Future  -     HEMOGLOBIN A1C; Future    Pure hypercholesterolemia  -     atorvastatin (LIPITOR) 40 MG Tab; Take 1 Tablet by mouth every day.    Encounter for vitamin deficiency screening  -     VITAMIN D,25 HYDROXY (DEFICIENCY); Future      HCC Gap Form    Diagnosis: E66.01 - Morbid (severe) obesity due to excess calories (HCC)  Z68.38 - Body mass index (BMI) 38.0-38.9, adult  Comorbidity Diagnosis: Type 2 diabetes mellitus without complication, without long-term current use of insulin (HCC)  The current BMI is 38.93 kg/m2 as of 12/14/22 14:23 PST  Assessment and plan: Chronic, stable. Encouraged healthy diet and physical activity changes with a goal of weight loss. Follow up at least annually. The comorbid condition is stable based on today's assessment. Continue current treatment plan. Follow up at least yearly.  Diagnosis to address: E11.9 - Type 2  diabetes mellitus without complication, without long-term current use of insulin (HCC)  Assessment and plan: Chronic, stable. Continue with current defined treatment plan: Medications refilled.. Follow-up 6 to 9 months.  Diagnosis: F15.11 - History of methamphetamine abuse (HCC)  Assessment and plan: Chronic, stable.  Currently using Ritalin versus Adderall due to shortages of medication.  Follow-up at least annually.  Last edited 12/14/22 14:26 PST by Aubrie Bryant P.A.-C.          Medications refilled.  Follow-up in 6 to 9 months.  Labs ordered to be repeated at that time.        No follow-ups on file.    Please note that this dictation was created using voice recognition software. I have made every reasonable attempt to correct obvious errors, but expect that there are errors of grammar and possible content that I did not discover before finalizing note.

## 2022-12-22 LAB — RETINAL SCREEN: NEGATIVE

## 2023-01-27 ENCOUNTER — TELEPHONE (OUTPATIENT)
Dept: BEHAVIORAL HEALTH | Facility: CLINIC | Age: 63
End: 2023-01-27
Payer: COMMERCIAL

## 2023-01-27 NOTE — TELEPHONE ENCOUNTER
"Caller Name: Hayley Leigh  Call Back Number: [phone] (home)     How would the patient prefer to be contacted with a response: Phone call do NOT leave a detailed message    Pt's wife called and states that Jensen is experiencing a lot of nausea, heartburn and \"dry heat\" ever since taking methylphenidate.  She has given him some of her medication, prozac to aid with the heartburn. Hayley is requesting an alternative since Jensen is also \"snappy\" lately. Please advise.  "

## 2023-01-27 NOTE — TELEPHONE ENCOUNTER
Called to notify Hayley. There was miscommunication she gave him PRILOZEC. But she understands not to give any of her medication to him. She said she will let Jensen know about the Ritalin and will call us with any updates.

## 2023-02-07 ENCOUNTER — PATIENT MESSAGE (OUTPATIENT)
Dept: BEHAVIORAL HEALTH | Facility: CLINIC | Age: 63
End: 2023-02-07
Payer: COMMERCIAL

## 2023-02-07 DIAGNOSIS — F90.2 ADHD (ATTENTION DEFICIT HYPERACTIVITY DISORDER), COMBINED TYPE: ICD-10-CM

## 2023-02-07 RX ORDER — DEXTROAMPHETAMINE SULFATE 10 MG/1
10 CAPSULE, EXTENDED RELEASE ORAL DAILY
Qty: 30 CAPSULE | Refills: 0 | Status: SHIPPED | OUTPATIENT
Start: 2023-02-10 | End: 2023-02-08 | Stop reason: SDUPTHER

## 2023-02-07 RX ORDER — DEXTROAMPHETAMINE SULFATE 10 MG/1
20 CAPSULE, EXTENDED RELEASE ORAL DAILY
Qty: 60 CAPSULE | Refills: 0 | Status: SHIPPED | OUTPATIENT
Start: 2023-02-10 | End: 2023-02-07

## 2023-02-07 RX ORDER — METHYLPHENIDATE HYDROCHLORIDE 30 MG/1
30 CAPSULE, EXTENDED RELEASE ORAL EVERY MORNING
Qty: 30 CAPSULE | Refills: 0 | Status: SHIPPED | OUTPATIENT
Start: 2023-02-10 | End: 2023-02-07

## 2023-02-07 NOTE — PATIENT COMMUNICATION
Received request via: Patient    Was the patient seen in the last year in this department? Yes    Does the patient have an active prescription (recently filled or refills available) for medication(s) requested? No    Does the patient have [facility] and need 100 day supply (blood pressure, diabetes and cholesterol meds only)? Medication is not for cholesterol, blood pressure or diabetes and Patient does not have SCP

## 2023-02-08 RX ORDER — DEXTROAMPHETAMINE SULFATE 10 MG/1
20 CAPSULE, EXTENDED RELEASE ORAL DAILY
Qty: 60 CAPSULE | Refills: 0 | Status: SHIPPED | OUTPATIENT
Start: 2023-02-10 | End: 2023-02-24

## 2023-02-08 NOTE — PATIENT COMMUNICATION
Is pt supposed to be on Dexedrine 20 mg or 10 mg? [facility] does not have any in-stock. I tried calling around different pharmacies in [place] and none had them. Please advise.

## 2023-02-09 RX ORDER — METHYLPHENIDATE HYDROCHLORIDE 30 MG/1
30 CAPSULE, EXTENDED RELEASE ORAL EVERY MORNING
Qty: 30 CAPSULE | Refills: 0 | Status: SHIPPED | OUTPATIENT
Start: 2023-02-10 | End: 2023-03-12

## 2023-02-09 NOTE — PATIENT COMMUNICATION
Unable to find Dexedrine in stock. [facility] pharmacy in [place] has Ritalin 30 mg SR. Can you please send the script?

## 2023-02-23 ENCOUNTER — TELEPHONE (OUTPATIENT)
Dept: BEHAVIORAL HEALTH | Facility: CLINIC | Age: 63
End: 2023-02-23
Payer: COMMERCIAL

## 2023-02-23 DIAGNOSIS — F90.2 ADHD (ATTENTION DEFICIT HYPERACTIVITY DISORDER), COMBINED TYPE: ICD-10-CM

## 2023-02-23 RX ORDER — DEXTROAMPHETAMINE SULFATE 10 MG/1
20 CAPSULE, EXTENDED RELEASE ORAL DAILY
Qty: 60 CAPSULE | Refills: 0 | Status: SHIPPED | OUTPATIENT
Start: 2023-03-13 | End: 2023-02-24

## 2023-02-23 RX ORDER — DEXTROAMPHETAMINE SULFATE 10 MG/1
10 CAPSULE, EXTENDED RELEASE ORAL DAILY
Qty: 30 CAPSULE | Refills: 0 | Status: SHIPPED | OUTPATIENT
Start: 2023-03-13 | End: 2023-02-23

## 2023-02-23 NOTE — TELEPHONE ENCOUNTER
Caller Name: Hayley Leigh  Call Back Number: [phone] (home)     How would the patient prefer to be contacted with a response: Phone call OK to leave a detailed message      Pt's wife states that the Dexedrine has been working. And she has noticed a big improvement on his mood w/ no s/e. She states that she picked up both Ritalin and Dexedrine. He only has enough for 2 more days and will start the Ritalin again. Hayley is asking if he can get prescribed the Dexedrine from now on instead of the Ritalin? Please advise.

## 2023-02-23 NOTE — TELEPHONE ENCOUNTER
Pt's wife called the [facility] [place] they have enough to fill Dexedrine QTY 60. Hayley is asking if it is okay to fill  now and have the Ritalin as a back up? Please advise.

## 2023-02-24 DIAGNOSIS — F90.2 ADHD (ATTENTION DEFICIT HYPERACTIVITY DISORDER), COMBINED TYPE: ICD-10-CM

## 2023-02-24 RX ORDER — DEXTROAMPHETAMINE SULFATE 10 MG/1
20 CAPSULE, EXTENDED RELEASE ORAL DAILY
Qty: 60 CAPSULE | Refills: 0 | Status: SHIPPED | OUTPATIENT
Start: 2023-02-24 | End: 2023-03-22 | Stop reason: SDUPTHER

## 2023-04-04 DIAGNOSIS — F90.2 ADHD (ATTENTION DEFICIT HYPERACTIVITY DISORDER), COMBINED TYPE: ICD-10-CM

## 2023-04-04 RX ORDER — LISDEXAMFETAMINE DIMESYLATE CAPSULES 30 MG/1
30 CAPSULE ORAL EVERY MORNING
Qty: 30 CAPSULE | Refills: 0 | Status: SHIPPED | OUTPATIENT
Start: 2023-04-04 | End: 2023-05-04

## 2023-05-04 ENCOUNTER — TELEMEDICINE (OUTPATIENT)
Dept: BEHAVIORAL HEALTH | Facility: CLINIC | Age: 63
End: 2023-05-04
Payer: COMMERCIAL

## 2023-05-04 DIAGNOSIS — F63.81 INTERMITTENT EXPLOSIVE DISORDER: ICD-10-CM

## 2023-05-04 DIAGNOSIS — F32.A DEPRESSIVE DISORDER: ICD-10-CM

## 2023-05-04 DIAGNOSIS — F90.2 ADHD (ATTENTION DEFICIT HYPERACTIVITY DISORDER), COMBINED TYPE: ICD-10-CM

## 2023-05-04 PROCEDURE — 99214 OFFICE O/P EST MOD 30 MIN: CPT | Mod: 95 | Performed by: PSYCHIATRY & NEUROLOGY

## 2023-05-04 RX ORDER — DEXTROAMPHETAMINE SULFATE 10 MG/1
20 CAPSULE, EXTENDED RELEASE ORAL DAILY
Qty: 60 CAPSULE | Refills: 0 | Status: SHIPPED | OUTPATIENT
Start: 2023-06-02 | End: 2023-07-02

## 2023-05-04 NOTE — PROGRESS NOTES
This evaluation was conducted via Zoom using secure and encrypted videoconferencing technology. The patient was in their home in the [facility].    The patient's identity was confirmed and verbal consent was obtained for this virtual visit.      PSYCHIATRY FOLLOW-UP NOTE      Name: Jensen Leigh  MRN: [id number]  : 1960  Age: 63 y.o.  Date of assessment: 2023  PCP: Aubrie Bryant P.A.-C.  Persons in attendance: Patient      REASON FOR VISIT/CHIEF COMPLAINT (as stated by Patient):  Jensen Leigh is a 63 y.o., White male, attending follow-up appointment for mood and anxiety management.      HISTORY OF PRESENT ILLNESS:  Jensen Leigh is a 63 y.o. old male with MDD, ADHD comes in today for follow up. Patient was last seen 6 months ago, and following treatment planning recommendations were done:  Continue Sertraline 50 mg daily for depression and anxiety management.  Given 3 month supply with 1 refill.  Continue Adderall XR 30 mg daily for ADHD management. Patient will send me a [employer] message 3 days before upcoming refill dates and prescription will be sent to pharmacy accordingly.  Controlled medication treatment agreement signed in 2021.      Patient Adderall was switched to Dexedrine and Vyvanse due to supply issues.  Wife was present for the session as well and agreed that he does well on Dexedrine and feels more stable from mood, anxiety and focus standpoint.  Patient is compliant with Zoloft.  Patient denies any acute side effect and agreed with staying on the current medication.      CURRENT MEDICATIONS:  Current Outpatient Medications   Medication Sig Dispense Refill    [START ON 2023] dextroamphetamine (DEXEDRINE SPANSULE) 10 MG SR capsule Take 2 Capsules by mouth every day for 30 days. 60 Capsule 0    lisdexamfetamine (VYVANSE) 30 MG capsule Take 1 Capsule by mouth every morning for 30 days. 30 Capsule 0    Empagliflozin (JARDIANCE) 25 MG Tab TAKE 1 TABLET BY MOUTH ONCE DAILY IN THE MORNING .  30 Tablet 9    SITagliptin (JANUVIA) 100 MG Tab Take 1 Tablet by mouth every day. 30 Tablet 9    lisinopril (PRINIVIL) 2.5 MG Tab Take 1 Tablet by mouth every day. (for kidney protection) 30 Tablet 9    atorvastatin (LIPITOR) 40 MG Tab Take 1 Tablet by mouth every day. 30 Tablet 9    sertraline (ZOLOFT) 50 MG Tab Take 1 Tablet by mouth every day. 90 Tablet 1    diclofenac sodium (VOLTAREN) 1 % Gel Apply 4 g topically in the morning, at noon, and at bedtime. 50 g 0    tizanidine (ZANAFLEX) 4 MG capsule Take 1 Capsule by mouth every 8 hours as needed (muscle spasms). 30 Capsule 0    Lancet Devices (LANCET DEVICE WITH EJECTOR) Misc True metrix lancet ejector device. 1 Each 0    tadalafil (CIALIS) 10 MG tablet Take 1 tablet by mouth as needed for Erectile Dysfunction. 10 tablet 9    Blood Glucose Test Strips Test strips order: Test strips for Contour Next meter. Sig: use 2x/d and prn ssx high or low sugar #100 RF x 3 100 Strip 3    aspirin EC (ECOTRIN) 81 MG Tablet Delayed Response Take 2 Tabs by mouth every day. 30 Tab     Blood Glucose Monitoring Suppl Device Meter: Dispense 1 Contour Next Device . Sig. Use as directed for blood sugar monitoring. #1. NR. 1 Device 0    glucose blood (TRUE METRIX BLOOD GLUCOSE TEST) strip 1 Strip by Other route as needed. 300 Strip 1    Multiple Vitamin (MULTI VITAMIN MENS PO) Take 1 Tab by mouth every day.       No current facility-administered medications for this visit.       MEDICAL HISTORY  Past Medical History:   Diagnosis Date    Eczema     GERD (gastroesophageal reflux disease)     Type II or unspecified type diabetes mellitus without mention of complication, not stated as uncontrolled     Umbilical hernia      Past Surgical History:   Procedure Laterality Date    ROTATOR CUFF REPAIR      rt shoulder    TONSILLECTOMY AND ADENOIDECTOMY         PAST PSYCHIATRIC HISTORY  Prior psychiatric hospitalization: none  Prior Self harm/suicide attempt: no  Prior Diagnosis: Depression, IED,  ADHD     PAST PSYCHIATRIC MEDICATIONS  One antidepressant in the past      FAMILY HISTORY  Psychiatric diagnosis:  none  History of suicide attempts:  no  Substance abuse history:  no     SUBSTANCE USE HISTORY:  ALCOHOL: no  TOBACCO: no  CANNABIS: no  OPIOIDS: no  PRESCRIPTION MEDICATIONS: no  OTHERS: experimented with methamphetamine in his 20s.  History of inpatient/outpatient rehab treatment: none     SOCIAL HISTORY  Childhood: born in [place] and describes childhood as ok  Education: [employer] (dropped out early)  in Special Education: no  Intellectual Disability: no  Employment: employed  at [employer] (for > 40 yr)  Relationship:  for > 40 yr  Kids: no  Current living situation: lives with wife  Current/past legal issues: no  History of emotional/physical/sexual abuse - no      REVIEW OF SYSTEMS:        Constitutional negative   Eyes negative   Ears/Nose/Mouth/Throat negative   Cardiovascular negative   Respiratory negative   Gastrointestinal negative   Genitourinary negative   Muscular negative   Integumentary negative   Neurological negative   Endocrine negative   Hematologic/Lymphatic negative     PHYSICAL EXAMINAION:  Vital signs: There were no vitals taken for this visit.  Musculoskeletal: Normal gait.   Abnormal movements: none      MENTAL STATUS EXAMINATION      General:   - Grooming and hygiene: Casual,   - Apparent distress: none,   - Behavior: Calm  - Eye Contact:  Good,   - no psychomotor agitation or retardation    - Participation: Active verbal participation  Orientation: Alert and Fully Oriented to person, place and time  Mood: Euthymic  Affect: Flexible and Full range,  Thought Process: Logical and Goal-directed  Thought Content: Denies suicidal or homicidal ideations, intent or plan   Perception: Denies auditory or visual hallucinations. No delusions noted   Attention span and concentration: Intact   Speech:Rate within normal limits and Volume within normal limits  Language:  Appropriate   Insight: Good  Judgment: Good  Recent and remote memory: No gross evidence of memory deficits        DEPRESSION SCREENIN/6/2020    11:30 AM 11/3/2021     5:00 PM 2022     2:00 PM   Depression Screen (PHQ-2/PHQ-9)   PHQ-2 Total Score 0 0 0       Interpretation of PHQ-9 Total Score   Score Severity   1-4 No Depression   5-9 Mild Depression   10-14 Moderate Depression   15-19 Moderately Severe Depression   20-27 Severe Depression    CURRENT RISK:       Suicidal: Low       Homicidal: Low       Self-Harm: Low       Relapse: Low       Crisis Safety Plan Reviewed Not Indicated       If evidence of imminent risk is present, intervention/plan:      MEDICAL RECORDS/LABS/DIAGNOSTIC TESTS REVIEWED:  No new lab since last visit     [place]  records -   Reviewed     PLAN:  (1) Depressive Disorder; (2) Intermittent Explosive Disorder; (3) ADHD  Stable  Continue Sertraline 50 mg daily for depression and anxiety management.  Given 3 month supply with 1 refill.  Continue Dexedrine ER 20 mg daily for ADHD management. Patient will send me a [employer] message 3 days before upcoming refill dates and prescription will be sent to pharmacy accordingly.  Controlled medication treatment agreement signed in 2021.  Medication options, alternatives (including no medications) and medication risks/benefits/side effects were discussed in detail.  The patient was advised to call, message provider on [employer], or come in to the clinic if symptoms worsen or if any future questions/issues regarding their medications arise; the patient verbalized understanding and agreement.    The patient was educated to call 911, call the suicide hotline, or go to local ER if having thoughts of suicide or homicide; verbalized understanding.    Billing Coding based on:  34706 based on [facility]    Return to clinic in 3-5 months or sooner if symptoms worsen.  Next Appointment: instruction provided on how to make the next appointment.     The  proposed treatment plan was discussed with the patient who was provided the opportunity to ask questions and make suggestions regarding alternative treatment. Patient verbalized understanding and expressed agreement with the plan.       Eliazar Prasad M.D.  05/04/23    This note was created using voice recognition software (Dragon). The accuracy of the dictation is limited by the abilities of the software. I have reviewed the note prior to signing, however some errors in grammar and context are still possible. If you have any questions related to this note please do not hesitate to contact our office.

## 2023-05-30 DIAGNOSIS — F90.2 ADHD (ATTENTION DEFICIT HYPERACTIVITY DISORDER), COMBINED TYPE: ICD-10-CM

## 2023-05-30 RX ORDER — DEXTROAMPHETAMINE SULFATE 10 MG/1
20 CAPSULE, EXTENDED RELEASE ORAL DAILY
Qty: 60 CAPSULE | Refills: 0 | Status: SHIPPED | OUTPATIENT
Start: 2023-06-02 | End: 2023-07-02

## 2023-05-30 NOTE — TELEPHONE ENCOUNTER
Please send dexedrine to [facility] in [place].    Received request via: Patient    Was the patient seen in the last year in this department? Yes    Does the patient have an active prescription (recently filled or refills available) for medication(s) requested? No    Does the patient have [facility] and need 100 day supply (blood pressure, diabetes and cholesterol meds only)? Medication is not for cholesterol, blood pressure or diabetes and Patient does not have SCP

## 2023-06-07 NOTE — PROGRESS NOTES
This evaluation was conducted via Zoom using secure and encrypted videoconferencing technology. The patient was in a private location in the [facility].    The patient's identity was confirmed and verbal consent was obtained for this virtual visit.     PSYCHIATRY FOLLOW-UP NOTE      Name: Jensen Leigh  MRN: [id number]  : 1960  Age: 61 y.o.  Date of assessment: 11/3/2021  PCP: Aubrie Bryant P.A.-C.  Persons in attendance: Patient      REASON FOR VISIT/CHIEF COMPLAINT (as stated by Patient):  Jensen Leigh is a 61 y.o., White male, attending follow-up appointment for ADHD, mood and anxiety management.      HISTORY OF PRESENT ILLNESS:  Jensen Leigh is a 61 y.o. old male with depression and ADHD comes in today for follow up. Patient was last seen 3 months ago, and following treatment planning recommendations were done:  · Continue Sertraline 50 mg daily for depression and anxiety management.  Given 3 month supply with 1 refill.  · Continue Adderall XR 30 mg daily for depression and anxiety management. Recent refill on 21. Following three 1 month prescriptions sent to pharmacy: -; -10/26; 10/27-21.  · Controlled medication treatment agreement signed in 2021.    Patient is compliant with medication with no side effect and reports stable mood and anxiety.  Patient denies any negative impact of Adderall on sleep, appetite, anger, obsession or any physical symptoms including chest pain or racing heart.  Patient agreed with plan of continuing current dosage.  Patient is having issues getting prescription filled for Adderall on time.  Agreed with plan of him sending me a message 3 days before each prescription and I will send a 30-day prescription accordingly so that he maintains medication consistency.  Patient agreed with follow-up every 6 monthly with the option of making early appointment if any worsening of mood or anxiety is noted.  Motivated to implement behavioral activation  activities on the weekends when he is not working to maintain good energy and motivation.      CURRENT MEDICATIONS:  Current Outpatient Medications   Medication Sig Dispense Refill   • amphetamine-dextroamphetamine ER (ADDERALL XR) 30 MG XR capsule Take 1 Capsule by mouth every morning for 30 days. 30 Capsule 0   • sertraline (ZOLOFT) 50 MG Tab Take 1 tablet by mouth every day. 90 tablet 1   • atorvastatin (LIPITOR) 40 MG Tab Take 1 tablet by mouth every day. 30 tablet 9   • Empagliflozin (JARDIANCE) 25 MG Tab TAKE 1 TABLET BY MOUTH ONCE DAILY IN THE MORNING . 30 tablet 9   • tadalafil (CIALIS) 10 MG tablet Take 1 tablet by mouth as needed for Erectile Dysfunction. 10 tablet 9   • SITagliptin (JANUVIA) 100 MG Tab Take 1 tablet by mouth every day. 30 tablet 9   • lisinopril (PRINIVIL) 2.5 MG Tab Take 1 tablet by mouth every day. (for kidney protection) 30 tablet 9   • atorvastatin (LIPITOR) 20 MG Tab Take 1 tablet by mouth every day. 30 tablet 0   • Blood Glucose Test Strips Test strips order: Test strips for Contour Next meter. Sig: use 2x/d and prn ssx high or low sugar #100 RF x 3 100 Strip 3   • aspirin EC (ECOTRIN) 81 MG Tablet Delayed Response Take 2 Tabs by mouth every day. 30 Tab    • Blood Glucose Monitoring Suppl Device Meter: Dispense 1 Contour Next Device . Sig. Use as directed for blood sugar monitoring. #1. NR. 1 Device 0   • glucose blood (TRUE METRIX BLOOD GLUCOSE TEST) strip 1 Strip by Other route as needed. 300 Strip 1   • Multiple Vitamin (MULTI VITAMIN MENS PO) Take 1 Tab by mouth every day.       No current facility-administered medications for this visit.       MEDICAL HISTORY  Past Medical History:   Diagnosis Date   • Eczema    • GERD (gastroesophageal reflux disease)    • Type II or unspecified type diabetes mellitus without mention of complication, not stated as uncontrolled    • Umbilical hernia      Past Surgical History:   Procedure Laterality Date   • ROTATOR CUFF REPAIR      rt shoulder    • TONSILLECTOMY AND ADENOIDECTOMY         PAST PSYCHIATRIC HISTORY  Prior psychiatric hospitalization: none  Prior Self harm/suicide attempt: no  Prior Diagnosis: Depression, IED, ADHD     PAST PSYCHIATRIC MEDICATIONS  One antidepressant in the past      FAMILY HISTORY  Psychiatric diagnosis:  none  History of suicide attempts:  no  Substance abuse history:  no     SUBSTANCE USE HISTORY:  ALCOHOL: no  TOBACCO: no  CANNABIS: no  OPIOIDS: no  PRESCRIPTION MEDICATIONS: no  OTHERS: experimented with methamphetamine in his 20s.  History of inpatient/outpatient rehab treatment: none     SOCIAL HISTORY  Childhood: born in [place] and describes childhood as ok  Education: [employer] (dropped out early)  in Special Education: no  Intellectual Disability: no  Employment: employed  at [employer] (for > 40 yr)  Relationship:  for > 40 yr  Kids: no  Current living situation: lives with wife  Current/past legal issues: no  History of emotional/physical/sexual abuse - no      REVIEW OF SYSTEMS:        Constitutional negative   Eyes negative   Ears/Nose/Mouth/Throat negative   Cardiovascular negative   Respiratory negative   Gastrointestinal negative   Genitourinary negative   Muscular negative   Integumentary negative   Neurological negative   Endocrine negative   Hematologic/Lymphatic negative     PHYSICAL EXAMINAION:  Vital signs: There were no vitals taken for this visit.  Musculoskeletal: Normal gait.   Abnormal movements: none      MENTAL STATUS EXAMINATION      General:   - Grooming and hygiene: Casual,   - Apparent distress: none,   - Behavior: Calm  - Eye Contact:  Good,   - no psychomotor agitation or retardation    - Participation: Active verbal participation  Orientation: Alert and Fully Oriented to person, place and time  Mood: Euthymic  Affect: Flexible and Full range,  Thought Process: Logical and Goal-directed  Thought Content: Denies suicidal or homicidal ideations, intent or plan Within normal  limits  Perception: Denies auditory or visual hallucinations. No delusions noted Within normal limits  Attention span and concentration: Intact   Speech:Rate within normal limits and Volume within normal limits  Language: Appropriate   Insight: Good  Judgment: Good  Recent and remote memory: No gross evidence of memory deficits        DEPRESSION SCREENING:  Depression Screen (PHQ-2/PHQ-9) 5/10/2019 11/4/2019 11/6/2020   PHQ-2 Total Score 0 2 0   PHQ-9 Total Score - 7 -       Interpretation of PHQ-9 Total Score   Score Severity   1-4 No Depression   5-9 Mild Depression   10-14 Moderate Depression   15-19 Moderately Severe Depression   20-27 Severe Depression    CURRENT RISK:       Suicidal: Low       Homicidal: Low       Self-Harm: Low       Relapse: Low       Crisis Safety Plan Reviewed Not Indicated       If evidence of imminent risk is present, intervention/plan:      MEDICAL RECORDS/LABS/DIAGNOSTIC TESTS REVIEWED:  No new lab since last visit     [place] [facility] records -   Reviewed     DIAGNOSTIC IMPRESSION(S):  1. Depressive Disorder  2. Intermittent Explosive Disorder  3. ADHD        PLAN:  (1) Depressive Disorder; (2) Intermittent Explosive Disorder; (3) ADHD  · Stable  · Continue Sertraline 50 mg daily for depression and anxiety management.  Given 3 month supply with 1 refill.  · Continue Adderall XR 30 mg daily for depression and anxiety management.  Patient will send me a [employer] message 3 days before upcoming refill dates and prescription will be sent to pharmacy accordingly.  · Controlled medication treatment agreement signed in Feb 2021.  · Medication options, alternatives (including no medications) and medication risks/benefits/side effects were discussed in detail.  · The patient was advised to call, message provider on [employer], or come in to the clinic if symptoms worsen or if any future questions/issues regarding their medications arise; the patient verbalized understanding and agreement.    · The patient  was educated to call 911, call the suicide hotline, or go to local ER if having thoughts of suicide or homicide; verbalized understanding.    Billing Coding based on:  42410: based on MDM    Return to clinic in 6 months or sooner if symptoms worsen.  Next Appointment: instruction provided on how to make the next appointment.     The proposed treatment plan was discussed with the patient who was provided the opportunity to ask questions and make suggestions regarding alternative treatment. Patient verbalized understanding and expressed agreement with the plan.       Eliazar Prasad M.D.  11/03/21    This note was created using voice recognition software (Dragon). The accuracy of the dictation is limited by the abilities of the software. I have reviewed the note prior to signing, however some errors in grammar and context are still possible. If you have any questions related to this note please do not hesitate to contact our office.      Calm

## 2023-06-25 ENCOUNTER — OFFICE VISIT (OUTPATIENT)
Dept: URGENT CARE | Facility: PHYSICIAN GROUP | Age: 63
End: 2023-06-25
Payer: COMMERCIAL

## 2023-06-25 VITALS
HEART RATE: 86 BPM | HEIGHT: 68 IN | SYSTOLIC BLOOD PRESSURE: 132 MMHG | WEIGHT: 230 LBS | OXYGEN SATURATION: 96 % | BODY MASS INDEX: 34.86 KG/M2 | TEMPERATURE: 97.6 F | RESPIRATION RATE: 14 BRPM | DIASTOLIC BLOOD PRESSURE: 84 MMHG

## 2023-06-25 DIAGNOSIS — R04.0 EPISTAXIS: ICD-10-CM

## 2023-06-25 PROCEDURE — 99213 OFFICE O/P EST LOW 20 MIN: CPT | Performed by: FAMILY MEDICINE

## 2023-06-25 PROCEDURE — 3075F SYST BP GE 130 - 139MM HG: CPT | Performed by: FAMILY MEDICINE

## 2023-06-25 PROCEDURE — 3079F DIAST BP 80-89 MM HG: CPT | Performed by: FAMILY MEDICINE

## 2023-07-12 ENCOUNTER — OFFICE VISIT (OUTPATIENT)
Dept: MEDICAL GROUP | Facility: CLINIC | Age: 63
End: 2023-07-12
Payer: COMMERCIAL

## 2023-07-12 ENCOUNTER — HOSPITAL ENCOUNTER (OUTPATIENT)
Facility: MEDICAL CENTER | Age: 63
End: 2023-07-12
Attending: PHYSICIAN ASSISTANT
Payer: COMMERCIAL

## 2023-07-12 VITALS
SYSTOLIC BLOOD PRESSURE: 110 MMHG | RESPIRATION RATE: 16 BRPM | WEIGHT: 242 LBS | HEIGHT: 68 IN | HEART RATE: 90 BPM | TEMPERATURE: 99.2 F | BODY MASS INDEX: 36.68 KG/M2 | OXYGEN SATURATION: 92 % | DIASTOLIC BLOOD PRESSURE: 72 MMHG

## 2023-07-12 DIAGNOSIS — R07.89 OTHER CHEST PAIN: ICD-10-CM

## 2023-07-12 DIAGNOSIS — R11.14 BILIOUS VOMITING WITH NAUSEA: ICD-10-CM

## 2023-07-12 DIAGNOSIS — E11.9 TYPE 2 DIABETES MELLITUS WITHOUT COMPLICATION (HCC): ICD-10-CM

## 2023-07-12 DIAGNOSIS — I45.10 RIGHT BUNDLE BRANCH BLOCK: ICD-10-CM

## 2023-07-12 DIAGNOSIS — E78.00 PURE HYPERCHOLESTEROLEMIA: ICD-10-CM

## 2023-07-12 DIAGNOSIS — R94.31 ABNORMAL EKG: ICD-10-CM

## 2023-07-12 DIAGNOSIS — E11.9 TYPE 2 DIABETES MELLITUS WITHOUT COMPLICATION, WITHOUT LONG-TERM CURRENT USE OF INSULIN (HCC): ICD-10-CM

## 2023-07-12 LAB
HBA1C MFR BLD: 7.9 % (ref ?–5.8)
POCT INT CON NEG: NEGATIVE
POCT INT CON POS: POSITIVE

## 2023-07-12 PROCEDURE — 83036 HEMOGLOBIN GLYCOSYLATED A1C: CPT | Performed by: PHYSICIAN ASSISTANT

## 2023-07-12 PROCEDURE — 82570 ASSAY OF URINE CREATININE: CPT

## 2023-07-12 PROCEDURE — 3078F DIAST BP <80 MM HG: CPT | Performed by: PHYSICIAN ASSISTANT

## 2023-07-12 PROCEDURE — 93000 ELECTROCARDIOGRAM COMPLETE: CPT | Performed by: PHYSICIAN ASSISTANT

## 2023-07-12 PROCEDURE — 82043 UR ALBUMIN QUANTITATIVE: CPT

## 2023-07-12 PROCEDURE — 99214 OFFICE O/P EST MOD 30 MIN: CPT | Performed by: PHYSICIAN ASSISTANT

## 2023-07-12 PROCEDURE — 3074F SYST BP LT 130 MM HG: CPT | Performed by: PHYSICIAN ASSISTANT

## 2023-07-12 RX ORDER — ATORVASTATIN CALCIUM 40 MG/1
40 TABLET, FILM COATED ORAL DAILY
Qty: 30 TABLET | Refills: 3 | Status: SHIPPED | OUTPATIENT
Start: 2023-07-12 | End: 2023-11-09 | Stop reason: SDUPTHER

## 2023-07-12 RX ORDER — GLIPIZIDE 5 MG/1
5 TABLET ORAL DAILY
Qty: 30 TABLET | Refills: 2 | Status: SHIPPED | OUTPATIENT
Start: 2023-07-12 | End: 2023-09-05

## 2023-07-12 RX ORDER — EMPAGLIFLOZIN 25 MG/1
TABLET, FILM COATED ORAL
Qty: 30 TABLET | Refills: 3 | Status: SHIPPED | OUTPATIENT
Start: 2023-07-12 | End: 2024-03-07 | Stop reason: SDUPTHER

## 2023-07-12 RX ORDER — LISINOPRIL 2.5 MG/1
2.5 TABLET ORAL DAILY
Qty: 30 TABLET | Refills: 3 | Status: SHIPPED | OUTPATIENT
Start: 2023-07-12 | End: 2023-10-12 | Stop reason: SDUPTHER

## 2023-07-12 RX ORDER — PANTOPRAZOLE SODIUM 40 MG/1
40 TABLET, DELAYED RELEASE ORAL DAILY
Qty: 30 TABLET | Refills: 3 | Status: SHIPPED | OUTPATIENT
Start: 2023-07-12 | End: 2023-09-05

## 2023-07-12 SDOH — HEALTH STABILITY: PHYSICAL HEALTH: ON AVERAGE, HOW MANY DAYS PER WEEK DO YOU ENGAGE IN MODERATE TO STRENUOUS EXERCISE (LIKE A BRISK WALK)?: 5 DAYS

## 2023-07-12 SDOH — ECONOMIC STABILITY: INCOME INSECURITY: HOW HARD IS IT FOR YOU TO PAY FOR THE VERY BASICS LIKE FOOD, HOUSING, MEDICAL CARE, AND HEATING?: NOT VERY HARD

## 2023-07-12 SDOH — ECONOMIC STABILITY: INCOME INSECURITY: IN THE LAST 12 MONTHS, WAS THERE A TIME WHEN YOU WERE NOT ABLE TO PAY THE MORTGAGE OR RENT ON TIME?: NO

## 2023-07-12 SDOH — ECONOMIC STABILITY: HOUSING INSECURITY
IN THE LAST 12 MONTHS, WAS THERE A TIME WHEN YOU DID NOT HAVE A STEADY PLACE TO SLEEP OR SLEPT IN A SHELTER (INCLUDING NOW)?: NO

## 2023-07-12 SDOH — ECONOMIC STABILITY: TRANSPORTATION INSECURITY
IN THE PAST 12 MONTHS, HAS THE LACK OF TRANSPORTATION KEPT YOU FROM MEDICAL APPOINTMENTS OR FROM GETTING MEDICATIONS?: PATIENT DECLINED

## 2023-07-12 SDOH — ECONOMIC STABILITY: FOOD INSECURITY: WITHIN THE PAST 12 MONTHS, THE FOOD YOU BOUGHT JUST DIDN'T LAST AND YOU DIDN'T HAVE MONEY TO GET MORE.: PATIENT DECLINED

## 2023-07-12 SDOH — ECONOMIC STABILITY: HOUSING INSECURITY: IN THE LAST 12 MONTHS, HOW MANY PLACES HAVE YOU LIVED?: 1

## 2023-07-12 SDOH — HEALTH STABILITY: MENTAL HEALTH
STRESS IS WHEN SOMEONE FEELS TENSE, NERVOUS, ANXIOUS, OR CAN'T SLEEP AT NIGHT BECAUSE THEIR MIND IS TROUBLED. HOW STRESSED ARE YOU?: PATIENT DECLINED

## 2023-07-12 SDOH — ECONOMIC STABILITY: TRANSPORTATION INSECURITY

## 2023-07-12 SDOH — HEALTH STABILITY: PHYSICAL HEALTH: ON AVERAGE, HOW MANY MINUTES DO YOU ENGAGE IN EXERCISE AT THIS LEVEL?: PATIENT DECLINED

## 2023-07-12 SDOH — ECONOMIC STABILITY: FOOD INSECURITY: WITHIN THE PAST 12 MONTHS, YOU WORRIED THAT YOUR FOOD WOULD RUN OUT BEFORE YOU GOT MONEY TO BUY MORE.: PATIENT DECLINED

## 2023-07-12 ASSESSMENT — SOCIAL DETERMINANTS OF HEALTH (SDOH)
DO YOU BELONG TO ANY CLUBS OR ORGANIZATIONS SUCH AS CHURCH GROUPS UNIONS, FRATERNAL OR ATHLETIC GROUPS, OR SCHOOL GROUPS?: NO
HOW MANY DRINKS CONTAINING ALCOHOL DO YOU HAVE ON A TYPICAL DAY WHEN YOU ARE DRINKING: PATIENT DOES NOT DRINK
WITHIN THE PAST 12 MONTHS, YOU WORRIED THAT YOUR FOOD WOULD RUN OUT BEFORE YOU GOT THE MONEY TO BUY MORE: PATIENT DECLINED
HOW OFTEN DO YOU GET TOGETHER WITH FRIENDS OR RELATIVES?: PATIENT DECLINED
HOW OFTEN DO YOU ATTEND CHURCH OR RELIGIOUS SERVICES?: PATIENT DECLINED
HOW OFTEN DO YOU ATTENT MEETINGS OF THE CLUB OR ORGANIZATION YOU BELONG TO?: PATIENT DECLINED
HOW OFTEN DO YOU HAVE A DRINK CONTAINING ALCOHOL: NEVER
IN A TYPICAL WEEK, HOW MANY TIMES DO YOU TALK ON THE PHONE WITH FAMILY, FRIENDS, OR NEIGHBORS?: PATIENT DECLINED
HOW OFTEN DO YOU ATTENT MEETINGS OF THE CLUB OR ORGANIZATION YOU BELONG TO?: PATIENT DECLINED
HOW OFTEN DO YOU HAVE SIX OR MORE DRINKS ON ONE OCCASION: NEVER
HOW OFTEN DO YOU ATTEND CHURCH OR RELIGIOUS SERVICES?: PATIENT DECLINED
HOW HARD IS IT FOR YOU TO PAY FOR THE VERY BASICS LIKE FOOD, HOUSING, MEDICAL CARE, AND HEATING?: NOT VERY HARD
IN A TYPICAL WEEK, HOW MANY TIMES DO YOU TALK ON THE PHONE WITH FAMILY, FRIENDS, OR NEIGHBORS?: PATIENT DECLINED
DO YOU BELONG TO ANY CLUBS OR ORGANIZATIONS SUCH AS CHURCH GROUPS UNIONS, FRATERNAL OR ATHLETIC GROUPS, OR SCHOOL GROUPS?: NO
HOW OFTEN DO YOU GET TOGETHER WITH FRIENDS OR RELATIVES?: PATIENT DECLINED

## 2023-07-12 ASSESSMENT — LIFESTYLE VARIABLES
HOW MANY STANDARD DRINKS CONTAINING ALCOHOL DO YOU HAVE ON A TYPICAL DAY: PATIENT DOES NOT DRINK
SKIP TO QUESTIONS 9-10: 1
AUDIT-C TOTAL SCORE: 0
HOW OFTEN DO YOU HAVE A DRINK CONTAINING ALCOHOL: NEVER
HOW OFTEN DO YOU HAVE SIX OR MORE DRINKS ON ONE OCCASION: NEVER

## 2023-07-13 ENCOUNTER — HOSPITAL ENCOUNTER (OUTPATIENT)
Dept: CARDIOLOGY | Facility: MEDICAL CENTER | Age: 63
End: 2023-07-13
Attending: PHYSICIAN ASSISTANT
Payer: COMMERCIAL

## 2023-07-13 DIAGNOSIS — E11.9 TYPE 2 DIABETES MELLITUS WITHOUT COMPLICATION (HCC): ICD-10-CM

## 2023-07-13 DIAGNOSIS — I45.10 RIGHT BUNDLE BRANCH BLOCK: ICD-10-CM

## 2023-07-13 DIAGNOSIS — R07.89 OTHER CHEST PAIN: ICD-10-CM

## 2023-07-13 LAB
LV EJECT FRACT  99904: 55
LV EJECT FRACT MOD 2C 99903: 27.98
LV EJECT FRACT MOD 4C 99902: 57.63
LV EJECT FRACT MOD BP 99901: 49.03

## 2023-07-13 PROCEDURE — 93306 TTE W/DOPPLER COMPLETE: CPT

## 2023-07-13 PROCEDURE — 93306 TTE W/DOPPLER COMPLETE: CPT | Mod: 26 | Performed by: INTERNAL MEDICINE

## 2023-07-13 NOTE — PROGRESS NOTES
Cc:  chest pain    Subjective:     Jensen Leigh is a 63 y.o. male presenting for chest pain      Patient presents to the office for chest pain. Patient has been having left chest pain with dry heaves.  He states that it will last a couple of minutes.  When it occurred 5 days ago he was exerting himself.  It does not necessarily have to happen with exertion.  He states that he can be walking and climb up ladder and not have it happen. It happnes more often with exertion.  Patient is here with his wife.  He is resistant to coming into the office.  His wife is concerned because of the symptoms he is having.  He does have a history of a DVT in his right femoral vein.  He is also a type II diabetic.  He does have an inferior vena cava filter.  He is currently on aspirin.  He has tried taking medication for his stomach at home including Tums and omeprazole with no significant improvement although there is some with Tums.  Patient also has a past history of meth use.    Review of systems:  See above.   Denies any symptoms unless previously indicated.        Current Outpatient Medications:     SITagliptin (JANUVIA) 100 MG Tab, Take 1 Tablet by mouth every day., Disp: 30 Tablet, Rfl: 3    Empagliflozin (JARDIANCE) 25 MG Tab, TAKE 1 TABLET BY MOUTH ONCE DAILY IN THE MORNING ., Disp: 30 Tablet, Rfl: 3    atorvastatin (LIPITOR) 40 MG Tab, Take 1 Tablet by mouth every day., Disp: 30 Tablet, Rfl: 3    lisinopril (PRINIVIL) 2.5 MG Tab, Take 1 Tablet by mouth every day. (for kidney protection), Disp: 30 Tablet, Rfl: 3    pantoprazole (PROTONIX) 40 MG Tablet Delayed Response, Take 1 Tablet by mouth every day., Disp: 30 Tablet, Rfl: 3    glipiZIDE (GLUCOTROL) 5 MG Tab, Take 1 Tablet by mouth every day., Disp: 30 Tablet, Rfl: 2    sertraline (ZOLOFT) 50 MG Tab, Take 1 Tablet by mouth every day., Disp: 90 Tablet, Rfl: 1    diclofenac sodium (VOLTAREN) 1 % Gel, Apply 4 g topically in the morning, at noon, and at bedtime., Disp: 50 g,  "Rfl: 0    tizanidine (ZANAFLEX) 4 MG capsule, Take 1 Capsule by mouth every 8 hours as needed (muscle spasms)., Disp: 30 Capsule, Rfl: 0    Lancet Devices (LANCET DEVICE WITH EJECTOR) Misc, True metrix lancet ejector device., Disp: 1 Each, Rfl: 0    tadalafil (CIALIS) 10 MG tablet, Take 1 tablet by mouth as needed for Erectile Dysfunction., Disp: 10 tablet, Rfl: 9    Blood Glucose Test Strips, Test strips order: Test strips for Contour Next meter. Sig: use 2x/d and prn ssx high or low sugar #100 RF x 3, Disp: 100 Strip, Rfl: 3    aspirin EC (ECOTRIN) 81 MG Tablet Delayed Response, Take 2 Tabs by mouth every day., Disp: 30 Tab, Rfl:     Blood Glucose Monitoring Suppl Device, Meter: Dispense 1 Contour Next Device . Sig. Use as directed for blood sugar monitoring. #1. NR., Disp: 1 Device, Rfl: 0    glucose blood (TRUE METRIX BLOOD GLUCOSE TEST) strip, 1 Strip by Other route as needed., Disp: 300 Strip, Rfl: 1    Multiple Vitamin (MULTI VITAMIN MENS PO), Take 1 Tab by mouth every day., Disp: , Rfl:     Allergies, past medical history, past surgical history, family history, social history reviewed and updated    Objective:     Vitals: /72 (BP Location: Left arm, Patient Position: Sitting, BP Cuff Size: Adult)   Pulse 90   Temp 37.3 °C (99.2 °F) (Temporal)   Resp 16   Ht 1.727 m (5' 8\")   Wt 110 kg (242 lb)   SpO2 92%   BMI 36.80 kg/m²   General: Alert, pleasant, NAD  EYES:   PERRL, EOMI, no icterus or pallor.  Conjunctivae and lids normal.   HENT:  Normocephalic.  External ears normal.   Neck supple.  .  Heart: Regular rate and rhythm.  S1 and S2 normal.  No murmurs appreciated.  Respiratory: Normal respiratory effort.  Clear to auscultation bilaterally.  Abdomen: Obese.  Skin: Warm, dry, no rashes.  Musculoskeletal: Gait is normal.  Moves all extremities well.    Extremities: Normal range of motion all extremities.   Neurological: No tremors, sensation grossly intact, CN2-12 intact.  Psych:  Affect/mood is " normal, judgement is good, memory is intact, grooming is appropriate.  EKG: Right bundle branch block.  Possible left atrial enlargement.    Assessment/Plan:     Jensen was seen today for nausea.    Diagnoses and all orders for this visit:    Other chest pain  -     EKG - Clinic Performed  -     STRESS TEST  -     EC-ECHOCARDIOGRAM COMPLETE W/O CONT; Future  -     REFERRAL TO CARDIOLOGY  -     pantoprazole (PROTONIX) 40 MG Tablet Delayed Response; Take 1 Tablet by mouth every day.  -     Lipid Profile; Future  -     CBC WITH DIFFERENTIAL; Future  -     Comp Metabolic Panel; Future  -     VITAMIN D,25 HYDROXY (DEFICIENCY); Future  -     TSH WITH REFLEX TO FT4; Future  -     H. PYLORI BREATH TEST  Abnormal EKG  -     STRESS TEST  -     REFERRAL TO CARDIOLOGY  -     Lipid Profile; Future  -     CBC WITH DIFFERENTIAL; Future  -     Comp Metabolic Panel; Future  -     VITAMIN D,25 HYDROXY (DEFICIENCY); Future  -     TSH WITH REFLEX TO FT4; Future  Right bundle branch block  -     STRESS TEST  -     EC-ECHOCARDIOGRAM COMPLETE W/O CONT; Future  -     REFERRAL TO CARDIOLOGY  -     Lipid Profile; Future  -     CBC WITH DIFFERENTIAL; Future  -     Comp Metabolic Panel; Future  -     VITAMIN D,25 HYDROXY (DEFICIENCY); Future  -     TSH WITH REFLEX TO FT4; Future  Bilious vomiting with nausea  -     pantoprazole (PROTONIX) 40 MG Tablet Delayed Response; Take 1 Tablet by mouth every day.  -     Lipid Profile; Future  -     CBC WITH DIFFERENTIAL; Future  -     Comp Metabolic Panel; Future  -     VITAMIN D,25 HYDROXY (DEFICIENCY); Future  -     TSH WITH REFLEX TO FT4; Future  -     H. PYLORI BREATH TEST  Type 2 diabetes mellitus without complication (HCC)  -     Microalbumin Creat Ratio Urine (Clinic Collect); Future  -     Cancel: Diabetic Monofilament LE Exam  -     POCT A1C  -     STRESS TEST  -     EC-ECHOCARDIOGRAM COMPLETE W/O CONT; Future  -     REFERRAL TO CARDIOLOGY  -     Lipid Profile; Future  -     CBC WITH  DIFFERENTIAL; Future  -     Comp Metabolic Panel; Future  -     VITAMIN D,25 HYDROXY (DEFICIENCY); Future  -     TSH WITH REFLEX TO FT4; Future  -     glipiZIDE (GLUCOTROL) 5 MG Tab; Take 1 Tablet by mouth every day.  Type 2 diabetes mellitus without complication, without long-term current use of insulin (HCC)  -     SITagliptin (JANUVIA) 100 MG Tab; Take 1 Tablet by mouth every day.  -     Empagliflozin (JARDIANCE) 25 MG Tab; TAKE 1 TABLET BY MOUTH ONCE DAILY IN THE MORNING .  -     lisinopril (PRINIVIL) 2.5 MG Tab; Take 1 Tablet by mouth every day. (for kidney protection)  -     Lipid Profile; Future  -     CBC WITH DIFFERENTIAL; Future  -     Comp Metabolic Panel; Future  -     VITAMIN D,25 HYDROXY (DEFICIENCY); Future  -     TSH WITH REFLEX TO FT4; Future  Pure hypercholesterolemia  -     atorvastatin (LIPITOR) 40 MG Tab; Take 1 Tablet by mouth every day.  -     Lipid Profile; Future  -     CBC WITH DIFFERENTIAL; Future  -     Comp Metabolic Panel; Future  -     VITAMIN D,25 HYDROXY (DEFICIENCY); Future  -     TSH WITH REFLEX TO FT4; Future    Symptoms are concerning.  Patient is a type II diabetic not controlled.  He does have hypercholesterolemia and is taking Lipitor 40 mg daily.  He does have a history of a DVT and now has an IVC filter in place.  EKG shows right bundle branch block.  Symptoms seem to occur mostly with exertion.  He denies having any chest pain or shortness of breath at this time.  I have ordered a stat stress test as well as an urgent echo and an urgent referral to cardiology.  I have discussed with patient the importance of going to the ER if he has any symptoms.  My concern is that he is on the verge of a cardiac event.  We will also evaluate for possible GERD symptoms/H. pylori and we will have him start pantoprazole after he has had a breath test.  We will follow-up in 2 weeks with labs.  I have also added glipizide to his labs in order to gain better control of his diabetes in order to  optimize patient to the best of our ability.  Wife was with patient and both patient and wife verbalized understanding to all instructions.  They were given a copy of the EKG and understand that they can take this with him to the ER if symptoms return.  Patient was not sent to the ER at this time as he is not having any cardiac symptoms currently.    HCC Gap Form    Diagnosis: E66.01 - Morbid (severe) obesity due to excess calories (HCC)  Z68.36 - Body mass index (BMI) 36.0-36.9, adult  Comorbidity Diagnosis: Right bundle branch block  The current BMI is 36.80 kg/m2 as of 07/12/23 17:56 PDT  Assessment and plan: Chronic, stable. Encouraged healthy diet and physical activity changes with a goal of weight loss. Follow up at least annually. The comorbid condition is worsening based on today's assessment. Treatment plan as follows: in plan currently listed. Follow up 2 weeks.  Diagnosis to address: E11.9 - Type 2 diabetes mellitus without complication, without long-term current use of insulin (HCC)  Assessment and plan: Chronic, stable. Continue with current defined treatment plan: obtain labs. Follow-up 2 weeks  Diagnosis: F15.11 - History of methamphetamine abuse (HCC)  Assessment and plan: Chronic, stable. Continue with current defined treatment plan: Follow-up at least annually.  Last edited 07/12/23 17:58 PDT by Aubrie Bryant P.A.-C.          Return in about 2 weeks (around 7/26/2023), or if symptoms worsen or fail to improve, for 2-3 weeks.    Please note that this dictation was created using voice recognition software. I have made every reasonable attempt to correct obvious errors, but expect that there are errors of grammar and possible content that I did not discover before finalizing note.

## 2023-07-14 LAB
CREAT UR-MCNC: 79.7 MG/DL
MICROALBUMIN UR-MCNC: <1.2 MG/DL
MICROALBUMIN/CREAT UR: NORMAL MG/G (ref 0–30)

## 2023-07-15 ENCOUNTER — HOSPITAL ENCOUNTER (OUTPATIENT)
Dept: LAB | Facility: MEDICAL CENTER | Age: 63
End: 2023-07-15
Attending: PHYSICIAN ASSISTANT
Payer: COMMERCIAL

## 2023-07-15 DIAGNOSIS — R07.89 OTHER CHEST PAIN: ICD-10-CM

## 2023-07-15 DIAGNOSIS — E78.00 PURE HYPERCHOLESTEROLEMIA: ICD-10-CM

## 2023-07-15 DIAGNOSIS — E11.9 TYPE 2 DIABETES MELLITUS WITHOUT COMPLICATION, WITHOUT LONG-TERM CURRENT USE OF INSULIN (HCC): ICD-10-CM

## 2023-07-15 DIAGNOSIS — I45.10 RIGHT BUNDLE BRANCH BLOCK: ICD-10-CM

## 2023-07-15 DIAGNOSIS — R94.31 ABNORMAL EKG: ICD-10-CM

## 2023-07-15 DIAGNOSIS — E11.9 TYPE 2 DIABETES MELLITUS WITHOUT COMPLICATION (HCC): ICD-10-CM

## 2023-07-15 DIAGNOSIS — R11.14 BILIOUS VOMITING WITH NAUSEA: ICD-10-CM

## 2023-07-15 LAB
25(OH)D3 SERPL-MCNC: 28 NG/ML (ref 30–100)
ALBUMIN SERPL BCP-MCNC: 4.1 G/DL (ref 3.2–4.9)
ALBUMIN/GLOB SERPL: 1.4 G/DL
ALP SERPL-CCNC: 81 U/L (ref 30–99)
ALT SERPL-CCNC: 27 U/L (ref 2–50)
ANION GAP SERPL CALC-SCNC: 11 MMOL/L (ref 7–16)
AST SERPL-CCNC: 15 U/L (ref 12–45)
BASOPHILS # BLD AUTO: 0.7 % (ref 0–1.8)
BASOPHILS # BLD: 0.06 K/UL (ref 0–0.12)
BILIRUB SERPL-MCNC: 0.8 MG/DL (ref 0.1–1.5)
BUN SERPL-MCNC: 17 MG/DL (ref 8–22)
CALCIUM ALBUM COR SERPL-MCNC: 9.3 MG/DL (ref 8.5–10.5)
CALCIUM SERPL-MCNC: 9.4 MG/DL (ref 8.5–10.5)
CHLORIDE SERPL-SCNC: 104 MMOL/L (ref 96–112)
CHOLEST SERPL-MCNC: 126 MG/DL (ref 100–199)
CO2 SERPL-SCNC: 23 MMOL/L (ref 20–33)
CREAT SERPL-MCNC: 0.6 MG/DL (ref 0.5–1.4)
EOSINOPHIL # BLD AUTO: 0.15 K/UL (ref 0–0.51)
EOSINOPHIL NFR BLD: 1.9 % (ref 0–6.9)
ERYTHROCYTE [DISTWIDTH] IN BLOOD BY AUTOMATED COUNT: 45.5 FL (ref 35.9–50)
FASTING STATUS PATIENT QL REPORTED: NORMAL
GFR SERPLBLD CREATININE-BSD FMLA CKD-EPI: 108 ML/MIN/1.73 M 2
GLOBULIN SER CALC-MCNC: 3 G/DL (ref 1.9–3.5)
GLUCOSE SERPL-MCNC: 115 MG/DL (ref 65–99)
HCT VFR BLD AUTO: 49.2 % (ref 42–52)
HDLC SERPL-MCNC: 46 MG/DL
HGB BLD-MCNC: 15.8 G/DL (ref 14–18)
IMM GRANULOCYTES # BLD AUTO: 0.01 K/UL (ref 0–0.11)
IMM GRANULOCYTES NFR BLD AUTO: 0.1 % (ref 0–0.9)
LDLC SERPL CALC-MCNC: 68 MG/DL
LYMPHOCYTES # BLD AUTO: 2.87 K/UL (ref 1–4.8)
LYMPHOCYTES NFR BLD: 35.8 % (ref 22–41)
MCH RBC QN AUTO: 29.1 PG (ref 27–33)
MCHC RBC AUTO-ENTMCNC: 32.1 G/DL (ref 32.3–36.5)
MCV RBC AUTO: 90.6 FL (ref 81.4–97.8)
MONOCYTES # BLD AUTO: 0.78 K/UL (ref 0–0.85)
MONOCYTES NFR BLD AUTO: 9.7 % (ref 0–13.4)
NEUTROPHILS # BLD AUTO: 4.14 K/UL (ref 1.82–7.42)
NEUTROPHILS NFR BLD: 51.8 % (ref 44–72)
NRBC # BLD AUTO: 0 K/UL
NRBC BLD-RTO: 0 /100 WBC (ref 0–0.2)
PLATELET # BLD AUTO: 196 K/UL (ref 164–446)
PMV BLD AUTO: 10.3 FL (ref 9–12.9)
POTASSIUM SERPL-SCNC: 4.6 MMOL/L (ref 3.6–5.5)
PROT SERPL-MCNC: 7.1 G/DL (ref 6–8.2)
RBC # BLD AUTO: 5.43 M/UL (ref 4.7–6.1)
SODIUM SERPL-SCNC: 138 MMOL/L (ref 135–145)
TRIGL SERPL-MCNC: 59 MG/DL (ref 0–149)
TSH SERPL DL<=0.005 MIU/L-ACNC: 0.6 UIU/ML (ref 0.38–5.33)
WBC # BLD AUTO: 8 K/UL (ref 4.8–10.8)

## 2023-07-15 PROCEDURE — 36415 COLL VENOUS BLD VENIPUNCTURE: CPT

## 2023-07-15 PROCEDURE — 80053 COMPREHEN METABOLIC PANEL: CPT

## 2023-07-15 PROCEDURE — 85025 COMPLETE CBC W/AUTO DIFF WBC: CPT

## 2023-07-15 PROCEDURE — 84443 ASSAY THYROID STIM HORMONE: CPT

## 2023-07-15 PROCEDURE — 82306 VITAMIN D 25 HYDROXY: CPT

## 2023-07-15 PROCEDURE — 80061 LIPID PANEL: CPT

## 2023-07-17 ENCOUNTER — TELEPHONE (OUTPATIENT)
Dept: HEALTH INFORMATION MANAGEMENT | Facility: OTHER | Age: 63
End: 2023-07-17
Payer: COMMERCIAL

## 2023-07-29 ENCOUNTER — PATIENT MESSAGE (OUTPATIENT)
Dept: BEHAVIORAL HEALTH | Facility: CLINIC | Age: 63
End: 2023-07-29
Payer: COMMERCIAL

## 2023-07-29 DIAGNOSIS — F90.2 ADHD (ATTENTION DEFICIT HYPERACTIVITY DISORDER), COMBINED TYPE: ICD-10-CM

## 2023-07-31 RX ORDER — DEXTROAMPHETAMINE SACCHARATE, AMPHETAMINE ASPARTATE MONOHYDRATE, DEXTROAMPHETAMINE SULFATE AND AMPHETAMINE SULFATE 7.5; 7.5; 7.5; 7.5 MG/1; MG/1; MG/1; MG/1
30 CAPSULE, EXTENDED RELEASE ORAL EVERY MORNING
Qty: 30 CAPSULE | Refills: 0 | Status: SHIPPED | OUTPATIENT
Start: 2023-07-31 | End: 2023-08-29 | Stop reason: SDUPTHER

## 2023-07-31 NOTE — PATIENT COMMUNICATION
Osmar pt requesting refill Adderall 30mg in stock at current pharmacy and cheaper requesting that medication and not DexAmph. 20mg     Received request via: Patient    Was the patient seen in the last year in this department? Yes    Does the patient have an active prescription (recently filled or refills available) for medication(s) requested? No    Does the patient have [facility] and need 100 day supply (blood pressure, diabetes and cholesterol meds only)? Medication is not for cholesterol, blood pressure or diabetes

## 2023-08-11 ENCOUNTER — PATIENT MESSAGE (OUTPATIENT)
Dept: MEDICAL GROUP | Facility: CLINIC | Age: 63
End: 2023-08-11
Payer: COMMERCIAL

## 2023-08-11 DIAGNOSIS — I45.10 RIGHT BUNDLE BRANCH BLOCK: ICD-10-CM

## 2023-08-11 DIAGNOSIS — R94.31 ABNORMAL EKG: ICD-10-CM

## 2023-08-11 DIAGNOSIS — Z95.828 PRESENCE OF INFERIOR VENA CAVA FILTER: ICD-10-CM

## 2023-08-11 DIAGNOSIS — R07.89 OTHER CHEST PAIN: ICD-10-CM

## 2023-08-11 DIAGNOSIS — E78.00 PURE HYPERCHOLESTEROLEMIA: ICD-10-CM

## 2023-08-29 DIAGNOSIS — F90.2 ADHD (ATTENTION DEFICIT HYPERACTIVITY DISORDER), COMBINED TYPE: ICD-10-CM

## 2023-08-29 RX ORDER — DEXTROAMPHETAMINE SACCHARATE, AMPHETAMINE ASPARTATE MONOHYDRATE, DEXTROAMPHETAMINE SULFATE AND AMPHETAMINE SULFATE 7.5; 7.5; 7.5; 7.5 MG/1; MG/1; MG/1; MG/1
30 CAPSULE, EXTENDED RELEASE ORAL EVERY MORNING
Qty: 30 CAPSULE | Refills: 0 | Status: SHIPPED | OUTPATIENT
Start: 2023-08-29 | End: 2023-09-28 | Stop reason: SDUPTHER

## 2023-09-05 ENCOUNTER — OFFICE VISIT (OUTPATIENT)
Dept: CARDIOLOGY | Facility: MEDICAL CENTER | Age: 63
End: 2023-09-05
Attending: PHYSICIAN ASSISTANT
Payer: COMMERCIAL

## 2023-09-05 ENCOUNTER — TELEPHONE (OUTPATIENT)
Dept: CARDIOLOGY | Facility: MEDICAL CENTER | Age: 63
End: 2023-09-05

## 2023-09-05 VITALS
DIASTOLIC BLOOD PRESSURE: 64 MMHG | WEIGHT: 226 LBS | RESPIRATION RATE: 12 BRPM | HEIGHT: 68 IN | OXYGEN SATURATION: 97 % | HEART RATE: 78 BPM | BODY MASS INDEX: 34.25 KG/M2 | SYSTOLIC BLOOD PRESSURE: 88 MMHG

## 2023-09-05 DIAGNOSIS — I25.83 CORONARY ARTERY DISEASE DUE TO LIPID RICH PLAQUE: ICD-10-CM

## 2023-09-05 DIAGNOSIS — I10 ESSENTIAL HYPERTENSION: ICD-10-CM

## 2023-09-05 DIAGNOSIS — I25.10 CORONARY ARTERY DISEASE DUE TO LIPID RICH PLAQUE: ICD-10-CM

## 2023-09-05 DIAGNOSIS — I20.89 STABLE ANGINA (HCC): ICD-10-CM

## 2023-09-05 DIAGNOSIS — E78.5 DYSLIPIDEMIA: ICD-10-CM

## 2023-09-05 LAB — EKG IMPRESSION: NORMAL

## 2023-09-05 PROCEDURE — 3078F DIAST BP <80 MM HG: CPT | Performed by: STUDENT IN AN ORGANIZED HEALTH CARE EDUCATION/TRAINING PROGRAM

## 2023-09-05 PROCEDURE — 99213 OFFICE O/P EST LOW 20 MIN: CPT | Performed by: STUDENT IN AN ORGANIZED HEALTH CARE EDUCATION/TRAINING PROGRAM

## 2023-09-05 PROCEDURE — 93005 ELECTROCARDIOGRAM TRACING: CPT | Performed by: STUDENT IN AN ORGANIZED HEALTH CARE EDUCATION/TRAINING PROGRAM

## 2023-09-05 PROCEDURE — 3074F SYST BP LT 130 MM HG: CPT | Performed by: STUDENT IN AN ORGANIZED HEALTH CARE EDUCATION/TRAINING PROGRAM

## 2023-09-05 PROCEDURE — 93010 ELECTROCARDIOGRAM REPORT: CPT | Performed by: STUDENT IN AN ORGANIZED HEALTH CARE EDUCATION/TRAINING PROGRAM

## 2023-09-05 PROCEDURE — 99244 OFF/OP CNSLTJ NEW/EST MOD 40: CPT | Performed by: STUDENT IN AN ORGANIZED HEALTH CARE EDUCATION/TRAINING PROGRAM

## 2023-09-05 RX ORDER — NITROGLYCERIN 0.4 MG/1
TABLET SUBLINGUAL
COMMUNITY
Start: 2023-08-10 | End: 2023-11-09 | Stop reason: SDUPTHER

## 2023-09-05 ASSESSMENT — ENCOUNTER SYMPTOMS
WEAKNESS: 0
FEVER: 0
NIGHT SWEATS: 0
PND: 0
NAUSEA: 0
VOMITING: 0
NEAR-SYNCOPE: 0
WHEEZING: 0
DIARRHEA: 0
ORTHOPNEA: 0
FOCAL WEAKNESS: 0
SYNCOPE: 0
IRREGULAR HEARTBEAT: 0
SHORTNESS OF BREATH: 0
ABDOMINAL PAIN: 0
DYSPNEA ON EXERTION: 0
PALPITATIONS: 0
DIZZINESS: 0
COUGH: 0

## 2023-09-05 ASSESSMENT — FIBROSIS 4 INDEX: FIB4 SCORE: 0.94

## 2023-09-05 NOTE — TELEPHONE ENCOUNTER
Caller: Hayley- Patients spouse     Topic/issue: Hayley is calling in regards to patients angiogram that was recently completed at [facility]. Hayley mentions questions of the patient potentially needing stents placed or a potential surgery per the doctor at [facility]. Hayley would like to speak with [facility] about the patients care and cardiac questions. Hayley also mentions questions of the patient qualifying for disability per his cardiac care. Please advise.     Callback Number: [phone] Hayley     Thank you,   Violet MIRAMONTES

## 2023-09-05 NOTE — PROGRESS NOTES
Cardiology Initial Consultation Note    Date of note:    9/5/2023    Primary Care Provider: Aubrie Bryant P.A.-C.  Referring Provider: Aubrie Bryant P.A.-C.     Patient Name: Jensen Leigh     YOB: 1960  MRN:              [id number]    Chief Complaint: CAD    History of Present Illness: Mr. Jensen Leigh is a 63 y.o. male whose current medical problems include right bundle branch block, diabetes, dyslipidemia, mild mitral regurgitation, DVT status post IVC placement who is here for cardiac consultation for CAD.    The patient previously saw a cardiologist at [facility].  The patient was last seen by Dr. Hamm 8/23/2023.  During the visit, the patient reports occasional chest pain, and he was referred for left heart cath.  Left heart cath was completed 8/23/2023, which showed mild left main disease,  of RCA with collaterals from the left system, occluded proximal LCx with collaterals from LAD, and mild to moderate mid LAD stenosis status post intracoronary physiology assessment of LAD, which showed severe mid LAD disease, and he was recommended for surgical revascularization.    The patient presents today for a second opinion at [facility].  The patient reports feeling well currently.  The patient reports that he would prefer to not undergo surgery, and is wondering if they are percutaneous options for him at [facility].  He denies any current chest pain, but does have chest pain on exertion.  He denies any orthopnea, PND, or leg swelling.  No palpitations.  No syncope or presyncopal episodes.    Cardiovascular Risk Factors:  1. Smoking status: Never smoker  2. Type II Diabetes Mellitus: On medication   Lab Results   Component Value Date/Time    HBA1C 7.9 (A) 07/12/2023 05:50 PM    HBA1C 7.4 (A) 12/14/2022 01:54 PM     3. Hypertension: On medication  4. Dyslipidemia: On statin   Cholesterol,Tot   Date Value Ref Range Status   07/15/2023 126 100 - 199 mg/dL Final     LDL   Date Value Ref Range Status    07/15/2023 68 <100 mg/dL Final     HDL   Date Value Ref Range Status   07/15/2023 46 >=40 mg/dL Final     Triglycerides   Date Value Ref Range Status   07/15/2023 59 0 - 149 mg/dL Final     5. Family history of early Coronary Artery Disease in a first degree relative (Male less than 55 years of age; Female less than 65 years of age): None  6.  Obesity and/or Metabolic Syndrome: Body mass index is 34.36 kg/m².  7. Sedentary lifestyle: Not active but not sedentary    Review of Systems   Constitutional: Negative for fever, malaise/fatigue and night sweats.   Cardiovascular:  Negative for chest pain, dyspnea on exertion, irregular heartbeat, leg swelling, near-syncope, orthopnea, palpitations, paroxysmal nocturnal dyspnea and syncope.   Respiratory:  Negative for cough, shortness of breath and wheezing.    Gastrointestinal:  Negative for abdominal pain, diarrhea, nausea and vomiting.   Neurological:  Negative for dizziness, focal weakness and weakness.       All other systems reviewed and are negative.     Current Outpatient Medications   Medication Sig Dispense Refill    amphetamine-dextroamphetamine ER (ADDERALL XR) 30 MG XR capsule Take 1 Capsule by mouth every morning for 30 days. 30 Capsule 0    SITagliptin (JANUVIA) 100 MG Tab Take 1 Tablet by mouth every day. 30 Tablet 3    Empagliflozin (JARDIANCE) 25 MG Tab TAKE 1 TABLET BY MOUTH ONCE DAILY IN THE MORNING . 30 Tablet 3    atorvastatin (LIPITOR) 40 MG Tab Take 1 Tablet by mouth every day. 30 Tablet 3    lisinopril (PRINIVIL) 2.5 MG Tab Take 1 Tablet by mouth every day. (for kidney protection) 30 Tablet 3    sertraline (ZOLOFT) 50 MG Tab Take 1 Tablet by mouth every day. 90 Tablet 1    diclofenac sodium (VOLTAREN) 1 % Gel Apply 4 g topically in the morning, at noon, and at bedtime. 50 g 0    tizanidine (ZANAFLEX) 4 MG capsule Take 1 Capsule by mouth every 8 hours as needed (muscle spasms). 30 Capsule 0    Lancet Devices (LANCET DEVICE WITH EJECTOR) Misc True  "metrix lancet ejector device. 1 Each 0    tadalafil (CIALIS) 10 MG tablet Take 1 tablet by mouth as needed for Erectile Dysfunction. 10 tablet 9    Blood Glucose Test Strips Test strips order: Test strips for Contour Next meter. Sig: use 2x/d and prn ssx high or low sugar #100 RF x 3 100 Strip 3    aspirin EC (ECOTRIN) 81 MG Tablet Delayed Response Take 2 Tabs by mouth every day. 30 Tab     Blood Glucose Monitoring Suppl Device Meter: Dispense 1 Contour Next Device . Sig. Use as directed for blood sugar monitoring. #1. NR. 1 Device 0    glucose blood (TRUE METRIX BLOOD GLUCOSE TEST) strip 1 Strip by Other route as needed. 300 Strip 1    Multiple Vitamin (MULTI VITAMIN MENS PO) Take 1 Tab by mouth every day.      metoprolol tartrate (LOPRESSOR) 25 MG Tab TAKE 1/2 (ONE-HALF) TABLET BY MOUTH EVERY 12 HOURS      nitroglycerin (NITROSTAT) 0.4 MG SL Tab PLACE 1 TABLET UNDER THE TONGIE EVERY 5 MINUTES AS NEEDED FOR CHEST PAIN. DO NOT CHEW, CRUSH, OR SWALLOW SUBLINGUAL TABLET. FOR A MAXIMUM OF 3 DOSES       No current facility-administered medications for this visit.         No Known Allergies      Physical Exam:  Ambulatory Vitals  BP (!) 88/64 (BP Location: Left arm, Patient Position: Sitting, BP Cuff Size: Adult)   Pulse 78   Resp 12   Ht 1.727 m (5' 8\")   Wt 103 kg (226 lb)   SpO2 97%    Oxygen Therapy:  Pulse Oximetry: 97 %  BP Readings from Last 4 Encounters:   09/05/23 (!) 88/64   07/12/23 110/72   06/25/23 132/84   12/14/22 118/68       Weight/BMI: Body mass index is 34.36 kg/m².  Wt Readings from Last 4 Encounters:   09/05/23 103 kg (226 lb)   07/12/23 110 kg (242 lb)   06/25/23 104 kg (230 lb)   12/14/22 109 kg (241 lb 3.2 oz)       General: Well appearing and in no apparent distress  Eyes: nl conjunctiva, no icteric sclera  ENT: normal external appearance of ears, nose, and throat  Neck: no visible JVP,  no carotid bruits  Lungs: normal respiratory effort, CTAB  Heart: RRR, no murmurs, no rubs or gallops,  " no edema bilateral lower extremities. No LV/RV heave on cardiac palpatation. + bilateral radial pulses.  + bilateral dp pulses.   Abdomen: soft, non tender, non distended, no masses, normal bowel sounds.  No HSM.  Extremities/MSK: no clubbing, no cyanosis  Neurological: No focal sensory deficits  Psychiatric: Appropriate affect, A/O x 3, intact judgement and insight  Skin: Warm extremities      Lab Data Review:  Lab Results   Component Value Date/Time    CHOLSTRLTOT 126 07/15/2023 11:16 AM    LDL 68 07/15/2023 11:16 AM    HDL 46 07/15/2023 11:16 AM    TRIGLYCERIDE 59 07/15/2023 11:16 AM       Lab Results   Component Value Date/Time    SODIUM 137 08/23/2023 07:13 AM    SODIUM 138 07/15/2023 11:16 AM    POTASSIUM 4.6 08/23/2023 07:13 AM    POTASSIUM 4.6 07/15/2023 11:16 AM    CHLORIDE 104 08/23/2023 07:13 AM    CHLORIDE 104 07/15/2023 11:16 AM    CO2 25 08/23/2023 07:13 AM    CO2 23 07/15/2023 11:16 AM    GLUCOSE 142 (H) 08/23/2023 07:13 AM    GLUCOSE 115 (H) 07/15/2023 11:16 AM    BUN 21 (H) 08/23/2023 07:13 AM    BUN 17 07/15/2023 11:16 AM    CREATININE 0.86 08/23/2023 07:13 AM    CREATININE 0.60 07/15/2023 11:16 AM    GLOMRATE 90 08/23/2023 07:13 AM     Lab Results   Component Value Date/Time    ALKPHOSPHAT 81 07/15/2023 11:16 AM    ASTSGOT 15 07/15/2023 11:16 AM    ALTSGPT 27 07/15/2023 11:16 AM    TBILIRUBIN 0.8 07/15/2023 11:16 AM      Lab Results   Component Value Date/Time    WBC 8.0 07/15/2023 11:16 AM    HEMOGLOBIN 15.8 07/15/2023 11:16 AM     Lab Results   Component Value Date/Time    HBA1C 7.9 (A) 07/12/2023 05:50 PM    HBA1C 7.4 (A) 12/14/2022 01:54 PM         Cardiac Imaging and Procedures Review:    EKG dated 9/5/2023: My personal interpretation is sinus rhythm, short MO interval, right bundle branch block    Echo dated 7/13/2023:   CONCLUSIONS  Left ventricular systolic function is normal.  Mild mitral regurgitation.  Calcified aortic valve leaflets. No aortic valve stenosis.           Assessment &  Plan     1. Stable angina (HCC)  EKG    metoprolol tartrate (LOPRESSOR) 25 MG Tab    nitroglycerin (NITROSTAT) 0.4 MG SL Tab      2. Coronary artery disease due to lipid rich plaque  EKG    metoprolol tartrate (LOPRESSOR) 25 MG Tab    nitroglycerin (NITROSTAT) 0.4 MG SL Tab      3. Dyslipidemia        4. Essential hypertension  EKG    metoprolol tartrate (LOPRESSOR) 25 MG Tab            Shared Medical Decision Making:    Multivessel CAD  Stable angina  I reviewed notes as well as left heart cath report from [facility].  Given the patient's diagnosis of diabetes with multivessel disease, CABG is the best option for him.  I discussed the procedure as well as risks and benefits of CABG in detail with the patient.  The patient has already been evaluated by CT surgeon at [facility].  -Recommend proceeding with CABG  -Continue aspirin and high intensity statin  -Continue metoprolol 25 mg twice daily  -Continue lisinopril 2.5 mg daily  -ER warning signs given    Dyslipidemia  -Continue high intensity statin as above    Hypertension  BP low this visit.  Patient asymptomatic  -Continue low-dose metoprolol and lisinopril as above.  Consider lowering metoprolol if becoming symptomatic.    The patient will continue to follow-up with his regular cardiologist at [facility].  This visit was for a second opinion.    All of the patient's excellent questions were answered to the best of my knowledge and to his satisfaction.  It was a pleasure seeing Mr. Jensen Leigh in my clinic today. Return if symptoms worsen or fail to improve. Patient is aware to call the cardiology clinic with any questions or concerns.      Rickie Rasheed MD  [facility]  [facility], [facility]  [address] [address]  Phone: [phone]  Fax: [phone]

## 2023-09-06 NOTE — TELEPHONE ENCOUNTER
Rickie Rasheed M.D.  You 3 minutes ago (11:37 AM)     We can recommend [facility].     -------------------------------------------------------------------------------------------    Referral placed.

## 2023-09-06 NOTE — TELEPHONE ENCOUNTER
Returned call. Hayley states that pt does not want to do CABG through [facility]. [facility] and they are wondering if  recommends a specific surgeon there. Hayley also had many questions regarding CABG procedure, advised that they should discuss these concerns and questions w/ [facility] at appt.    To [facility], ok to place referral? Any specific recommendations for surgeon?

## 2023-09-07 ENCOUNTER — TELEPHONE (OUTPATIENT)
Dept: CARDIOLOGY | Facility: MEDICAL CENTER | Age: 63
End: 2023-09-07
Payer: COMMERCIAL

## 2023-09-07 NOTE — TELEPHONE ENCOUNTER
To [facility], please verify Metoprolol dose that you would like pt on. Med rec reports 12.5 mg BID but OV note states 25 mg BID. Pt was previously on 12.5 mg BID.

## 2023-09-07 NOTE — TELEPHONE ENCOUNTER
MIREILLE     Caller: Hayley Leigh (Wife)     Topic/issue: Patient wife called in and states that the Dr patient saw before Kathya advised patient should be taking 1/2 a pill of the metoprolol tartrate (LOPRESSOR) 25 MG Tab and states that after the visit with Dr. Rasheed instructions say he is to take the entire 25MG She would like someone to confirm this is accurate and he should be increasing the dosage     Callback Number: [phone]    Thank You,   Bety ORTIZ

## 2023-09-08 DIAGNOSIS — I25.83 CORONARY ARTERY DISEASE DUE TO LIPID RICH PLAQUE: ICD-10-CM

## 2023-09-08 DIAGNOSIS — I25.10 CORONARY ARTERY DISEASE DUE TO LIPID RICH PLAQUE: ICD-10-CM

## 2023-09-08 DIAGNOSIS — I20.89 STABLE ANGINA (HCC): ICD-10-CM

## 2023-09-08 DIAGNOSIS — I10 ESSENTIAL HYPERTENSION: ICD-10-CM

## 2023-09-08 NOTE — TELEPHONE ENCOUNTER
Rickie Rasheed M.D.  You 15 hours ago (6:03 PM)       He said he was taking 25mg bid. I didn't wish to change any medications last visit, so please continue to take whatever he was taking prior to seeing me.

## 2023-09-08 NOTE — TELEPHONE ENCOUNTER
Called and spoke with patient's wife.   Patient's wife states he has always been on the 12.5 mg BID.  Wife states patients average BP at home is about 130/80.  Patients last OV BP was 88/64.  Advised patient to continue dosage 12.5 mg BID per [facility] recommendations.   Advised patient to log BP morning, and afternoon and to call us in two weeks with readings.    Wife verbalized understanding.

## 2023-09-19 ENCOUNTER — OFFICE VISIT (OUTPATIENT)
Dept: CARDIOLOGY | Facility: MEDICAL CENTER | Age: 63
End: 2023-09-19
Attending: NURSE PRACTITIONER
Payer: COMMERCIAL

## 2023-09-19 VITALS
RESPIRATION RATE: 14 BRPM | OXYGEN SATURATION: 94 % | HEART RATE: 74 BPM | WEIGHT: 236 LBS | BODY MASS INDEX: 35.77 KG/M2 | HEIGHT: 68 IN | SYSTOLIC BLOOD PRESSURE: 124 MMHG | DIASTOLIC BLOOD PRESSURE: 82 MMHG

## 2023-09-19 DIAGNOSIS — I20.89 STABLE ANGINA (HCC): ICD-10-CM

## 2023-09-19 DIAGNOSIS — I25.118 CORONARY ARTERY DISEASE OF NATIVE ARTERY OF NATIVE HEART WITH STABLE ANGINA PECTORIS (HCC): ICD-10-CM

## 2023-09-19 LAB — EKG IMPRESSION: NORMAL

## 2023-09-19 PROCEDURE — 99213 OFFICE O/P EST LOW 20 MIN: CPT | Performed by: NURSE PRACTITIONER

## 2023-09-19 PROCEDURE — 3074F SYST BP LT 130 MM HG: CPT | Performed by: NURSE PRACTITIONER

## 2023-09-19 PROCEDURE — 3079F DIAST BP 80-89 MM HG: CPT | Performed by: NURSE PRACTITIONER

## 2023-09-19 PROCEDURE — 93005 ELECTROCARDIOGRAM TRACING: CPT | Performed by: NURSE PRACTITIONER

## 2023-09-19 PROCEDURE — 93010 ELECTROCARDIOGRAM REPORT: CPT | Performed by: INTERNAL MEDICINE

## 2023-09-19 ASSESSMENT — FIBROSIS 4 INDEX: FIB4 SCORE: 0.94

## 2023-09-19 ASSESSMENT — ENCOUNTER SYMPTOMS
CONSTITUTIONAL NEGATIVE: 1
EYES NEGATIVE: 1
WHEEZING: 0
NEUROLOGICAL NEGATIVE: 1
RESPIRATORY NEGATIVE: 1
GASTROINTESTINAL NEGATIVE: 1
CLAUDICATION: 0
BRUISES/BLEEDS EASILY: 0
SHORTNESS OF BREATH: 0
DIZZINESS: 0
NERVOUS/ANXIOUS: 0
SENSORY CHANGE: 0
MUSCULOSKELETAL NEGATIVE: 1
HALLUCINATIONS: 0
PND: 0
NAUSEA: 0
ORTHOPNEA: 0
PALPITATIONS: 0
DEPRESSION: 0

## 2023-09-19 NOTE — PROGRESS NOTES
Cardiology Follow up Note    DOS: 9/19/2023  [id number]  Jensen Leigh    Chief complaint/Reason for consult: follow up for intermittent chest pain     HPI: Pt is a 63 y.o. male who presents to the clinic today in follow up for chest pain. Patient has a past medical history significant for but not limited to: type 2 diabetes, DVT S/P IVC filter, recent diagnosis of multivessel disease with upcoming consultation with CT surgery, h/o methamphetamine abuse, right bundle branch block. Patient recent ly saw Dr. Rasheed for secondary opinion about recent diagnosis of multivessel disease and recommendation for CABG. Patient was told that it would be best for him to have bypass surgery. He has been having some intermittent chest pain when he lays on either side during sleep and also intermittently throughout the week at random times. HE has a diagnosis of angina and I told him to try a nitroglycerin at these times if it is uncomfortable. Long term he may or may not need long acting nitrates on a daily basis. CT consultation at end of the month per [facility].       Past Medical History:   Diagnosis Date    Eczema     GERD (gastroesophageal reflux disease)     Type II or unspecified type diabetes mellitus without mention of complication, not stated as uncontrolled     Umbilical hernia        Past Surgical History:   Procedure Laterality Date    ROTATOR CUFF REPAIR      rt shoulder    TONSILLECTOMY AND ADENOIDECTOMY         Social History     Socioeconomic History    Marital status:      Spouse name: Not on file    Number of children: Not on file    Years of education: Not on file    Highest education level: 10th grade   Occupational History    Not on file   Tobacco Use    Smoking status: Never    Smokeless tobacco: Never   Vaping Use    Vaping Use: Never used   Substance and Sexual Activity    Alcohol use: Not Currently    Drug use: No    Sexual activity: Yes   Other Topics Concern    Not on file   Social History  Narrative    Not on file     Social Determinants of Health     Financial Resource Strain: Low Risk  (7/12/2023)    Overall Financial Resource Strain (CARDIA)     Difficulty of Paying Living Expenses: Not very hard   Food Insecurity: Unknown (7/12/2023)    Hunger Vital Sign     Worried About Running Out of Food in the Last Year: Patient refused     Ran Out of Food in the Last Year: Patient refused   Transportation Needs: Unknown (7/12/2023)    PRAPARE - Transportation     Lack of Transportation (Medical): Patient refused     Lack of Transportation (Non-Medical): Not on file   Physical Activity: Unknown (7/12/2023)    Exercise Vital Sign     Days of Exercise per Week: 5 days     Minutes of Exercise per Session: Patient refused   Stress: Unknown (7/12/2023)    [employer] [place] [employer] - Occupational Stress Questionnaire     Feeling of Stress : Patient refused   Social Connections: Unknown (7/12/2023)    Social Connection and Isolation Panel [NHANES]     Frequency of Communication with Friends and Family: Patient refused     Frequency of Social Gatherings with Friends and Family: Patient refused     Attends [employer] Services: Patient refused     Active Member of Clubs or Organizations: No     Attends Club or Organization Meetings: Patient refused     Marital Status:    Intimate Partner Violence: Not on file   Housing Stability: Low Risk  (7/12/2023)    Housing Stability Vital Sign     Unable to Pay for Housing in the Last Year: No     Number of Places Lived in the Last Year: 1     Unstable Housing in the Last Year: No       Family History   Problem Relation Age of Onset    Cancer Mother         liver    Depression Mother     Alcohol abuse Mother     Cancer Father         lung    Alcohol abuse Father     Depression Sister        No Known Allergies    Current Outpatient Medications   Medication Sig Dispense Refill    amphetamine-dextroamphetamine ER (ADDERALL XR) 30 MG XR capsule Take 1 Capsule by  mouth every morning for 30 days. 30 Capsule 0    SITagliptin (JANUVIA) 100 MG Tab Take 1 Tablet by mouth every day. 30 Tablet 3    Empagliflozin (JARDIANCE) 25 MG Tab TAKE 1 TABLET BY MOUTH ONCE DAILY IN THE MORNING . 30 Tablet 3    atorvastatin (LIPITOR) 40 MG Tab Take 1 Tablet by mouth every day. 30 Tablet 3    lisinopril (PRINIVIL) 2.5 MG Tab Take 1 Tablet by mouth every day. (for kidney protection) 30 Tablet 3    aspirin EC (ECOTRIN) 81 MG Tablet Delayed Response Take 2 Tabs by mouth every day. 30 Tab     metoprolol tartrate (LOPRESSOR) 25 MG Tab TAKE 1/2 (ONE-HALF) TABLET BY MOUTH EVERY 12 HOURS 90 Tablet 3    nitroglycerin (NITROSTAT) 0.4 MG SL Tab PLACE 1 TABLET UNDER THE TONGIE EVERY 5 MINUTES AS NEEDED FOR CHEST PAIN. DO NOT CHEW, CRUSH, OR SWALLOW SUBLINGUAL TABLET. FOR A MAXIMUM OF 3 DOSES      sertraline (ZOLOFT) 50 MG Tab Take 1 Tablet by mouth every day. 90 Tablet 1    diclofenac sodium (VOLTAREN) 1 % Gel Apply 4 g topically in the morning, at noon, and at bedtime. 50 g 0    tizanidine (ZANAFLEX) 4 MG capsule Take 1 Capsule by mouth every 8 hours as needed (muscle spasms). 30 Capsule 0    Lancet Devices (LANCET DEVICE WITH EJECTOR) Misc True metrix lancet ejector device. 1 Each 0    tadalafil (CIALIS) 10 MG tablet Take 1 tablet by mouth as needed for Erectile Dysfunction. 10 tablet 9    Blood Glucose Test Strips Test strips order: Test strips for Contour Next meter. Sig: use 2x/d and prn ssx high or low sugar #100 RF x 3 100 Strip 3    Blood Glucose Monitoring Suppl Device Meter: Dispense 1 Contour Next Device . Sig. Use as directed for blood sugar monitoring. #1. NR. 1 Device 0    glucose blood (TRUE METRIX BLOOD GLUCOSE TEST) strip 1 Strip by Other route as needed. 300 Strip 1    Multiple Vitamin (MULTI VITAMIN MENS PO) Take 1 Tab by mouth every day.       No current facility-administered medications for this visit.       Vitals:    09/19/23 1517   BP: 124/82   Pulse: 74   Resp: 14   SpO2: 94%          Review of Systems   Constitutional: Negative.  Negative for malaise/fatigue.   HENT: Negative.     Eyes: Negative.    Respiratory: Negative.  Negative for shortness of breath and wheezing.    Cardiovascular:  Positive for chest pain. Negative for palpitations, orthopnea, claudication, leg swelling and PND.   Gastrointestinal: Negative.  Negative for nausea.   Genitourinary: Negative.    Musculoskeletal: Negative.    Skin: Negative.    Neurological: Negative.  Negative for dizziness and sensory change.   Endo/Heme/Allergies: Negative.  Does not bruise/bleed easily.   Psychiatric/Behavioral:  Negative for depression and hallucinations. The patient is not nervous/anxious.           EKG interpreted by me: Sinus with RBBB    Physical Exam  Constitutional:       Appearance: Normal appearance.   HENT:      Head: Normocephalic.   Eyes:      Pupils: Pupils are equal, round, and reactive to light.   Neck:      Vascular: No JVD.   Cardiovascular:      Rate and Rhythm: Normal rate and regular rhythm.      Pulses: Normal pulses.      Heart sounds: Normal heart sounds.   Pulmonary:      Effort: Pulmonary effort is normal.      Breath sounds: Normal breath sounds.   Abdominal:      General: Abdomen is flat.      Palpations: Abdomen is soft.   Musculoskeletal:      Cervical back: Normal range of motion.      Right lower leg: No edema.      Left lower leg: No edema.   Skin:     General: Skin is warm and dry.   Neurological:      Mental Status: He is alert and oriented to person, place, and time.   Psychiatric:         Mood and Affect: Mood normal.         Behavior: Behavior normal.          Data:  Lipids:   Lab Results   Component Value Date/Time    CHOLSTRLTOT 126 07/15/2023 11:16 AM    TRIGLYCERIDE 59 07/15/2023 11:16 AM    HDL 46 07/15/2023 11:16 AM    LDL 68 07/15/2023 11:16 AM        BMP:  Lab Results   Component Value Date/Time    SODIUM 137 08/23/2023 0713    POTASSIUM 4.6 08/23/2023 0713    CHLORIDE 104 08/23/2023  "0713    CO2 25 08/23/2023 0713    GLUCOSE 142 (H) 08/23/2023 0713    BUN 21 (H) 08/23/2023 0713    CREATININE 0.86 08/23/2023 0713    CALCIUM 9.3 08/23/2023 0713    ANION 8 08/23/2023 0713       GFR:  Lab Results   Component Value Date/Time    IFAFRICA >60 12/09/2021 0614    IFNOTAFR >60 12/09/2021 0614        TSH:   Lab Results   Component Value Date/Time    TSHULTRASEN 0.600 07/15/2023 1116       MAGNESIUM:  No results found for: \"MAGNESIUM\"     THYROXINE (T4):   No results found for: \"FREEDIR\"     CBC:   Lab Results   Component Value Date/Time    WBC 8.0 07/15/2023 11:16 AM    RBC 5.43 07/15/2023 11:16 AM    HEMOGLOBIN 15.8 07/15/2023 11:16 AM    HEMATOCRIT 49.2 07/15/2023 11:16 AM    MCV 90.6 07/15/2023 11:16 AM    MCH 29.1 07/15/2023 11:16 AM    MCHC 32.1 (L) 07/15/2023 11:16 AM    RDW 45.5 07/15/2023 11:16 AM    PLATELETCT 196 07/15/2023 11:16 AM    MPV 10.3 07/15/2023 11:16 AM    NEUTSPOLYS 51.80 07/15/2023 11:16 AM    LYMPHOCYTES 35.80 07/15/2023 11:16 AM    MONOCYTES 9.70 07/15/2023 11:16 AM    EOSINOPHILS 1.90 07/15/2023 11:16 AM    BASOPHILS 0.70 07/15/2023 11:16 AM    IMMGRAN 0.10 07/15/2023 11:16 AM    NRBC 0.00 07/15/2023 11:16 AM    NEUTS 4.14 07/15/2023 11:16 AM    LYMPHS 2.87 07/15/2023 11:16 AM    MONOS 0.78 07/15/2023 11:16 AM    EOS 0.15 07/15/2023 11:16 AM    BASO 0.06 07/15/2023 11:16 AM    IMMGRANAB 0.01 07/15/2023 11:16 AM    NRBCAB 0.00 07/15/2023 11:16 AM        CBC w/o DIFF  Lab Results   Component Value Date/Time    WBC 8.0 07/15/2023 11:16 AM    RBC 5.43 07/15/2023 11:16 AM    HEMOGLOBIN 15.8 07/15/2023 11:16 AM    MCV 90.6 07/15/2023 11:16 AM    MCH 29.1 07/15/2023 11:16 AM    MCHC 32.1 (L) 07/15/2023 11:16 AM    RDW 45.5 07/15/2023 11:16 AM    MPV 10.3 07/15/2023 11:16 AM       LIVER:  Lab Results   Component Value Date/Time    ALKPHOSPHAT 81 07/15/2023 11:16 AM    ASTSGOT 15 07/15/2023 11:16 AM    ALTSGPT 27 07/15/2023 11:16 AM    TBILIRUBIN 0.8 07/15/2023 11:16 AM       BNP:  No " "results found for: \"BNPBTYPENAT\"    PT/INR:  Lab Results   Component Value Date/Time    PROTHROMBTM 13.5 06/17/2019 06:13 AM    INR 1.01 06/17/2019 06:13 AM             Impression/Plan:  1. Stable angina (HCC)  EKG      2. Coronary artery disease of native artery of native heart with stable angina pectoris (HCC)           - try nitroglycerin tablets during periods of angina   - continue current medication regimen   - goal blood pressure less than 130/80   - [facility]         A total of 22 minutes of time was spent on day of encounter reviewing medical record, performing history and examination, counseling, ordering medication/test/consults, collaborating with referring service, and documentation.    Garrett Aviles [facility]-EP  Cardiac Electrophysiology    "

## 2023-09-20 NOTE — PROGRESS NOTES
REFERRING PHYSICIAN: HERBERT Duke     CONSULTING PHYSICIAN: Edu Velez DO     CHIEF COMPLAINT: Chest pain    HISTORY OF PRESENT ILLNESS: The patient is a 63 y.o. male with past medical history of diabetes mellitus, deep venous thrombosis, prior methamphetamine use who presents with chest pain. He states the pain is worse when he lays on his side, and also occurs intermittently during the day. This pain has been occurring since July 2023. He denies shortness of breath, orthopnea, PND, dizziness, and syncope. Angiogram reveals multivessel coronary artery disease.     PAST MEDICAL HISTORY:   Active Ambulatory Problems     Diagnosis Date Noted    Umbilical hernia     Type 2 diabetes mellitus without complication (CMS-[facility])     DVT of deep femoral vein, right ([facility]) 04/10/2018    Presence of inferior vena cava filter 04/10/2018    Obesity (BMI 30-39.9) 04/10/2018    Pure hypercholesterolemia 04/10/2018    Onychogryphosis 05/10/2019    Erectile dysfunction due to diseases classified elsewhere 05/10/2019    Attention deficit 07/17/2019    History of methamphetamine abuse ([facility]) 07/17/2019    ADHD (attention deficit hyperactivity disorder), combined type 11/04/2019    Depressive disorder 11/04/2019    Intermittent explosive disorder 01/27/2020    Other fatigue 04/20/2021    Other chest pain 07/12/2023    Right bundle branch block 07/12/2023    Abnormal EKG 07/12/2023    Bilious vomiting with nausea 07/12/2023    Stable angina ([facility]) 09/05/2023     Resolved Ambulatory Problems     Diagnosis Date Noted    Eczema     Preventative health care 05/05/2014    Neck strain 05/25/2018    Leg cramps 05/26/2018    Plantar fasciitis of left foot 12/13/2018    Excessive hunger 07/17/2019    Mood swings 07/17/2019    Prostate cancer screening 08/12/2020    Colon cancer screening 08/12/2020    Encounter for hepatitis C screening test for low risk patient 04/26/2021     Past Medical History:   Diagnosis Date    GERD  (gastroesophageal reflux disease)     Type II or unspecified type diabetes mellitus without mention of complication, not stated as uncontrolled        PAST SURGICAL HISTORY:   Past Surgical History:   Procedure Laterality Date    ROTATOR CUFF REPAIR      rt shoulder    TONSILLECTOMY AND ADENOIDECTOMY          ALLERGIES: No Known Allergies     CURRENT MEDICATIONS:   Current Outpatient Medications:     metoprolol tartrate (LOPRESSOR) 25 MG Tab, TAKE 1/2 (ONE-HALF) TABLET BY MOUTH EVERY 12 HOURS, Disp: 90 Tablet, Rfl: 3    nitroglycerin (NITROSTAT) 0.4 MG SL Tab, PLACE 1 TABLET UNDER THE TONGIE EVERY 5 MINUTES AS NEEDED FOR CHEST PAIN. DO NOT CHEW, CRUSH, OR SWALLOW SUBLINGUAL TABLET. FOR A MAXIMUM OF 3 DOSES, Disp: , Rfl:     amphetamine-dextroamphetamine ER (ADDERALL XR) 30 MG XR capsule, Take 1 Capsule by mouth every morning for 30 days., Disp: 30 Capsule, Rfl: 0    SITagliptin (JANUVIA) 100 MG Tab, Take 1 Tablet by mouth every day., Disp: 30 Tablet, Rfl: 3    Empagliflozin (JARDIANCE) 25 MG Tab, TAKE 1 TABLET BY MOUTH ONCE DAILY IN THE MORNING ., Disp: 30 Tablet, Rfl: 3    atorvastatin (LIPITOR) 40 MG Tab, Take 1 Tablet by mouth every day., Disp: 30 Tablet, Rfl: 3    lisinopril (PRINIVIL) 2.5 MG Tab, Take 1 Tablet by mouth every day. (for kidney protection), Disp: 30 Tablet, Rfl: 3    sertraline (ZOLOFT) 50 MG Tab, Take 1 Tablet by mouth every day., Disp: 90 Tablet, Rfl: 1    diclofenac sodium (VOLTAREN) 1 % Gel, Apply 4 g topically in the morning, at noon, and at bedtime., Disp: 50 g, Rfl: 0    tizanidine (ZANAFLEX) 4 MG capsule, Take 1 Capsule by mouth every 8 hours as needed (muscle spasms)., Disp: 30 Capsule, Rfl: 0    Lancet Devices (LANCET DEVICE WITH EJECTOR) Misc, True metrix lancet ejector device., Disp: 1 Each, Rfl: 0    tadalafil (CIALIS) 10 MG tablet, Take 1 tablet by mouth as needed for Erectile Dysfunction., Disp: 10 tablet, Rfl: 9    Blood Glucose Test Strips, Test strips order: Test strips for  Contour Next meter. Sig: use 2x/d and prn ssx high or low sugar #100 RF x 3, Disp: 100 Strip, Rfl: 3    aspirin EC (ECOTRIN) 81 MG Tablet Delayed Response, Take 2 Tabs by mouth every day., Disp: 30 Tab, Rfl:     Blood Glucose Monitoring Suppl Device, Meter: Dispense 1 Contour Next Device . Sig. Use as directed for blood sugar monitoring. #1. NR., Disp: 1 Device, Rfl: 0    glucose blood (TRUE METRIX BLOOD GLUCOSE TEST) strip, 1 Strip by Other route as needed., Disp: 300 Strip, Rfl: 1    Multiple Vitamin (MULTI VITAMIN MENS PO), Take 1 Tab by mouth every day., Disp: , Rfl:     FAMILY HISTORY:   Family History   Problem Relation Age of Onset    Cancer Mother         liver    Depression Mother     Alcohol abuse Mother     Cancer Father         lung    Alcohol abuse Father     Depression Sister         SOCIAL HISTORY:   Social History     Socioeconomic History    Marital status:      Spouse name: Not on file    Number of children: Not on file    Years of education: Not on file    Highest education level: 10th grade   Occupational History    Not on file   Tobacco Use    Smoking status: Never    Smokeless tobacco: Never   Vaping Use    Vaping Use: Never used   Substance and Sexual Activity    Alcohol use: Not Currently    Drug use: No    Sexual activity: Yes   Other Topics Concern    Not on file   Social History Narrative    Not on file     Social Determinants of Health     Financial Resource Strain: Low Risk  (7/12/2023)    Overall Financial Resource Strain (CARDIA)     Difficulty of Paying Living Expenses: Not very hard   Food Insecurity: Unknown (7/12/2023)    Hunger Vital Sign     Worried About Running Out of Food in the Last Year: Patient refused     Ran Out of Food in the Last Year: Patient refused   Transportation Needs: Unknown (7/12/2023)    PRAPARE - Transportation     Lack of Transportation (Medical): Patient refused     Lack of Transportation (Non-Medical): Not on file   Physical Activity: Unknown  (7/12/2023)    Exercise Vital Sign     Days of Exercise per Week: 5 days     Minutes of Exercise per Session: Patient refused   Stress: Unknown (7/12/2023)    [employer] - Occupational Stress Questionnaire     Feeling of Stress : Patient refused   Social Connections: Unknown (7/12/2023)    Social Connection and Isolation Panel [NHANES]     Frequency of Communication with Friends and Family: Patient refused     Frequency of Social Gatherings with Friends and Family: Patient refused     Attends Taoism Services: Patient refused     Active Member of Clubs or Organizations: No     Attends Club or Organization Meetings: Patient refused     Marital Status:    Intimate Partner Violence: Not on file   Housing Stability: Low Risk  (7/12/2023)    Housing Stability Vital Sign     Unable to Pay for Housing in the Last Year: No     Number of Places Lived in the Last Year: 1     Unstable Housing in the Last Year: No       REVIEW OF SYSTEMS:  Review of Systems   Constitutional:  Negative for chills, diaphoresis, fever and weight loss.   HENT:  Negative for congestion, ear pain, hearing loss, nosebleeds, sore throat and tinnitus.    Eyes:  Negative for blurred vision, double vision, pain and discharge.   Respiratory:  Negative for cough, hemoptysis, sputum production, wheezing and stridor.    Cardiovascular:  Positive for chest pain. Negative for palpitations, orthopnea and leg swelling.   Gastrointestinal:  Positive for nausea. Negative for abdominal pain, constipation, heartburn, melena and vomiting.   Genitourinary:  Negative for dysuria, flank pain and hematuria.   Musculoskeletal:  Negative for joint pain, myalgias and neck pain.   Skin:  Negative for itching and rash.   Neurological:  Negative for dizziness, speech change, focal weakness, seizures, weakness and headaches.   Endo/Heme/Allergies:  Negative for environmental allergies and polydipsia. Does not bruise/bleed easily.  "  Psychiatric/Behavioral:  Negative for depression, hallucinations, substance abuse and suicidal ideas.      PHYSICAL EXAMINATION:    /62 (BP Location: Left arm, Patient Position: Sitting, BP Cuff Size: Adult)   Pulse 91   Temp 36 °C (96.8 °F) (Temporal)   Ht 1.727 m (5' 8\")   Wt 107 kg (235 lb 9.6 oz)   SpO2 91%   BMI 35.82 kg/m²    Physical Exam  Constitutional:       General: He is not in acute distress.  HENT:      Head: Normocephalic and atraumatic.      Nose: Nose normal.   Eyes:      Conjunctiva/sclera: Conjunctivae normal.      Pupils: Pupils are equal, round, and reactive to light.   Neck:      Vascular: No JVD.      Trachea: No tracheal deviation.   Cardiovascular:      Rate and Rhythm: Normal rate and regular rhythm.      Heart sounds: No murmur heard.  Pulmonary:      Effort: Pulmonary effort is normal. No respiratory distress.      Breath sounds: Normal breath sounds. No stridor.   Abdominal:      General: Bowel sounds are normal. There is no distension.      Palpations: Abdomen is soft.      Tenderness: There is no abdominal tenderness.      Hernia: A hernia is present.   Musculoskeletal:         General: No tenderness. Normal range of motion.      Cervical back: Normal range of motion and neck supple.   Skin:     General: Skin is warm and dry.   Neurological:      Mental Status: He is alert and oriented to person, place, and time.      Coordination: Coordination normal.      Gait: Gait is intact.   Psychiatric:         Mood and Affect: Mood and affect normal.         Cognition and Memory: Memory normal.       LABS REVIEWED:  Lab Results   Component Value Date/Time    SODIUM 137 08/23/2023 07:13 AM    SODIUM 138 07/15/2023 11:16 AM    POTASSIUM 4.6 08/23/2023 07:13 AM    POTASSIUM 4.6 07/15/2023 11:16 AM    CHLORIDE 104 08/23/2023 07:13 AM    CHLORIDE 104 07/15/2023 11:16 AM    CO2 25 08/23/2023 07:13 AM    CO2 23 07/15/2023 11:16 AM    GLUCOSE 142 (H) 08/23/2023 07:13 AM    GLUCOSE 115 (H) " 07/15/2023 11:16 AM    BUN 21 (H) 08/23/2023 07:13 AM    BUN 17 07/15/2023 11:16 AM    CREATININE 0.86 08/23/2023 07:13 AM    CREATININE 0.60 07/15/2023 11:16 AM    GLOMRATE 90 08/23/2023 07:13 AM      Lab Results   Component Value Date/Time    PROTHROMBTM 13.5 06/17/2019 06:13 AM    INR 1.01 06/17/2019 06:13 AM      Lab Results   Component Value Date/Time    WBC 8.0 07/15/2023 11:16 AM    RBC 5.43 07/15/2023 11:16 AM    HEMOGLOBIN 15.8 07/15/2023 11:16 AM    HEMATOCRIT 49.2 07/15/2023 11:16 AM    MCV 90.6 07/15/2023 11:16 AM    MCH 29.1 07/15/2023 11:16 AM    MCHC 32.1 (L) 07/15/2023 11:16 AM    MPV 10.3 07/15/2023 11:16 AM    NEUTSPOLYS 51.80 07/15/2023 11:16 AM    LYMPHOCYTES 35.80 07/15/2023 11:16 AM    MONOCYTES 9.70 07/15/2023 11:16 AM    EOSINOPHILS 1.90 07/15/2023 11:16 AM    BASOPHILS 0.70 07/15/2023 11:16 AM        IMAGING REVIEWED AND INTERPRETED:    ECHOCARDIOGRAM   7/13/22 [facility]  CONCLUSIONS  Left ventricular systolic function is normal.  Mild mitral regurgitation.  Calcified aortic valve leaflets. No aortic valve stenosis.    CARDIAC CATHETERIZATION   8/23/23 [facility] [place]  Coronary Findings Diagnostic Dominance: Right   Left Main: Ost LM to Mid LM lesion is 40% stenosed.   Left Anterior Descending: Mid LAD lesion is 45% stenosed. iFR was measured. iFR ratio: 0.74. Mild to moderate mid LAD stenosis. s/p intracoronary physiologic assessment of the LAD which was significant for severe mid LAD disease.   Ramus Intermedius: Ramus lesion is 50% stenosed.   Left Circumflex: Ost Cx to Prox Cx lesion is 100% stenosed. First Obtuse Marginal Branch: 1st Mrg lesion is 40% stenosed. Third Obtuse Marginal Branch: 3rd Mrg filled by collaterals from 2nd Sept.   Right Coronary Artery: Ost RCA to Prox RCA lesion is 80% stenosed. Prox RCA lesion is 100% stenosed. Right Posterior Descending Artery: RPDA filled by collaterals from Dist LAD.     CT SCAN CHEST       IMPRESSION:  Multivessel coronary artery disease, stable angina,  diabetes mellitus type 2, history of deep venous thrombosis of right femoral vein, status post IVC filter placement, hypertension, hyperlipidemia right bundle branch block      PLAN:    I recommend CABG x3-4 with Endo vein harvest.  The patient and his wife had multiple questions regarding their options.  They had multiple concerns secondary to social issues related to his employment and having to be off for surgery.  The patient also was hesitant to be on no to light duty secondary to his employment.  Ultimately, we both feel that he needs to have surgery and again to discuss his options with his employment prior to undergoing.  The patient did see another cardiac surgeon prior to this visit, but would prefer to proceed with surgery here at this time.    The procedure, its risks, benefits, potential complications and alternative treatments were discussed with the patient in detail including the risks should he decide not to undergo my recommended treatment. All of his questions were answered to his satisfaction and he is willing to proceed with the operation. The risks include death, stroke, infection: to include a rare bacterial infection related to the use of the heart/lung machine, perry-operative myocardial infarction, dysrhythmias, diaphragmatic paralysis, chest wall paresthesia, tracheostomy, kidney or other organ failure, possible return to the operating room for bleeding, bleeding requiring transfusion with its attendant risks including AIDS or hepatitis, dehiscence of surgical incisions, respiratory complications including the need for prolonged ventilator support, Protamine or other drug reaction, peripheral neuropathy, loss of limb, and miscount of surgical items. The operative mortality risk is approximately 1%. The STS mortality risk score is 0.4% and the morbidity and mortality risk score is 3.3%. The scores were discussed with patient.     Thank you for this very challenging consultation and  participation in the patient’s care.  I will keep you apprised of all future developments.      The operation will be scheduled once the patient calls back      Sincerely,       Edu Velez DO.

## 2023-09-28 ENCOUNTER — PATIENT MESSAGE (OUTPATIENT)
Dept: BEHAVIORAL HEALTH | Facility: CLINIC | Age: 63
End: 2023-09-28

## 2023-09-28 ENCOUNTER — OFFICE VISIT (OUTPATIENT)
Dept: CARDIOTHORACIC SURGERY | Facility: MEDICAL CENTER | Age: 63
End: 2023-09-28
Payer: COMMERCIAL

## 2023-09-28 VITALS
SYSTOLIC BLOOD PRESSURE: 112 MMHG | HEIGHT: 68 IN | HEART RATE: 91 BPM | DIASTOLIC BLOOD PRESSURE: 62 MMHG | TEMPERATURE: 96.8 F | BODY MASS INDEX: 35.71 KG/M2 | OXYGEN SATURATION: 91 % | WEIGHT: 235.6 LBS

## 2023-09-28 DIAGNOSIS — F90.2 ADHD (ATTENTION DEFICIT HYPERACTIVITY DISORDER), COMBINED TYPE: ICD-10-CM

## 2023-09-28 DIAGNOSIS — I25.84 CORONARY ARTERY DISEASE DUE TO CALCIFIED CORONARY LESION: ICD-10-CM

## 2023-09-28 DIAGNOSIS — I25.10 CORONARY ARTERY DISEASE DUE TO CALCIFIED CORONARY LESION: ICD-10-CM

## 2023-09-28 PROCEDURE — 3074F SYST BP LT 130 MM HG: CPT | Performed by: THORACIC SURGERY (CARDIOTHORACIC VASCULAR SURGERY)

## 2023-09-28 PROCEDURE — 99205 OFFICE O/P NEW HI 60 MIN: CPT | Performed by: THORACIC SURGERY (CARDIOTHORACIC VASCULAR SURGERY)

## 2023-09-28 PROCEDURE — 3078F DIAST BP <80 MM HG: CPT | Performed by: THORACIC SURGERY (CARDIOTHORACIC VASCULAR SURGERY)

## 2023-09-28 RX ORDER — DEXTROAMPHETAMINE SACCHARATE, AMPHETAMINE ASPARTATE MONOHYDRATE, DEXTROAMPHETAMINE SULFATE AND AMPHETAMINE SULFATE 7.5; 7.5; 7.5; 7.5 MG/1; MG/1; MG/1; MG/1
30 CAPSULE, EXTENDED RELEASE ORAL EVERY MORNING
Qty: 30 CAPSULE | Refills: 0 | Status: SHIPPED | OUTPATIENT
Start: 2023-09-28 | End: 2023-10-30 | Stop reason: SDUPTHER

## 2023-09-28 ASSESSMENT — ENCOUNTER SYMPTOMS
WEIGHT LOSS: 0
SEIZURES: 0
SORE THROAT: 0
BRUISES/BLEEDS EASILY: 0
SPEECH CHANGE: 0
FEVER: 0
HALLUCINATIONS: 0
NECK PAIN: 0
POLYDIPSIA: 0
PALPITATIONS: 0
DOUBLE VISION: 0
HEADACHES: 0
CHILLS: 0
WEAKNESS: 0
SPUTUM PRODUCTION: 0
EYE PAIN: 0
FLANK PAIN: 0
STRIDOR: 0
WHEEZING: 0
HEMOPTYSIS: 0
DIAPHORESIS: 0
FOCAL WEAKNESS: 0
ORTHOPNEA: 0
DIZZINESS: 0
CONSTIPATION: 0
HEARTBURN: 0
VOMITING: 0
DEPRESSION: 0
BLURRED VISION: 0
COUGH: 0
NAUSEA: 1
ABDOMINAL PAIN: 0
EYE DISCHARGE: 0
MYALGIAS: 0

## 2023-09-28 ASSESSMENT — LIFESTYLE VARIABLES: SUBSTANCE_ABUSE: 0

## 2023-09-28 ASSESSMENT — FIBROSIS 4 INDEX: FIB4 SCORE: 0.94

## 2023-09-28 NOTE — PATIENT COMMUNICATION
Received request via: Patient    Was the patient seen in the last year in this department? Yes    Does the patient have an active prescription (recently filled or refills available) for medication(s) requested? No    Does the patient have [facility] and need 100 day supply (blood pressure, diabetes and cholesterol meds only)? Medication is not for cholesterol, blood pressure or diabetes

## 2023-10-04 ENCOUNTER — OFFICE VISIT (OUTPATIENT)
Dept: MEDICAL GROUP | Facility: MEDICAL CENTER | Age: 63
End: 2023-10-04
Payer: COMMERCIAL

## 2023-10-04 VITALS
DIASTOLIC BLOOD PRESSURE: 78 MMHG | WEIGHT: 235.23 LBS | OXYGEN SATURATION: 93 % | HEIGHT: 68 IN | SYSTOLIC BLOOD PRESSURE: 128 MMHG | BODY MASS INDEX: 35.65 KG/M2 | HEART RATE: 77 BPM | TEMPERATURE: 98.6 F

## 2023-10-04 DIAGNOSIS — E11.9 TYPE 2 DIABETES MELLITUS WITHOUT COMPLICATION, WITHOUT LONG-TERM CURRENT USE OF INSULIN (HCC): ICD-10-CM

## 2023-10-04 DIAGNOSIS — I82.411 DVT OF DEEP FEMORAL VEIN, RIGHT (HCC): ICD-10-CM

## 2023-10-04 DIAGNOSIS — E66.9 OBESITY (BMI 30-39.9): ICD-10-CM

## 2023-10-04 DIAGNOSIS — Z12.12 SCREENING FOR COLORECTAL CANCER: ICD-10-CM

## 2023-10-04 DIAGNOSIS — Z12.11 SCREENING FOR COLORECTAL CANCER: ICD-10-CM

## 2023-10-04 DIAGNOSIS — I25.10 CAD IN NATIVE ARTERY: ICD-10-CM

## 2023-10-04 LAB
HBA1C MFR BLD: 7.4 % (ref ?–5.8)
POCT INT CON NEG: NEGATIVE
POCT INT CON POS: POSITIVE

## 2023-10-04 PROCEDURE — 3074F SYST BP LT 130 MM HG: CPT | Performed by: STUDENT IN AN ORGANIZED HEALTH CARE EDUCATION/TRAINING PROGRAM

## 2023-10-04 PROCEDURE — 83036 HEMOGLOBIN GLYCOSYLATED A1C: CPT | Performed by: STUDENT IN AN ORGANIZED HEALTH CARE EDUCATION/TRAINING PROGRAM

## 2023-10-04 PROCEDURE — 99214 OFFICE O/P EST MOD 30 MIN: CPT | Performed by: STUDENT IN AN ORGANIZED HEALTH CARE EDUCATION/TRAINING PROGRAM

## 2023-10-04 PROCEDURE — 3078F DIAST BP <80 MM HG: CPT | Performed by: STUDENT IN AN ORGANIZED HEALTH CARE EDUCATION/TRAINING PROGRAM

## 2023-10-04 ASSESSMENT — ENCOUNTER SYMPTOMS
WHEEZING: 0
FEVER: 0
PALPITATIONS: 0
HEADACHES: 0
WEIGHT LOSS: 0
VOMITING: 0
NAUSEA: 0
SHORTNESS OF BREATH: 0
CHILLS: 0
DIZZINESS: 0

## 2023-10-04 ASSESSMENT — FIBROSIS 4 INDEX: FIB4 SCORE: 0.94

## 2023-10-04 ASSESSMENT — PATIENT HEALTH QUESTIONNAIRE - PHQ9: CLINICAL INTERPRETATION OF PHQ2 SCORE: 0

## 2023-10-04 NOTE — PROGRESS NOTES
"Subjective:     CC: Presents to establish    HPI:   Jensen presents today with    Last seen for epistaxis at urgent care  presents to establish    9/28/2023 neck thoracic surgery-recommend CABG x3-4 with Endo vein harvest  9/19/2023 seen in cardiology for intermittent chest pain in setting of type 2 diabetes, DVT status post IVC filter, multivessel disease history of amphetamine use, right bundle branch block  - Nitroglycerin as needed  - Continue aspirin, high intensity statin, metoprolol 25 mg twice daily, lisinopril 2.5 mg twice daily    Type 2 diabetes last A1c 7.9 months ago    ADHD  Depression  - adderall 30 mg   - sertraline 50 mg     Multivessel disease  - asa, atorvastatin   - metoprolol 25mg BID, lisopril 2.5mg   - nitroglycerin has not required since last cardiolgy visi t    T2DM- diagnosed around age 44   - jardiance 25mg daily  - januvia 100 mg daily  - cannot tolerate metformin     HTN   Lisinopril 2.5mg     Muscle spams  - tizandine          Problem   Cad in Native Artery    Multivessel disease has follow-up with cardiology and cardiothoracic surgery.  Planning for CABG however working through financial/administrative/social issues     Dvt of Deep Femoral Vein, Right (Hcc)    Report around age 30s, unprovoked thought to be related  Prolong drive per patient, s/p AC - IVC in place         Health Maintenance:     ROS:  Review of Systems   Constitutional:  Negative for chills, fever and weight loss.   HENT:  Negative for hearing loss.    Respiratory:  Negative for shortness of breath and wheezing.    Cardiovascular:  Negative for chest pain and palpitations.   Gastrointestinal:  Negative for nausea and vomiting.   Genitourinary:  Negative for frequency and urgency.   Skin:  Negative for rash.   Neurological:  Negative for dizziness and headaches.       Objective:     Exam:  /78 (BP Location: Left arm, Patient Position: Sitting)   Pulse 77   Temp 37 °C (98.6 °F) (Temporal)   Ht 1.727 m (5' 8\")   " Wt 107 kg (235 lb 3.7 oz)   SpO2 93%   BMI 35.77 kg/m²  Body mass index is 35.77 kg/m².    Physical Exam  Constitutional:       Appearance: Normal appearance.   Cardiovascular:      Rate and Rhythm: Normal rate and regular rhythm.   Pulmonary:      Effort: Pulmonary effort is normal.      Breath sounds: Normal breath sounds.   Musculoskeletal:      Cervical back: Normal range of motion and neck supple.   Lymphadenopathy:      Cervical: No cervical adenopathy.   Neurological:      Mental Status: He is alert.           Labs:     Assessment & Plan:     63 CAD in native artery with the following -     1. Type 2 diabetes mellitus without complication, without long-term current use of insulin (HCC)  Chronic, stable  On Jardiance 25 mg daily, and Januvia 100 mg daily, type 2 diabetes well-controlled, denies adverse effect  Cannot tolerate metformin  - POCT Hemoglobin A1C  - Diabetic Monofilament Lower Extremity Exam    2. DVT of deep femoral vein, right (HCC)  History of DVT previously on anticoagulation with IVC in place    3. Screening for colorectal cancer    - COLOGUARD (FIT DNA)    4. Obesity (BMI 30-39.9)  Chronic, stable  - Patient identified as having weight management issue.  Appropriate orders and counseling given.    5.CAD in native artery  Chronic, stable  Reviewed cardiology note  Reviewed cardiothoracic surgery note patient is currently on aspirin 81 daily and atorvastatin 40 mg LDL at goal, tolerating metoprolol 25 mg tab twice daily and lisinopril 2.5 mg daily without complaints of orthostatic hypotension or dizziness.    Return in about 3 months (around 1/4/2024) for chronic conditions.    Please note that this dictation was created using voice recognition software. I have made every reasonable attempt to correct obvious errors, but I expect that there are errors of grammar and possibly content that I did not discover before finalizing the note.

## 2023-10-05 ENCOUNTER — TELEPHONE (OUTPATIENT)
Dept: CARDIOTHORACIC SURGERY | Facility: MEDICAL CENTER | Age: 63
End: 2023-10-05
Payer: COMMERCIAL

## 2023-10-11 ENCOUNTER — PATIENT MESSAGE (OUTPATIENT)
Dept: MEDICAL GROUP | Facility: MEDICAL CENTER | Age: 63
End: 2023-10-11
Payer: COMMERCIAL

## 2023-10-11 DIAGNOSIS — E11.9 TYPE 2 DIABETES MELLITUS WITHOUT COMPLICATION, WITHOUT LONG-TERM CURRENT USE OF INSULIN (HCC): ICD-10-CM

## 2023-10-12 RX ORDER — LISINOPRIL 2.5 MG/1
2.5 TABLET ORAL DAILY
Qty: 30 TABLET | Refills: 3 | Status: SHIPPED | OUTPATIENT
Start: 2023-10-12 | End: 2024-02-01 | Stop reason: SDUPTHER

## 2023-10-30 ENCOUNTER — PATIENT MESSAGE (OUTPATIENT)
Dept: BEHAVIORAL HEALTH | Facility: CLINIC | Age: 63
End: 2023-10-30
Payer: COMMERCIAL

## 2023-10-30 DIAGNOSIS — F90.2 ADHD (ATTENTION DEFICIT HYPERACTIVITY DISORDER), COMBINED TYPE: ICD-10-CM

## 2023-10-30 RX ORDER — DEXTROAMPHETAMINE SACCHARATE, AMPHETAMINE ASPARTATE MONOHYDRATE, DEXTROAMPHETAMINE SULFATE AND AMPHETAMINE SULFATE 7.5; 7.5; 7.5; 7.5 MG/1; MG/1; MG/1; MG/1
30 CAPSULE, EXTENDED RELEASE ORAL EVERY MORNING
Qty: 30 CAPSULE | Refills: 0 | Status: SHIPPED | OUTPATIENT
Start: 2023-10-30 | End: 2023-11-27 | Stop reason: SDUPTHER

## 2023-10-30 NOTE — PATIENT COMMUNICATION
's patient.     Received request via: Patient    Was the patient seen in the last year in this department? Yes    Does the patient have an active prescription (recently filled or refills available) for medication(s) requested? No    Does the patient have nursing home Plus and need 100 day supply (blood pressure, diabetes and cholesterol meds only)? Medication is not for cholesterol, blood pressure or diabetes and Patient does not have SCP

## 2023-10-31 ENCOUNTER — TELEPHONE (OUTPATIENT)
Dept: CARDIOTHORACIC SURGERY | Facility: MEDICAL CENTER | Age: 63
End: 2023-10-31
Payer: COMMERCIAL

## 2023-10-31 NOTE — TELEPHONE ENCOUNTER
Patients wife states that he had to use a nitro today during what sounded like a stable angina event and think they need to schedule surgery but still have financial concerns about moving forward.     We discussed critical illness insurance, and that a pre existing condition that may preclude him from qualifying is something that should be determined by speaking with a representative rather than assuming based on looking at their web site.    She states she will call a speak with someone to determine if he is eligible.    She was quite concerned about his need to use the nitro during exertion today.  She was told she can reach out to cardiology about additional options (perhaps Imdur?) if appropriate in the event he cannot get critical illness and they need to continue to save financially prior to surgery or risk not having basics like food and shelter.      Call time 15 minutes

## 2023-11-01 ENCOUNTER — PATIENT MESSAGE (OUTPATIENT)
Dept: MEDICAL GROUP | Facility: MEDICAL CENTER | Age: 63
End: 2023-11-01
Payer: COMMERCIAL

## 2023-11-01 DIAGNOSIS — E11.9 TYPE 2 DIABETES MELLITUS WITHOUT COMPLICATION, WITHOUT LONG-TERM CURRENT USE OF INSULIN (HCC): ICD-10-CM

## 2023-11-07 ENCOUNTER — TELEPHONE (OUTPATIENT)
Dept: CARDIOLOGY | Facility: MEDICAL CENTER | Age: 63
End: 2023-11-07
Payer: COMMERCIAL

## 2023-11-07 NOTE — TELEPHONE ENCOUNTER
----- Message from NISA Lee sent at 11/6/2023  2:17 PM PST -----  Hello. We were referred this patient for CABG. He can not do surgery due to financial reasons but is still having chest pain. The [facility] is asking us to order him Imdur because he does not feel comfortable. I am wondering if he would be better served with PCI if he can not get/refuses surgery. I would love for him to see an interventionalist for an opinion.     Thank you!    Alcira

## 2023-11-07 NOTE — TELEPHONE ENCOUNTER
Patient's wife Hayley called back to discuss. Advised of APRN's recommendations. Scheduled consult with Dr. Campoverde 11/9.

## 2023-11-08 ENCOUNTER — TELEMEDICINE (OUTPATIENT)
Dept: BEHAVIORAL HEALTH | Facility: CLINIC | Age: 63
End: 2023-11-08
Payer: COMMERCIAL

## 2023-11-08 DIAGNOSIS — F32.A DEPRESSIVE DISORDER: ICD-10-CM

## 2023-11-08 DIAGNOSIS — F63.81 INTERMITTENT EXPLOSIVE DISORDER: ICD-10-CM

## 2023-11-08 DIAGNOSIS — F90.2 ADHD (ATTENTION DEFICIT HYPERACTIVITY DISORDER), COMBINED TYPE: ICD-10-CM

## 2023-11-08 PROCEDURE — 99214 OFFICE O/P EST MOD 30 MIN: CPT | Mod: 95 | Performed by: PSYCHIATRY & NEUROLOGY

## 2023-11-08 RX ORDER — DEXTROAMPHETAMINE SACCHARATE, AMPHETAMINE ASPARTATE MONOHYDRATE, DEXTROAMPHETAMINE SULFATE AND AMPHETAMINE SULFATE 7.5; 7.5; 7.5; 7.5 MG/1; MG/1; MG/1; MG/1
30 CAPSULE, EXTENDED RELEASE ORAL EVERY MORNING
Qty: 30 CAPSULE | Refills: 0 | Status: SHIPPED | OUTPATIENT
Start: 2023-11-30 | End: 2023-11-09

## 2023-11-08 RX ORDER — DEXTROAMPHETAMINE SACCHARATE, AMPHETAMINE ASPARTATE MONOHYDRATE, DEXTROAMPHETAMINE SULFATE AND AMPHETAMINE SULFATE 7.5; 7.5; 7.5; 7.5 MG/1; MG/1; MG/1; MG/1
30 CAPSULE, EXTENDED RELEASE ORAL EVERY MORNING
Qty: 30 CAPSULE | Refills: 0 | Status: SHIPPED | OUTPATIENT
Start: 2023-12-31 | End: 2023-11-09

## 2023-11-08 RX ORDER — DEXTROAMPHETAMINE SACCHARATE, AMPHETAMINE ASPARTATE MONOHYDRATE, DEXTROAMPHETAMINE SULFATE AND AMPHETAMINE SULFATE 7.5; 7.5; 7.5; 7.5 MG/1; MG/1; MG/1; MG/1
30 CAPSULE, EXTENDED RELEASE ORAL EVERY MORNING
Qty: 30 CAPSULE | Refills: 0 | Status: SHIPPED | OUTPATIENT
Start: 2024-01-31 | End: 2023-11-09

## 2023-11-08 NOTE — PROGRESS NOTES
This evaluation was conducted via Zoom using secure and encrypted videoconferencing technology. The patient was in their home in the [facility].    The patient's identity was confirmed and verbal consent was obtained for this virtual visit.      PSYCHIATRY FOLLOW-UP NOTE      Name: Jensen Leigh  MRN: [id number]  : 1960  Age: 63 y.o.  Date of assessment: 2023  PCP: Mahamed Santos M.D.  Persons in attendance: Patient      REASON FOR VISIT/CHIEF COMPLAINT (as stated by Patient):  Jensen Leigh is a 63 y.o., White male, attending follow-up appointment for mood and anxiety management.      HISTORY OF PRESENT ILLNESS:  Jensen Leigh is a 63 y.o. old male with MDD, IED and ADHD comes in today for follow up. Patient was last seen 6 months ago, and following treatment planning recommendations were done:  Continue Sertraline 50 mg daily for depression and anxiety management.  Given 3 month supply with 1 refill.  Continue Dexedrine ER 20 mg daily for ADHD management. Patient will send me a [employer] message 3 days before upcoming refill dates and prescription will be sent to pharmacy accordingly.  Controlled medication treatment agreement signed in 2021.      Patient is currently on Zoloft 50 mg and Adderall XR 30 mg.   Sleep is good; appetite normal; no side effects.  Mood and anxiety is stable      CURRENT MEDICATIONS:  Current Outpatient Medications   Medication Sig Dispense Refill    SITagliptin (JANUVIA) 100 MG Tab Take 1 Tablet by mouth every day. 90 Tablet 3    amphetamine-dextroamphetamine ER (ADDERALL XR) 30 MG XR capsule Take 1 Capsule by mouth every morning for 30 days. 30 Capsule 0    lisinopril (PRINIVIL) 2.5 MG Tab Take 1 Tablet by mouth every day. (for kidney protection) 30 Tablet 3    metoprolol tartrate (LOPRESSOR) 25 MG Tab TAKE 1/2 (ONE-HALF) TABLET BY MOUTH EVERY 12 HOURS 90 Tablet 3    nitroglycerin (NITROSTAT) 0.4 MG SL Tab PLACE 1 TABLET UNDER THE TONGIE EVERY 5 MINUTES  AS NEEDED FOR CHEST PAIN. DO NOT CHEW, CRUSH, OR SWALLOW SUBLINGUAL TABLET. FOR A MAXIMUM OF 3 DOSES      Empagliflozin (JARDIANCE) 25 MG Tab TAKE 1 TABLET BY MOUTH ONCE DAILY IN THE MORNING . 30 Tablet 3    atorvastatin (LIPITOR) 40 MG Tab Take 1 Tablet by mouth every day. 30 Tablet 3    sertraline (ZOLOFT) 50 MG Tab Take 1 Tablet by mouth every day. 90 Tablet 1    diclofenac sodium (VOLTAREN) 1 % Gel Apply 4 g topically in the morning, at noon, and at bedtime. 50 g 0    tizanidine (ZANAFLEX) 4 MG capsule Take 1 Capsule by mouth every 8 hours as needed (muscle spasms). 30 Capsule 0    Lancet Devices (LANCET DEVICE WITH EJECTOR) Misc True metrix lancet ejector device. 1 Each 0    tadalafil (CIALIS) 10 MG tablet Take 1 tablet by mouth as needed for Erectile Dysfunction. 10 tablet 9    Blood Glucose Test Strips Test strips order: Test strips for Contour Next meter. Sig: use 2x/d and prn ssx high or low sugar #100 RF x 3 100 Strip 3    aspirin EC (ECOTRIN) 81 MG Tablet Delayed Response Take 2 Tabs by mouth every day. 30 Tab     Blood Glucose Monitoring Suppl Device Meter: Dispense 1 Contour Next Device . Sig. Use as directed for blood sugar monitoring. #1. NR. 1 Device 0    glucose blood (TRUE METRIX BLOOD GLUCOSE TEST) strip 1 Strip by Other route as needed. 300 Strip 1    Multiple Vitamin (MULTI VITAMIN MENS PO) Take 1 Tab by mouth every day.       No current facility-administered medications for this visit.       MEDICAL HISTORY  Past Medical History:   Diagnosis Date    Clotting disorder (HCC)     Eczema     GERD (gastroesophageal reflux disease)     Hyperlipidemia     Type II or unspecified type diabetes mellitus without mention of complication, not stated as uncontrolled     Umbilical hernia      Past Surgical History:   Procedure Laterality Date    ROTATOR CUFF REPAIR      rt shoulder    TONSILLECTOMY AND ADENOIDECTOMY         PAST PSYCHIATRIC MEDICATIONS  Zoloft  Dexedrine      REVIEW OF SYSTEMS:         Constitutional negative   Eyes negative   Ears/Nose/Mouth/Throat negative   Cardiovascular negative   Respiratory negative   Gastrointestinal negative   Genitourinary negative   Muscular negative   Integumentary negative   Neurological negative   Endocrine negative   Hematologic/Lymphatic negative     PHYSICAL EXAMINAION:  Vital signs: There were no vitals taken for this visit.  Musculoskeletal: Normal gait.   Abnormal movements: none      MENTAL STATUS EXAMINATION      General:   - Grooming and hygiene: Casual,   - Apparent distress: none,   - Behavior: Calm  - Eye Contact:  Good,   - no psychomotor agitation or retardation    - Participation: Active verbal participation  Orientation: Alert and Fully Oriented to person, place and time  Mood: Euthymic  Affect: Flexible and Full range,  Thought Process: Logical and Goal-directed  Thought Content: Denies suicidal or homicidal ideations, intent or plan Within normal limits  Perception: Denies auditory or visual hallucinations. No delusions noted   Attention span and concentration: Intact   Speech:Rate within normal limits and Volume within normal limits  Language: Appropriate   Insight: Good  Judgment: Good  Recent and remote memory: No gross evidence of memory deficits        DEPRESSION SCREENIN/3/2021     5:00 PM 2022     2:00 PM 10/4/2023     2:00 PM   Depression Screen (PHQ-2/PHQ-9)   PHQ-2 Total Score 0 0 0       Interpretation of PHQ-9 Total Score   Score Severity   1-4 No Depression   5-9 Mild Depression   10-14 Moderate Depression   15-19 Moderately Severe Depression   20-27 Severe Depression    CURRENT RISK:       Suicidal: Low       Homicidal: Low       Self-Harm: Low       Relapse: Low       Crisis Safety Plan Reviewed Not Indicated       If evidence of imminent risk is present, intervention/plan:      MEDICAL RECORDS/LABS/DIAGNOSTIC TESTS REVIEWED:  No new lab since last visit     [place]  records -   Reviewed     PLAN:  (1) Depressive  Disorder; (2) Intermittent Explosive Disorder; (3) ADHD  Stable  Continue Sertraline 50 mg daily for depression and anxiety management.  Given 3 month supply with 1 refill.  Continue Adderall XR 30 mg daily for ADHD management. Patient will send me a [employer] message 3 days before upcoming refill dates and prescription will be sent to pharmacy accordingly.  Controlled medication treatment agreement signed in Feb 2021.  Medication options, alternatives (including no medications) and medication risks/benefits/side effects were discussed in detail.  The patient was advised to call, message provider on [employer], or come in to the clinic if symptoms worsen or if any future questions/issues regarding their medications arise; the patient verbalized understanding and agreement.    The patient was educated to call 911, call the suicide hotline, or go to local ER if having thoughts of suicide or homicide; verbalized understanding.    Billing Coding based on:  87746 based on MDM    Return to clinic in 4-5 months or sooner if symptoms worsen.  Next Appointment: instruction provided on how to make the next appointment.     The proposed treatment plan was discussed with the patient who was provided the opportunity to ask questions and make suggestions regarding alternative treatment. Patient verbalized understanding and expressed agreement with the plan.       Eliazar Prasad M.D.  11/08/23    This note was created using voice recognition software (Dragon). The accuracy of the dictation is limited by the abilities of the software. I have reviewed the note prior to signing, however some errors in grammar and context are still possible. If you have any questions related to this note please do not hesitate to contact our office.

## 2023-11-09 ENCOUNTER — PATIENT MESSAGE (OUTPATIENT)
Dept: CARDIOLOGY | Facility: MEDICAL CENTER | Age: 63
End: 2023-11-09

## 2023-11-09 ENCOUNTER — OFFICE VISIT (OUTPATIENT)
Dept: CARDIOLOGY | Facility: MEDICAL CENTER | Age: 63
End: 2023-11-09
Attending: INTERNAL MEDICINE
Payer: COMMERCIAL

## 2023-11-09 VITALS
DIASTOLIC BLOOD PRESSURE: 76 MMHG | OXYGEN SATURATION: 97 % | HEART RATE: 71 BPM | WEIGHT: 232 LBS | SYSTOLIC BLOOD PRESSURE: 120 MMHG | RESPIRATION RATE: 18 BRPM | BODY MASS INDEX: 35.16 KG/M2 | HEIGHT: 68 IN

## 2023-11-09 DIAGNOSIS — E78.5 DYSLIPIDEMIA: ICD-10-CM

## 2023-11-09 DIAGNOSIS — I20.9 ANGINA PECTORIS (HCC): ICD-10-CM

## 2023-11-09 DIAGNOSIS — I20.89 STABLE ANGINA (HCC): ICD-10-CM

## 2023-11-09 DIAGNOSIS — I10 HYPERTENSION, ESSENTIAL: ICD-10-CM

## 2023-11-09 DIAGNOSIS — I25.10 CORONARY ARTERY DISEASE DUE TO CALCIFIED CORONARY LESION: ICD-10-CM

## 2023-11-09 DIAGNOSIS — I25.84 CORONARY ARTERY DISEASE DUE TO CALCIFIED CORONARY LESION: ICD-10-CM

## 2023-11-09 DIAGNOSIS — E11.9 TYPE 2 DIABETES MELLITUS WITHOUT COMPLICATION, WITHOUT LONG-TERM CURRENT USE OF INSULIN (HCC): ICD-10-CM

## 2023-11-09 DIAGNOSIS — E78.00 PURE HYPERCHOLESTEROLEMIA: ICD-10-CM

## 2023-11-09 PROCEDURE — 3074F SYST BP LT 130 MM HG: CPT | Performed by: INTERNAL MEDICINE

## 2023-11-09 PROCEDURE — 99214 OFFICE O/P EST MOD 30 MIN: CPT | Performed by: INTERNAL MEDICINE

## 2023-11-09 PROCEDURE — 3078F DIAST BP <80 MM HG: CPT | Performed by: INTERNAL MEDICINE

## 2023-11-09 PROCEDURE — 99213 OFFICE O/P EST LOW 20 MIN: CPT | Performed by: INTERNAL MEDICINE

## 2023-11-09 RX ORDER — ATORVASTATIN CALCIUM 40 MG/1
40 TABLET, FILM COATED ORAL DAILY
Qty: 90 TABLET | Refills: 2 | Status: SHIPPED | OUTPATIENT
Start: 2023-11-09

## 2023-11-09 RX ORDER — NITROGLYCERIN 0.4 MG/1
0.4 TABLET SUBLINGUAL PRN
Qty: 25 TABLET | Refills: 11 | Status: SHIPPED | OUTPATIENT
Start: 2023-11-09

## 2023-11-09 RX ORDER — METOPROLOL SUCCINATE 50 MG/1
50 TABLET, EXTENDED RELEASE ORAL DAILY
Qty: 100 TABLET | Refills: 3 | Status: SHIPPED | OUTPATIENT
Start: 2023-11-09

## 2023-11-09 ASSESSMENT — FIBROSIS 4 INDEX: FIB4 SCORE: 0.94

## 2023-11-09 NOTE — PROGRESS NOTES
CARDIOLOGY OUTPATIENT FOLLOWUP    PCP: Mahamed Santos M.D.    1. Coronary artery disease due to calcified coronary lesion    2. Angina pectoris (HCC)    3. Stable angina    4. Dyslipidemia    5. Hypertension, essential    6. Type 2 diabetes mellitus without complication, without long-term current use of insulin (HCC)      Jensen Leigh has class II angina symptoms with obstructive two-vessel coronary artery disease.  He is not interested in bypass surgery which I think is reasonable as he does not clearly stand to benefit from a LIMA graft.  At this juncture, medical therapy is also appropriate and I therefore increased metoprolol and will check in on him in a couple weeks to see if further titration of antianginals is warranted.  I counseled him about physical activity and working within his body's limit.  I will request a copy of the angiogram films from [facility] and will also arrange a clinic follow-up visit with Dr. Locke.    Follow up: 3 months    History: Jensen Leigh is a 63 y.o. male diabetic man with obstructive two-vessel coronary artery disease, normal LVEF, prior methamphetamine abuse presenting for second opinion regarding need for bypass surgery.  He also has a history of DVT with IVC filter.    In August he had a angiogram performed at [facility] which showed a 40% left main lesion, approximately 50% LAD lesion, IFR abnormal with the LAD supplying collaterals to the occluded circumflex and RCA.  The ramus had a 50% stenosis.  The occluded vessels are described as being occluded in the ostial proximal segment.    He was offered bypass but not interested partly due to inability to adhere to post bypass activity restrictions as he is employed full-time in a physically demanding profession.  He also does not like the idea of being cut open.    He reports continuing to work 10 to 12-hour days with rare angina when overdoing it.  He did take a nitroglycerin for the first time  "several days ago.  Frequently experiences angina maybe 2 or 3 times per week.      Physical Exam:  /76 (BP Location: Left arm, Patient Position: Sitting, BP Cuff Size: Adult)   Pulse 71   Resp 18   Ht 1.727 m (5' 8\")   Wt 105 kg (232 lb)   SpO2 97%   BMI 35.28 kg/m²   GEN: NAD  RESP: CTAB  CVS: RRR, No M/R/G  ABD: Soft, NT/ND  EXT: WWP, no edema    The ASCVD Risk score (Mary MAXWELL, et al., 2019) failed to calculate.    Today we discussed decision making regarding major surgery with elevated patient risk factors diabetic -discussion pertaining to coronary artery bypass surgery as well as risks benefits and alternatives.    The patient is experiencing symptoms of a chronic illness with threat to bodily function.    Studies  Lab Results   Component Value Date/Time    CHOLSTRLTOT 126 07/15/2023 11:16 AM    LDL 68 07/15/2023 11:16 AM    HDL 46 07/15/2023 11:16 AM    TRIGLYCERIDE 59 07/15/2023 11:16 AM       Lab Results   Component Value Date/Time    SODIUM 137 08/23/2023 07:13 AM    SODIUM 138 07/15/2023 11:16 AM    POTASSIUM 4.6 08/23/2023 07:13 AM    POTASSIUM 4.6 07/15/2023 11:16 AM    CHLORIDE 104 08/23/2023 07:13 AM    CHLORIDE 104 07/15/2023 11:16 AM    CO2 25 08/23/2023 07:13 AM    CO2 23 07/15/2023 11:16 AM    GLUCOSE 142 (H) 08/23/2023 07:13 AM    GLUCOSE 115 (H) 07/15/2023 11:16 AM    BUN 21 (H) 08/23/2023 07:13 AM    BUN 17 07/15/2023 11:16 AM    CREATININE 0.86 08/23/2023 07:13 AM    CREATININE 0.60 07/15/2023 11:16 AM    GLOMRATE 90 08/23/2023 07:13 AM     Lab Results   Component Value Date/Time    ALKPHOSPHAT 81 07/15/2023 11:16 AM    ASTSGOT 15 07/15/2023 11:16 AM    ALTSGPT 27 07/15/2023 11:16 AM    TBILIRUBIN 0.8 07/15/2023 11:16 AM        Past Medical History:   Diagnosis Date    Clotting disorder (HCC)     Eczema     GERD (gastroesophageal reflux disease)     Hyperlipidemia     Type II or unspecified type diabetes mellitus without mention of complication, not stated as uncontrolled     " Umbilical hernia      No Known Allergies  Outpatient Encounter Medications as of 11/9/2023   Medication Sig Dispense Refill    atorvastatin (LIPITOR) 40 MG Tab Take 1 Tablet by mouth every day. 90 Tablet 2    metoprolol SR (TOPROL XL) 50 MG TABLET SR 24 HR Take 1 Tablet by mouth every day. 100 Tablet 3    nitroglycerin (NITROSTAT) 0.4 MG SL Tab Place 1 Tablet under the tongue as needed for Chest Pain. 25 Tablet 11    sertraline (ZOLOFT) 50 MG Tab Take 1 Tablet by mouth every day. 90 Tablet 1    SITagliptin (JANUVIA) 100 MG Tab Take 1 Tablet by mouth every day. 90 Tablet 3    amphetamine-dextroamphetamine ER (ADDERALL XR) 30 MG XR capsule Take 1 Capsule by mouth every morning for 30 days. 30 Capsule 0    lisinopril (PRINIVIL) 2.5 MG Tab Take 1 Tablet by mouth every day. (for kidney protection) 30 Tablet 3    Empagliflozin (JARDIANCE) 25 MG Tab TAKE 1 TABLET BY MOUTH ONCE DAILY IN THE MORNING . 30 Tablet 3    diclofenac sodium (VOLTAREN) 1 % Gel Apply 4 g topically in the morning, at noon, and at bedtime. 50 g 0    tizanidine (ZANAFLEX) 4 MG capsule Take 1 Capsule by mouth every 8 hours as needed (muscle spasms). 30 Capsule 0    Lancet Devices (LANCET DEVICE WITH EJECTOR) Misc True metrix lancet ejector device. 1 Each 0    tadalafil (CIALIS) 10 MG tablet Take 1 tablet by mouth as needed for Erectile Dysfunction. 10 tablet 9    Blood Glucose Test Strips Test strips order: Test strips for Contour Next meter. Sig: use 2x/d and prn ssx high or low sugar #100 RF x 3 100 Strip 3    aspirin EC (ECOTRIN) 81 MG Tablet Delayed Response Take 2 Tabs by mouth every day. 30 Tab     Blood Glucose Monitoring Suppl Device Meter: Dispense 1 Contour Next Device . Sig. Use as directed for blood sugar monitoring. #1. NR. 1 Device 0    glucose blood (TRUE METRIX BLOOD GLUCOSE TEST) strip 1 Strip by Other route as needed. 300 Strip 1    Multiple Vitamin (MULTI VITAMIN MENS PO) Take 1 Tab by mouth every day.      [DISCONTINUED]  amphetamine-dextroamphetamine ER (ADDERALL XR) 30 MG XR capsule Take 1 Capsule by mouth every morning for 30 days. (Patient not taking: Reported on 11/9/2023) 30 Capsule 0    [DISCONTINUED] amphetamine-dextroamphetamine ER (ADDERALL XR) 30 MG XR capsule Take 1 Capsule by mouth every morning for 30 days. (Patient not taking: Reported on 11/9/2023) 30 Capsule 0    [DISCONTINUED] amphetamine-dextroamphetamine ER (ADDERALL XR) 30 MG XR capsule Take 1 Capsule by mouth every morning for 30 days. (Patient not taking: Reported on 11/9/2023) 30 Capsule 0    [DISCONTINUED] metoprolol tartrate (LOPRESSOR) 25 MG Tab TAKE 1/2 (ONE-HALF) TABLET BY MOUTH EVERY 12 HOURS 90 Tablet 3    [DISCONTINUED] nitroglycerin (NITROSTAT) 0.4 MG SL Tab PLACE 1 TABLET UNDER THE TONGIE EVERY 5 MINUTES AS NEEDED FOR CHEST PAIN. DO NOT CHEW, CRUSH, OR SWALLOW SUBLINGUAL TABLET. FOR A MAXIMUM OF 3 DOSES       No facility-administered encounter medications on file as of 11/9/2023.     Social History     Socioeconomic History    Marital status:      Spouse name: Not on file    Number of children: Not on file    Years of education: Not on file    Highest education level: 10th grade   Occupational History    Not on file   Tobacco Use    Smoking status: Never    Smokeless tobacco: Never   Vaping Use    Vaping Use: Never used   Substance and Sexual Activity    Alcohol use: Not Currently    Drug use: No    Sexual activity: Yes     Partners: Female     Birth control/protection: None   Other Topics Concern    Not on file   Social History Narrative    Not on file     Social Determinants of Health     Financial Resource Strain: Low Risk  (7/12/2023)    Overall Financial Resource Strain (CARDIA)     Difficulty of Paying Living Expenses: Not very hard   Food Insecurity: Unknown (7/12/2023)    Hunger Vital Sign     Worried About Running Out of Food in the Last Year: Patient refused     Ran Out of Food in the Last Year: Patient refused   Transportation  Needs: Unknown (7/12/2023)    PRAPARE - Transportation     Lack of Transportation (Medical): Patient refused     Lack of Transportation (Non-Medical): Not on file   Physical Activity: Unknown (7/12/2023)    Exercise Vital Sign     Days of Exercise per Week: 5 days     Minutes of Exercise per Session: Patient refused   Stress: Unknown (7/12/2023)    [employer] [place] [employer] - Occupational Stress Questionnaire     Feeling of Stress : Patient refused   Social Connections: Unknown (7/12/2023)    Social Connection and Isolation Panel [NHANES]     Frequency of Communication with Friends and Family: Patient refused     Frequency of Social Gatherings with Friends and Family: Patient refused     Attends Yarsanism Services: Patient refused     Active Member of Clubs or Organizations: No     Attends Club or Organization Meetings: Patient refused     Marital Status:    Intimate Partner Violence: Not on file   Housing Stability: Low Risk  (7/12/2023)    Housing Stability Vital Sign     Unable to Pay for Housing in the Last Year: No     Number of Places Lived in the Last Year: 1     Unstable Housing in the Last Year: No         ROS:   10 point review systems is otherwise negative except as per the HPI    Chief Complaint   Patient presents with    Chest Pain    Coronary Artery Disease

## 2023-11-10 ENCOUNTER — TELEPHONE (OUTPATIENT)
Dept: CARDIOLOGY | Facility: MEDICAL CENTER | Age: 63
End: 2023-11-10
Payer: COMMERCIAL

## 2023-11-10 NOTE — TELEPHONE ENCOUNTER
Patient's wife Hayley called to inquire about the POC. She and the patient are a bit upset about why they're being referred to another interventional cardiologist. Advised I would have to reach out to Dr. Campoverde to find out the exact plan. Advised he is out of the office today, but I will send him a message and have his RN reach back out to her as soon as possible.     We discussed the patient's new medication (metoprolol SR) and potential side effects of decreased BP/HR. Discussed trying to take it at night time to minimize potential effects of sluggishness/fatigue. Hayley verbalizes understanding.     Advised Hayley we would obtain the patient's cath images from Dr. Velez's office or re-request them from [facility].

## 2023-11-14 ENCOUNTER — HOSPITAL ENCOUNTER (OUTPATIENT)
Dept: RADIOLOGY | Facility: MEDICAL CENTER | Age: 63
End: 2023-11-14
Payer: COMMERCIAL

## 2023-11-23 ENCOUNTER — PATIENT MESSAGE (OUTPATIENT)
Dept: CARDIOLOGY | Facility: MEDICAL CENTER | Age: 63
End: 2023-11-23
Payer: COMMERCIAL

## 2023-11-25 ENCOUNTER — PATIENT MESSAGE (OUTPATIENT)
Dept: BEHAVIORAL HEALTH | Facility: CLINIC | Age: 63
End: 2023-11-25
Payer: COMMERCIAL

## 2023-11-25 DIAGNOSIS — F90.2 ADHD (ATTENTION DEFICIT HYPERACTIVITY DISORDER), COMBINED TYPE: ICD-10-CM

## 2023-11-27 ENCOUNTER — TELEPHONE (OUTPATIENT)
Dept: CARDIOLOGY | Facility: MEDICAL CENTER | Age: 63
End: 2023-11-27
Payer: COMMERCIAL

## 2023-11-27 NOTE — TELEPHONE ENCOUNTER
BE    Caller: Hayley- Wife    Topic/issue: They are wanting to know where the copy of the angiogram disc is? They have spoken to Customer Service and Records who would not give her any information. She states her  saw a Cardiothoracic surgeon with [facility] and they had the copy. She thought BE would have ordered this disc. Since he is wanting refer him to [facility]. She is wanting to know what the next step is and where this disc is.     Callback Number: [phone] (home)      Thank you  Ladonna markham

## 2023-11-27 NOTE — TELEPHONE ENCOUNTER
Phone Number Called: [phone]    Call outcome:  spoke to patient wife    Message: Patient wife wanting to know if angiogram was received from [facility]. Angiogram has been uploaded into chart previously.  Advised that this RN would reach out to BE to have him review for recommendations on next steps. All questions answered at this time. Advised to call back with any further questions or concerns.     To [facility]: please review angiogram and advise (under media tab labeled as outside x-ray), thank you!

## 2023-11-28 RX ORDER — DEXTROAMPHETAMINE SACCHARATE, AMPHETAMINE ASPARTATE MONOHYDRATE, DEXTROAMPHETAMINE SULFATE AND AMPHETAMINE SULFATE 7.5; 7.5; 7.5; 7.5 MG/1; MG/1; MG/1; MG/1
30 CAPSULE, EXTENDED RELEASE ORAL EVERY MORNING
Qty: 30 CAPSULE | Refills: 0 | Status: SHIPPED | OUTPATIENT
Start: 2023-11-30 | End: 2023-12-22 | Stop reason: SDUPTHER

## 2023-11-28 NOTE — TELEPHONE ENCOUNTER
Nghia Campoverde M.D.  You17 hours ago (4:13 PM)     Yes I see the film now. Will review and get back to ptMendel SANDERS     Phone Number Called: [phone]    Call outcome:  spoke to patient wife     Message: Provided updated to patient about [facility] reading angiogram and plan of care moving forward. All questions answered at this time. Advised to call back with any further questions or concerns.

## 2023-12-01 ENCOUNTER — PATIENT MESSAGE (OUTPATIENT)
Dept: CARDIOLOGY | Facility: MEDICAL CENTER | Age: 63
End: 2023-12-01
Payer: COMMERCIAL

## 2023-12-01 NOTE — TELEPHONE ENCOUNTER
Nghia Campoverde M.D.  You2 days ago     Angio shows two vessel . I suggest consultation with our  specialist Dr. Locke.    Thx  BE     To Schedulers: please call to get patient scheduled with [facility], thank you!

## 2023-12-04 NOTE — TELEPHONE ENCOUNTER
Phone Number Called: [phone]    Call outcome:  spoke to patient wife    Message: Patient wife wondering if patient can be scheduled for appointment with [facility] for consultation. Patient scheduled with [facility] at this time. All questions answered at this time. Advised to call back with any further questions or concerns.

## 2023-12-22 ENCOUNTER — PATIENT MESSAGE (OUTPATIENT)
Dept: BEHAVIORAL HEALTH | Facility: CLINIC | Age: 63
End: 2023-12-22
Payer: COMMERCIAL

## 2023-12-22 DIAGNOSIS — F90.2 ADHD (ATTENTION DEFICIT HYPERACTIVITY DISORDER), COMBINED TYPE: ICD-10-CM

## 2023-12-22 RX ORDER — DEXTROAMPHETAMINE SACCHARATE, AMPHETAMINE ASPARTATE MONOHYDRATE, DEXTROAMPHETAMINE SULFATE AND AMPHETAMINE SULFATE 7.5; 7.5; 7.5; 7.5 MG/1; MG/1; MG/1; MG/1
30 CAPSULE, EXTENDED RELEASE ORAL EVERY MORNING
Qty: 30 CAPSULE | Refills: 0 | Status: SHIPPED | OUTPATIENT
Start: 2023-12-29 | End: 2024-01-23 | Stop reason: SDUPTHER

## 2023-12-22 NOTE — PATIENT COMMUNICATION
Received request via: Patient    Was the patient seen in the last year in this department? Yes    Does the patient have an active prescription (recently filled or refills available) for medication(s) requested? No    Does the patient have custodial Plus and need 100 day supply (blood pressure, diabetes and cholesterol meds only)? Medication is not for cholesterol, blood pressure or diabetes

## 2023-12-28 ENCOUNTER — TELEPHONE (OUTPATIENT)
Dept: CARDIOLOGY | Facility: MEDICAL CENTER | Age: 63
End: 2023-12-28
Payer: COMMERCIAL

## 2023-12-28 NOTE — TELEPHONE ENCOUNTER
LVM asking if patient has seen another Cardiologist in the past or had any cardiac testing done outside of [facility] to call with information so that records can be requested prior to appointment.     [facility]  1/23/24

## 2024-01-17 NOTE — PROGRESS NOTES
INTERVENTIONAL CARDIOLOGY COMPLEX REVASCULARIZATION  CONSULTATION NOTE      Date of Visit: 1/23/2024    Primary Care Provider: Mahamed Santos M.D.    Patient Name: Jensen Leigh  YOB: 1960  MRN: [id number]     Reason for Visit:   Complex revascularization consultation     Patient Story:   Jensen Leigh is a 63 year-old gentleman with history of hypertension, hyperlipidemia, and type 2 diabetes who recently underwent coronary angiography at [facility] to evaluate worsening anginal symptoms.  This demonstrated severe three-vessel coronary artery disease with chronic total occlusions of the proximal RCA and proximal LCx and at least moderate disease of the left main.  IFR was performed on the LAD, which was reported to be hemodynamically significant, although this was in setting of LAD collateralization to both the RCA and LCx territories.  He was evaluated during that hospitalization by Dr. Almonte and was not interested in CABG at that time, mainly due to the length of recovery time that surgery would require.  He was seen by Dr. Campoverde for evaluation and then referred to my clinic for further discussion regarding his complex coronary disease.    He presents today for consultation regarding the risk/benefits/alternatives and feasibility of complex multivessel revascularization.  Currently, he reports continuing to have frequent episodes of angina.  These are mostly brought on by exertion, although relatively low levels of exertion can cause symptoms.  Typically, his episodes of angina will resolve just with rest, although he has had to use nitroglycerin twice.  He again notes that he was very concerned by the length of time that he may be out of work if he were to undergo CABG, which she was told would be about 3 months.     Medications and Allergies:     Current Outpatient Medications   Medication Sig Dispense Refill    [START ON 1/27/2024] amphetamine-dextroamphetamine  ER (ADDERALL XR) 30 MG XR capsule Take 1 Capsule by mouth every morning for 30 days. 30 Capsule 0    atorvastatin (LIPITOR) 40 MG Tab Take 1 Tablet by mouth every day. 90 Tablet 2    metoprolol SR (TOPROL XL) 50 MG TABLET SR 24 HR Take 1 Tablet by mouth every day. 100 Tablet 3    nitroglycerin (NITROSTAT) 0.4 MG SL Tab Place 1 Tablet under the tongue as needed for Chest Pain. 25 Tablet 11    sertraline (ZOLOFT) 50 MG Tab Take 1 Tablet by mouth every day. 90 Tablet 1    SITagliptin (JANUVIA) 100 MG Tab Take 1 Tablet by mouth every day. 90 Tablet 3    lisinopril (PRINIVIL) 2.5 MG Tab Take 1 Tablet by mouth every day. (for kidney protection) 30 Tablet 3    Empagliflozin (JARDIANCE) 25 MG Tab TAKE 1 TABLET BY MOUTH ONCE DAILY IN THE MORNING . 30 Tablet 3    aspirin EC (ECOTRIN) 81 MG Tablet Delayed Response Take 2 Tabs by mouth every day. 30 Tab     Multiple Vitamin (MULTI VITAMIN MENS PO) Take 1 Tab by mouth every day.       No current facility-administered medications for this visit.     No Known Allergies     Medical Decision Making:   I reviewed the patient's coronary angiogram images in detail with the patient and his wife.  I discussed that his RCA is very diffusely diseased and would require, not only revascularization of a short segment of  in the proximal vessel, but would also require extensive atherectomy and stenting of almost the entire vessel to achieve an adequate outcome.  I further discussed that the morphology of his LCx  was particularly challenging as his LCx arises from the left main at a 90 degree bend and has another acute bend after the  segment.  This anatomy makes makes a  intervention challenging and significantly impacts our ability to maintain support for a complex intervention.  Additionally, the left main is significantly diseased and would almost certainly be occluded by a guide extension catheter, which would most likely be needed to intervene on the LCx.  This would require  "either stenting the left main prior to attempting intervention on the LCx or placing an Impella to support a complex LCx intervention.  If a left main stent was placed at the ostium of the LCx, this would possibly make passing interventional care into the LCx very challenging.  Additionally, I discussed that, in diabetic patients with multivessel disease, there is a strong preference for surgery given the mortality and other benefits that diabetics received from surgery over stenting, particularly if an extensive number of stents or complex percutaneous procedures are required.  Overall, I strongly recommended that the patient reconsider CABG rather than proceeding with what would be a challenging, high risk, and potentially unsuccessful percutaneous revascularization strategy that would require multiple procedures for completion.    After reviewing his images and my recommendations with his wife.  The patient was agreeable to proceeding with multivessel CABG as previously recommended by Dr. Velez.  For now, he can continue on his current medications and use nitroglycerin as needed for very severe episodes of angina.  He was instructed to stop any activities causing angina and to proceed to the ED if he developed chest pain that was not resolving with rest or nitroglycerin.    Follow Up  After surgery     Cardiac Studies and Procedures:   Echocardiography  TTE (7/13/2023)  Left ventricular systolic function is normal.  Mild mitral regurgitation.  Calcified aortic valve leaflets. No aortic valve stenosis.    Coronary Angiography/Cardiac Catheterization  I personally reviewed and interpreted his coronary angiogram images from [facility] in detail.  See above for pertinent findings.     Vital Signs:   /72 (BP Location: Left arm, Patient Position: Sitting)   Pulse 70   Resp 16   Ht 1.727 m (5' 8\")   Wt 108 kg (238 lb)   SpO2 96%    BP Readings from Last 4 Encounters:   01/23/24 122/72 " "  11/09/23 120/76   10/04/23 128/78   09/28/23 112/62     Wt Readings from Last 4 Encounters:   01/23/24 108 kg (238 lb)   11/09/23 105 kg (232 lb)   10/04/23 107 kg (235 lb 3.7 oz)   09/28/23 107 kg (235 lb 9.6 oz)     Body mass index is 36.19 kg/m².     Laboratories:   Lipids  Lab Results   Component Value Date/Time    LDL 68 07/15/2023 11:16 AM    LDL 77 11/29/2022 06:13 AM     (H) 12/09/2021 06:14 AM    LDL 84 04/22/2021 06:39 AM    LDL 76 02/20/2020 06:45 AM       Lab Results   Component Value Date/Time    HDL 46 07/15/2023 11:16 AM    HDL 45 11/29/2022 06:13 AM    HDL 45 12/09/2021 06:14 AM    HDL 41 04/22/2021 06:39 AM    HDL 45 02/20/2020 06:45 AM       Lab Results   Component Value Date/Time    TRIGLYCERIDE 59 07/15/2023 11:16 AM    TRIGLYCERIDE 78 11/29/2022 06:13 AM    TRIGLYCERIDE 63 12/09/2021 06:14 AM    TRIGLYCERIDE 104 04/22/2021 06:39 AM    TRIGLYCERIDE 76 02/20/2020 06:45 AM       Lab Results   Component Value Date/Time    CHOLSTRLTOT 126 07/15/2023 11:16 AM    CHOLSTRLTOT 138 11/29/2022 06:13 AM    CHOLSTRLTOT 162 12/09/2021 06:14 AM    CHOLSTRLTOT 146 04/22/2021 06:39 AM    CHOLSTRLTOT 136 02/20/2020 06:45 AM       No components found for: \"LPA\"      Chemistries  Lab Results   Component Value Date/Time    CREATININE 0.86 08/23/2023 07:13 AM    CREATININE 0.60 07/15/2023 11:16 AM    CREATININE 0.72 11/29/2022 06:13 AM    CREATININE 0.80 12/09/2021 06:14 AM    CREATININE 0.82 04/22/2021 06:39 AM    CREATININE 1.00 02/20/2020 06:45 AM     Lab Results   Component Value Date/Time    BUN 21 (H) 08/23/2023 07:13 AM    BUN 17 07/15/2023 11:16 AM    BUN 17 11/29/2022 06:13 AM    BUN 16 12/09/2021 06:14 AM    BUN 14 04/22/2021 06:39 AM    BUN 18 02/20/2020 06:45 AM     Lab Results   Component Value Date/Time    POTASSIUM 4.6 08/23/2023 07:13 AM    POTASSIUM 4.6 07/15/2023 11:16 AM    POTASSIUM 4.5 11/29/2022 06:13 AM    POTASSIUM 4.6 12/09/2021 06:14 AM     Lab Results   Component Value Date/Time    " "SODIUM 137 08/23/2023 07:13 AM    SODIUM 138 07/15/2023 11:16 AM    SODIUM 141 11/29/2022 06:13 AM    SODIUM 138 12/09/2021 06:14 AM     Lab Results   Component Value Date/Time    GLUCOSE 142 (H) 08/23/2023 07:13 AM    GLUCOSE 115 (H) 07/15/2023 11:16 AM    GLUCOSE 144 (H) 11/29/2022 06:13 AM    GLUCOSE 149 (H) 12/09/2021 06:14 AM     Lab Results   Component Value Date/Time    ASTSGOT 15 07/15/2023 11:16 AM    ASTSGOT 26 11/29/2022 06:13 AM    ASTSGOT 21 12/09/2021 06:14 AM     Lab Results   Component Value Date/Time    ALTSGPT 27 07/15/2023 11:16 AM    ALTSGPT 26 11/29/2022 06:13 AM    ALTSGPT 25 12/09/2021 06:14 AM     Lab Results   Component Value Date/Time    ALKPHOSPHAT 81 07/15/2023 11:16 AM    ALKPHOSPHAT 108 (H) 11/29/2022 06:13 AM    ALKPHOSPHAT 98 12/09/2021 06:14 AM     Lab Results   Component Value Date/Time    HBA1C 7.4 (A) 10/04/2023 02:01 PM    HBA1C 7.9 (A) 07/12/2023 05:50 PM    HBA1C 7.4 (A) 12/14/2022 01:54 PM     No results found for: \"TSH\"  No results found for: \"NTPROBNP\"  No results found for: \"TROPONINT\"    Blood Counts  Lab Results   Component Value Date/Time    HEMOGLOBIN 15.8 07/15/2023 11:16 AM    HEMOGLOBIN 17.1 04/22/2021 06:39 AM    HEMOGLOBIN 17.2 02/20/2020 06:45 AM     Lab Results   Component Value Date/Time    PLATELETCT 196 07/15/2023 11:16 AM    PLATELETCT 206 04/22/2021 06:39 AM    PLATELETCT 208 02/20/2020 06:45 AM     Lab Results   Component Value Date/Time    WBC 8.0 07/15/2023 11:16 AM    WBC 6.6 04/22/2021 06:39 AM    WBC 7.9 02/20/2020 06:45 AM        Physical Examination:   General: Well-developed, no acute distress  Eyes: Extraocular movements intact, anicteric  Neck: Full range of motion, no gross jugular venous distension  Pulmonary: Normal respiratory effort, no distress  Cardiovascular: Regular rate, regular rhythm  Gastrointestinal: Obese, nondistended  Extremities: Moves all extremities normally, no gross lower extremity edema  Neurological: Alert and oriented, " normal speech  Psychiatric: Normal affect, normal judgment     Past History:   Past Medical History  The patient's past medical history was reviewed.  See HPI and self-reported patient medical history form for pertinent medical history to consultation.    Past Social History  The patient's social history was reviewed.  See HPI self-reported patient medical history form for pertinent social history to consultation.    Past Family History  The patient's family history was reviewed.  See HPI self-reported patient medical history form for pertinent family history to consultation.    Review of Systems  A pertinent cardiac review of systems was performed and was otherwise unremarkable except as per HPI and self-reported patient medical history form.        Arron Locke MD, [facility]  Interventional Cardiology  [facility], [address]  [address] [place] [id number]  Phone: [phone]  Fax: [phone]

## 2024-01-23 ENCOUNTER — OFFICE VISIT (OUTPATIENT)
Dept: CARDIOLOGY | Facility: MEDICAL CENTER | Age: 64
End: 2024-01-23
Attending: INTERNAL MEDICINE
Payer: COMMERCIAL

## 2024-01-23 ENCOUNTER — PATIENT MESSAGE (OUTPATIENT)
Dept: BEHAVIORAL HEALTH | Facility: CLINIC | Age: 64
End: 2024-01-23

## 2024-01-23 VITALS
RESPIRATION RATE: 16 BRPM | WEIGHT: 238 LBS | DIASTOLIC BLOOD PRESSURE: 72 MMHG | OXYGEN SATURATION: 96 % | HEART RATE: 70 BPM | SYSTOLIC BLOOD PRESSURE: 122 MMHG | BODY MASS INDEX: 36.07 KG/M2 | HEIGHT: 68 IN

## 2024-01-23 DIAGNOSIS — F90.2 ADHD (ATTENTION DEFICIT HYPERACTIVITY DISORDER), COMBINED TYPE: ICD-10-CM

## 2024-01-23 DIAGNOSIS — I20.89 STABLE ANGINA (HCC): ICD-10-CM

## 2024-01-23 PROCEDURE — 3078F DIAST BP <80 MM HG: CPT | Performed by: INTERNAL MEDICINE

## 2024-01-23 PROCEDURE — 99214 OFFICE O/P EST MOD 30 MIN: CPT | Performed by: INTERNAL MEDICINE

## 2024-01-23 PROCEDURE — 3074F SYST BP LT 130 MM HG: CPT | Performed by: INTERNAL MEDICINE

## 2024-01-23 PROCEDURE — 99213 OFFICE O/P EST LOW 20 MIN: CPT | Performed by: INTERNAL MEDICINE

## 2024-01-23 RX ORDER — AMLODIPINE BESYLATE 5 MG/1
5 TABLET ORAL DAILY
Qty: 90 TABLET | Refills: 3 | Status: SHIPPED | OUTPATIENT
Start: 2024-01-23

## 2024-01-23 RX ORDER — DEXTROAMPHETAMINE SACCHARATE, AMPHETAMINE ASPARTATE MONOHYDRATE, DEXTROAMPHETAMINE SULFATE AND AMPHETAMINE SULFATE 7.5; 7.5; 7.5; 7.5 MG/1; MG/1; MG/1; MG/1
30 CAPSULE, EXTENDED RELEASE ORAL EVERY MORNING
Qty: 30 CAPSULE | Refills: 0 | Status: SHIPPED | OUTPATIENT
Start: 2024-01-27 | End: 2024-02-21 | Stop reason: SDUPTHER

## 2024-01-23 ASSESSMENT — FIBROSIS 4 INDEX: FIB4 SCORE: 0.94

## 2024-01-23 NOTE — PATIENT COMMUNICATION
Received request via: Patient    Was the patient seen in the last year in this department? Yes    Does the patient have an active prescription (recently filled or refills available) for medication(s) requested? No    Pharmacy Name: [facility]     Does the patient have senior living Plus and need 100 day supply (blood pressure, diabetes and cholesterol meds only)? Medication is not for cholesterol, blood pressure or diabetes

## 2024-01-23 NOTE — PATIENT INSTRUCTIONS
Start amlodipine 5 mg in the morning  If you have significant dizziness stop taking lisinopril 2.5 mg  If you continue to have limiting chest pain in 1-2 weeks, call our office and we will prescribe ranolazine

## 2024-02-01 DIAGNOSIS — E11.9 TYPE 2 DIABETES MELLITUS WITHOUT COMPLICATION, WITHOUT LONG-TERM CURRENT USE OF INSULIN (HCC): ICD-10-CM

## 2024-02-01 RX ORDER — LISINOPRIL 2.5 MG/1
2.5 TABLET ORAL DAILY
Qty: 90 TABLET | Refills: 3 | Status: SHIPPED | OUTPATIENT
Start: 2024-02-01

## 2024-02-14 ENCOUNTER — APPOINTMENT (OUTPATIENT)
Dept: CARDIOLOGY | Facility: MEDICAL CENTER | Age: 64
End: 2024-02-14
Attending: INTERNAL MEDICINE
Payer: COMMERCIAL

## 2024-02-21 ENCOUNTER — PATIENT MESSAGE (OUTPATIENT)
Dept: BEHAVIORAL HEALTH | Facility: CLINIC | Age: 64
End: 2024-02-21
Payer: COMMERCIAL

## 2024-02-21 DIAGNOSIS — F90.2 ADHD (ATTENTION DEFICIT HYPERACTIVITY DISORDER), COMBINED TYPE: ICD-10-CM

## 2024-02-21 RX ORDER — DEXTROAMPHETAMINE SACCHARATE, AMPHETAMINE ASPARTATE MONOHYDRATE, DEXTROAMPHETAMINE SULFATE AND AMPHETAMINE SULFATE 7.5; 7.5; 7.5; 7.5 MG/1; MG/1; MG/1; MG/1
30 CAPSULE, EXTENDED RELEASE ORAL EVERY MORNING
Qty: 30 CAPSULE | Refills: 0 | Status: SHIPPED | OUTPATIENT
Start: 2024-02-25 | End: 2024-03-27 | Stop reason: SDUPTHER

## 2024-03-07 ENCOUNTER — PATIENT MESSAGE (OUTPATIENT)
Dept: MEDICAL GROUP | Facility: MEDICAL CENTER | Age: 64
End: 2024-03-07
Payer: MEDICARE

## 2024-03-07 DIAGNOSIS — E11.9 TYPE 2 DIABETES MELLITUS WITHOUT COMPLICATION, WITHOUT LONG-TERM CURRENT USE OF INSULIN (HCC): ICD-10-CM

## 2024-03-07 NOTE — PATIENT COMMUNICATION
Received request via: Patient    Was the patient seen in the last year in this department? Yes    Does the patient have an active prescription (recently filled or refills available) for medication(s) requested? No    Pharmacy Name: [facility] [place]    Does the patient have intermediate Plus and need 100 day supply (blood pressure, diabetes and cholesterol meds only)? Patient does not have SCP

## 2024-03-08 RX ORDER — EMPAGLIFLOZIN 25 MG/1
TABLET, FILM COATED ORAL
Qty: 90 TABLET | Refills: 3 | Status: SHIPPED | OUTPATIENT
Start: 2024-03-08

## 2024-03-19 ENCOUNTER — TELEMEDICINE (OUTPATIENT)
Dept: BEHAVIORAL HEALTH | Facility: CLINIC | Age: 64
End: 2024-03-19
Payer: MEDICARE

## 2024-03-19 DIAGNOSIS — F32.A DEPRESSIVE DISORDER: ICD-10-CM

## 2024-03-19 DIAGNOSIS — F90.2 ADHD (ATTENTION DEFICIT HYPERACTIVITY DISORDER), COMBINED TYPE: ICD-10-CM

## 2024-03-19 DIAGNOSIS — F63.81 INTERMITTENT EXPLOSIVE DISORDER: ICD-10-CM

## 2024-03-19 PROCEDURE — 99214 OFFICE O/P EST MOD 30 MIN: CPT | Mod: 95 | Performed by: PSYCHIATRY & NEUROLOGY

## 2024-03-19 RX ORDER — DEXTROAMPHETAMINE SACCHARATE, AMPHETAMINE ASPARTATE MONOHYDRATE, DEXTROAMPHETAMINE SULFATE AND AMPHETAMINE SULFATE 7.5; 7.5; 7.5; 7.5 MG/1; MG/1; MG/1; MG/1
30 CAPSULE, EXTENDED RELEASE ORAL EVERY MORNING
Qty: 30 CAPSULE | Refills: 0 | Status: SHIPPED | OUTPATIENT
Start: 2024-05-26 | End: 2024-06-25

## 2024-03-19 RX ORDER — DEXTROAMPHETAMINE SACCHARATE, AMPHETAMINE ASPARTATE MONOHYDRATE, DEXTROAMPHETAMINE SULFATE AND AMPHETAMINE SULFATE 7.5; 7.5; 7.5; 7.5 MG/1; MG/1; MG/1; MG/1
30 CAPSULE, EXTENDED RELEASE ORAL EVERY MORNING
Qty: 30 CAPSULE | Refills: 0 | Status: SHIPPED | OUTPATIENT
Start: 2024-04-25 | End: 2024-05-25

## 2024-03-19 RX ORDER — DEXTROAMPHETAMINE SACCHARATE, AMPHETAMINE ASPARTATE MONOHYDRATE, DEXTROAMPHETAMINE SULFATE AND AMPHETAMINE SULFATE 7.5; 7.5; 7.5; 7.5 MG/1; MG/1; MG/1; MG/1
30 CAPSULE, EXTENDED RELEASE ORAL EVERY MORNING
Qty: 30 CAPSULE | Refills: 0 | Status: SHIPPED | OUTPATIENT
Start: 2024-03-25 | End: 2024-04-24

## 2024-03-19 NOTE — PROGRESS NOTES
This evaluation was conducted via Zoom using secure and encrypted videoconferencing technology. The patient was in their home in the [facility].    The patient's identity was confirmed and verbal consent was obtained for this virtual visit.      PSYCHIATRY FOLLOW-UP NOTE      Name: Jensen Leigh  MRN: [id number]  : 1960  Age: 63 y.o.  Date of assessment: 3/19/2024  PCP: Mahamed Santos M.D.  Persons in attendance: Patient      REASON FOR VISIT/CHIEF COMPLAINT (as stated by Patient):  Jensen Leigh is a 63 y.o., White male, attending follow-up appointment for mood and anxiety management.      HISTORY OF PRESENT ILLNESS:  Jensen Leigh is a 63 y.o. old male with MDD, ADHD and IED comes in today for follow up. Patient was last seen 4 months ago, and following treatment planning recommendations were done:  Continue Sertraline 50 mg daily for depression and anxiety management.  Given 3 month supply with 1 refill.  Continue Adderall XR 30 mg daily for ADHD management. Patient will send me a [employer] message 3 days before upcoming refill dates and prescription will be sent to pharmacy accordingly.  Controlled medication treatment agreement signed in 2021.        Patient is compliant with Zoloft 50 mg and Adderall XR 30 mg with good response and no side.  Describes mood, anxiety and ADHD symptoms as stable.  Agreed with plan of not titrating medications further.  Found to have clogged arteries and had an appointment with cardiology tomorrow for follow-up.  Wife was present for part of the session as well & the report cardiology have no concerns regarding Adderall at this time.      CURRENT MEDICATIONS:  Current Outpatient Medications   Medication Sig Dispense Refill    Empagliflozin (JARDIANCE) 25 MG Tab TAKE 1 TABLET BY MOUTH ONCE DAILY IN THE MORNING . 90 Tablet 3    amphetamine-dextroamphetamine ER (ADDERALL XR) 30 MG XR capsule Take 1 Capsule by mouth every morning for 30 days. 30 Capsule 0     lisinopril (PRINIVIL) 2.5 MG Tab Take 1 Tablet by mouth every day. (for kidney protection) 90 Tablet 3    amLODIPine (NORVASC) 5 MG Tab Take 1 Tablet by mouth every day. 90 Tablet 3    atorvastatin (LIPITOR) 40 MG Tab Take 1 Tablet by mouth every day. 90 Tablet 2    metoprolol SR (TOPROL XL) 50 MG TABLET SR 24 HR Take 1 Tablet by mouth every day. 100 Tablet 3    nitroglycerin (NITROSTAT) 0.4 MG SL Tab Place 1 Tablet under the tongue as needed for Chest Pain. 25 Tablet 11    sertraline (ZOLOFT) 50 MG Tab Take 1 Tablet by mouth every day. 90 Tablet 1    SITagliptin (JANUVIA) 100 MG Tab Take 1 Tablet by mouth every day. 90 Tablet 3    aspirin EC (ECOTRIN) 81 MG Tablet Delayed Response Take 2 Tabs by mouth every day. 30 Tab     Multiple Vitamin (MULTI VITAMIN MENS PO) Take 1 Tab by mouth every day.       No current facility-administered medications for this visit.       MEDICAL HISTORY  Past Medical History:   Diagnosis Date    Clotting disorder (HCC)     Eczema     GERD (gastroesophageal reflux disease)     Hyperlipidemia     Type II or unspecified type diabetes mellitus without mention of complication, not stated as uncontrolled     Umbilical hernia      Past Surgical History:   Procedure Laterality Date    ROTATOR CUFF REPAIR      rt shoulder    TONSILLECTOMY AND ADENOIDECTOMY         PAST PSYCHIATRIC MEDICATIONS  Zoloft  Dexedrine      REVIEW OF SYSTEMS:        Constitutional negative   Eyes negative   Ears/Nose/Mouth/Throat negative   Cardiovascular negative   Respiratory negative   Gastrointestinal negative   Genitourinary negative   Muscular negative   Integumentary negative   Neurological negative   Endocrine negative   Hematologic/Lymphatic negative     PHYSICAL EXAMINAION:  Vital signs: There were no vitals taken for this visit.  Musculoskeletal: Normal gait.   Abnormal movements: none      MENTAL STATUS EXAMINATION      General:   - Grooming and hygiene: Casual,   - Apparent distress: none,   - Behavior:  Calm  - Eye Contact:  Good,   - no psychomotor agitation or retardation    - Participation: Active verbal participation  Orientation: Alert and Fully Oriented to person, place and time  Mood: Euthymic  Affect: Flexible and Full range,  Thought Process: Logical and Goal-directed  Thought Content: Denies suicidal or homicidal ideations, intent or plan   Perception: Denies auditory or visual hallucinations. No delusions noted   Attention span and concentration: Intact   Speech:Rate within normal limits and Volume within normal limits  Language: Appropriate   Insight: Good  Judgment: Good  Recent and remote memory: No gross evidence of memory deficits        DEPRESSION SCREENIN/3/2021     5:00 PM 2022     2:00 PM 10/4/2023     2:00 PM   Depression Screen (PHQ-2/PHQ-9)   PHQ-2 Total Score 0 0 0       Interpretation of PHQ-9 Total Score   Score Severity   1-4 No Depression   5-9 Mild Depression   10-14 Moderate Depression   15-19 Moderately Severe Depression   20-27 Severe Depression    CURRENT RISK:       Suicidal: Low       Homicidal: Low       Self-Harm: Low       Relapse: Low       Crisis Safety Plan Reviewed Not Indicated       If evidence of imminent risk is present, intervention/plan:      MEDICAL RECORDS/LABS/DIAGNOSTIC TESTS REVIEWED:  No new lab since last visit     [place]  records -   Reviewed     PLAN:  (1) Depressive Disorder; (2) Intermittent Explosive Disorder; (3) ADHD  Stable  Continue Sertraline 50 mg daily for depression and anxiety management.  Continue Adderall XR 30 mg daily for ADHD management.   Controlled medication treatment agreement signed in 2021.  Medication options, alternatives (including no medications) and medication risks/benefits/side effects were discussed in detail.  The patient was advised to call, message provider on MyChart, or come in to the clinic if symptoms worsen or if any future questions/issues regarding their medications arise; the patient verbalized  understanding and agreement.    The patient was educated to call 911, call the suicide hotline, or go to local ER if having thoughts of suicide or homicide; verbalized understanding.    Billing Coding based on:  46005 based on MDM    Return to clinic in 3-4 months or sooner if symptoms worsen.  Next Appointment: instruction provided on how to make the next appointment.     The proposed treatment plan was discussed with the patient who was provided the opportunity to ask questions and make suggestions regarding alternative treatment. Patient verbalized understanding and expressed agreement with the plan.       Eliazar Prasad M.D.  03/19/24    This note was created using voice recognition software (Dragon). The accuracy of the dictation is limited by the abilities of the software. I have reviewed the note prior to signing, however some errors in grammar and context are still possible. If you have any questions related to this note please do not hesitate to contact our office.

## 2024-03-20 ENCOUNTER — OFFICE VISIT (OUTPATIENT)
Dept: CARDIOTHORACIC SURGERY | Facility: MEDICAL CENTER | Age: 64
End: 2024-03-20
Payer: MEDICARE

## 2024-03-20 VITALS
SYSTOLIC BLOOD PRESSURE: 90 MMHG | HEART RATE: 82 BPM | OXYGEN SATURATION: 95 % | BODY MASS INDEX: 36.07 KG/M2 | TEMPERATURE: 99 F | HEIGHT: 68 IN | WEIGHT: 238 LBS | DIASTOLIC BLOOD PRESSURE: 52 MMHG

## 2024-03-20 DIAGNOSIS — I25.84 CORONARY ARTERY DISEASE DUE TO CALCIFIED CORONARY LESION: ICD-10-CM

## 2024-03-20 DIAGNOSIS — I25.10 CORONARY ARTERY DISEASE DUE TO CALCIFIED CORONARY LESION: ICD-10-CM

## 2024-03-20 PROCEDURE — 99215 OFFICE O/P EST HI 40 MIN: CPT | Performed by: THORACIC SURGERY (CARDIOTHORACIC VASCULAR SURGERY)

## 2024-03-20 PROCEDURE — 3078F DIAST BP <80 MM HG: CPT | Performed by: THORACIC SURGERY (CARDIOTHORACIC VASCULAR SURGERY)

## 2024-03-20 PROCEDURE — 3074F SYST BP LT 130 MM HG: CPT | Performed by: THORACIC SURGERY (CARDIOTHORACIC VASCULAR SURGERY)

## 2024-03-20 ASSESSMENT — ENCOUNTER SYMPTOMS
HEADACHES: 0
ABDOMINAL PAIN: 0
FEVER: 0
SEIZURES: 0
SORE THROAT: 0
DIAPHORESIS: 0
FLANK PAIN: 0
HALLUCINATIONS: 0
ORTHOPNEA: 0
WHEEZING: 0
VOMITING: 0
PALPITATIONS: 0
HEARTBURN: 0
SPEECH CHANGE: 0
CONSTIPATION: 0
WEAKNESS: 0
DEPRESSION: 0
COUGH: 0
DIZZINESS: 0
WEIGHT LOSS: 0
SPUTUM PRODUCTION: 0
POLYDIPSIA: 0
STRIDOR: 0
HEMOPTYSIS: 0
NECK PAIN: 0
FOCAL WEAKNESS: 0
EYE DISCHARGE: 0
CHILLS: 0
NAUSEA: 0
EYE PAIN: 0
BRUISES/BLEEDS EASILY: 0
MYALGIAS: 0
BLURRED VISION: 0
DOUBLE VISION: 0

## 2024-03-20 ASSESSMENT — FIBROSIS 4 INDEX: FIB4 SCORE: 0.94

## 2024-03-20 ASSESSMENT — LIFESTYLE VARIABLES: SUBSTANCE_ABUSE: 0

## 2024-03-20 NOTE — PROGRESS NOTES
REFERRING PHYSICIAN: Arron Locke MD.     CONSULTING PHYSICIAN: Edu Velez DO     CHIEF COMPLAINT: Chest pain    HISTORY OF PRESENT ILLNESS: The patient is a 63 y.o. male with past medical history of methamphetamine use, DVT, diabetes mellitus, hypertension, DVT s/p IVC filter, depressive disorder, hyperlipidemia, obesity,  and coronary artery disease. He was seen in our clinic in September and recommended CABG. He and his wife wanted to go home and think about it. He continues to have angina with exertion that is relieved with rest or nitroglycerin. He is working full time. He denies shortness of breath, dizziness, syncope.     PAST MEDICAL HISTORY:   Active Ambulatory Problems     Diagnosis Date Noted    Umbilical hernia     Type 2 diabetes mellitus without complication ([facility])     DVT of deep femoral vein, right ([facility]) 04/10/2018    Presence of inferior vena cava filter 04/10/2018    Obesity (BMI 30-39.9) 04/10/2018    Pure hypercholesterolemia 04/10/2018    Onychogryphosis 05/10/2019    Erectile dysfunction due to diseases classified elsewhere 05/10/2019    Attention deficit 07/17/2019    History of methamphetamine abuse (HCC) 07/17/2019    ADHD (attention deficit hyperactivity disorder), combined type 11/04/2019    Depressive disorder 11/04/2019    Intermittent explosive disorder 01/27/2020    Other fatigue 04/20/2021    Angina pectoris (HCC) 07/12/2023    Right bundle branch block 07/12/2023    Abnormal EKG 07/12/2023    Bilious vomiting with nausea 07/12/2023    Coronary artery disease due to calcified coronary lesion 10/04/2023    Hypertension, essential 11/09/2023    Dyslipidemia 11/09/2023     Resolved Ambulatory Problems     Diagnosis Date Noted    Eczema     Preventative health care 05/05/2014    Neck strain 05/25/2018    Leg cramps 05/26/2018    Plantar fasciitis of left foot 12/13/2018    Excessive hunger 07/17/2019    Mood swings 07/17/2019    Prostate cancer screening 08/12/2020     Colon cancer screening 08/12/2020    Encounter for hepatitis C screening test for low risk patient 04/26/2021     Past Medical History:   Diagnosis Date    Clotting disorder (HCC)     GERD (gastroesophageal reflux disease)     Hyperlipidemia     Type II or unspecified type diabetes mellitus without mention of complication, not stated as uncontrolled      PAST SURGICAL HISTORY:   Past Surgical History:   Procedure Laterality Date    ROTATOR CUFF REPAIR      rt shoulder    TONSILLECTOMY AND ADENOIDECTOMY       ALLERGIES: No Known Allergies     CURRENT MEDICATIONS:   Current Outpatient Medications:     [START ON 3/25/2024] amphetamine-dextroamphetamine ER (ADDERALL XR) 30 MG XR capsule, Take 1 Capsule by mouth every morning for 30 days., Disp: 30 Capsule, Rfl: 0    [START ON 4/25/2024] amphetamine-dextroamphetamine ER (ADDERALL XR) 30 MG XR capsule, Take 1 Capsule by mouth every morning for 30 days., Disp: 30 Capsule, Rfl: 0    [START ON 5/26/2024] amphetamine-dextroamphetamine ER (ADDERALL XR) 30 MG XR capsule, Take 1 Capsule by mouth every morning for 30 days., Disp: 30 Capsule, Rfl: 0    sertraline (ZOLOFT) 50 MG Tab, Take 1 Tablet by mouth every day., Disp: 90 Tablet, Rfl: 1    Empagliflozin (JARDIANCE) 25 MG Tab, TAKE 1 TABLET BY MOUTH ONCE DAILY IN THE MORNING ., Disp: 90 Tablet, Rfl: 3    amphetamine-dextroamphetamine ER (ADDERALL XR) 30 MG XR capsule, Take 1 Capsule by mouth every morning for 30 days., Disp: 30 Capsule, Rfl: 0    lisinopril (PRINIVIL) 2.5 MG Tab, Take 1 Tablet by mouth every day. (for kidney protection), Disp: 90 Tablet, Rfl: 3    amLODIPine (NORVASC) 5 MG Tab, Take 1 Tablet by mouth every day., Disp: 90 Tablet, Rfl: 3    atorvastatin (LIPITOR) 40 MG Tab, Take 1 Tablet by mouth every day., Disp: 90 Tablet, Rfl: 2    metoprolol SR (TOPROL XL) 50 MG TABLET SR 24 HR, Take 1 Tablet by mouth every day., Disp: 100 Tablet, Rfl: 3    nitroglycerin (NITROSTAT) 0.4 MG SL Tab, Place 1 Tablet under the  tongue as needed for Chest Pain., Disp: 25 Tablet, Rfl: 11    SITagliptin (JANUVIA) 100 MG Tab, Take 1 Tablet by mouth every day., Disp: 90 Tablet, Rfl: 3    aspirin EC (ECOTRIN) 81 MG Tablet Delayed Response, Take 2 Tabs by mouth every day., Disp: 30 Tab, Rfl:     Multiple Vitamin (MULTI VITAMIN MENS PO), Take 1 Tab by mouth every day., Disp: , Rfl:     FAMILY HISTORY:   Family History   Problem Relation Age of Onset    Cancer Mother         liver    Depression Mother     Alcohol abuse Mother     Cancer Father         lung    Alcohol abuse Father     Depression Sister     Ovarian Cancer Neg Hx     Tubal Cancer Neg Hx     Breast Cancer Neg Hx     Colorectal Cancer Neg Hx     Peritoneal Cancer Neg Hx       SOCIAL HISTORY:   Social History     Socioeconomic History    Marital status:      Spouse name: Not on file    Number of children: Not on file    Years of education: Not on file    Highest education level: 10th grade   Occupational History    Not on file   Tobacco Use    Smoking status: Never    Smokeless tobacco: Never   Vaping Use    Vaping Use: Never used   Substance and Sexual Activity    Alcohol use: Not Currently    Drug use: No    Sexual activity: Yes     Partners: Female     Birth control/protection: None   Other Topics Concern    Not on file   Social History Narrative    Not on file     Social Determinants of Health     Financial Resource Strain: Low Risk  (7/12/2023)    Overall Financial Resource Strain (CARDIA)     Difficulty of Paying Living Expenses: Not very hard   Food Insecurity: Patient Declined (7/12/2023)    Hunger Vital Sign     Worried About Running Out of Food in the Last Year: Patient declined     Ran Out of Food in the Last Year: Patient declined   Transportation Needs: Unknown (7/12/2023)    PRAPARE - Transportation     Lack of Transportation (Medical): Patient declined     Lack of Transportation (Non-Medical): Not on file   Physical Activity: Unknown (7/12/2023)    Exercise Vital  Sign     Days of Exercise per Week: 5 days     Minutes of Exercise per Session: Patient declined   Stress: Patient Declined (7/12/2023)    [employer] - Occupational Stress Questionnaire     Feeling of Stress : Patient declined   Social Connections: Unknown (7/12/2023)    Social Connection and Isolation Panel [NHANES]     Frequency of Communication with Friends and Family: Patient declined     Frequency of Social Gatherings with Friends and Family: Patient declined     Attends Taoist Services: Patient declined     Active Member of Clubs or Organizations: No     Attends Club or Organization Meetings: Patient declined     Marital Status:    Intimate Partner Violence: Not on file   Housing Stability: Low Risk  (7/12/2023)    Housing Stability Vital Sign     Unable to Pay for Housing in the Last Year: No     Number of Places Lived in the Last Year: 1     Unstable Housing in the Last Year: No     REVIEW OF SYSTEMS:  Review of Systems   Constitutional:  Negative for chills, diaphoresis, fever and weight loss.   HENT:  Negative for congestion, ear pain, hearing loss, nosebleeds, sore throat and tinnitus.    Eyes:  Negative for blurred vision, double vision, pain and discharge.   Respiratory:  Negative for cough, hemoptysis, sputum production, wheezing and stridor.    Cardiovascular:  Positive for chest pain. Negative for palpitations, orthopnea and leg swelling.   Gastrointestinal:  Negative for abdominal pain, constipation, heartburn, melena, nausea and vomiting.   Genitourinary:  Negative for dysuria, flank pain and hematuria.   Musculoskeletal:  Negative for myalgias and neck pain.   Skin:  Negative for itching and rash.   Neurological:  Negative for dizziness, speech change, focal weakness, seizures, weakness and headaches.   Endo/Heme/Allergies:  Negative for environmental allergies and polydipsia. Does not bruise/bleed easily.   Psychiatric/Behavioral:  Negative for depression,  "hallucinations, substance abuse and suicidal ideas.      PHYSICAL EXAMINATION:    BP 90/52 (BP Location: Left arm, Patient Position: Sitting, BP Cuff Size: Adult)   Pulse 82   Temp 37.2 °C (99 °F) (Temporal)   Ht 1.727 m (5' 8\")   Wt 108 kg (238 lb)   SpO2 95%   BMI 36.19 kg/m²      Physical Exam  Constitutional:       General: He is not in acute distress.     Appearance: He is obese.   HENT:      Head: Normocephalic and atraumatic.      Nose: Nose normal.   Eyes:      Conjunctiva/sclera: Conjunctivae normal.      Pupils: Pupils are equal, round, and reactive to light.   Neck:      Vascular: No JVD.      Trachea: No tracheal deviation.   Cardiovascular:      Rate and Rhythm: Normal rate and regular rhythm.   Pulmonary:      Effort: Pulmonary effort is normal. No respiratory distress.      Breath sounds: Normal breath sounds. No stridor.   Abdominal:      General: Bowel sounds are normal. There is no distension.      Palpations: Abdomen is soft.      Tenderness: There is no abdominal tenderness.   Musculoskeletal:         General: No tenderness. Normal range of motion.      Cervical back: Normal range of motion and neck supple.   Skin:     General: Skin is warm and dry.   Neurological:      Mental Status: He is alert and oriented to person, place, and time.      Coordination: Coordination normal.      Gait: Gait is intact.   Psychiatric:         Mood and Affect: Mood and affect normal.         Cognition and Memory: Memory normal.       LABS REVIEWED:  Lab Results   Component Value Date/Time    SODIUM 137 08/23/2023 07:13 AM    SODIUM 138 07/15/2023 11:16 AM    POTASSIUM 4.6 08/23/2023 07:13 AM    POTASSIUM 4.6 07/15/2023 11:16 AM    CHLORIDE 104 08/23/2023 07:13 AM    CHLORIDE 104 07/15/2023 11:16 AM    CO2 25 08/23/2023 07:13 AM    CO2 23 07/15/2023 11:16 AM    GLUCOSE 142 (H) 08/23/2023 07:13 AM    GLUCOSE 115 (H) 07/15/2023 11:16 AM    BUN 21 (H) 08/23/2023 07:13 AM    BUN 17 07/15/2023 11:16 AM    " CREATININE 0.86 08/23/2023 07:13 AM    CREATININE 0.60 07/15/2023 11:16 AM    GLOMRATE 90 08/23/2023 07:13 AM      Lab Results   Component Value Date/Time    PROTHROMBTM 13.5 06/17/2019 06:13 AM    INR 1.01 06/17/2019 06:13 AM      Lab Results   Component Value Date/Time    WBC 8.0 07/15/2023 11:16 AM    RBC 5.43 07/15/2023 11:16 AM    HEMOGLOBIN 15.8 07/15/2023 11:16 AM    HEMATOCRIT 49.2 07/15/2023 11:16 AM    MCV 90.6 07/15/2023 11:16 AM    MCH 29.1 07/15/2023 11:16 AM    MCHC 32.1 (L) 07/15/2023 11:16 AM    MPV 10.3 07/15/2023 11:16 AM    NEUTSPOLYS 51.80 07/15/2023 11:16 AM    LYMPHOCYTES 35.80 07/15/2023 11:16 AM    MONOCYTES 9.70 07/15/2023 11:16 AM    EOSINOPHILS 1.90 07/15/2023 11:16 AM    BASOPHILS 0.70 07/15/2023 11:16 AM      IMAGING REVIEWED AND INTERPRETED:    ECHOCARDIOGRAM 7/13/22 [facility]:  Left ventricular systolic function is normal.  Mild mitral regurgitation.  Calcified aortic valve leaflets. No aortic valve stenosis.     CARDIAC CATHETERIZATION 8/23/23 [facility]:  Coronary Findings Diagnostic Dominance: Right   Left Main: Ost LM to Mid LM lesion is 40% stenosed.   Left Anterior Descending: Mid LAD lesion is 45% stenosed. iFR was measured. iFR ratio: 0.74. Mild to moderate mid LAD stenosis. s/p intracoronary physiologic assessment of the LAD which was significant for severe mid LAD disease.   Ramus Intermedius: Ramus lesion is 50% stenosed.   Left Circumflex: Ost Cx to Prox Cx lesion is 100% stenosed. First Obtuse Marginal Branch: 1st Mrg lesion is 40% stenosed. Third Obtuse Marginal Branch: 3rd Mrg filled by collaterals from 2nd Sept.   Right Coronary Artery: Ost RCA to Prox RCA lesion is 80% stenosed. Prox RCA lesion is 100% stenosed. Right Posterior Descending Artery: RPDA filled by collaterals from Dist LAD.     IMPRESSION:  Multivessel coronary artery disease, stable angina, diabetes mellitus type 2, history of deep venous thrombosis of right femoral vein, status post IVC filter placement,  hypertension, hyperlipidemia right bundle branch block     PLAN:  I recommend recommend CABG x 3-4 with endoscopic vein harvesting and transesophageal echocardiogram. He will need a repeat echocardiogram prior to surgery.     The procedure, its risks, benefits, potential complications and alternative treatments were discussed with the patient in detail including the risks should he decide not to undergo my recommended treatment. All of his questions were answered to his satisfaction and he is willing to proceed with the operation. The risks include death, stroke, infection: to include a rare bacterial infection related to the use of the heart/lung machine, perry-operative myocardial infarction, dysrhythmias, diaphragmatic paralysis, chest wall paresthesia, tracheostomy, kidney or other organ failure, possible return to the operating room for bleeding, bleeding requiring transfusion with its attendant risks including AIDS or hepatitis, dehiscence of surgical incisions, respiratory complications including the need for prolonged ventilator support, Protamine or other drug reaction, peripheral neuropathy, loss of limb, and miscount of surgical items. TThe operative mortality risk is approximately 1%. The STS mortality risk score is 0.4% and the morbidity and mortality risk score is 3.3%. The scores were discussed with patient.     Thank you for this very challenging consultation and participation in the patient’s care.  I will keep you apprised of all future developments.      The operation is scheduled for Wednesday April 17, 2024 at 0800 AM at [facility].       Sincerely,     Edu Velez DO.

## 2024-03-22 ENCOUNTER — TELEPHONE (OUTPATIENT)
Dept: CARDIOTHORACIC SURGERY | Facility: MEDICAL CENTER | Age: 64
End: 2024-03-22
Payer: MEDICARE

## 2024-03-22 NOTE — TELEPHONE ENCOUNTER
Problem: Prehabilitation    Goal: optimize identified modifiable risk factors prior to cardiac surgery.     Intervention: Screening and interventions for the following  risk factors with educational materials provided if indicated  and patient demonstrates readiness to participate.  Dentition, malnutrition, CAD and dietary cholesterol,  obesity, alcohol and tobacco abuse, illegal drug use,  recent eye surgery/procedures, A1C > 7.5 for pharmacotherapy referral,  and social support system for post discharge planning.    Additionally, home exercise regimen initiation or continuation  (if appropriate), and teaching of and participation of  inspiratory muscle training via incentive spirometer (provided).    Review of post-surgical physical limitations, upcoming  appointments & testing, ordered diagnostics, and medication review  to identify and educate on anticoagulant and antihypertensives  that need cessation in the days prior to surgery.    The cardiac surgery prehabilitation sheet, surgery instruction sheet,  MAP, education booklet, vitals logbook, normals after heart surgery,  and cardiac rehab flier was provided for patient to read and review.    Surgical CHG wipes were also provided with instructions on use.    DENTITION:  Routine dental appointment prior to surgery is  not indicated for CABG procedure.    he was not encouraged to get a dental   cleaning as he does get regular cleanings    INCENTIVE SPIROMETRY:  Discussed importance of incentive spirometry (IS) use,  20 times a day AT MINIMUM but more often if possible to  optimize cardio-pulmonary function prior to surgery.  They were instructed to allow a 5 minute rest in  between uses to achieve max IS volume.  Education with return demonstration performed.   Patient is effective, coaching was not needed.    Prehabilitation IS baseline is 3500.    HOME EXERCISE REGIMEN:  We discussed importance of preventing deconditioning and  muscle wasting in the time  preceding surgery as this will occur  post surgery.    > 50 % left main disease present? NO  EF-- 65%  Active sub lingual nitro use? YES  he is appropriate for initiation  of a home exercise regimen.    he is moderately physically active at his work, current  exercise tolerance/level is moderate due to  symptoms of chest pain and fatigue with activity.    he was educated to continue his/her current exercise regimen of walking 5 times a week.     he was educated  that if chest pain or SOB occurs patient is to stop  immediately and if symptoms do not  resolve after stopping to call 911.    he was also encouraged to practice sit to stand  from a chair with one arm to assist or no arm assistance  12 times a day to strengthen quadriceps and improve balance.    CAD:  Patient does have known CAD; education on  cholesterol, diet, and the heart are indicated  and were provided to his wife via email.    OBESITY:  Patient's BMI is over 30.  Education regarding portion  Sizing and diet are indicated and were provided via email to his wife.    MALNUTRITION:  Malnutrition screening tool (MST) shows he is  not at risk with a score of 0.  (0-1 not at risk; greater or equal to 2 is at risk).    Given MST score, functional capacity,   and no reported muscle loss, it was not  recommended they increase their protein  intake to 1.2 to 2.5 gram/kg/day    SMOKING:  Patient denies current smoking history.  Smoking risks  and cessation education not indicated and was not provided.    ALCOHOL ABUSE:  Patient denies alcohol abuse.  Alcohol risks and cessation  education not indicated and was not provided.    ILLEGAL DRUG USE:  Patient not illicit drug use.  Illicit drug use risks  and cessation education not indicated and was not provided    SOCIAL SUPPORT SYSTEM FOR DISCHARGE NEEDS:    Patient does have family, Hayley to stay for  48 hours post discharge to assist with ADL's. No barriers  to hospital discharge anticipated.    PSYCHOLOGICAL  PREPARATION:    We discussed the basics of physical limitation post op to include:               No driving for 4 weeks               No lifting, pushing or pulling > 10 lbs for 6 weeks  Sternal precautions to include moving  Within the tube and safe mobility in and  out of bed and the chair.    ANTIBIOTIC STEWARDSHIP:  MRSA swab will be collected by My during pre-admit testing.    MEDICATION AN UPCOMING APPOINTMENTS REVIEW:  Current medications were reviewed that may need  specific stop dates prior to surgery. The patient was instructed   to stop the following medications after the indicated date.    Januvia to stop 7 full days prior to surgery; last dose 4/10  Jardiance to stop 4 full days prior to surgery; last dose 4/12  Lisinopril to stop 2 full days prior to surgery; last dose 4/14    Vascular scheduling sheet was provided and explained.    Walk test Times: 4 5 4    A phone appointment for pre op education was made  for 4/11 at 3-4 pm to review all provided education materials    Extensive phone call with lots of questions: 56 minutes

## 2024-03-23 ENCOUNTER — PATIENT MESSAGE (OUTPATIENT)
Dept: BEHAVIORAL HEALTH | Facility: CLINIC | Age: 64
End: 2024-03-23
Payer: MEDICARE

## 2024-03-23 DIAGNOSIS — F90.2 ADHD (ATTENTION DEFICIT HYPERACTIVITY DISORDER), COMBINED TYPE: ICD-10-CM

## 2024-03-25 ENCOUNTER — APPOINTMENT (OUTPATIENT)
Dept: ADMISSIONS | Facility: MEDICAL CENTER | Age: 64
DRG: 236 | End: 2024-03-25
Attending: THORACIC SURGERY (CARDIOTHORACIC VASCULAR SURGERY)
Payer: MEDICARE

## 2024-03-25 ENCOUNTER — HOSPITAL ENCOUNTER (OUTPATIENT)
Dept: RADIOLOGY | Facility: MEDICAL CENTER | Age: 64
End: 2024-03-25
Attending: NURSE PRACTITIONER
Payer: MEDICARE

## 2024-03-25 DIAGNOSIS — I25.84 CORONARY ARTERY DISEASE DUE TO CALCIFIED CORONARY LESION: ICD-10-CM

## 2024-03-25 DIAGNOSIS — I25.10 CORONARY ARTERY DISEASE DUE TO CALCIFIED CORONARY LESION: ICD-10-CM

## 2024-03-25 PROCEDURE — 93880 EXTRACRANIAL BILAT STUDY: CPT | Mod: 26 | Performed by: INTERNAL MEDICINE

## 2024-03-25 PROCEDURE — 93880 EXTRACRANIAL BILAT STUDY: CPT

## 2024-03-25 PROCEDURE — 93970 EXTREMITY STUDY: CPT

## 2024-03-25 PROCEDURE — 93970 EXTREMITY STUDY: CPT | Mod: 26 | Performed by: INTERNAL MEDICINE

## 2024-03-27 RX ORDER — DEXTROAMPHETAMINE SACCHARATE, AMPHETAMINE ASPARTATE MONOHYDRATE, DEXTROAMPHETAMINE SULFATE AND AMPHETAMINE SULFATE 7.5; 7.5; 7.5; 7.5 MG/1; MG/1; MG/1; MG/1
30 CAPSULE, EXTENDED RELEASE ORAL EVERY MORNING
Qty: 10 CAPSULE | Refills: 0 | Status: SHIPPED | OUTPATIENT
Start: 2024-04-13 | End: 2024-04-23

## 2024-03-27 NOTE — PATIENT COMMUNICATION
Pt was only able to  a partial quantity of his last Adderall RX ( 20 capsules) his pharmacy is requesting a new script for the remaining 10 caps can you send that over?      Received request via: Patient    Was the patient seen in the last year in this department? Yes    Does the patient have an active prescription (recently filled or refills available) for medication(s) requested? No    Pharmacy Name: [facility]     Does the patient have alf Plus and need 100 day supply (blood pressure, diabetes and cholesterol meds only)? Medication is not for cholesterol, blood pressure or diabetes

## 2024-03-28 ENCOUNTER — ANCILLARY PROCEDURE (OUTPATIENT)
Dept: CARDIOLOGY | Facility: MEDICAL CENTER | Age: 64
End: 2024-03-28
Attending: THORACIC SURGERY (CARDIOTHORACIC VASCULAR SURGERY)
Payer: MEDICARE

## 2024-03-28 DIAGNOSIS — I25.10 CORONARY ARTERY DISEASE DUE TO CALCIFIED CORONARY LESION: ICD-10-CM

## 2024-03-28 DIAGNOSIS — I25.84 CORONARY ARTERY DISEASE DUE TO CALCIFIED CORONARY LESION: ICD-10-CM

## 2024-03-28 LAB
LV EJECT FRACT MOD 2C 99903: 58.57
LV EJECT FRACT MOD 4C 99902: 62.23
LV EJECT FRACT MOD BP 99901: 61.13

## 2024-03-28 PROCEDURE — 93306 TTE W/DOPPLER COMPLETE: CPT

## 2024-03-28 PROCEDURE — 93306 TTE W/DOPPLER COMPLETE: CPT | Mod: 26 | Performed by: INTERNAL MEDICINE

## 2024-03-29 ENCOUNTER — PRE-ADMISSION TESTING (OUTPATIENT)
Dept: ADMISSIONS | Facility: MEDICAL CENTER | Age: 64
DRG: 236 | End: 2024-03-29
Attending: THORACIC SURGERY (CARDIOTHORACIC VASCULAR SURGERY)
Payer: MEDICARE

## 2024-03-29 NOTE — OR NURSING
Pre-admit RN tele appointment completed. Instructed pt and wife to consult with cardiologist on any medication instructions for surgery on 4/17/24.

## 2024-04-01 NOTE — TELEPHONE ENCOUNTER
Patient's work does not have short term disability from his work and are concerned about money flow for necessities.    We reviewed the followin) calling the social security office to question if social security disability is a thing   2) call the estimate line at [phone] for additional financial help routes  3) call around for critical illness benefits via [employer], [employer], or [employer] etc to see what the waiting period is if they cover his planned surgery, and what premiums are    Call time 11 minutes   stated

## 2024-04-11 ENCOUNTER — TELEPHONE (OUTPATIENT)
Dept: CARDIOTHORACIC SURGERY | Facility: MEDICAL CENTER | Age: 64
End: 2024-04-11
Payer: MEDICARE

## 2024-04-11 NOTE — TELEPHONE ENCOUNTER
Patient was reminded that CABG X 3-4 surgery with  Dr. Velez is on 4/17 at 0800 in the morning. he   are aware check in is at 0515 am.    PAT date and time was reviewed with patient and  verified it is within 72 hours of surgery.    Vascular studies and procedures: carotids, vein mapping, and repeat echo  were scheduled, completed,  and reviewed by myself for concerning  results did not need escalation to the DOMINICK/MD.    50-69% left ICA blockage, Dr. Velez made aware.    he did not need a dental check/work.    Baseline IS was 3500. He has been compliant   with the IS. Volume has not improved past 3500.    he was prescribed a walking regimen or  told to continue he current regimen and  He has been compliant.   He has been practicing sit to stand.    he was reminded that Januvia needs to stop after yesterday 4/10.  he was reminded that Jardiance needs to stop after tomorrow 4/12.  He was reminded that Lisinopril needs to stop after 4/14.  Takes ASA at night; instructed that last dose is 4/15.    He was told that adderall, zoloft, and amlodipine medication (s) are okay to  take the morning of surgery prior to check in.    The [facility] wipes instructions were reviewed and understood.    he was reminded no food after midnight.    Call time 28 minutes

## 2024-04-16 ENCOUNTER — PRE-ADMISSION TESTING (OUTPATIENT)
Dept: ADMISSIONS | Facility: MEDICAL CENTER | Age: 64
DRG: 236 | End: 2024-04-16
Attending: THORACIC SURGERY (CARDIOTHORACIC VASCULAR SURGERY)
Payer: MEDICARE

## 2024-04-16 ENCOUNTER — HOSPITAL ENCOUNTER (OUTPATIENT)
Dept: RADIOLOGY | Facility: MEDICAL CENTER | Age: 64
DRG: 236 | End: 2024-04-16
Attending: THORACIC SURGERY (CARDIOTHORACIC VASCULAR SURGERY)
Payer: MEDICARE

## 2024-04-16 DIAGNOSIS — Z01.810 PRE-OPERATIVE CARDIOVASCULAR EXAMINATION: ICD-10-CM

## 2024-04-16 DIAGNOSIS — Z01.811 PRE-OPERATIVE RESPIRATORY EXAMINATION: ICD-10-CM

## 2024-04-16 DIAGNOSIS — Z01.812 PRE-OPERATIVE LABORATORY EXAMINATION: ICD-10-CM

## 2024-04-16 LAB
ABO GROUP BLD: NORMAL
ALBUMIN SERPL BCP-MCNC: 4.4 G/DL (ref 3.2–4.9)
ALBUMIN/GLOB SERPL: 1.5 G/DL
ALP SERPL-CCNC: 83 U/L (ref 30–99)
ALT SERPL-CCNC: 23 U/L (ref 2–50)
AMPHET UR QL SCN: POSITIVE
ANION GAP SERPL CALC-SCNC: 16 MMOL/L (ref 7–16)
APPEARANCE UR: CLEAR
APTT PPP: 28.1 SEC (ref 24.7–36)
AST SERPL-CCNC: 18 U/L (ref 12–45)
BARBITURATES UR QL SCN: NEGATIVE
BASOPHILS # BLD AUTO: 0.6 % (ref 0–1.8)
BASOPHILS # BLD: 0.05 K/UL (ref 0–0.12)
BENZODIAZ UR QL SCN: NEGATIVE
BILIRUB SERPL-MCNC: 0.7 MG/DL (ref 0.1–1.5)
BILIRUB UR QL STRIP.AUTO: NEGATIVE
BLD GP AB SCN SERPL QL: NORMAL
BUN SERPL-MCNC: 18 MG/DL (ref 8–22)
BZE UR QL SCN: NEGATIVE
CALCIUM ALBUM COR SERPL-MCNC: 9.1 MG/DL (ref 8.5–10.5)
CALCIUM SERPL-MCNC: 9.4 MG/DL (ref 8.5–10.5)
CANNABINOIDS UR QL SCN: NEGATIVE
CHLORIDE SERPL-SCNC: 101 MMOL/L (ref 96–112)
CO2 SERPL-SCNC: 20 MMOL/L (ref 20–33)
COLOR UR: YELLOW
CREAT SERPL-MCNC: 0.62 MG/DL (ref 0.5–1.4)
EOSINOPHIL # BLD AUTO: 0.13 K/UL (ref 0–0.51)
EOSINOPHIL NFR BLD: 1.7 % (ref 0–6.9)
ERYTHROCYTE [DISTWIDTH] IN BLOOD BY AUTOMATED COUNT: 43.4 FL (ref 35.9–50)
EST. AVERAGE GLUCOSE BLD GHB EST-MCNC: 183 MG/DL
FENTANYL UR QL: NEGATIVE
GFR SERPLBLD CREATININE-BSD FMLA CKD-EPI: 106 ML/MIN/1.73 M 2
GLOBULIN SER CALC-MCNC: 2.9 G/DL (ref 1.9–3.5)
GLUCOSE SERPL-MCNC: 184 MG/DL (ref 65–99)
GLUCOSE UR STRIP.AUTO-MCNC: >=1000 MG/DL
HBA1C MFR BLD: 8 % (ref 4–5.6)
HCT VFR BLD AUTO: 48.4 % (ref 42–52)
HGB BLD-MCNC: 16.2 G/DL (ref 14–18)
IMM GRANULOCYTES # BLD AUTO: 0.02 K/UL (ref 0–0.11)
IMM GRANULOCYTES NFR BLD AUTO: 0.3 % (ref 0–0.9)
INR PPP: 1.06 (ref 0.87–1.13)
KETONES UR STRIP.AUTO-MCNC: NEGATIVE MG/DL
LEUKOCYTE ESTERASE UR QL STRIP.AUTO: NEGATIVE
LYMPHOCYTES # BLD AUTO: 3.3 K/UL (ref 1–4.8)
LYMPHOCYTES NFR BLD: 42.2 % (ref 22–41)
MCH RBC QN AUTO: 29.7 PG (ref 27–33)
MCHC RBC AUTO-ENTMCNC: 33.5 G/DL (ref 32.3–36.5)
MCV RBC AUTO: 88.6 FL (ref 81.4–97.8)
METHADONE UR QL SCN: NEGATIVE
MICRO URNS: ABNORMAL
MONOCYTES # BLD AUTO: 0.61 K/UL (ref 0–0.85)
MONOCYTES NFR BLD AUTO: 7.8 % (ref 0–13.4)
NEUTROPHILS # BLD AUTO: 3.71 K/UL (ref 1.82–7.42)
NEUTROPHILS NFR BLD: 47.4 % (ref 44–72)
NITRITE UR QL STRIP.AUTO: NEGATIVE
NRBC # BLD AUTO: 0 K/UL
NRBC BLD-RTO: 0 /100 WBC (ref 0–0.2)
OPIATES UR QL SCN: NEGATIVE
OXYCODONE UR QL SCN: NEGATIVE
PCP UR QL SCN: NEGATIVE
PH UR STRIP.AUTO: 5.5 [PH] (ref 5–8)
PLATELET # BLD AUTO: 199 K/UL (ref 164–446)
PMV BLD AUTO: 10.2 FL (ref 9–12.9)
POTASSIUM SERPL-SCNC: 4 MMOL/L (ref 3.6–5.5)
PROPOXYPH UR QL SCN: NEGATIVE
PROT SERPL-MCNC: 7.3 G/DL (ref 6–8.2)
PROT UR QL STRIP: NEGATIVE MG/DL
PROTHROMBIN TIME: 13.9 SEC (ref 12–14.6)
RBC # BLD AUTO: 5.46 M/UL (ref 4.7–6.1)
RBC UR QL AUTO: NEGATIVE
RH BLD: NORMAL
SCCMEC + MECA PNL NOSE NAA+PROBE: NEGATIVE
SCCMEC + MECA PNL NOSE NAA+PROBE: POSITIVE
SODIUM SERPL-SCNC: 137 MMOL/L (ref 135–145)
SP GR UR STRIP.AUTO: 1.04
UROBILINOGEN UR STRIP.AUTO-MCNC: 1 MG/DL
WBC # BLD AUTO: 7.8 K/UL (ref 4.8–10.8)

## 2024-04-16 PROCEDURE — 80053 COMPREHEN METABOLIC PANEL: CPT

## 2024-04-16 PROCEDURE — 81003 URINALYSIS AUTO W/O SCOPE: CPT

## 2024-04-16 PROCEDURE — 86901 BLOOD TYPING SEROLOGIC RH(D): CPT

## 2024-04-16 PROCEDURE — 85025 COMPLETE CBC W/AUTO DIFF WBC: CPT

## 2024-04-16 PROCEDURE — 85730 THROMBOPLASTIN TIME PARTIAL: CPT

## 2024-04-16 PROCEDURE — 87640 STAPH A DNA AMP PROBE: CPT

## 2024-04-16 PROCEDURE — 93005 ELECTROCARDIOGRAM TRACING: CPT

## 2024-04-16 PROCEDURE — 86900 BLOOD TYPING SEROLOGIC ABO: CPT

## 2024-04-16 PROCEDURE — 80307 DRUG TEST PRSMV CHEM ANLYZR: CPT

## 2024-04-16 PROCEDURE — 86850 RBC ANTIBODY SCREEN: CPT

## 2024-04-16 PROCEDURE — 85610 PROTHROMBIN TIME: CPT

## 2024-04-16 PROCEDURE — 83036 HEMOGLOBIN GLYCOSYLATED A1C: CPT | Mod: GA

## 2024-04-16 PROCEDURE — 87641 MR-STAPH DNA AMP PROBE: CPT

## 2024-04-16 PROCEDURE — 71046 X-RAY EXAM CHEST 2 VIEWS: CPT

## 2024-04-16 PROCEDURE — 36415 COLL VENOUS BLD VENIPUNCTURE: CPT

## 2024-04-16 RX ORDER — DEXMEDETOMIDINE HYDROCHLORIDE 4 UG/ML
0-1.5 INJECTION, SOLUTION INTRAVENOUS CONTINUOUS
Status: DISCONTINUED | OUTPATIENT
Start: 2024-04-17 | End: 2024-04-17

## 2024-04-16 RX ORDER — NOREPINEPHRINE BITARTRATE 0.03 MG/ML
0-1 INJECTION, SOLUTION INTRAVENOUS CONTINUOUS
Status: DISCONTINUED | OUTPATIENT
Start: 2024-04-17 | End: 2024-04-17

## 2024-04-16 RX ORDER — EPINEPHRINE HCL IN 0.9 % NACL 4MG/250ML
0-.5 PLASTIC BAG, INJECTION (ML) INTRAVENOUS CONTINUOUS
Status: DISCONTINUED | OUTPATIENT
Start: 2024-04-17 | End: 2024-04-17

## 2024-04-16 ASSESSMENT — FIBROSIS 4 INDEX: FIB4 SCORE: 0.96

## 2024-04-17 ENCOUNTER — ANESTHESIA (OUTPATIENT)
Dept: SURGERY | Facility: MEDICAL CENTER | Age: 64
DRG: 236 | End: 2024-04-17
Payer: MEDICARE

## 2024-04-17 ENCOUNTER — APPOINTMENT (OUTPATIENT)
Dept: RADIOLOGY | Facility: MEDICAL CENTER | Age: 64
DRG: 236 | End: 2024-04-17
Attending: THORACIC SURGERY (CARDIOTHORACIC VASCULAR SURGERY)
Payer: MEDICARE

## 2024-04-17 ENCOUNTER — APPOINTMENT (OUTPATIENT)
Dept: RADIOLOGY | Facility: MEDICAL CENTER | Age: 64
DRG: 236 | End: 2024-04-17
Payer: MEDICARE

## 2024-04-17 ENCOUNTER — ANESTHESIA EVENT (OUTPATIENT)
Dept: SURGERY | Facility: MEDICAL CENTER | Age: 64
DRG: 236 | End: 2024-04-17
Payer: MEDICARE

## 2024-04-17 ENCOUNTER — HOSPITAL ENCOUNTER (INPATIENT)
Facility: MEDICAL CENTER | Age: 64
LOS: 4 days | DRG: 236 | End: 2024-04-21
Attending: THORACIC SURGERY (CARDIOTHORACIC VASCULAR SURGERY) | Admitting: THORACIC SURGERY (CARDIOTHORACIC VASCULAR SURGERY)
Payer: MEDICARE

## 2024-04-17 ENCOUNTER — APPOINTMENT (OUTPATIENT)
Dept: CARDIOLOGY | Facility: MEDICAL CENTER | Age: 64
DRG: 236 | End: 2024-04-17
Attending: THORACIC SURGERY (CARDIOTHORACIC VASCULAR SURGERY)
Payer: MEDICARE

## 2024-04-17 DIAGNOSIS — G89.18 ACUTE POST-OPERATIVE PAIN: ICD-10-CM

## 2024-04-17 DIAGNOSIS — Z95.1 S/P CABG X 3: ICD-10-CM

## 2024-04-17 DIAGNOSIS — I25.119 CHEST PAIN DUE TO CAD (HCC): ICD-10-CM

## 2024-04-17 LAB
ACT BLD: 136 SEC (ref 74–137)
ACT BLD: 142 SEC (ref 74–137)
ACT BLD: 493 SEC (ref 74–137)
ACT BLD: 563 SEC (ref 74–137)
ACT BLD: 634 SEC (ref 74–137)
ANION GAP SERPL CALC-SCNC: 10 MMOL/L (ref 7–16)
APTT PPP: 28.3 SEC (ref 24.7–36)
BASE EXCESS BLDA CALC-SCNC: -17 MMOL/L (ref -4–3)
BASE EXCESS BLDA CALC-SCNC: -2 MMOL/L (ref -4–3)
BASE EXCESS BLDA CALC-SCNC: -3 MMOL/L (ref -4–3)
BASE EXCESS BLDA CALC-SCNC: -4 MMOL/L (ref -4–3)
BASE EXCESS BLDA CALC-SCNC: -6 MMOL/L (ref -4–3)
BASE EXCESS BLDA CALC-SCNC: 0 MMOL/L (ref -4–3)
BASE EXCESS BLDA CALC-SCNC: 0 MMOL/L (ref -4–3)
BODY TEMPERATURE: ABNORMAL DEGREES
BUN SERPL-MCNC: 15 MG/DL (ref 8–22)
CA-I BLD ISE-SCNC: 0.69 MMOL/L (ref 1.1–1.3)
CA-I BLD ISE-SCNC: 1.02 MMOL/L (ref 1.1–1.3)
CA-I BLD ISE-SCNC: 1.08 MMOL/L (ref 1.1–1.3)
CA-I BLD ISE-SCNC: 1.23 MMOL/L (ref 1.1–1.3)
CA-I BLD ISE-SCNC: 1.25 MMOL/L (ref 1.1–1.3)
CA-I BLD ISE-SCNC: 1.29 MMOL/L (ref 1.1–1.3)
CA-I BLD ISE-SCNC: 1.36 MMOL/L (ref 1.1–1.3)
CA-I SERPL-SCNC: 1.1 MMOL/L (ref 1.1–1.3)
CALCIUM SERPL-MCNC: 7.4 MG/DL (ref 8.5–10.5)
CHLORIDE SERPL-SCNC: 117 MMOL/L (ref 96–112)
CO2 BLDA-SCNC: 21 MMOL/L (ref 20–33)
CO2 BLDA-SCNC: 23 MMOL/L (ref 20–33)
CO2 BLDA-SCNC: 24 MMOL/L (ref 20–33)
CO2 BLDA-SCNC: 25 MMOL/L (ref 20–33)
CO2 BLDA-SCNC: 26 MMOL/L (ref 20–33)
CO2 BLDA-SCNC: 26 MMOL/L (ref 20–33)
CO2 BLDA-SCNC: 8 MMOL/L (ref 20–33)
CO2 SERPL-SCNC: 18 MMOL/L (ref 20–33)
CREAT SERPL-MCNC: 0.48 MG/DL (ref 0.5–1.4)
DELSYS IDSYS: ABNORMAL
EKG IMPRESSION: NORMAL
EKG IMPRESSION: NORMAL
GFR SERPLBLD CREATININE-BSD FMLA CKD-EPI: 115 ML/MIN/1.73 M 2
GLUCOSE BLD STRIP.AUTO-MCNC: 171 MG/DL (ref 65–99)
GLUCOSE BLD STRIP.AUTO-MCNC: 181 MG/DL (ref 65–99)
GLUCOSE BLD STRIP.AUTO-MCNC: 181 MG/DL (ref 65–99)
GLUCOSE BLD STRIP.AUTO-MCNC: 186 MG/DL (ref 65–99)
GLUCOSE BLD STRIP.AUTO-MCNC: 198 MG/DL (ref 65–99)
GLUCOSE BLD STRIP.AUTO-MCNC: 198 MG/DL (ref 65–99)
GLUCOSE SERPL-MCNC: 106 MG/DL (ref 65–99)
HCO3 BLDA-SCNC: 19.6 MMOL/L (ref 17–25)
HCO3 BLDA-SCNC: 22.3 MMOL/L (ref 17–25)
HCO3 BLDA-SCNC: 22.3 MMOL/L (ref 17–25)
HCO3 BLDA-SCNC: 22.4 MMOL/L (ref 17–25)
HCO3 BLDA-SCNC: 22.7 MMOL/L (ref 17–25)
HCO3 BLDA-SCNC: 24.1 MMOL/L (ref 17–25)
HCO3 BLDA-SCNC: 24.3 MMOL/L (ref 17–25)
HCO3 BLDA-SCNC: 24.8 MMOL/L (ref 17–25)
HCO3 BLDA-SCNC: 7.5 MMOL/L (ref 17–25)
HCT VFR BLD AUTO: 43.1 % (ref 42–52)
HCT VFR BLD CALC: 33 % (ref 42–52)
HCT VFR BLD CALC: 34 % (ref 42–52)
HCT VFR BLD CALC: 40 % (ref 42–52)
HCT VFR BLD CALC: 44 % (ref 42–52)
HCT VFR BLD CALC: 48 % (ref 42–52)
HGB BLD-MCNC: 11.2 G/DL (ref 14–18)
HGB BLD-MCNC: 11.6 G/DL (ref 14–18)
HGB BLD-MCNC: 13.6 G/DL (ref 14–18)
HGB BLD-MCNC: 14.3 G/DL (ref 14–18)
HGB BLD-MCNC: 15 G/DL (ref 14–18)
HGB BLD-MCNC: 16.3 G/DL (ref 14–18)
HOROWITZ INDEX BLDA+IHG-RTO: 151 MM[HG]
HOROWITZ INDEX BLDA+IHG-RTO: 164 MM[HG]
HOROWITZ INDEX BLDA+IHG-RTO: 235 MM[HG]
HOROWITZ INDEX BLDA+IHG-RTO: 245 MM[HG]
INR PPP: 1.2 (ref 0.87–1.13)
LACTATE BLD-SCNC: 0.9 MMOL/L (ref 0.5–2)
LACTATE BLD-SCNC: 1.3 MMOL/L (ref 0.5–2)
MAGNESIUM SERPL-MCNC: 2.2 MG/DL (ref 1.5–2.5)
MAGNESIUM SERPL-MCNC: 3.2 MG/DL (ref 1.5–2.5)
MODE IMODE: ABNORMAL
O2/TOTAL GAS SETTING VFR VENT: 100 %
O2/TOTAL GAS SETTING VFR VENT: 40 %
O2/TOTAL GAS SETTING VFR VENT: 60 %
O2/TOTAL GAS SETTING VFR VENT: 90 %
PCO2 BLDA: 15.3 MMHG (ref 26–37)
PCO2 BLDA: 36.3 MMHG (ref 26–37)
PCO2 BLDA: 36.4 MMHG (ref 26–37)
PCO2 BLDA: 36.9 MMHG (ref 26–37)
PCO2 BLDA: 39.3 MMHG (ref 26–37)
PCO2 BLDA: 39.4 MMHG (ref 26–37)
PCO2 BLDA: 40.2 MMHG (ref 26–37)
PCO2 BLDA: 45.7 MMHG (ref 26–37)
PCO2 BLDA: 50.5 MMHG (ref 26–37)
PCO2 TEMP ADJ BLDA: 14.5 MMHG (ref 26–37)
PCO2 TEMP ADJ BLDA: 33.6 MMHG (ref 26–37)
PCO2 TEMP ADJ BLDA: 34.4 MMHG (ref 26–37)
PCO2 TEMP ADJ BLDA: 35.9 MMHG (ref 26–37)
PCO2 TEMP ADJ BLDA: 36.8 MMHG (ref 26–37)
PCO2 TEMP ADJ BLDA: 37.6 MMHG (ref 26–37)
PCO2 TEMP ADJ BLDA: 38 MMHG (ref 26–37)
PCO2 TEMP ADJ BLDA: 42.4 MMHG (ref 26–37)
PCO2 TEMP ADJ BLDA: 47.3 MMHG (ref 26–37)
PEEP END EXPIRATORY PRESSURE IPEEP: 8 CMH20
PERCENT MINUTE VOLUME IPMV: 130
PERCENT MINUTE VOLUME IPMV: 160
PERCENT MINUTE VOLUME IPMV: 160
PH BLDA: 7.29 [PH] (ref 7.4–7.5)
PH BLDA: 7.3 [PH] (ref 7.4–7.5)
PH BLDA: 7.3 [PH] (ref 7.4–7.5)
PH BLDA: 7.34 [PH] (ref 7.4–7.5)
PH BLDA: 7.36 [PH] (ref 7.4–7.5)
PH BLDA: 7.36 [PH] (ref 7.4–7.5)
PH BLDA: 7.4 [PH] (ref 7.4–7.5)
PH BLDA: 7.4 [PH] (ref 7.4–7.5)
PH BLDA: 7.43 [PH] (ref 7.4–7.5)
PH TEMP ADJ BLDA: 7.31 [PH] (ref 7.4–7.5)
PH TEMP ADJ BLDA: 7.31 [PH] (ref 7.4–7.5)
PH TEMP ADJ BLDA: 7.33 [PH] (ref 7.4–7.5)
PH TEMP ADJ BLDA: 7.34 [PH] (ref 7.4–7.5)
PH TEMP ADJ BLDA: 7.37 [PH] (ref 7.4–7.5)
PH TEMP ADJ BLDA: 7.38 [PH] (ref 7.4–7.5)
PH TEMP ADJ BLDA: 7.42 [PH] (ref 7.4–7.5)
PH TEMP ADJ BLDA: 7.43 [PH] (ref 7.4–7.5)
PH TEMP ADJ BLDA: 7.46 [PH] (ref 7.4–7.5)
PLATELET # BLD AUTO: 143 K/UL (ref 164–446)
PO2 BLDA: 141 MMHG (ref 64–87)
PO2 BLDA: 148 MMHG (ref 64–87)
PO2 BLDA: 151 MMHG (ref 64–87)
PO2 BLDA: 183 MMHG (ref 64–87)
PO2 BLDA: 221 MMHG (ref 64–87)
PO2 BLDA: 288 MMHG (ref 64–87)
PO2 BLDA: 316 MMHG (ref 64–87)
PO2 BLDA: 500 MMHG (ref 64–87)
PO2 BLDA: 98 MMHG (ref 64–87)
PO2 TEMP ADJ BLDA: 135 MMHG (ref 64–87)
PO2 TEMP ADJ BLDA: 141 MMHG (ref 64–87)
PO2 TEMP ADJ BLDA: 144 MMHG (ref 64–87)
PO2 TEMP ADJ BLDA: 179 MMHG (ref 64–87)
PO2 TEMP ADJ BLDA: 213 MMHG (ref 64–87)
PO2 TEMP ADJ BLDA: 280 MMHG (ref 64–87)
PO2 TEMP ADJ BLDA: 305 MMHG (ref 64–87)
PO2 TEMP ADJ BLDA: 486 MMHG (ref 64–87)
PO2 TEMP ADJ BLDA: 97 MMHG (ref 64–87)
POTASSIUM BLD-SCNC: 3.3 MMOL/L (ref 3.6–5.5)
POTASSIUM BLD-SCNC: 3.9 MMOL/L (ref 3.6–5.5)
POTASSIUM BLD-SCNC: 4.1 MMOL/L (ref 3.6–5.5)
POTASSIUM BLD-SCNC: 4.3 MMOL/L (ref 3.6–5.5)
POTASSIUM BLD-SCNC: 5.2 MMOL/L (ref 3.6–5.5)
POTASSIUM BLD-SCNC: 5.4 MMOL/L (ref 3.6–5.5)
POTASSIUM BLD-SCNC: <2 MMOL/L (ref 3.6–5.5)
POTASSIUM SERPL-SCNC: 3.7 MMOL/L (ref 3.6–5.5)
PRESSURE SUPPORT SETTING VENT: 5 CM[H2O]
PROTHROMBIN TIME: 15.3 SEC (ref 12–14.6)
SAO2 % BLDA: 100 % (ref 93–99)
SAO2 % BLDA: 97 % (ref 93–99)
SAO2 % BLDA: 99 % (ref 93–99)
SODIUM BLD-SCNC: 136 MMOL/L (ref 135–145)
SODIUM BLD-SCNC: 137 MMOL/L (ref 135–145)
SODIUM BLD-SCNC: 139 MMOL/L (ref 135–145)
SODIUM BLD-SCNC: 140 MMOL/L (ref 135–145)
SODIUM BLD-SCNC: 140 MMOL/L (ref 135–145)
SODIUM BLD-SCNC: 144 MMOL/L (ref 135–145)
SODIUM BLD-SCNC: 153 MMOL/L (ref 135–145)
SODIUM SERPL-SCNC: 145 MMOL/L (ref 135–145)
SPECIMEN DRAWN FROM PATIENT: ABNORMAL

## 2024-04-17 PROCEDURE — 700102 HCHG RX REV CODE 250 W/ 637 OVERRIDE(OP)

## 2024-04-17 PROCEDURE — C9248 INJ, CLEVIDIPINE BUTYRATE: HCPCS | Performed by: ANESTHESIOLOGY

## 2024-04-17 PROCEDURE — 021109W BYPASS CORONARY ARTERY, TWO ARTERIES FROM AORTA WITH AUTOLOGOUS VENOUS TISSUE, OPEN APPROACH: ICD-10-PCS | Performed by: THORACIC SURGERY (CARDIOTHORACIC VASCULAR SURGERY)

## 2024-04-17 PROCEDURE — 82962 GLUCOSE BLOOD TEST: CPT | Mod: 91

## 2024-04-17 PROCEDURE — 110371 HCHG SHELL REV 272: Performed by: THORACIC SURGERY (CARDIOTHORACIC VASCULAR SURGERY)

## 2024-04-17 PROCEDURE — B24BZZ4 ULTRASONOGRAPHY OF HEART WITH AORTA, TRANSESOPHAGEAL: ICD-10-PCS | Performed by: THORACIC SURGERY (CARDIOTHORACIC VASCULAR SURGERY)

## 2024-04-17 PROCEDURE — 82803 BLOOD GASES ANY COMBINATION: CPT | Mod: 91

## 2024-04-17 PROCEDURE — P9047 ALBUMIN (HUMAN), 25%, 50ML: HCPCS | Mod: JG

## 2024-04-17 PROCEDURE — C1898 LEAD, PMKR, OTHER THAN TRANS: HCPCS | Performed by: THORACIC SURGERY (CARDIOTHORACIC VASCULAR SURGERY)

## 2024-04-17 PROCEDURE — 700111 HCHG RX REV CODE 636 W/ 250 OVERRIDE (IP): Performed by: ANESTHESIOLOGY

## 2024-04-17 PROCEDURE — 700105 HCHG RX REV CODE 258: Performed by: THORACIC SURGERY (CARDIOTHORACIC VASCULAR SURGERY)

## 2024-04-17 PROCEDURE — 99223 1ST HOSP IP/OBS HIGH 75: CPT | Performed by: INTERNAL MEDICINE

## 2024-04-17 PROCEDURE — 700101 HCHG RX REV CODE 250

## 2024-04-17 PROCEDURE — 93010 ELECTROCARDIOGRAM REPORT: CPT | Mod: GZ,76 | Performed by: INTERNAL MEDICINE

## 2024-04-17 PROCEDURE — 770022 HCHG ROOM/CARE - ICU (200)

## 2024-04-17 PROCEDURE — C9248 INJ, CLEVIDIPINE BUTYRATE: HCPCS | Mod: JZ

## 2024-04-17 PROCEDURE — 84132 ASSAY OF SERUM POTASSIUM: CPT | Mod: 91

## 2024-04-17 PROCEDURE — 700111 HCHG RX REV CODE 636 W/ 250 OVERRIDE (IP): Performed by: THORACIC SURGERY (CARDIOTHORACIC VASCULAR SURGERY)

## 2024-04-17 PROCEDURE — 85049 AUTOMATED PLATELET COUNT: CPT

## 2024-04-17 PROCEDURE — 80048 BASIC METABOLIC PNL TOTAL CA: CPT

## 2024-04-17 PROCEDURE — 700102 HCHG RX REV CODE 250 W/ 637 OVERRIDE(OP): Performed by: THORACIC SURGERY (CARDIOTHORACIC VASCULAR SURGERY)

## 2024-04-17 PROCEDURE — 94002 VENT MGMT INPAT INIT DAY: CPT

## 2024-04-17 PROCEDURE — C1751 CATH, INF, PER/CENT/MIDLINE: HCPCS | Performed by: THORACIC SURGERY (CARDIOTHORACIC VASCULAR SURGERY)

## 2024-04-17 PROCEDURE — A9270 NON-COVERED ITEM OR SERVICE: HCPCS | Performed by: THORACIC SURGERY (CARDIOTHORACIC VASCULAR SURGERY)

## 2024-04-17 PROCEDURE — 5A2204Z RESTORATION OF CARDIAC RHYTHM, SINGLE: ICD-10-PCS | Performed by: THORACIC SURGERY (CARDIOTHORACIC VASCULAR SURGERY)

## 2024-04-17 PROCEDURE — 160048 HCHG OR STATISTICAL LEVEL 1-5: Performed by: THORACIC SURGERY (CARDIOTHORACIC VASCULAR SURGERY)

## 2024-04-17 PROCEDURE — 700111 HCHG RX REV CODE 636 W/ 250 OVERRIDE (IP)

## 2024-04-17 PROCEDURE — 700111 HCHG RX REV CODE 636 W/ 250 OVERRIDE (IP): Mod: JZ

## 2024-04-17 PROCEDURE — 160009 HCHG ANES TIME/MIN: Performed by: THORACIC SURGERY (CARDIOTHORACIC VASCULAR SURGERY)

## 2024-04-17 PROCEDURE — 85730 THROMBOPLASTIN TIME PARTIAL: CPT

## 2024-04-17 PROCEDURE — 06BP4ZZ EXCISION OF RIGHT SAPHENOUS VEIN, PERCUTANEOUS ENDOSCOPIC APPROACH: ICD-10-PCS | Performed by: THORACIC SURGERY (CARDIOTHORACIC VASCULAR SURGERY)

## 2024-04-17 PROCEDURE — 82330 ASSAY OF CALCIUM: CPT | Mod: 91

## 2024-04-17 PROCEDURE — 5A1221Z PERFORMANCE OF CARDIAC OUTPUT, CONTINUOUS: ICD-10-PCS | Performed by: THORACIC SURGERY (CARDIOTHORACIC VASCULAR SURGERY)

## 2024-04-17 PROCEDURE — 700105 HCHG RX REV CODE 258

## 2024-04-17 PROCEDURE — 502000 HCHG MISC OR IMPLANTS RC 0278: Performed by: THORACIC SURGERY (CARDIOTHORACIC VASCULAR SURGERY)

## 2024-04-17 PROCEDURE — 502240 HCHG MISC OR SUPPLY RC 0272: Performed by: THORACIC SURGERY (CARDIOTHORACIC VASCULAR SURGERY)

## 2024-04-17 PROCEDURE — 700101 HCHG RX REV CODE 250: Performed by: THORACIC SURGERY (CARDIOTHORACIC VASCULAR SURGERY)

## 2024-04-17 PROCEDURE — 93005 ELECTROCARDIOGRAM TRACING: CPT

## 2024-04-17 PROCEDURE — 33518 CABG ARTERY-VEIN TWO: CPT | Performed by: THORACIC SURGERY (CARDIOTHORACIC VASCULAR SURGERY)

## 2024-04-17 PROCEDURE — 33533 CABG ARTERIAL SINGLE: CPT | Performed by: THORACIC SURGERY (CARDIOTHORACIC VASCULAR SURGERY)

## 2024-04-17 PROCEDURE — 700105 HCHG RX REV CODE 258: Performed by: ANESTHESIOLOGY

## 2024-04-17 PROCEDURE — 33508 ENDOSCOPIC VEIN HARVEST: CPT | Mod: AS,59

## 2024-04-17 PROCEDURE — 71045 X-RAY EXAM CHEST 1 VIEW: CPT

## 2024-04-17 PROCEDURE — 85347 COAGULATION TIME ACTIVATED: CPT | Mod: 91

## 2024-04-17 PROCEDURE — 33508 ENDOSCOPIC VEIN HARVEST: CPT | Mod: 59 | Performed by: THORACIC SURGERY (CARDIOTHORACIC VASCULAR SURGERY)

## 2024-04-17 PROCEDURE — 160042 HCHG SURGERY MINUTES - EA ADDL 1 MIN LEVEL 5: Performed by: THORACIC SURGERY (CARDIOTHORACIC VASCULAR SURGERY)

## 2024-04-17 PROCEDURE — 33518 CABG ARTERY-VEIN TWO: CPT | Mod: AS

## 2024-04-17 PROCEDURE — 33533 CABG ARTERIAL SINGLE: CPT | Mod: AS

## 2024-04-17 PROCEDURE — A9270 NON-COVERED ITEM OR SERVICE: HCPCS

## 2024-04-17 PROCEDURE — C1729 CATH, DRAINAGE: HCPCS | Performed by: THORACIC SURGERY (CARDIOTHORACIC VASCULAR SURGERY)

## 2024-04-17 PROCEDURE — 93010 ELECTROCARDIOGRAM REPORT: CPT | Performed by: INTERNAL MEDICINE

## 2024-04-17 PROCEDURE — 83735 ASSAY OF MAGNESIUM: CPT | Mod: 91

## 2024-04-17 PROCEDURE — 160031 HCHG SURGERY MINUTES - 1ST 30 MINS LEVEL 5: Performed by: THORACIC SURGERY (CARDIOTHORACIC VASCULAR SURGERY)

## 2024-04-17 PROCEDURE — 503001 HCHG PERFUSION: Performed by: THORACIC SURGERY (CARDIOTHORACIC VASCULAR SURGERY)

## 2024-04-17 PROCEDURE — 700101 HCHG RX REV CODE 250: Performed by: ANESTHESIOLOGY

## 2024-04-17 PROCEDURE — 85610 PROTHROMBIN TIME: CPT

## 2024-04-17 PROCEDURE — 85014 HEMATOCRIT: CPT | Mod: 91

## 2024-04-17 PROCEDURE — 83605 ASSAY OF LACTIC ACID: CPT

## 2024-04-17 PROCEDURE — 84295 ASSAY OF SERUM SODIUM: CPT | Mod: 91

## 2024-04-17 PROCEDURE — 02100Z9 BYPASS CORONARY ARTERY, ONE ARTERY FROM LEFT INTERNAL MAMMARY, OPEN APPROACH: ICD-10-PCS | Performed by: THORACIC SURGERY (CARDIOTHORACIC VASCULAR SURGERY)

## 2024-04-17 PROCEDURE — 94150 VITAL CAPACITY TEST: CPT

## 2024-04-17 PROCEDURE — 93325 DOPPLER ECHO COLOR FLOW MAPG: CPT

## 2024-04-17 PROCEDURE — 85018 HEMOGLOBIN: CPT

## 2024-04-17 DEVICE — IMPLANTABLE DEVICE: Type: IMPLANTABLE DEVICE | Status: FUNCTIONAL

## 2024-04-17 DEVICE — MARKER GRAFT AC VEIN DISK SHAPED (10EA/BX): Type: IMPLANTABLE DEVICE | Status: FUNCTIONAL

## 2024-04-17 RX ORDER — TRAMADOL HYDROCHLORIDE 50 MG/1
50 TABLET ORAL EVERY 4 HOURS PRN
Status: DISCONTINUED | OUTPATIENT
Start: 2024-04-17 | End: 2024-04-21 | Stop reason: HOSPADM

## 2024-04-17 RX ORDER — ATORVASTATIN CALCIUM 40 MG/1
40 TABLET, FILM COATED ORAL
Status: DISCONTINUED | OUTPATIENT
Start: 2024-04-17 | End: 2024-04-18

## 2024-04-17 RX ORDER — ACETAMINOPHEN 325 MG/1
650 TABLET ORAL EVERY 4 HOURS PRN
Status: DISCONTINUED | OUTPATIENT
Start: 2024-04-17 | End: 2024-04-21 | Stop reason: HOSPADM

## 2024-04-17 RX ORDER — MIDAZOLAM HYDROCHLORIDE 1 MG/ML
INJECTION INTRAMUSCULAR; INTRAVENOUS PRN
Status: DISCONTINUED | OUTPATIENT
Start: 2024-04-17 | End: 2024-04-17 | Stop reason: SURG

## 2024-04-17 RX ORDER — ACETAMINOPHEN 650 MG/1
650 SUPPOSITORY RECTAL EVERY 4 HOURS PRN
Status: DISCONTINUED | OUTPATIENT
Start: 2024-04-17 | End: 2024-04-21 | Stop reason: HOSPADM

## 2024-04-17 RX ORDER — NITROGLYCERIN 20 MG/100ML
0-100 INJECTION INTRAVENOUS CONTINUOUS
Status: DISCONTINUED | OUTPATIENT
Start: 2024-04-17 | End: 2024-04-18

## 2024-04-17 RX ORDER — OXYCODONE HYDROCHLORIDE 10 MG/1
10 TABLET ORAL
Status: DISCONTINUED | OUTPATIENT
Start: 2024-04-17 | End: 2024-04-21 | Stop reason: HOSPADM

## 2024-04-17 RX ORDER — AMOXICILLIN 250 MG
1 CAPSULE ORAL
Status: DISCONTINUED | OUTPATIENT
Start: 2024-04-17 | End: 2024-04-21 | Stop reason: HOSPADM

## 2024-04-17 RX ORDER — NOREPINEPHRINE BITARTRATE 0.03 MG/ML
0-1 INJECTION, SOLUTION INTRAVENOUS CONTINUOUS
Status: DISCONTINUED | OUTPATIENT
Start: 2024-04-17 | End: 2024-04-21 | Stop reason: HOSPADM

## 2024-04-17 RX ORDER — INSULIN LISPRO 100 [IU]/ML
0-14 INJECTION, SOLUTION INTRAVENOUS; SUBCUTANEOUS
Qty: 9 ML | Refills: 0 | Status: DISCONTINUED | OUTPATIENT
Start: 2024-04-17 | End: 2024-04-18

## 2024-04-17 RX ORDER — AMOXICILLIN 250 MG
1 CAPSULE ORAL NIGHTLY
Status: DISCONTINUED | OUTPATIENT
Start: 2024-04-17 | End: 2024-04-21 | Stop reason: HOSPADM

## 2024-04-17 RX ORDER — CEFAZOLIN SODIUM 1 G/3ML
INJECTION, POWDER, FOR SOLUTION INTRAMUSCULAR; INTRAVENOUS PRN
Status: DISCONTINUED | OUTPATIENT
Start: 2024-04-17 | End: 2024-04-17 | Stop reason: SURG

## 2024-04-17 RX ORDER — ACETAMINOPHEN 500 MG
1000 TABLET ORAL EVERY 6 HOURS PRN
Status: DISCONTINUED | OUTPATIENT
Start: 2024-04-27 | End: 2024-04-21 | Stop reason: HOSPADM

## 2024-04-17 RX ORDER — CLOPIDOGREL BISULFATE 75 MG/1
75 TABLET ORAL DAILY
Status: DISCONTINUED | OUTPATIENT
Start: 2024-04-18 | End: 2024-04-21 | Stop reason: HOSPADM

## 2024-04-17 RX ORDER — ROCURONIUM BROMIDE 10 MG/ML
INJECTION, SOLUTION INTRAVENOUS PRN
Status: DISCONTINUED | OUTPATIENT
Start: 2024-04-17 | End: 2024-04-17 | Stop reason: SURG

## 2024-04-17 RX ORDER — MAGNESIUM SULFATE 1 G/100ML
1 INJECTION INTRAVENOUS DAILY
Status: COMPLETED | OUTPATIENT
Start: 2024-04-17 | End: 2024-04-19

## 2024-04-17 RX ORDER — POTASSIUM CHLORIDE 14.9 MG/ML
20 INJECTION INTRAVENOUS ONCE
Status: COMPLETED | OUTPATIENT
Start: 2024-04-17 | End: 2024-04-17

## 2024-04-17 RX ORDER — POTASSIUM CHLORIDE 29.8 MG/ML
40 INJECTION INTRAVENOUS ONCE
Status: COMPLETED | OUTPATIENT
Start: 2024-04-17 | End: 2024-04-17

## 2024-04-17 RX ORDER — DEXMEDETOMIDINE HYDROCHLORIDE 4 UG/ML
0-1.5 INJECTION, SOLUTION INTRAVENOUS CONTINUOUS
Status: DISCONTINUED | OUTPATIENT
Start: 2024-04-17 | End: 2024-04-18

## 2024-04-17 RX ORDER — ACETAMINOPHEN 500 MG
1000 TABLET ORAL EVERY 6 HOURS
Qty: 80 TABLET | Refills: 0 | Status: DISCONTINUED | OUTPATIENT
Start: 2024-04-17 | End: 2024-04-21 | Stop reason: HOSPADM

## 2024-04-17 RX ORDER — MIDAZOLAM HYDROCHLORIDE 1 MG/ML
2 INJECTION INTRAMUSCULAR; INTRAVENOUS
Status: DISCONTINUED | OUTPATIENT
Start: 2024-04-17 | End: 2024-04-18

## 2024-04-17 RX ORDER — ONDANSETRON 2 MG/ML
8 INJECTION INTRAMUSCULAR; INTRAVENOUS EVERY 6 HOURS PRN
Status: DISCONTINUED | OUTPATIENT
Start: 2024-04-17 | End: 2024-04-21 | Stop reason: HOSPADM

## 2024-04-17 RX ORDER — ALUMINA, MAGNESIA, AND SIMETHICONE 2400; 2400; 240 MG/30ML; MG/30ML; MG/30ML
30 SUSPENSION ORAL EVERY 4 HOURS PRN
Status: DISCONTINUED | OUTPATIENT
Start: 2024-04-17 | End: 2024-04-21 | Stop reason: HOSPADM

## 2024-04-17 RX ORDER — ACETAMINOPHEN 500 MG
1000 TABLET ORAL ONCE
Status: COMPLETED | OUTPATIENT
Start: 2024-04-17 | End: 2024-04-17

## 2024-04-17 RX ORDER — DIPHENHYDRAMINE HCL 25 MG
25 TABLET ORAL
Status: DISCONTINUED | OUTPATIENT
Start: 2024-04-17 | End: 2024-04-21 | Stop reason: HOSPADM

## 2024-04-17 RX ORDER — PAPAVERINE HYDROCHLORIDE 30 MG/ML
INJECTION INTRAMUSCULAR; INTRAVENOUS
Status: DISCONTINUED | OUTPATIENT
Start: 2024-04-17 | End: 2024-04-17 | Stop reason: HOSPADM

## 2024-04-17 RX ORDER — METHADONE HYDROCHLORIDE 10 MG/ML
20 INJECTION, SOLUTION INTRAMUSCULAR; INTRAVENOUS; SUBCUTANEOUS ONCE
Status: COMPLETED | OUTPATIENT
Start: 2024-04-17 | End: 2024-04-17

## 2024-04-17 RX ORDER — SODIUM CHLORIDE 9 MG/ML
INJECTION, SOLUTION INTRAVENOUS CONTINUOUS
Status: DISCONTINUED | OUTPATIENT
Start: 2024-04-17 | End: 2024-04-21 | Stop reason: HOSPADM

## 2024-04-17 RX ORDER — OMEPRAZOLE 20 MG/1
20 CAPSULE, DELAYED RELEASE ORAL DAILY
Status: DISCONTINUED | OUTPATIENT
Start: 2024-04-18 | End: 2024-04-21 | Stop reason: HOSPADM

## 2024-04-17 RX ORDER — AMIODARONE HYDROCHLORIDE 150 MG/3ML
INJECTION, SOLUTION INTRAVENOUS PRN
Status: DISCONTINUED | OUTPATIENT
Start: 2024-04-17 | End: 2024-04-17 | Stop reason: SURG

## 2024-04-17 RX ORDER — CEFAZOLIN 2 G/1
2 INJECTION, POWDER, FOR SOLUTION INTRAMUSCULAR; INTRAVENOUS ONCE
Qty: 2000 MG | Refills: 0 | Status: DISCONTINUED | OUTPATIENT
Start: 2024-04-17 | End: 2024-04-17 | Stop reason: HOSPADM

## 2024-04-17 RX ORDER — MORPHINE SULFATE 4 MG/ML
4 INJECTION INTRAVENOUS
Status: DISCONTINUED | OUTPATIENT
Start: 2024-04-17 | End: 2024-04-17 | Stop reason: ALTCHOICE

## 2024-04-17 RX ORDER — OXYCODONE HYDROCHLORIDE 5 MG/1
5 TABLET ORAL
Status: DISCONTINUED | OUTPATIENT
Start: 2024-04-17 | End: 2024-04-21 | Stop reason: HOSPADM

## 2024-04-17 RX ORDER — PROTAMINE SULFATE 10 MG/ML
INJECTION, SOLUTION INTRAVENOUS PRN
Status: DISCONTINUED | OUTPATIENT
Start: 2024-04-17 | End: 2024-04-17 | Stop reason: SURG

## 2024-04-17 RX ORDER — ASPIRIN 81 MG/1
81 TABLET ORAL DAILY
Status: DISCONTINUED | OUTPATIENT
Start: 2024-04-18 | End: 2024-04-21 | Stop reason: HOSPADM

## 2024-04-17 RX ORDER — SODIUM CHLORIDE, SODIUM LACTATE, POTASSIUM CHLORIDE, CALCIUM CHLORIDE 600; 310; 30; 20 MG/100ML; MG/100ML; MG/100ML; MG/100ML
INJECTION, SOLUTION INTRAVENOUS
Status: DISCONTINUED | OUTPATIENT
Start: 2024-04-17 | End: 2024-04-18 | Stop reason: HOSPADM

## 2024-04-17 RX ORDER — PROCHLORPERAZINE EDISYLATE 5 MG/ML
10 INJECTION INTRAMUSCULAR; INTRAVENOUS EVERY 6 HOURS PRN
Status: DISCONTINUED | OUTPATIENT
Start: 2024-04-17 | End: 2024-04-21 | Stop reason: HOSPADM

## 2024-04-17 RX ORDER — POLYETHYLENE GLYCOL 3350 17 G/17G
1 POWDER, FOR SOLUTION ORAL 2 TIMES DAILY PRN
Status: DISCONTINUED | OUTPATIENT
Start: 2024-04-17 | End: 2024-04-21 | Stop reason: HOSPADM

## 2024-04-17 RX ORDER — ENEMA 19; 7 G/133ML; G/133ML
1 ENEMA RECTAL
Status: DISCONTINUED | OUTPATIENT
Start: 2024-04-17 | End: 2024-04-21 | Stop reason: HOSPADM

## 2024-04-17 RX ORDER — HEPARIN SODIUM,PORCINE 1000/ML
VIAL (ML) INJECTION PRN
Status: DISCONTINUED | OUTPATIENT
Start: 2024-04-17 | End: 2024-04-17 | Stop reason: SURG

## 2024-04-17 RX ORDER — SODIUM CHLORIDE 9 MG/ML
INJECTION, SOLUTION INTRAVENOUS
Status: DISCONTINUED | OUTPATIENT
Start: 2024-04-17 | End: 2024-04-17 | Stop reason: SURG

## 2024-04-17 RX ORDER — DOCUSATE SODIUM 100 MG/1
100 CAPSULE, LIQUID FILLED ORAL 2 TIMES DAILY
Status: DISCONTINUED | OUTPATIENT
Start: 2024-04-17 | End: 2024-04-21 | Stop reason: HOSPADM

## 2024-04-17 RX ORDER — SODIUM CHLORIDE, SODIUM GLUCONATE, SODIUM ACETATE, POTASSIUM CHLORIDE AND MAGNESIUM CHLORIDE 526; 502; 368; 37; 30 MG/100ML; MG/100ML; MG/100ML; MG/100ML; MG/100ML
INJECTION, SOLUTION INTRAVENOUS
Status: DISCONTINUED | OUTPATIENT
Start: 2024-04-17 | End: 2024-04-17 | Stop reason: SURG

## 2024-04-17 RX ORDER — SODIUM CHLORIDE, SODIUM GLUCONATE, SODIUM ACETATE, POTASSIUM CHLORIDE AND MAGNESIUM CHLORIDE 526; 502; 368; 37; 30 MG/100ML; MG/100ML; MG/100ML; MG/100ML; MG/100ML
INJECTION, SOLUTION INTRAVENOUS PRN
Status: DISCONTINUED | OUTPATIENT
Start: 2024-04-17 | End: 2024-04-21 | Stop reason: HOSPADM

## 2024-04-17 RX ORDER — BISACODYL 10 MG
10 SUPPOSITORY, RECTAL RECTAL
Status: DISCONTINUED | OUTPATIENT
Start: 2024-04-17 | End: 2024-04-21 | Stop reason: HOSPADM

## 2024-04-17 RX ORDER — SODIUM CHLORIDE 9 MG/ML
INJECTION, SOLUTION INTRAVENOUS CONTINUOUS
Status: DISCONTINUED | OUTPATIENT
Start: 2024-04-17 | End: 2024-04-18

## 2024-04-17 RX ADMIN — DEXMEDETOMIDINE HYDROCHLORIDE 0.4 MCG/KG/HR: 100 INJECTION, SOLUTION INTRAVENOUS at 13:08

## 2024-04-17 RX ADMIN — Medication 1 APPLICATOR: at 13:48

## 2024-04-17 RX ADMIN — MAGNESIUM SULFATE IN DEXTROSE 1 G: 10 INJECTION, SOLUTION INTRAVENOUS at 13:07

## 2024-04-17 RX ADMIN — SODIUM CHLORIDE, SODIUM GLUCONATE, SODIUM ACETATE, POTASSIUM CHLORIDE AND MAGNESIUM CHLORIDE 1000 ML: 526; 502; 368; 37; 30 INJECTION, SOLUTION INTRAVENOUS at 14:00

## 2024-04-17 RX ADMIN — EPINEPHRINE 0.04 MCG/KG/MIN: 1 INJECTION INTRAMUSCULAR; INTRAVENOUS; SUBCUTANEOUS at 11:24

## 2024-04-17 RX ADMIN — SODIUM CHLORIDE 5 UNITS/HR: 9 INJECTION, SOLUTION INTRAVENOUS at 10:25

## 2024-04-17 RX ADMIN — SODIUM CHLORIDE: 9 INJECTION, SOLUTION INTRAVENOUS at 08:00

## 2024-04-17 RX ADMIN — CLEVIPIDINE 200 MCG: 0.5 EMULSION INTRAVENOUS at 08:52

## 2024-04-17 RX ADMIN — CEFAZOLIN 2 G: 1 INJECTION, POWDER, FOR SOLUTION INTRAMUSCULAR; INTRAVENOUS at 08:32

## 2024-04-17 RX ADMIN — PROPOFOL 200 MG: 10 INJECTION, EMULSION INTRAVENOUS at 08:06

## 2024-04-17 RX ADMIN — DEXMEDETOMIDINE HYDROCHLORIDE 0.4 MCG/KG/HR: 100 INJECTION, SOLUTION INTRAVENOUS at 08:39

## 2024-04-17 RX ADMIN — HEPARIN SODIUM 43000 UNITS: 1000 INJECTION, SOLUTION INTRAVENOUS; SUBCUTANEOUS at 09:44

## 2024-04-17 RX ADMIN — TRAMADOL HYDROCHLORIDE 50 MG: 50 TABLET ORAL at 18:36

## 2024-04-17 RX ADMIN — NOREPINEPHRINE BITARTRATE 0.1 MCG/MIN: 1 INJECTION, SOLUTION, CONCENTRATE INTRAVENOUS at 09:44

## 2024-04-17 RX ADMIN — METOPROLOL TARTRATE 12.5 MG: 25 TABLET, FILM COATED ORAL at 06:41

## 2024-04-17 RX ADMIN — METHADONE HYDROCHLORIDE 10 MG: 10 INJECTION, SOLUTION INTRAMUSCULAR; INTRAVENOUS; SUBCUTANEOUS at 09:55

## 2024-04-17 RX ADMIN — SODIUM CHLORIDE, POTASSIUM CHLORIDE, SODIUM LACTATE AND CALCIUM CHLORIDE: 600; 310; 30; 20 INJECTION, SOLUTION INTRAVENOUS at 08:00

## 2024-04-17 RX ADMIN — CLEVIPIDINE 2 MG/HR: 0.5 EMULSION INTRAVENOUS at 11:34

## 2024-04-17 RX ADMIN — NOREPINEPHRINE BITARTRATE 0.02 MCG/KG/MIN: 1 INJECTION INTRAVENOUS at 13:16

## 2024-04-17 RX ADMIN — OXYCODONE 5 MG: 5 TABLET ORAL at 20:38

## 2024-04-17 RX ADMIN — AMINOCAPROIC ACID 2 G/HR: 250 INJECTION, SOLUTION INTRAVENOUS at 09:10

## 2024-04-17 RX ADMIN — ATORVASTATIN CALCIUM 40 MG: 40 TABLET, FILM COATED ORAL at 20:33

## 2024-04-17 RX ADMIN — ROCURONIUM BROMIDE 20 MG: 50 INJECTION, SOLUTION INTRAVENOUS at 11:25

## 2024-04-17 RX ADMIN — CLEVIPIDINE 200 MCG: 0.5 EMULSION INTRAVENOUS at 09:00

## 2024-04-17 RX ADMIN — SODIUM CHLORIDE: 9 INJECTION, SOLUTION INTRAVENOUS at 13:02

## 2024-04-17 RX ADMIN — POTASSIUM CHLORIDE 40 MEQ: 29.8 INJECTION, SOLUTION INTRAVENOUS at 13:03

## 2024-04-17 RX ADMIN — CALCIUM CHLORIDE 1000 MG: 100 INJECTION, SOLUTION INTRAVENOUS at 14:06

## 2024-04-17 RX ADMIN — DOCUSATE SODIUM 100 MG: 100 CAPSULE, LIQUID FILLED ORAL at 17:04

## 2024-04-17 RX ADMIN — ROCURONIUM BROMIDE 20 MG: 50 INJECTION, SOLUTION INTRAVENOUS at 09:36

## 2024-04-17 RX ADMIN — AMIODARONE HYDROCHLORIDE 150 MG: 50 INJECTION, SOLUTION INTRAVENOUS at 11:25

## 2024-04-17 RX ADMIN — ROCURONIUM BROMIDE 30 MG: 50 INJECTION, SOLUTION INTRAVENOUS at 11:01

## 2024-04-17 RX ADMIN — ACETAMINOPHEN 1000 MG: 500 TABLET, FILM COATED ORAL at 06:41

## 2024-04-17 RX ADMIN — CEFAZOLIN 2 G: 2 INJECTION, POWDER, FOR SOLUTION INTRAMUSCULAR; INTRAVENOUS at 16:45

## 2024-04-17 RX ADMIN — SODIUM CHLORIDE, SODIUM GLUCONATE, SODIUM ACETATE, POTASSIUM CHLORIDE AND MAGNESIUM CHLORIDE: 526; 502; 368; 37; 30 INJECTION, SOLUTION INTRAVENOUS at 08:00

## 2024-04-17 RX ADMIN — ACETAMINOPHEN 1000 MG: 500 TABLET, FILM COATED ORAL at 17:03

## 2024-04-17 RX ADMIN — OXYCODONE 5 MG: 5 TABLET ORAL at 17:04

## 2024-04-17 RX ADMIN — METHADONE HYDROCHLORIDE 10 MG: 10 INJECTION, SOLUTION INTRAMUSCULAR; INTRAVENOUS; SUBCUTANEOUS at 08:06

## 2024-04-17 RX ADMIN — ROCURONIUM BROMIDE 100 MG: 50 INJECTION, SOLUTION INTRAVENOUS at 08:06

## 2024-04-17 RX ADMIN — POTASSIUM CHLORIDE 20 MEQ: 14.9 INJECTION, SOLUTION INTRAVENOUS at 18:09

## 2024-04-17 RX ADMIN — SODIUM BICARBONATE 50 MEQ: 84 INJECTION INTRAVENOUS at 12:40

## 2024-04-17 RX ADMIN — DOCUSATE SODIUM 50 MG AND SENNOSIDES 8.6 MG 1 TABLET: 8.6; 5 TABLET, FILM COATED ORAL at 20:34

## 2024-04-17 RX ADMIN — MIDAZOLAM HYDROCHLORIDE 2 MG: 1 INJECTION, SOLUTION INTRAMUSCULAR; INTRAVENOUS at 08:06

## 2024-04-17 RX ADMIN — CLEVIPIDINE 1 MG/HR: 0.5 EMULSION INTRAVENOUS at 19:30

## 2024-04-17 RX ADMIN — PROTAMINE SULFATE 300 MG: 10 INJECTION, SOLUTION INTRAVENOUS at 11:25

## 2024-04-17 RX ADMIN — SODIUM CHLORIDE: 9 INJECTION, SOLUTION INTRAVENOUS at 13:11

## 2024-04-17 RX ADMIN — AMINOCAPROIC ACID 10 G: 250 INJECTION, SOLUTION INTRAVENOUS at 08:33

## 2024-04-17 RX ADMIN — Medication 1 APPLICATOR: at 20:34

## 2024-04-17 RX ADMIN — SODIUM CHLORIDE 6 UNITS/HR: 9 INJECTION, SOLUTION INTRAVENOUS at 13:17

## 2024-04-17 ASSESSMENT — COPD QUESTIONNAIRES
DO YOU EVER COUGH UP ANY MUCUS OR PHLEGM?: YES, A FEW DAYS A WEEK OR MONTH
COPD SCREENING SCORE: 3
HAVE YOU SMOKED AT LEAST 100 CIGARETTES IN YOUR ENTIRE LIFE: NO/DON'T KNOW
DURING THE PAST 4 WEEKS HOW MUCH DID YOU FEEL SHORT OF BREATH: NONE/LITTLE OF THE TIME

## 2024-04-17 ASSESSMENT — PAIN DESCRIPTION - PAIN TYPE
TYPE: ACUTE PAIN;SURGICAL PAIN
TYPE: ACUTE PAIN
TYPE: ACUTE PAIN;SURGICAL PAIN
TYPE: ACUTE PAIN
TYPE: ACUTE PAIN;SURGICAL PAIN
TYPE: ACUTE PAIN;SURGICAL PAIN

## 2024-04-17 ASSESSMENT — PULMONARY FUNCTION TESTS: FVC: 1.4

## 2024-04-17 ASSESSMENT — FIBROSIS 4 INDEX: FIB4 SCORE: 1.23

## 2024-04-17 NOTE — PROGRESS NOTES
Pt transported to T614 by OR and Ren Anesthesia MD on zoll. RT, Pat ROCA and Mark ELLIS at bedside. Art line connected and patient placed on monitor. Chest tubes connected to suction, EKG called for, initial labs drawn, blood glucose checked, and ABG running. Levophed, Precedex, and Insulin gtts verified. Initial patient assessment complete.

## 2024-04-17 NOTE — PROGRESS NOTES
Patient seen and examined in pre op. No new issues or complaints. No interval changes in H&P. Plan to proceed to OR.

## 2024-04-17 NOTE — ANESTHESIA PROCEDURE NOTES
Airway    Date/Time: 4/17/2024 8:08 AM    Performed by: Jostin Mcclure M.D.  Authorized by: Jostin Mcclure M.D.    Location:  OR  Urgency:  Elective  Difficult Airway: No    Indications for Airway Management:  Anesthesia      Spontaneous Ventilation: absent    Sedation Level:  Deep  Preoxygenated: Yes    Patient Position:  Sniffing  Mask Difficulty Assessment:  0 - not attempted  Final Airway Type:  Endotracheal airway  Final Endotracheal Airway:  ETT  Cuffed: Yes    Technique Used for Successful ETT Placement:  Direct laryngoscopy    Insertion Site:  Oral  Blade Type:  Jitendra  Laryngoscope Blade/Videolaryngoscope Blade Size:  3  ETT Size (mm):  8.5  Measured from:  Teeth  ETT to Teeth (cm):  22  Placement Verified by: capnometry    Cormack-Lehane Classification:  Grade I - full view of glottis  Number of Attempts at Approach:  1

## 2024-04-17 NOTE — ANESTHESIA PROCEDURE NOTES
Arterial Line    Performed by: Jostin Mcclure M.D.  Authorized by: Jostin Mcclure M.D.    Start Time:  4/17/2024 8:12 AM  End Time:  4/17/2024 8:14 AM  Localization: surface landmarks    Patient Location:  OR  Indication: continuous blood pressure monitoring        Catheter Size:  20 G  Seldinger Technique?: Yes    Laterality:  Right  Site:  Radial artery  Line Secured:  Antimicrobial disc, tape and transparent dressing  Events: patient tolerated procedure well with no complications

## 2024-04-17 NOTE — ANESTHESIA TIME REPORT
Anesthesia Start and Stop Event Times       Date Time Event    4/17/2024 0751 Ready for Procedure     0800 Anesthesia Start     1232 Anesthesia Stop          Responsible Staff  04/17/24      Name Role Begin End    Jostin Mcclure M.D. Anesth 0800 1232          Overtime Reason:  no overtime (within assigned shift)    Comments:

## 2024-04-17 NOTE — CARE PLAN
The patient is Watcher - Medium risk of patient condition declining or worsening    Shift Goals  Clinical Goals: Hemodynamic stability, wean vent  Patient Goals: VESNA  Family Goals: Updates    Problem: Knowledge Deficit - Standard  Goal: Patient and family/care givers will demonstrate understanding of plan of care, disease process/condition, diagnostic tests and medications  Outcome: Progressing     Problem: Skin Integrity  Goal: Skin integrity is maintained or improved  Outcome: Progressing    Problem: Pain - Standard  Goal: Alleviation of pain or a reduction in pain to the patient’s comfort goal  Outcome: Progressing     Problem: Day of surgery post CABG/Heart valve replacement  Goal: Stabilization in immediate post op period  Outcome: Progressing  Intervention: VS q 15 min x 4 hours, then q 1 hour. Include temperature immediately upon arrival. Check CO/CI q 2-4 hours and PRN  Note: Vital signs recorded Q 15 min  Intervention: If radial artery used, elevate arm, no BP checks or needle sticks from affected arm, monitor ulnar pulse and capillary refill  Note: N/A  Intervention: First post op hour labs and EKG per order  Note: Labs and EKG completed on unit arrival  Intervention: Serum K q 6 hours x 24 hours.  ABG and CBC prn.  Note: Labs sent Q6 hrs and ABGs drawn PRN  Intervention: Initiate post cardiac insulin infusion protocol orders for FSBS greater than 140 and check frequency per protocol  Note: Insulin drip in place per protocol.  Intervention: FSBS frequency as per Cardiac Surgery Insulin Drip Protocol  Note: Insulin drip in place per protocol.  Intervention: For patients on Beta Blockers: verify dose given prior to surgery or within 6 hours after arrival to the unit  Note: Metoprolol given prior to surgery  Intervention: Chest tube to 20 cm suction, record CT drainage with VS, and check for air leak  Note: CT at -20 cm h2O of water, no air leak present. CT output recorded per protocol  Intervention: For CT  drainage >300 mL in first hour post op and/or 150 mL in subsequent hours: Stat platelets, PT, INR, TEG, iSTAT, and H&H per order  Note: CT output within goal per flowsheets  Intervention: Titrate and wean off vasoactive drips per patient's condition and per MD order while maintaining SBP  mmHg per MD order  Note: Levophed drip running per MAR.  Intervention: VAP protocol in place  Note: VAP interventions in place  Intervention: Wean from Vent per protocol (see protocol), extubation goal within 6 hours post op  Note: Extubation time 1620.  Intervention: IS q 1 hour while awake post extubation  Note: Extubation time 1620.   Intervention: Bedrest until extubated and groin lines out  Note: Extubation time 1620.  Intervention: Dangle within 4 hours post extubation  Note: Extubation time 1620.  Intervention: Up in chair 4 hours, day of extubation  Note: Extubation time 1620.  Intervention: Maintain all original surgical dressings per provider orders and specifications  Note: Surgical dressings in place.  Intervention: Clear liquids post extubation, order carbohydrate free (post cardiac surgery) diet, advance as tolerated  Note: Clear liquid diet ordered  Intervention: Discontinue Eldora rochelle and arterial line 12-18 hours post op if hemodynamically stable and off vasoactive drips  Note: Art-line in place.  Intervention: A-Fib and DVT prophylaxis per MD order or contraindications documented (refer to DVT/VTE problem on Care Plan)  Note: SCDs on and in place  Intervention: Amiodarone protocol per MD order  Note: N/A

## 2024-04-17 NOTE — PROGRESS NOTES
1236: Dr. Velez at bedside and made aware of initial ABG.    1247: Pat ROCA notified of R BBB on 12 lead.    1320: Dr. Flores aware of ABG, K >2.0, and Ion Ca 0.69. 1 gram of calcium chloride to be given and 40 mEq of K per Dr. Flores. Repeat ABG to be drawn in 1hr and repeat labs after electrolyte replacement.     1330: Patient awake and following commands, moves all extremities, PERRLA intact.    1340: Repeat Ion Ca 1.25 and potassium 3.3 on ABG. Per Dr. Flores, give 1 gram of calcium chloride.

## 2024-04-17 NOTE — ANESTHESIA PREPROCEDURE EVALUATION
" Case: [id number] Date/Time: 04/17/24 0745    Procedures:       CORONARY ARTERY BYPASS GRAFTING X3-4, ENDOSCOPIC VEIN HARVEST, TRANSESOPHAGEAL ECHOCARDIOGRAM      ECHOCARDIOGRAM, TRANSESOPHAGEAL, INTRAOPERATIVE    Pre-op diagnosis: CORONARY ARTERY DISEASE    Location: [facility] OR 02 / SURGERY [facility]    Surgeons: Edu Velez D.O.            Relevant Problems   CARDIAC   (positive) Angina pectoris (HCC)   (positive) Coronary artery disease due to calcified coronary lesion   (positive) DVT of deep femoral vein, right ([facility])   (positive) Hypertension, essential   (positive) Right bundle branch block      ENDO   (positive) Type 2 diabetes mellitus without complication (CMS-[facility])     /68   Pulse 66   Temp 36 °C (96.8 °F) (Temporal)   Resp 18   Ht 1.727 m (5' 8\")   Wt 105 kg (231 lb 0.7 oz)   SpO2 95%   BMI 35.13 kg/m²     Physical Exam    Airway   Mallampati: II  TM distance: >3 FB  Neck ROM: full       Cardiovascular - normal exam  Rhythm: regular  Rate: normal  (-) murmur     Dental - normal exam           Pulmonary - normal exam  Breath sounds clear to auscultation     Abdominal    Neurological - normal exam                   Anesthesia Plan    ASA 4       Plan - general       Airway plan will be ETT          Induction: intravenous    Postoperative Plan: Postoperative administration of opioids is intended.    Pertinent diagnostic labs and testing reviewed    Informed Consent:    Anesthetic plan and risks discussed with patient.    Use of blood products discussed with: patient whom consented to blood products.           "

## 2024-04-17 NOTE — ANESTHESIA PROCEDURE NOTES
Central Venous Line    Performed by: Jostin Mcclure M.D.  Authorized by: Jostin Mcclure M.D.    Start Time:  4/17/2024 8:17 AM  End Time:  4/17/2024 8:20 AM  Patient Location:  OR  Indication: central venous access and hemodynamic monitoring        provider hand hygiene performed prior to central venous catheter insertion, all 5 sterile barriers used (gloves, gown, cap, mask, large sterile drape) during central venous catheter insertion and skin prep agent completely dried prior to procedure    Patient Position:  Trendelenburg  Laterality:  Right  Site:  Internal jugular  Prep:  Chlorhexidine  Catheter Size:  7 Fr  Catheter Length (cm):  20  Number of Lumens:  Triple lumen  target vein identified, needle advanced into vein and blood aspirated and guidewire advanced into vein    Seldinger Technique?: Yes    Ultrasound-Guided: ultrasound-guided  Image captured, interpreted and electronically stored.  Sterile Gel and Probe Cover Used for Ultrasound?: Yes    Intravenous Verification: verified by ultrasound, venous blood return and chest x-ray pending    all ports aspirated, all ports flushed easily, guidewire was removed intact, biopatch was applied, line was sutured in place and dressing was applied    Events: patient tolerated procedure well with no complications    PA Catheter Placed?: No

## 2024-04-17 NOTE — PROGRESS NOTES
12 hr chart check complete    MS  SR 60-70  BBB  Frequent PACs  0.12/0.13/0.44    Pacer Settings: V wires  Mode VVI  Backup Rate of 20  Output of 10  Sensitivity at 2

## 2024-04-17 NOTE — PROGRESS NOTES
Pt eligible for extubation per protocol, parameters as follows:    RR 19   VC 1375  NIF -30  Pinsp 5  PEEP 8  FiO2 40  PaCO2 35  PH 7.32  SpO2 97  RSBI 45  Hemodynamically stable, less than 2 pressors.    Pt extubated to 4 L NC by Justin, RT at 1620. No stridor noted. Tolerated well.    Total time intubated 3 hrs 58 min

## 2024-04-17 NOTE — ASSESSMENT & PLAN NOTE
Home medications are Jardiance and Januvia  Insulin infusion, transition to SSI as appropriate, maintain glycemic control perioperatively

## 2024-04-17 NOTE — OP REPORT
Operative report    PreOp Diagnosis: Multivessel coronary disease, stable angina, diabetes mellitus type 2, hypertension, hyperlipidemia, right bundle branch block      PostOp Diagnosis: Same as above      Procedure(s):  CORONARY ARTERY BYPASS GRAFTING X3  WITH SKELETONIZED SMITH TO LAD  RSCG TO OM1 AND RPDA  ENDOSCOPIC  HARVESTOF RIGHT GREATER SAPHENOUS VEIN, - Wound Class: Clean with Drain  ECHOCARDIOGRAM, TRANSESOPHAGEAL, INTRAOPERATIVE - Wound Class: Clean Contaminated    Surgeon(s):  Edu Velez D.O.    Anesthesiologist/Type of Anesthesia:  Anesthesiologist: Jostin Mcclure M.D./General    Surgical Staff:  Assistant: NISA Lee; Geovanny Stewart P.A.-C.  Circulator: Charlie Cabrera R.N.; Kelli Dorsey R.N.  Perfusionist: Jovanny Weems Circulator: Aimee Jameson R.N.  Scrub Person: Eduin Leigh R.N.; Taurus Denis    Specimens removed if any:  * No specimens in log *    Estimated Blood Loss: Cardiopulmonary bypass    Findings:     Pre and postoperative echo showed good ejection fraction no regional wall motion abnormalities.  Mild mitral regurgitation.  No other issues.    Mammary artery was 3 mm with excellent flow.  Saphenous vein was 3 to 4 mm with occasional varicosities and sidebranches.  Both good conduits.    All his coronaries had proximal calcification most the target sites had posterior wall calcification.  Each of a more reasonably large enough for bypass.  The LAD was particularly thick throughout its course but was more than 2 mm in size.  Obtuse marginal was more heavily calcified particularly posterior wall, it was roughly 1.5 mm in size.  PDA was greater than 2 mm.    Each graft had excellent flow noted distal to the anastomosis indicating patency.  Flow down the vein graft with cardioplegia instillation at 150 mmHg of pressure was 80 mL/min and 60 mL/min respectively for the PDA and OM1 branch.    Patient required defibrillation x 1 to resume normal  sinus rhythm, and  from bypass without issue    Complications: None immediate    Patient condition: Guarded to ICU    Chest tubes: Mediastinal: 32 [place] x2    Endoscopic vein harvest: Right greater saphenous vein.    Pacing wires: RV x 2    Pericardium: Open    Cross-clamp time: 51 minutes    Pump time: 66 minutes    Cardioplegia: Cold blood antegrade [place] cardioplegia given.  Initial induction with 1000 mL.  Antegrade hotshot given 300 cc warm blood    Vent: Aortic root      Procedure:    After informed consent was obtained, the patient was brought to the operating room.  They were placed in supine position on the operating table.  General anesthesia was induced via endotracheal tube.  Lines, arterial line, Spencer were placed by the nursing and anesthesia teams.  They received pre-incision antibiotics and beta-blockade.  The patient was prepped in a sterile fashion from their chin to their feet.  Drapes were placed in a sterile fashion.  The procedure was begun with a timeout.    I made a sternotomy incision with a 10 blade scalpel.  Concurrently to this, ABELINO Vega harvested the saphenous vein in the usual fashion.  He was proctored by Alcira GODINEZ.  Once removed from the leg, the leg was closed in standard fashion.  The vein was then prepped on the back table.  They then joined me at the head of the table and assisted in all aspects of the case.    I deepened my sternal incision with left cautery to the sternum.  The sternum was divided in the midline using the sternal saw.  Hemostasis of the sternal edges was obtained using bone putty and electrocautery.  The mammary retractor was brought up at that point and the left hemisternum was elevated.  Using the electrocautery with occasional clips for side branches, the mammary was dissected down from its takeoff at the subclavian down to its bifurcation into the muscular phrenic and inferior epigastric, in a skeletonized fashion.    Once the  mammary was completely dissected out, systemic heparinization was performed, using 400 units/kg of heparin, to achieve an ACT greater than 480 seconds. The mammary was ligated distally and divided.  There was excellent flow noted.  I placed a small clip at the distal end of the mammary, treated it with papaverine, and placed it back in the left chest for safe keeping.  The distal stump was secured.  The mammary retractor was removed and sternal retractor was placed in the chest open.    With the chest open, I then dissected out the innominate vein and the pericardium.  I then opened the pericardium and inverted T fashion.  I created pericardial well, suspending the pericardial edges from the skin edges using interrupted 2-0 [place] sutures.  Inspection of the heart and the aorta revealed no obvious pathology.  Palpation of the aorta revealed no calcifications precluding cannulation or cross-clamp.  I cannulated the aorta using a direct Seldinger's technique and GALO guidance.    Next, I placed the venous cannula into the IVC via the right atrium.  Again this was confirmed with GALO.  Next, I then placed a pursestring on the mid ascending aorta, through which I placed my antegrade needle.      With my cannulas in place, I then inspected the vein.  It was of reasonable quality.  The distal end was spatulated and it was marked to help with orienting the graft.  Cardiopulmonary bypass was then initiated.    Once on full flow, I then retracted the heart to inspect my targets.  They are noted above.  These were each marked with the [place] blade.  I then dissected out the IVC and brought the heart strep through the oblique sinus followed by running it through the transverse sinus.  The heart strep was used throughout the case to help position the heart.  Finally, I dissected the pulmonary artery away from the distal portion of the aorta to facilitate cross-clamp placement and complete isolation of the heart.  I then placed  a cross-clamp and the heart was arrested.  Ice slush was placed on the heart to assist with cooling.    After I had arrest, the heart was repositioned to expose the PDA.  I dissected this out with the [place] blade.  I then opened it.  An end vein to side artery anastomosis was created with a running 7-0 Prolene suture.  The vein graft was connected to the cardioplegia system, and cardioplegia was given as I tied down the anastomosis.  There was excellent flow noted and good hemostasis.  Hand-injection of vein solution was performed to distend the vein graft as I brought it around the right atrium, to measure it to the aorta.  I then divided it proximally.  The proximal portion was then spatulated as well.  Cardioplegia was given down the aortic root, and I created an aortotomy using 11 blade scalpel and a 4 mm aortic punch.  The proximal anastomosis was then created using a running 6-0 Prolene suture in an end vein to side aorta fashion.  It was also marked with a coronary marker.    The heart was then repositioned to expose the OM1 branch.  I dissected this out with the [place] blade, and then opened it. An end vein to side artery anastomosis was created with a running 7-0 Prolene suture.  Upon completion, the vein graft was connected to the cardioplegia circuit and cardioplegia given as I tied down the anastomosis.  Excellent flow was noted and there was good hemostasis.  I then used vein solution to distend the vein and measured out to the aorta.  I divided it proximally and spatulated it for creation of an another anastomosis.  The aorta was once again distended with cardioplegia, and I created an aortotomy with 11 blade scalpel and 4 mm punch.  An end vein to side aorta anastomosis was created with a running 6-0 Prolene suture.  Upon completion cardioplegia was given in both proximal anastomosis were found to be hemostatic.  The proximal was also marked with a coronary marker.  Rewarming of the patient was  begun    The heart was repositioned again to expose the LAD.  I dissected this out with a [place] blade and opened it with 11 blade scalpel.  I extended the arteriotomy with Bull scissors.  I then created a keyhole defect in the pericardium through which I brought my mammary pedicle.  I then sized it to the site of the anastomosis and divided mammary pedicle, discarding the distal portion.  I then fashioned the distal end of the mammary.  An end LIMA to side LAD anastomosis was created with a running 7-0 Prolene suture.  Upon completion, the pedicle was opened and flow was noted to run distal.  It was also noted to be hemostatic.  Bulldog clamp was reapplied and I tacked the pedicle to the epicardium with interrupted 6-0 Prolene sutures.    Patient was de-aired, flows dropped, and cross-clamp removed.  As the heart reperfused the placement of right ventricular pacing leads and my mediastinal chest tubes.  They were brought out through the epigastrium in the usual fashion.  Surgical sites were checked and found to be hemostatic.  The patient was then  from bypass in the usual fashion.  Venous cannula and aortic root vent were removed.  Protamine was administered to reverse systemic heparinization.  As the protamine was administered, the patient's blood volume in the pump was administered back to the aortic cannula.  Once that was given back, that cannula was removed.  Sites were oversewn as needed.  Hemostasis was verified to be good.  Proceeded with closure of the sternum using interrupted #5 sternal wires.  The wound was then irrigated and closed in layers using absorbable sutures.  The patient tolerated the procedure well, all instrument counts were correct x2, and he was taken to the ICU in guarded condition.          4/17/2024 11:51 AM Edu Velez D.O.

## 2024-04-17 NOTE — ASSESSMENT & PLAN NOTE
Multivessel CAD  S/p CABG x 3  Continue postoperative critical care management  Monitor hemodynamics, vasoactive agents as appropriate  Monitor chest tube output  Maintain euglycemia  Continue full ventilator support, wean ASV per protocol, okay to proceed with rapid wean to extubate as tolerated  Further recommendations to follow

## 2024-04-17 NOTE — ANESTHESIA PROCEDURE NOTES
GALO    Date/Time: 4/17/2024 8:22 AM    Performed by: Jostin Mcclure M.D.  Authorized by: Jostin Mcclure M.D.    Start Time:4/17/2024 8:22 AM  Preanesthetic Checklist: patient identified, IV checked, site marked, risks and benefits discussed, surgical consent, monitors and equipment checked, pre-op evaluation and timeout performed    Indication for GALO: diagnostic   Patient Location: OR  Intubated: Yes  Bite Block: Yes  Heart Visualized: Yes  Insertion: atraumatic    **See FULL GALO report in patient's chart via CV Syn[facility]**

## 2024-04-17 NOTE — CONSULTS
Critical Care Consultation    Date of consult: 4/17/2024    Referring Physician  Edu Velez D.O.    Reason for Consultation  Postoperative critical care management after CABG x 3    History of Presenting Illness  64 y.o. male, PMH multivessel CAD, T2DM, hypertension, DLD, periumbilical hernia who presented 4/17/2024 for elective CABG.  He underwent CABG x 3 without complication.    Code Status  Full Code    Review of Systems  Review of Systems   Unable to perform ROS: Acuity of condition       Past Medical History  Past Medical History:   Diagnosis Date    Anginal syndrome (HCC)     Arthritis     Blood clotting disorder (HCC)     DVT RT LEG    Clotting disorder (HCC)     Dental disorder     Dentures    Eczema     GERD (gastroesophageal reflux disease)     Heart burn     Hiatus hernia syndrome     High cholesterol     Hyperlipidemia     Indigestion     Type II or unspecified type diabetes mellitus without mention of complication, not stated as uncontrolled     Umbilical hernia        Surgical History   has a past surgical history that includes rotator cuff repair; tonsillectomy and adenoidectomy; and other.    Family History  family history includes Alcohol abuse in his father and mother; Cancer in his father and mother; Depression in his mother and sister.    Social History   reports that he has never smoked. He has never used smokeless tobacco. He reports that he does not currently use alcohol. He reports that he does not use drugs.    Medications  Home Medications    **Home medications have not yet been reviewed for this encounter**       Current Facility-Administered Medications   Medication Dose Route Frequency Provider Last Rate Last Admin    lidocaine (Xylocaine) 1 % injection 0.5 mL  0.5 mL Intradermal Once PRN GENEVA CohnOMendel        ceFAZolin (Ancef) injection 2 g  2 g Intravenous Once GENEVA CohnO.        heparin 2,000 Units in electrolyte-A (Plasmalyte-A) 1,000 mL     Intra-Op Once PRN Edu Velez D.O.   2,000 Units at 04/17/24 0919    papaverine injection    Intra-Op Once PRN Edu Velez D.OMendel   30 mg at 04/17/24 0919    K+ Scale: Goal of 4.5  1 Each Intravenous Q6HRS Geovanny Stewart P.A.-C.        insulin regular (Humulin R) 100 Units in  mL Infusion  1-6 Units/hr Intravenous Continuous Edu Velez, D.O. 6 mL/hr at 04/17/24 1150 6 Units/hr at 04/17/24 1150    EPINEPHrine (Adrenalin) infusion 4 mg/250 mL (premix)  0-0.5 mcg/kg/min (Ideal) Intravenous Continuous Edu Velez, D.O.   Stopped at 04/17/24 1134    norepinephrine (Levophed) 8 mg in 250 mL NS infusion (premix)  0-1 mcg/kg/min (Ideal) Intravenous Continuous Edu Velez, D.O.        dexmedetomidine (PRECEDEX) 400 mcg/100mL NS premix infusion  0-1.5 mcg/kg/hr (Ideal) Intravenous Continuous Edu Velez, D.O. 6.84 mL/hr at 04/17/24 0839 0.4 mcg/kg/hr at 04/17/24 0839     Facility-Administered Medications Ordered in Other Encounters   Medication Dose Route Frequency Provider Last Rate Last Admin    ceFAZolin (Ancef) injection   Intravenous PRN Jostin Mcclure M.D.   2 g at 04/17/24 0832    Lactated Ringers   Intravenous Intra-Op Continuous Jostin Mcclure M.D.   New Bag at 04/17/24 0800    NS infusion   Intravenous Intra-Op Continuous Jostin Mcclure M.D.   New Bag at 04/17/24 0800    electrolyte-A (Plasmalyte-A) infusion   Intravenous Intra-Op Continuous RAMOS Bojorquez Bag at 04/17/24 0800    propofol (DIPRIVAN) injection   Intravenous PRN Jostin Mcclure M.D.   200 mg at 04/17/24 0806    midazolam (Versed) injection   Intravenous PRN Jostin Mcclure M.D.   2 mg at 04/17/24 0806    rocuronium (Zemuron) injection   Intravenous PRN Jostin Mcclure M.D.   20 mg at 04/17/24 1125    clevidipine (Cleviprex) IV emulsion   Intravenous PRN Jostin Mcclure M.D.   Stopped at 04/17/24 1153    heparin OR USE ONLY   Intravenous PRN Jostin Mcclure M.D.   43,000 Units at 04/17/24 0913     norepinephrine (Levophed) 32 mg in  mL Infusion   Intravenous Intra-Op Continuous Jostin Mcclure M.D. 0.019 mL/hr at 04/17/24 1205 0.02 mcg/min at 04/17/24 1205    amiodarone (CORDARONE) 150 mg/3 mL injection (ACLS IV PUSH DOSE)   Intravenous PRN Jostin Mcclure M.D.   150 mg at 04/17/24 1125    protamine injection   Intravenous PRN Jostin Mcclure M.D.   300 mg at 04/17/24 1125       Allergies  No Known Allergies    Vital Signs last 24 hours  Temp:  [36 °C (96.8 °F)] 36 °C (96.8 °F)  Pulse:  [66-74] 66  Resp:  [18] 18  BP: (112-115)/(68-76) 112/68  SpO2:  [95 %] 95 %    Physical Exam  Physical Exam  Vitals and nursing note reviewed.   Constitutional:       Comments: Sedated, full ventilator support   HENT:      Head: Normocephalic.      Mouth/Throat:      Comments: ETT in place  Eyes:      Pupils: Pupils are equal, round, and reactive to light.   Cardiovascular:      Rate and Rhythm: Normal rate and regular rhythm.      Comments: Sternotomy dressing in place, bilateral substernal chest tubes in place  Pulmonary:      Comments: Full ventilator support, no wheeze  Abdominal:      General: There is no distension.      Palpations: Abdomen is soft.      Tenderness: There is no abdominal tenderness.      Comments: Soft, reduced periumbilical hernia   Musculoskeletal:         General: No swelling.      Cervical back: Neck supple.   Skin:     General: Skin is warm and dry.      Capillary Refill: Capillary refill takes less than 2 seconds.   Neurological:      Comments: Sedated, unresponsive immediately postoperative         Fluids    Intake/Output Summary (Last 24 hours) at 4/17/2024 1222  Last data filed at 4/17/2024 1205  Gross per 24 hour   Intake 950 ml   Output 250 ml   Net 700 ml       Laboratory  Recent Results (from the past 48 hour(s))   S. Aureus By PCR, Nasal Complete    Collection Time: 04/16/24  2:37 PM    Specimen: Respirate   Result Value Ref Range    Staph aureus by PCR POSITIVE (A) Negative    MRSA by PCR  Negative Negative   CBC WITH DIFFERENTIAL    Collection Time: 04/16/24  2:40 PM   Result Value Ref Range    WBC 7.8 4.8 - 10.8 K/uL    RBC 5.46 4.70 - 6.10 M/uL    Hemoglobin 16.2 14.0 - 18.0 g/dL    Hematocrit 48.4 42.0 - 52.0 %    MCV 88.6 81.4 - 97.8 fL    MCH 29.7 27.0 - 33.0 pg    MCHC 33.5 32.3 - 36.5 g/dL    RDW 43.4 35.9 - 50.0 fL    Platelet Count 199 164 - 446 K/uL    MPV 10.2 9.0 - 12.9 fL    Neutrophils-Polys 47.40 44.00 - 72.00 %    Lymphocytes 42.20 (H) 22.00 - 41.00 %    Monocytes 7.80 0.00 - 13.40 %    Eosinophils 1.70 0.00 - 6.90 %    Basophils 0.60 0.00 - 1.80 %    Immature Granulocytes 0.30 0.00 - 0.90 %    Nucleated RBC 0.00 0.00 - 0.20 /100 WBC    Neutrophils (Absolute) 3.71 1.82 - 7.42 K/uL    Lymphs (Absolute) 3.30 1.00 - 4.80 K/uL    Monos (Absolute) 0.61 0.00 - 0.85 K/uL    Eos (Absolute) 0.13 0.00 - 0.51 K/uL    Baso (Absolute) 0.05 0.00 - 0.12 K/uL    Immature Granulocytes (abs) 0.02 0.00 - 0.11 K/uL    NRBC (Absolute) 0.00 K/uL   Comp Metabolic Panel    Collection Time: 04/16/24  2:40 PM   Result Value Ref Range    Sodium 137 135 - 145 mmol/L    Potassium 4.0 3.6 - 5.5 mmol/L    Chloride 101 96 - 112 mmol/L    Co2 20 20 - 33 mmol/L    Anion Gap 16.0 7.0 - 16.0    Glucose 184 (H) 65 - 99 mg/dL    Bun 18 8 - 22 mg/dL    Creatinine 0.62 0.50 - 1.40 mg/dL    Calcium 9.4 8.5 - 10.5 mg/dL    Correct Calcium 9.1 8.5 - 10.5 mg/dL    AST(SGOT) 18 12 - 45 U/L    ALT(SGPT) 23 2 - 50 U/L    Alkaline Phosphatase 83 30 - 99 U/L    Total Bilirubin 0.7 0.1 - 1.5 mg/dL    Albumin 4.4 3.2 - 4.9 g/dL    Total Protein 7.3 6.0 - 8.2 g/dL    Globulin 2.9 1.9 - 3.5 g/dL    A-G Ratio 1.5 g/dL   PT    Collection Time: 04/16/24  2:40 PM   Result Value Ref Range    PT 13.9 12.0 - 14.6 sec    INR 1.06 0.87 - 1.13   APTT    Collection Time: 04/16/24  2:40 PM   Result Value Ref Range    APTT 28.1 24.7 - 36.0 sec   HEMOGLOBIN A1C    Collection Time: 04/16/24  2:40 PM   Result Value Ref Range    Glycohemoglobin 8.0 (H)  4.0 - 5.6 %    Est Avg Glucose 183 mg/dL   ESTIMATED GFR    Collection Time: 24  2:40 PM   Result Value Ref Range    GFR (CKD-EPI) 106 >60 mL/min/1.73 m 2   Urine Drug Screen    Collection Time: 24  2:41 PM   Result Value Ref Range    Amphetamines Urine Positive (A) Negative    Barbiturates Negative Negative    Benzodiazepines Negative Negative    Cocaine Metabolite Negative Negative    Fentanyl, Urine Negative Negative    Methadone Negative Negative    Opiates Negative Negative    Oxycodone Negative Negative    Phencyclidine -Pcp Negative Negative    Propoxyphene Negative Negative    Cannabinoid Metab Negative Negative   URINALYSIS    Collection Time: 24  2:41 PM    Specimen: Urine   Result Value Ref Range    Color Yellow     Character Clear     Specific Gravity 1.037 <1.035    Ph 5.5 5.0 - 8.0    Glucose >=1000 (A) Negative mg/dL    Ketones Negative Negative mg/dL    Protein Negative Negative mg/dL    Bilirubin Negative Negative    Urobilinogen, Urine 1.0 Negative    Nitrite Negative Negative    Leukocyte Esterase Negative Negative    Occult Blood Negative Negative    Micro Urine Req see below    PreAdmit COD    Collection Time: 24  2:43 PM   Result Value Ref Range    ABO Grouping Only B     Rh Grouping Only POS     Antibody Screen Scrn NEG    EKG    Collection Time: 24  2:47 PM   Result Value Ref Range    Report       [facility]    Test Date:  2024  Pt Name:    KATHRYN BOB                 Department: [facility]  MRN:        [id number]                      Room:  Gender:     Male                         Technician: TXM  :        [id number]                   Requested By:AMY FLORES  Order #:    [id number]                    Reading MD: Sean Pérez MD    Measurements  Intervals                                Axis  Rate:       87                           P:          56  AZ:         143                          QRS:        52  QRSD:       146                           T:          15  QT:         379  QTc:        456    Interpretive Statements  Sinus rhythm  Right bundle branch block  Compared to ECG 09/19/2023 15:14:51  NO SIGNIFICANT CHANGES  Electronically Signed On 04- 10:05:39 PDT by Sean Pérez MD     POCT glucose device results    Collection Time: 04/17/24  6:52 AM   Result Value Ref Range    POC Glucose, Blood 186 (H) 65 - 99 mg/dL       Imaging  DX-CHEST-PORTABLE (1 VIEW)    (Results Pending)       Assessment/Plan  * Coronary artery disease due to calcified coronary lesion- (present on admission)  Assessment & Plan  Multivessel CAD  S/p CABG x 3  Continue postoperative critical care management  Monitor hemodynamics, vasoactive agents as appropriate  Monitor chest tube output  Maintain euglycemia  Continue full ventilator support, wean ASV per protocol, okay to proceed with rapid wean to extubate as tolerated  Further recommendations to follow    Hypertension, essential- (present on admission)  Assessment & Plan  Home BP agents when appropriate    Intermittent explosive disorder- (present on admission)  Assessment & Plan  Interesting    Pure hypercholesterolemia- (present on admission)  Assessment & Plan  Continue statin    Obesity (BMI 30-39.9)- (present on admission)  Assessment & Plan  BMI 35    DVT of deep femoral vein, right (HCC)- (present on admission)  Assessment & Plan  Reportedly DVT in his 30s, prior IVC placement and not on chronic anticoagulation    Type 2 diabetes mellitus without complication (CMS-HCC)- (present on admission)  Assessment & Plan  Home medications are Jardiance and Januvia  Insulin infusion, transition to SSI as appropriate, maintain glycemic control perioperatively    Umbilical hernia- (present on admission)  Assessment & Plan  Manually reduced in OR  General surgery consultation in the outpatient setting        Discussed patient condition and risk of morbidity and/or mortality with RN, RT, Pharmacy, and CV surgery and  cardiac anesthesiology .    The patient remains critically ill.  Critical care time = 35 minutes in directly providing and coordinating critical care and extensive data review.  No time overlap and excludes procedures.

## 2024-04-18 ENCOUNTER — HOSPITAL ENCOUNTER (OUTPATIENT)
Dept: RADIOLOGY | Facility: MEDICAL CENTER | Age: 64
End: 2024-04-18

## 2024-04-18 LAB
ANION GAP SERPL CALC-SCNC: 10 MMOL/L (ref 7–16)
ANION GAP SERPL CALC-SCNC: 7 MMOL/L (ref 7–16)
BUN SERPL-MCNC: 17 MG/DL (ref 8–22)
BUN SERPL-MCNC: 21 MG/DL (ref 8–22)
CALCIUM SERPL-MCNC: 8.3 MG/DL (ref 8.5–10.5)
CALCIUM SERPL-MCNC: 8.6 MG/DL (ref 8.5–10.5)
CHLORIDE SERPL-SCNC: 102 MMOL/L (ref 96–112)
CHLORIDE SERPL-SCNC: 106 MMOL/L (ref 96–112)
CO2 SERPL-SCNC: 23 MMOL/L (ref 20–33)
CO2 SERPL-SCNC: 27 MMOL/L (ref 20–33)
CREAT SERPL-MCNC: 0.62 MG/DL (ref 0.5–1.4)
CREAT SERPL-MCNC: 0.71 MG/DL (ref 0.5–1.4)
EKG IMPRESSION: NORMAL
ERYTHROCYTE [DISTWIDTH] IN BLOOD BY AUTOMATED COUNT: 47.4 FL (ref 35.9–50)
ERYTHROCYTE [DISTWIDTH] IN BLOOD BY AUTOMATED COUNT: 48.4 FL (ref 35.9–50)
GFR SERPLBLD CREATININE-BSD FMLA CKD-EPI: 102 ML/MIN/1.73 M 2
GFR SERPLBLD CREATININE-BSD FMLA CKD-EPI: 106 ML/MIN/1.73 M 2
GLUCOSE BLD STRIP.AUTO-MCNC: 107 MG/DL (ref 65–99)
GLUCOSE BLD STRIP.AUTO-MCNC: 119 MG/DL (ref 65–99)
GLUCOSE BLD STRIP.AUTO-MCNC: 125 MG/DL (ref 65–99)
GLUCOSE BLD STRIP.AUTO-MCNC: 130 MG/DL (ref 65–99)
GLUCOSE BLD STRIP.AUTO-MCNC: 132 MG/DL (ref 65–99)
GLUCOSE BLD STRIP.AUTO-MCNC: 133 MG/DL (ref 65–99)
GLUCOSE BLD STRIP.AUTO-MCNC: 136 MG/DL (ref 65–99)
GLUCOSE BLD STRIP.AUTO-MCNC: 138 MG/DL (ref 65–99)
GLUCOSE BLD STRIP.AUTO-MCNC: 144 MG/DL (ref 65–99)
GLUCOSE BLD STRIP.AUTO-MCNC: 157 MG/DL (ref 65–99)
GLUCOSE BLD STRIP.AUTO-MCNC: 160 MG/DL (ref 65–99)
GLUCOSE BLD STRIP.AUTO-MCNC: 161 MG/DL (ref 65–99)
GLUCOSE BLD STRIP.AUTO-MCNC: 176 MG/DL (ref 65–99)
GLUCOSE BLD STRIP.AUTO-MCNC: 225 MG/DL (ref 65–99)
GLUCOSE SERPL-MCNC: 170 MG/DL (ref 65–99)
GLUCOSE SERPL-MCNC: 184 MG/DL (ref 65–99)
HCT VFR BLD AUTO: 42.4 % (ref 42–52)
HCT VFR BLD AUTO: 43.5 % (ref 42–52)
HGB BLD-MCNC: 13.7 G/DL (ref 14–18)
HGB BLD-MCNC: 14.1 G/DL (ref 14–18)
MCH RBC QN AUTO: 29.8 PG (ref 27–33)
MCH RBC QN AUTO: 30.1 PG (ref 27–33)
MCHC RBC AUTO-ENTMCNC: 32.3 G/DL (ref 32.3–36.5)
MCHC RBC AUTO-ENTMCNC: 32.4 G/DL (ref 32.3–36.5)
MCV RBC AUTO: 92 FL (ref 81.4–97.8)
MCV RBC AUTO: 93.2 FL (ref 81.4–97.8)
PLATELET # BLD AUTO: 157 K/UL (ref 164–446)
PLATELET # BLD AUTO: 164 K/UL (ref 164–446)
PMV BLD AUTO: 10.1 FL (ref 9–12.9)
PMV BLD AUTO: 10.2 FL (ref 9–12.9)
POTASSIUM SERPL-SCNC: 4.6 MMOL/L (ref 3.6–5.5)
POTASSIUM SERPL-SCNC: 4.6 MMOL/L (ref 3.6–5.5)
POTASSIUM SERPL-SCNC: 4.7 MMOL/L (ref 3.6–5.5)
RBC # BLD AUTO: 4.55 M/UL (ref 4.7–6.1)
RBC # BLD AUTO: 4.73 M/UL (ref 4.7–6.1)
SODIUM SERPL-SCNC: 136 MMOL/L (ref 135–145)
SODIUM SERPL-SCNC: 139 MMOL/L (ref 135–145)
WBC # BLD AUTO: 12.8 K/UL (ref 4.8–10.8)
WBC # BLD AUTO: 13.3 K/UL (ref 4.8–10.8)

## 2024-04-18 PROCEDURE — 80048 BASIC METABOLIC PNL TOTAL CA: CPT

## 2024-04-18 PROCEDURE — A9270 NON-COVERED ITEM OR SERVICE: HCPCS | Mod: JZ | Performed by: NURSE PRACTITIONER

## 2024-04-18 PROCEDURE — 99291 CRITICAL CARE FIRST HOUR: CPT | Performed by: INTERNAL MEDICINE

## 2024-04-18 PROCEDURE — 85027 COMPLETE CBC AUTOMATED: CPT

## 2024-04-18 PROCEDURE — 71045 X-RAY EXAM CHEST 1 VIEW: CPT

## 2024-04-18 PROCEDURE — 770022 HCHG ROOM/CARE - ICU (200)

## 2024-04-18 PROCEDURE — A9270 NON-COVERED ITEM OR SERVICE: HCPCS

## 2024-04-18 PROCEDURE — 700111 HCHG RX REV CODE 636 W/ 250 OVERRIDE (IP): Performed by: NURSE PRACTITIONER

## 2024-04-18 PROCEDURE — 93010 ELECTROCARDIOGRAM REPORT: CPT | Performed by: INTERNAL MEDICINE

## 2024-04-18 PROCEDURE — 93005 ELECTROCARDIOGRAM TRACING: CPT

## 2024-04-18 PROCEDURE — 700102 HCHG RX REV CODE 250 W/ 637 OVERRIDE(OP)

## 2024-04-18 PROCEDURE — 700102 HCHG RX REV CODE 250 W/ 637 OVERRIDE(OP): Mod: JZ | Performed by: NURSE PRACTITIONER

## 2024-04-18 PROCEDURE — 700111 HCHG RX REV CODE 636 W/ 250 OVERRIDE (IP)

## 2024-04-18 PROCEDURE — 94669 MECHANICAL CHEST WALL OSCILL: CPT

## 2024-04-18 PROCEDURE — 700102 HCHG RX REV CODE 250 W/ 637 OVERRIDE(OP): Performed by: INTERNAL MEDICINE

## 2024-04-18 PROCEDURE — 82962 GLUCOSE BLOOD TEST: CPT | Mod: 91

## 2024-04-18 RX ORDER — FUROSEMIDE 10 MG/ML
20 INJECTION INTRAMUSCULAR; INTRAVENOUS
Status: DISCONTINUED | OUTPATIENT
Start: 2024-04-18 | End: 2024-04-19

## 2024-04-18 RX ORDER — ATORVASTATIN CALCIUM 40 MG/1
40 TABLET, FILM COATED ORAL DAILY
Status: DISCONTINUED | OUTPATIENT
Start: 2024-04-18 | End: 2024-04-21 | Stop reason: HOSPADM

## 2024-04-18 RX ORDER — LISINOPRIL 5 MG/1
2.5 TABLET ORAL DAILY
Status: DISCONTINUED | OUTPATIENT
Start: 2024-04-20 | End: 2024-04-21 | Stop reason: HOSPADM

## 2024-04-18 RX ORDER — DEXTROAMPHETAMINE SACCHARATE, AMPHETAMINE ASPARTATE, DEXTROAMPHETAMINE SULFATE AND AMPHETAMINE SULFATE 2.5; 2.5; 2.5; 2.5 MG/1; MG/1; MG/1; MG/1
15 TABLET ORAL 2 TIMES DAILY
Status: DISCONTINUED | OUTPATIENT
Start: 2024-04-18 | End: 2024-04-21 | Stop reason: HOSPADM

## 2024-04-18 RX ORDER — POTASSIUM CHLORIDE 20 MEQ/1
10 TABLET, EXTENDED RELEASE ORAL 2 TIMES DAILY
Status: DISCONTINUED | OUTPATIENT
Start: 2024-04-18 | End: 2024-04-19

## 2024-04-18 RX ORDER — INSULIN LISPRO 100 [IU]/ML
2-9 INJECTION, SOLUTION INTRAVENOUS; SUBCUTANEOUS
Status: DISCONTINUED | OUTPATIENT
Start: 2024-04-18 | End: 2024-04-21 | Stop reason: HOSPADM

## 2024-04-18 RX ORDER — DEXTROAMPHETAMINE SACCHARATE, AMPHETAMINE ASPARTATE MONOHYDRATE, DEXTROAMPHETAMINE SULFATE AND AMPHETAMINE SULFATE 7.5; 7.5; 7.5; 7.5 MG/1; MG/1; MG/1; MG/1
30 CAPSULE, EXTENDED RELEASE ORAL EVERY MORNING
Status: DISCONTINUED | OUTPATIENT
Start: 2024-04-18 | End: 2024-04-18

## 2024-04-18 RX ORDER — DAPAGLIFLOZIN 10 MG/1
10 TABLET, FILM COATED ORAL DAILY
Status: DISCONTINUED | OUTPATIENT
Start: 2024-04-18 | End: 2024-04-18

## 2024-04-18 RX ORDER — INSULIN LISPRO 100 [IU]/ML
0.2 INJECTION, SOLUTION INTRAVENOUS; SUBCUTANEOUS
Status: DISCONTINUED | OUTPATIENT
Start: 2024-04-18 | End: 2024-04-21 | Stop reason: HOSPADM

## 2024-04-18 RX ADMIN — DOCUSATE SODIUM 50 MG AND SENNOSIDES 8.6 MG 1 TABLET: 8.6; 5 TABLET, FILM COATED ORAL at 20:57

## 2024-04-18 RX ADMIN — OMEPRAZOLE 20 MG: 20 CAPSULE, DELAYED RELEASE ORAL at 05:05

## 2024-04-18 RX ADMIN — OXYCODONE HYDROCHLORIDE 10 MG: 10 TABLET ORAL at 23:12

## 2024-04-18 RX ADMIN — OXYCODONE 5 MG: 5 TABLET ORAL at 00:19

## 2024-04-18 RX ADMIN — Medication 1 APPLICATOR: at 20:57

## 2024-04-18 RX ADMIN — ACETAMINOPHEN 1000 MG: 500 TABLET, FILM COATED ORAL at 00:09

## 2024-04-18 RX ADMIN — POTASSIUM CHLORIDE 10 MEQ: 1500 TABLET, EXTENDED RELEASE ORAL at 17:41

## 2024-04-18 RX ADMIN — ASPIRIN 81 MG: 81 TABLET, COATED ORAL at 05:05

## 2024-04-18 RX ADMIN — ACETAMINOPHEN 1000 MG: 500 TABLET, FILM COATED ORAL at 12:07

## 2024-04-18 RX ADMIN — DOCUSATE SODIUM 100 MG: 100 CAPSULE, LIQUID FILLED ORAL at 05:05

## 2024-04-18 RX ADMIN — ACETAMINOPHEN 1000 MG: 500 TABLET, FILM COATED ORAL at 23:12

## 2024-04-18 RX ADMIN — METOPROLOL TARTRATE 12.5 MG: 25 TABLET, FILM COATED ORAL at 05:06

## 2024-04-18 RX ADMIN — FUROSEMIDE 20 MG: 10 INJECTION INTRAMUSCULAR; INTRAVENOUS at 17:42

## 2024-04-18 RX ADMIN — INSULIN LISPRO 2 UNITS: 100 INJECTION, SOLUTION INTRAVENOUS; SUBCUTANEOUS at 08:53

## 2024-04-18 RX ADMIN — Medication 1 APPLICATOR: at 08:45

## 2024-04-18 RX ADMIN — OXYCODONE HYDROCHLORIDE 10 MG: 10 TABLET ORAL at 17:41

## 2024-04-18 RX ADMIN — OXYCODONE 5 MG: 5 TABLET ORAL at 08:51

## 2024-04-18 RX ADMIN — OXYCODONE HYDROCHLORIDE 10 MG: 10 TABLET ORAL at 12:07

## 2024-04-18 RX ADMIN — OXYCODONE HYDROCHLORIDE 10 MG: 10 TABLET ORAL at 05:09

## 2024-04-18 RX ADMIN — ACETAMINOPHEN 1000 MG: 500 TABLET, FILM COATED ORAL at 17:41

## 2024-04-18 RX ADMIN — INSULIN LISPRO 2 UNITS: 100 INJECTION, SOLUTION INTRAVENOUS; SUBCUTANEOUS at 22:27

## 2024-04-18 RX ADMIN — METOPROLOL TARTRATE 12.5 MG: 25 TABLET, FILM COATED ORAL at 17:41

## 2024-04-18 RX ADMIN — CLOPIDOGREL BISULFATE 75 MG: 75 TABLET ORAL at 05:06

## 2024-04-18 RX ADMIN — POTASSIUM CHLORIDE 10 MEQ: 1500 TABLET, EXTENDED RELEASE ORAL at 10:56

## 2024-04-18 RX ADMIN — ATORVASTATIN CALCIUM 40 MG: 40 TABLET, FILM COATED ORAL at 10:56

## 2024-04-18 RX ADMIN — SERTRALINE HYDROCHLORIDE 50 MG: 50 TABLET ORAL at 10:56

## 2024-04-18 RX ADMIN — INSULIN GLARGINE-YFGN 21 UNITS: 100 INJECTION, SOLUTION SUBCUTANEOUS at 10:59

## 2024-04-18 RX ADMIN — ACETAMINOPHEN 1000 MG: 500 TABLET, FILM COATED ORAL at 05:06

## 2024-04-18 RX ADMIN — DEXTROAMPHETAMINE SACCHARATE, AMPHETAMINE ASPARTATE, DEXTROAMPHETAMINE SULFATE, AMPHETAMINE SULFATE TABLETS, 10 MG,CLL 15 MG: 2.5; 2.5; 2.5; 2.5 TABLET ORAL at 11:38

## 2024-04-18 RX ADMIN — DOCUSATE SODIUM 100 MG: 100 CAPSULE, LIQUID FILLED ORAL at 17:41

## 2024-04-18 RX ADMIN — FUROSEMIDE 20 MG: 10 INJECTION INTRAMUSCULAR; INTRAVENOUS at 10:56

## 2024-04-18 RX ADMIN — INSULIN LISPRO 3 UNITS: 100 INJECTION, SOLUTION INTRAVENOUS; SUBCUTANEOUS at 14:25

## 2024-04-18 RX ADMIN — INSULIN LISPRO 7 UNITS: 100 INJECTION, SOLUTION INTRAVENOUS; SUBCUTANEOUS at 14:25

## 2024-04-18 RX ADMIN — INSULIN LISPRO 2 UNITS: 100 INJECTION, SOLUTION INTRAVENOUS; SUBCUTANEOUS at 20:53

## 2024-04-18 RX ADMIN — MAGNESIUM SULFATE IN DEXTROSE 1 G: 10 INJECTION, SOLUTION INTRAVENOUS at 06:17

## 2024-04-18 ASSESSMENT — PATIENT HEALTH QUESTIONNAIRE - PHQ9
1. LITTLE INTEREST OR PLEASURE IN DOING THINGS: NOT AT ALL
2. FEELING DOWN, DEPRESSED, IRRITABLE, OR HOPELESS: NOT AT ALL
SUM OF ALL RESPONSES TO PHQ9 QUESTIONS 1 AND 2: 0

## 2024-04-18 ASSESSMENT — PAIN DESCRIPTION - PAIN TYPE
TYPE: ACUTE PAIN;SURGICAL PAIN
TYPE: SURGICAL PAIN
TYPE: ACUTE PAIN;SURGICAL PAIN

## 2024-04-18 ASSESSMENT — FIBROSIS 4 INDEX: FIB4 SCORE: 1.49

## 2024-04-18 NOTE — ANESTHESIA POSTPROCEDURE EVALUATION
Patient: Jensen Leigh    Procedure Summary       Date: 04/17/24 Room / Location: [facility] 02 / SURGERY [facility]    Anesthesia Start: 0800 Anesthesia Stop: 1232    Procedures:       CORONARY ARTERY BYPASS GRAFTING X3, ENDOSCOPIC VEIN HARVEST, (Chest)      ECHOCARDIOGRAM, TRANSESOPHAGEAL, INTRAOPERATIVE (Esophagus) Diagnosis: (CORONARY ARTERY DISEASE,  UMBILICAL HERNIA)    Surgeons: Edu Velez D.O. Responsible Provider: Jostin Mcclure M.D.    Anesthesia Type: general ASA Status: 4            Final Anesthesia Type: general  Last vitals  BP   Blood Pressure : 114/58, Arterial BP: 122/51    Temp   36 °C (96.8 °F)    Pulse   85   Resp   (!) 21    SpO2   92 %      Anesthesia Post Evaluation    Patient location during evaluation: PACU  Patient participation: complete - patient participated  Level of consciousness: awake and alert    Airway patency: patent  Anesthetic complications: no  Cardiovascular status: hemodynamically stable  Respiratory status: face mask    PONV: none          No notable events documented.     Nurse Pain Score: 3 (NPRS)

## 2024-04-18 NOTE — CARE PLAN
The patient is Watcher - Medium risk of patient condition declining or worsening    Shift Goals  Clinical Goals: SBP  mmHg, pain control, IS, mobilize up to chair in the AM.  Patient Goals: Rest and sleep  Family Goals: Pt to feel better, and provide updates if there's any.    Progress made toward(s) clinical / shift goals:      Problem: Day of surgery post CABG/Heart valve replacement  Goal: Stabilization in immediate post op period  Outcome: Progressing  Intervention: VS q 15 min x 4 hours, then q 1 hour. Include temperature immediately upon arrival. Check CO/CI q 2-4 hours and PRN  Note: Cleviprex gtt started to maintain goal SBP  mmHg.   Intervention: If radial artery used, elevate arm, no BP checks or needle sticks from affected arm, monitor ulnar pulse and capillary refill  Note: Right arm elevated, BP on Left upper arm, cap refill less than 3 seconds, pulse 1+.  Intervention: First post op hour labs and EKG per order  Note: Completed prior to shift.  Intervention: Serum K q 6 hours x 24 hours.  ABG and CBC prn.  Note: Q6 Serum K checked and replacing as needed.  Intervention: Initiate post cardiac insulin infusion protocol orders for FSBS greater than 140 and check frequency per protocol  Note: On insulin gtt, following protocol.  Intervention: FSBS frequency as per Cardiac Surgery Insulin Drip Protocol  Note: On insulin gtt, following protocol.  Intervention: For patients on Beta Blockers: verify dose given prior to surgery or within 6 hours after arrival to the unit  Note: N/A  Intervention: Chest tube to 20 cm suction, record CT drainage with VS, and check for air leak  Note: Chest tube to 20 cm suction, checked and recorded Q1hr, no air leak noted.  Intervention: For CT drainage >300 mL in first hour post op and/or 150 mL in subsequent hours: Stat platelets, PT, INR, TEG, iSTAT, and H&H per order  Note: Minimal output 5-30 mL per hour with serosanguinous output.  Intervention: Titrate and wean  off vasoactive drips per patient's condition and per MD order while maintaining SBP  mmHg per MD order  Note: Started cleviprex gtt to maintain SBP  mmHg.  Pt maintaining goal SBP 90-120mmHg, cleviprex stopped.  Intervention: VAP protocol in place  Note: Following protocol.  Intervention: Wean from Vent per protocol (see protocol), extubation goal within 6 hours post op  Note: Pt extubated at 1620 on 4L nasal cannula tolerating well.  Intervention: IS q 1 hour while awake post extubation  Note: IS baseline 3500  IS best 500 needs some coaching  Intervention: Bedrest until extubated and groin lines out  Note: N/A  Intervention: Dangle within 4 hours post extubation  Note: Completed during dayshift.  Intervention: Up in chair 4 hours, day of extubation  Note: Pt up in chair for breakfast. Education and expectation discussed, pt demonstrated understanding.  Intervention: Maintain all original surgical dressings per provider orders and specifications  Note: Midline island dressing and ace wrap on right evh site intact and in place.  Intervention: Clear liquids post extubation, order carbohydrate free (post cardiac surgery) diet, advance as tolerated  Note: Clear liquids tolerated, diet advanced to cardiac CHO.  Intervention: Discontinue [place] rochelle and arterial line 12-18 hours post op if hemodynamically stable and off vasoactive drips  Note: Arterial line discontinued, hemodynamically stable, off pressors.  Intervention: A-Fib and DVT prophylaxis per MD order or contraindications documented (refer to DVT/VTE problem on Care Plan)  Note: SCD on at night and BRIAN hose during the day.     Problem: Pain - Standard  Goal: Alleviation of pain or a reduction in pain to the patient’s comfort goal  Outcome: Progressing  Note: Pt medicated per MAR.     Problem: Skin Integrity  Goal: Skin integrity is maintained or improved  Outcome: Progressing     Problem: Knowledge Deficit - Standard  Goal: Patient and family/care  givers will demonstrate understanding of plan of care, disease process/condition, diagnostic tests and medications  Outcome: Progressing       Patient is not progressing towards the following goals:

## 2024-04-18 NOTE — DIETARY
Nutrition Services: Heart Healthy Diet Education Consult   Day 1 of admit.  Jensen Leigh is a 64 y.o. male with admitting DX of Coronary artery disease [I25.10]  Atherosclerotic heart disease of native coronary artery without angina pectoris [I25.10]    RD able to visit pt at bedside to provide heart healthy diet education. Pt stated his wife would be coming in on Saturday and she does the food prep at home. RD left handouts with patient to review. Encouraged him to read through and reach out if he or his wife have questions.     No other education needs identified at this time. Consider referral to outpatient nutrition services for continuation of education as indicated or per pt preferences.     Please re-consult RD as indicated.

## 2024-04-18 NOTE — PROGRESS NOTES
4 Eyes Skin Assessment Completed by MENDY Grewal and MENDY Tucker.    Head WDL  Ears Redness and Blanching  Nose Redness and Blanching from glasses  Mouth WDL  Neck WDL  Breast/Chest midline incision, chest tube sites  Shoulder Blades WDL  Spine Redness and Blanching mid back  (R) Arm/Elbow/Hand calloused  (L) Arm/Elbow/Hand calloused  Abdomen WDL, umbilical hernia present  Groin WDL  Scrotum/Coccyx/Buttocks Redness and Blanching  (R) Leg VESNA (EVH site with ace wrap  (L) Leg calloused knee  (R) Heel/Foot/Toe calloused  (L) Heel/Foot/Toe calloused          Devices In Places ECG, Tele Box, Blood Pressure Cuff, Pulse Ox, Spencer, Arterial Line, SCD's, Central Line, Nasal Cannula, and BRIAN's      Interventions In Place Gray Ear Foams, Sacral Mepilex, Chair Waffle, Pillows, Elbow Mepilex, Q2 Turns, Low Air Loss Mattress, and Heels Loaded W/Pillows    Possible Skin Injury No    Pictures Uploaded Into Epic N/A  Wound Consult Placed N/A  RN Wound Prevention Protocol Ordered No

## 2024-04-18 NOTE — PROGRESS NOTES
Critical Care Progress Note    Date of admission  4/17/2024    Chief Complaint  64 y.o. male admitted 4/17/2024 for postoperative critical care management after CABG x 3    Hospital Course     History of Presenting Illness  64 y.o. male, PMH multivessel CAD, T2DM, hypertension, DLD, periumbilical hernia who presented 4/17/2024 for elective CABG.  He underwent CABG x 3 without complication.    4/18 -extubated yesterday on track, up to chair, 2 LPM oxygen and,    Interval Problem Update  Reviewed last 24 hour events:  A/o x 4  Mod sternal pain  SR/ST  Off all gtts  Afebrile  2 lpm O2, extubated yesterday on track  PEP, IS   CTs 220 o/p 12 hrs  UP to chair  ? Start lasix  DAPT  Statin  No abx      Review of Systems  Review of Systems   Unable to perform ROS: Acuity of condition        Vital Signs for last 24 hours   Pulse:  [62-86] 85  Resp:  [13-30] 21  BP: ()/(54-76) 114/58  SpO2:  [92 %-99 %] 92 %    Hemodynamic parameters for last 24 hours  CVP:  [4 MM HG-15 MM HG] 7 MM HG    Respiratory Information for the last 24 hours  Vent Mode: Spont  PEEP/CPAP: 8  P Support: 5  MAP: 9.3  Control VTE (exp VT): 385    Physical Exam   Physical Exam  Vitals and nursing note reviewed.   Constitutional:       Appearance: Normal appearance.      Comments: Up in chair, no distress   HENT:      Head: Normocephalic.      Mouth/Throat:      Mouth: Mucous membranes are moist.   Eyes:      Pupils: Pupils are equal, round, and reactive to light.   Cardiovascular:      Rate and Rhythm: Normal rate and regular rhythm.      Comments: Sternotomy dressing in place, bilateral substernal chest tubes in place  Pulmonary:      Breath sounds: No wheezing.   Abdominal:      General: There is no distension.      Palpations: Abdomen is soft.      Tenderness: There is no abdominal tenderness.      Comments: Soft, reduced periumbilical hernia   Musculoskeletal:         General: No swelling.      Cervical back: Neck supple.   Skin:     General: Skin  is warm and dry.      Capillary Refill: Capillary refill takes less than 2 seconds.   Neurological:      General: No focal deficit present.      Mental Status: He is alert and oriented to person, place, and time.      Cranial Nerves: No cranial nerve deficit.         Medications  Current Facility-Administered Medications   Medication Dose Route Frequency Provider Last Rate Last Admin    Respiratory Therapy Consult   Nebulization Continuous RT NIDA VegaA.-C.        NS infusion   Intravenous Continuous OLIMPIA Vega.A.-C. 10 mL/hr at 04/17/24 1302 New Bag at 04/17/24 1302    NS infusion   Intravenous Continuous OLIMPIA Vega.A.-C.   Stopped at 04/17/24 1742    electrolyte-A (Plasmalyte-A) infusion   Intravenous PRN OLIMPIA Vega.ABDIRIZAK.-C. 999 mL/hr at 04/17/24 1400 1,000 mL at 04/17/24 1400    Nozin nasal  swab  1 Applicator Each Nostril BID NIDA VegaA.-C.   1 Applicator at 04/18/24 0845    calcium CHLORIDE 10 % 1,000 mg in  mL IVPB  1,000 mg Intravenous Once PRN OLIMPIA Vega.A.-C. 200 mL/hr at 04/17/24 1406 1,000 mg at 04/17/24 1406    magnesium sulfate in D5W IVPB premix 1 g  1 g Intravenous DAILY OLIMPIA Vega.A.-C.   Stopped at 04/18/24 0717    K+ Scale: Goal of 4.5  1 Each Intravenous Q6HRS AZRA Vega.-C.   1 Each at 04/17/24 1800    metoprolol tartrate (Lopressor) tablet 12.5 mg  12.5 mg Oral BID OLIMPIA Vega.A.-C.   12.5 mg at 04/18/24 0506    Followed by    [START ON 4/19/2024] metoprolol tartrate (Lopressor) tablet 25 mg  25 mg Oral BID OLIMPIA Vega.A.-C.        aspirin EC tablet 81 mg  81 mg Oral DAILY OLIMPIA Vega.A.-C.   81 mg at 04/18/24 0505    clopidogrel (Plavix) tablet 75 mg  75 mg Oral DAILY OLIMPIA Vega.A.-C.   75 mg at 04/18/24 0506    atorvastatin (Lipitor) tablet 40 mg  40 mg Oral QHS Geovanny Stewart P.A.-C.   40 mg at 04/17/24 2033    clevidipine (Cleviprex) IV emulsion  0-21 mg/hr Intravenous Continuous Geovanny Stewart P.A.-C.   Stopped at  04/18/24 0623    nitroglycerin 50 mg in D5W 250 ml infusion  0-100 mcg/min Intravenous Continuous AZRA Vega.-CMendel   Dose not Required at 04/17/24 1245    acetaminophen (Tylenol) tablet 1,000 mg  1,000 mg Oral Q6HRS OLIMPIA Vega.A.-C.   1,000 mg at 04/18/24 0506    Followed by    [START ON 4/27/2024] acetaminophen (Tylenol) tablet 1,000 mg  1,000 mg Oral Q6HRS PRN OLIMPIA Vega.A.-C.        oxyCODONE immediate-release (Roxicodone) tablet 5 mg  5 mg Oral Q3HRS PRN OLIMPIA Vega.A.-C.   5 mg at 04/18/24 0851    Or    oxyCODONE immediate release (Roxicodone) tablet 10 mg  10 mg Oral Q3HRS PRN OLIMPIA Vega.A.-C.   10 mg at 04/18/24 0509    Or    fentaNYL (Sublimaze) injection 50 mcg  50 mcg Intravenous Q3HRS PRN OLIMPIA Vega.A.-C.        traMADol (Ultram) 50 MG tablet 50 mg  50 mg Oral Q4HRS PRN OLIMPIA Vega.A.-C.   50 mg at 04/17/24 1836    midazolam (Versed) injection 2 mg  2 mg Intravenous Q HOUR PRN OLIMPIA Vega.A.-C.        dexmedetomidine (PRECEDEX) 400 mcg/100mL NS premix infusion  0-1.5 mcg/kg/hr (Ideal) Intravenous Continuous NIDA VegaA.-C.   Stopped at 04/17/24 1623    sodium bicarbonate 8.4 % injection 50 mEq  50 mEq Intravenous Q HOUR PRN OLIMPIA Vega.A.-C.   50 mEq at 04/17/24 1240    ondansetron (Zofran) syringe/vial injection 8 mg  8 mg Intravenous Q6HRS PRN OLIMPIA Vega.A.-C.        Or    prochlorperazine (Compazine) injection 10 mg  10 mg Intravenous Q6HRS PRN OLIMPIA Vega.A.-C.        acetaminophen (Tylenol) tablet 650 mg  650 mg Oral Q4HRS PRN OLIMPIA Vega.A.-C.        Or    acetaminophen (Tylenol) suppository 650 mg  650 mg Rectal Q4HRS PRN Geovanny Stewart P.A.-C.        docusate sodium (Colace) capsule 100 mg  100 mg Oral BID Geovanny Stewart P.A.-C.   100 mg at 04/18/24 0505    senna-docusate (Pericolace Or Senokot S) 8.6-50 MG per tablet 1 Tablet  1 Tablet Oral Nightly Geovanny Stewart P.A.-C.   1 Tablet at 04/17/24 2034    senna-docusate (Pericolace Or Senokot  S) 8.6-50 MG per tablet 1 Tablet  1 Tablet Oral Q24HRS PRN Geovanny Stewart P.A.-C.        polyethylene glycol/lytes (Miralax) Packet 1 Packet  1 Packet Oral BID PRN Geovanny Stewart P.A.-C.        magnesium hydroxide (Milk Of Magnesia) suspension 30 mL  30 mL Oral QDAY PRN Geovanny Stewart P.A.-C.        bisacodyl (Dulcolax) suppository 10 mg  10 mg Rectal Q24HRS PRN Geovanny Stewart P.A.-C.        sodium phosphate (Fleet) enema 133 mL  1 Each Rectal Once PRN Geovanny Stewart P.A.-C.        omeprazole (PriLOSEC) capsule 20 mg  20 mg Oral DAILY Geovanny Stewart P.A.-C.   20 mg at 04/18/24 0505    mag hydrox-al hydrox-simeth (Maalox Plus Es Or Mylanta Ds) suspension 30 mL  30 mL Oral Q4HRS PRN Geovanny Stewart P.A.-C.        diphenhydrAMINE (Benadryl) tablet/capsule 25 mg  25 mg Oral HS PRN - MR X 1 Geovanny Stewart P.A.-C.        norepinephrine (Levophed) 8 mg in 250 mL NS infusion (premix)  0-1 mcg/kg/min (Ideal) Intravenous Continuous Geovanny Stewart P.A.-C.   Stopped at 04/17/24 1725    insulin regular (HumuLIN R,NovoLIN R) injection  0-14 Units Intravenous Once Geovanny Stewart P.A.-C.        insulin lispro (HumaLOG,AdmeLOG) injection  0-14 Units Subcutaneous TID AC Geovanny Stewart P.A.-C.   2 Units at 04/18/24 0853    insulin regular (Humulin R) 100 Units in  mL Infusion  0-29 Units/hr Intravenous Continuous Geovanny Stewart P.A.-C.   Stopped. (Insulin or Heparin) at 04/17/24 2300    dextrose 10 % BOLUS 12.5-25 g  12.5-25 g Intravenous PRN Geovanny Stewart P.A.-C. MD Alert...Pharmacy to initiate transition from insulin infusion to subcutaneous insulin for cardiothoracic surgery 1 Each  1 Each Other Continuous Geovanny Stewart, P.A.-C.           Fluids    Intake/Output Summary (Last 24 hours) at 4/18/2024 0910  Last data filed at 4/18/2024 0600  Gross per 24 hour   Intake 3551.98 ml   Output 1445 ml   Net 2106.98 ml       Laboratory  Recent Labs     04/17/24  1337 04/17/24  1442 04/17/24  1559   ISTATAPH 7.398* 7.361* 7.340*   ISTATAPCO2  36.3 39.3* 36.4   ISTATAPO2 148* 141* 98*   ISTATATCO2 23 23 21   HNUNXOX5ICR 99 99 97   ISTATARTHCO3 22.4 22.3 19.6   ISTATARTBE -2 -3 -6*   ISTATTEMP 35.8 C 36.0 C 36.7 C   ISTATFIO2 90 60 40   ISTATSPEC Arterial Arterial Arterial   ISTATAPHTC 7.416 7.376* 7.345*   [id number] 141* 135* 97*         Recent Labs     04/16/24  1440 04/17/24  1229 04/17/24  1640 04/17/24  2310 04/18/24  0404   SODIUM 137  --  145  --  139   POTASSIUM 4.0  --  3.7 4.7 4.6   CHLORIDE 101  --  117*  --  106   CO2 20  --  18*  --  23   BUN 18  --  15  --  17   CREATININE 0.62  --  0.48*  --  0.62   MAGNESIUM  --  3.2* 2.2  --   --    CALCIUM 9.4  --  7.4*  --  8.3*     Recent Labs     04/16/24 1440 04/17/24  1640 04/18/24  0404   ALTSGPT 23  --   --    ASTSGOT 18  --   --    ALKPHOSPHAT 83  --   --    TBILIRUBIN 0.7  --   --    GLUCOSE 184* 106* 170*     Recent Labs     04/16/24 1440 04/18/24  0404   WBC 7.8 12.8*   NEUTSPOLYS 47.40  --    LYMPHOCYTES 42.20*  --    MONOCYTES 7.80  --    EOSINOPHILS 1.70  --    BASOPHILS 0.60  --    ASTSGOT 18  --    ALTSGPT 23  --    ALKPHOSPHAT 83  --    TBILIRUBIN 0.7  --      Recent Labs     04/16/24  1440 04/17/24  1229 04/18/24  0404   RBC 5.46  --  4.73   HEMOGLOBIN 16.2 14.3 14.1   HEMATOCRIT 48.4 43.1 43.5   PLATELETCT 199 143* 164   PROTHROMBTM 13.9 15.3*  --    APTT 28.1 28.3  --    INR 1.06 1.20*  --        Imaging  X-Ray:  I have personally reviewed the images and compared with prior images.    Assessment/Plan  * Coronary artery disease due to calcified coronary lesion- (present on admission)  Assessment & Plan  Multivessel CAD  S/p CABG x 3  Continue postoperative critical care management  Monitor hemodynamics, vasoactive agents as appropriate  Monitor chest tube output  Maintain euglycemia  Extubated on track, continue PEP, IS, RT protocol  Consider initiation of forced diuresis    Hypertension, essential- (present on admission)  Assessment & Plan  Home BP agents when  appropriate    Intermittent explosive disorder- (present on admission)  Assessment & Plan  Interesting, seems fairly well-adjusted today    Pure hypercholesterolemia- (present on admission)  Assessment & Plan  Continue statin    Obesity (BMI 30-39.9)- (present on admission)  Assessment & Plan  BMI 35    DVT of deep femoral vein, right (HCC)- (present on admission)  Assessment & Plan  Reportedly DVT in his 30s, prior IVC placement and not on chronic anticoagulation    Type 2 diabetes mellitus without complication (CMS-HCC)- (present on admission)  Assessment & Plan  Home medications are Jardiance and Januvia  Insulin infusion, transition to glargine and SSI, maintain glycemic control perioperatively    Umbilical hernia- (present on admission)  Assessment & Plan  Manually reduced in OR  General surgery consultation in the outpatient setting           I have performed a physical exam and reviewed and updated ROS and Plan today (4/18/2024). In review of yesterday's note (4/17/2024), there are no changes except as documented above.     Discussed patient condition and risk of morbidity and/or mortality with Hospitalist, RN, RT, and QA team  The patient remains critically ill.  Critical care time = 31 minutes in directly providing and coordinating critical care and extensive data review.  No time overlap and excludes procedures.

## 2024-04-18 NOTE — PROGRESS NOTES
Patient has met the following  - patient has been out of bed, dangled or turned from side to side    - Chest tube drainage should be <30cc/hour unless otherwise approved by physician.   - Place chest drainage unit to water-seal:  absence of fluctuations in water seal-chamber or presence of air leaks.       Verified order with MD/APRN. Explained the procedure to the patient. Patient in semi fowlers position. CT clamped, sutures removed, CT removed upon expiration. Dressing applied, no complaint of pain or excessive drainage. Primary RN to order PRN chest x-ray if patient develops dyspnea or hypoxia.

## 2024-04-18 NOTE — PROGRESS NOTES
Cardiovascular Surgery Progress Note    Name: Jensen Leigh  MRN: [id number]  : 1960  Admit Date: 2024  5:24 AM  1 Day Post-Op     Procedure:  Procedure(s) and Anesthesia Type:     * CORONARY ARTERY BYPASS GRAFTING X3, ENDOSCOPIC VEIN HARVEST, - General     * ECHOCARDIOGRAM, TRANSESOPHAGEAL, INTRAOPERATIVE - General    Vitals:  Vitals:    24 0530 24 0545 24 0600 24 0615   BP: 119/68 115/65  114/58   Pulse: 83 83 85    Resp: (!) 24 (!) 24 (!) 29 (!) 21   Temp:       TempSrc:       SpO2: 93% 93% 92%    Weight:   106 kg (233 lb 4 oz)    Height:          No data recorded.      Respiratory:  Vent Mode: Spont, PEEP/CPAP: 8, PIP: 13, MAP: 9.3 Respiration: (!) 21, Pulse Oximetry: 92 %       Fluids:    Intake/Output Summary (Last 24 hours) at 2024 0928  Last data filed at 2024 0600  Gross per 24 hour   Intake 3551.98 ml   Output 1445 ml   Net 2106.98 ml     Admit weight: Weight: 108 kg (238 lb 1.6 oz)  Current weight: Weight: 106 kg (233 lb 4 oz) (24 0600)    Labs:  Recent Labs     24  1440 24  1229 24  0404   WBC 7.8  --  12.8*   RBC 5.46  --  4.73   HEMOGLOBIN 16.2 14.3 14.1   HEMATOCRIT 48.4 43.1 43.5   MCV 88.6  --  92.0   MCH 29.7  --  29.8   MCHC 33.5  --  32.4   RDW 43.4  --  47.4   PLATELETCT 199 143* 164   MPV 10.2  --  10.1     Recent Labs     24  1440 24  1640 24  2310 24  0404   SODIUM 137 145  --  139   POTASSIUM 4.0 3.7 4.7 4.6   CHLORIDE 101 117*  --  106   CO2 20 18*  --  23   GLUCOSE 184* 106*  --  170*   BUN 18 15  --  17   CREATININE 0.62 0.48*  --  0.62   CALCIUM 9.4 7.4*  --  8.3*     Recent Labs     24  1440 24  1229   APTT 28.1 28.3   INR 1.06 1.20*       HgbA1c:  8    Diabetic Educator Consult:  no    Medications:  Scheduled Medications   Medication Dose Frequency    amphetamine-dextroamphetamine ER  30 mg QAM    atorvastatin  40 mg DAILY    [START ON 2024] SITagliptin  100 mg DAILY     sertraline  50 mg DAILY    [START ON 4/20/2024] lisinopril  2.5 mg DAILY    dapagliflozin propanediol  10 mg DAILY    furosemide  20 mg BID DIURETIC    potassium chloride SA  10 mEq BID    insulin GLARGINE  0.2 Units/kg/day QAM INSULIN    And    insulin lispro  0.2 Units/kg/day TID AC    And    insulin lispro  2-9 Units 4X/DAY ACHS    Nozin nasal  swab  1 Applicator BID    magnesium sulfate  1 g DAILY    K+ Scale: Goal of 4.5  1 Each Q6HRS    metoprolol tartrate  12.5 mg BID    Followed by    [START ON 4/19/2024] metoprolol tartrate  25 mg BID    aspirin  81 mg DAILY    clopidogrel  75 mg DAILY    atorvastatin  40 mg QHS    acetaminophen  1,000 mg Q6HRS    docusate sodium  100 mg BID    senna-docusate  1 Tablet Nightly    omeprazole  20 mg DAILY        Exam:   Physical Exam  Constitutional:       General: He is not in acute distress.     Appearance: He is well-developed. He is not diaphoretic.   Cardiovascular:      Rate and Rhythm: Normal rate and regular rhythm.      Heart sounds: Normal heart sounds. No murmur heard.     No friction rub. No gallop.   Pulmonary:      Effort: Pulmonary effort is normal. No respiratory distress.      Breath sounds: Examination of the right-lower field reveals decreased breath sounds. Examination of the left-lower field reveals decreased breath sounds. Decreased breath sounds present. No wheezing or rales.   Abdominal:      General: There is no distension.      Palpations: Abdomen is soft.      Tenderness: There is no abdominal tenderness.   Musculoskeletal:      Cervical back: Neck supple.   Skin:     General: Skin is warm and dry.      Comments: RSVG site- ace wrap  Sternum- island dressing   Neurological:      Mental Status: He is alert and oriented to person, place, and time.         Cardiac Medications:    ASA - Yes    Plavix - Yes    Post-operative Beta Blockers - Yes    Ace/ARB- Yes    Statin - Yes    Aldactone- No; contraindicated because of Normal EF    SGLT2i-  No  contraindicated because of No; contraindicated because of Normal EF    Ejection Fraction:  55%    Telemetry:   4/18 SR    Assessment/Plan:  POD 1 HDS, SR- keep pacer wires today, d/c mediastinal tubes, DM- resume home meds, gently diurese- keep gonzalez for strict I&O, incisions- covered, ADD/depression- resume home meds    Disposition:  TBD

## 2024-04-18 NOTE — CARE PLAN
Problem: Hyperinflation  Goal: Prevent or improve atelectasis  Description: Target End Date:  3 to 4 days    1. Instruct incentive spirometry usage  2.  Perform hyperinflation therapy as indicated  Outcome: Progressing     PEP QID IS 1500

## 2024-04-18 NOTE — CARE PLAN
Problem: Hyperinflation  Goal: Prevent or improve atelectasis  Description: Target End Date:  3 to 4 days    1. Instruct incentive spirometry usage  2.  Perform hyperinflation therapy as indicated  Outcome: Progressing        Respiratory Update  Treatment Modality: PEP  Frequency: QID  IS (new start)    Pt tolerating current treatments well with no adverse reactions, will continue to monitor

## 2024-04-18 NOTE — PROGRESS NOTES
4 Eyes Skin Assessment Completed by MENDY Eli and MENDY Mandujano    Skin assessment is primarily focused on high risk bony prominences. Pay special attention to skin beneath and around medical devices, high risk bony prominences, skin to skin areas and areas where the patient lacks sensation to feel pain and areas where the patient previously had breakdown.     HIGH RISK PRESSURE POINTS -    Occipital Region WDL  Right Ear Red and blanching  Left Ear Red and Blanching  Sacrum/Coccyx WDL  Right ischial tuberosity (Sit Bones) WDL  Left Ischial Tuberosity (Sit Bones) WDL  Right Heel WDL  Left Heel WDL  Other: NA WDL    Skin Risk/Tony   Sensory Perception: Slightly Limited  Moisture: Occasionally Moist  Activity: Bedfast  Mobility: Very Limited  Nutrition: Probably Inadequate  Friction and Shear: Potential Problem  Total Score: 13  Skin Breakdown Risk: 13-17 Moderate Risk for Skin Breakdown    Sensory Interventions  Bed Types: ICU Low Airloss  Skin Preventative Measures: Pillows in Use to Float Heels, Pillows in Use for Support / Positioning  Skin Preventative Dressing: Foam Sacral Protector  Moisturizers/Barriers: Containment Devices     Activity : Bed, Range of motion  Patient Turns / Repositioning: Reposition - RUE, Reposition - LUE, Reposition - RLE, Reposition - LLE  Patient is Receiving Nutrition: Nothing by Mouth  Nutrition Consult Ordered: No, Consult has Already been Placed  Vitamin Therapy in Use: No  Friction Interventions: Draw Sheet / Pad Used for Repositioning                         Wound 04/17/24 Incision Leg Right dermabond (Active)   Site Assessment VESNA 04/17/24 1400   Periwound Assessment VESNA 04/17/24 1400   Margins VESNA 04/17/24 1600   Closure VESNA 04/17/24 1400   Drainage Amount VESNA 04/17/24 1400   Dressing Status Clean;Dry;Intact 04/17/24 1400   Dressing Changed New 04/17/24 1400   Dressing Options Ace Wrap;Dry Gauze 04/17/24 1400       Wound 04/17/24 Incision Chest dermabond and drain sponge (Active)    Site Assessment VESNA 04/17/24 1400   Periwound Assessment VESNA 04/17/24 1400   Margins VESNA 04/17/24 1600   Closure VESNA 04/17/24 1400   Drainage Amount VESNA 04/17/24 1400   Dressing Status Clean;Dry;Intact 04/17/24 1400   Dressing Changed New 04/17/24 1400   Dressing Options Island Dressing 04/17/24 1400       Wound 04/17/24 Incision Abdomen 4X4 (Active)   Site Assessment Clean;Intact;Dry 04/17/24 1400   Margins Unattached edges 04/17/24 1600   Dressing Status Clean;Dry;Intact 04/17/24 1400   Dressing Changed New 04/17/24 1400   Dressing Options Dry Gauze 04/17/24 1400           PROTOCOL INTERVENTIONS:   ICU Low Airloss Bed Already in place  Q2 turns with pillows Already in place  Spencer Already in place  Nasal Cannula with gray foams Already in place    WOUND PHOTOS:   N/A no wounds identified    WOUND CONSULT:   N/A, no advanced wound care needs identified

## 2024-04-19 LAB
GLUCOSE BLD STRIP.AUTO-MCNC: 165 MG/DL (ref 65–99)
GLUCOSE BLD STRIP.AUTO-MCNC: 172 MG/DL (ref 65–99)
GLUCOSE BLD STRIP.AUTO-MCNC: 176 MG/DL (ref 65–99)
GLUCOSE BLD STRIP.AUTO-MCNC: 177 MG/DL (ref 65–99)
GLUCOSE BLD STRIP.AUTO-MCNC: 180 MG/DL (ref 65–99)
GLUCOSE BLD STRIP.AUTO-MCNC: 190 MG/DL (ref 65–99)
LV EJECT FRACT  99904: 55

## 2024-04-19 PROCEDURE — 700102 HCHG RX REV CODE 250 W/ 637 OVERRIDE(OP): Mod: JZ | Performed by: NURSE PRACTITIONER

## 2024-04-19 PROCEDURE — A9270 NON-COVERED ITEM OR SERVICE: HCPCS | Mod: JZ | Performed by: NURSE PRACTITIONER

## 2024-04-19 PROCEDURE — 700111 HCHG RX REV CODE 636 W/ 250 OVERRIDE (IP): Mod: JZ | Performed by: NURSE PRACTITIONER

## 2024-04-19 PROCEDURE — 97163 PT EVAL HIGH COMPLEX 45 MIN: CPT

## 2024-04-19 PROCEDURE — A9270 NON-COVERED ITEM OR SERVICE: HCPCS

## 2024-04-19 PROCEDURE — 82962 GLUCOSE BLOOD TEST: CPT | Mod: 91

## 2024-04-19 PROCEDURE — 97535 SELF CARE MNGMENT TRAINING: CPT

## 2024-04-19 PROCEDURE — 700111 HCHG RX REV CODE 636 W/ 250 OVERRIDE (IP)

## 2024-04-19 PROCEDURE — 700111 HCHG RX REV CODE 636 W/ 250 OVERRIDE (IP): Performed by: NURSE PRACTITIONER

## 2024-04-19 PROCEDURE — 700102 HCHG RX REV CODE 250 W/ 637 OVERRIDE(OP)

## 2024-04-19 PROCEDURE — 94669 MECHANICAL CHEST WALL OSCILL: CPT

## 2024-04-19 PROCEDURE — 99024 POSTOP FOLLOW-UP VISIT: CPT | Performed by: NURSE PRACTITIONER

## 2024-04-19 PROCEDURE — 770020 HCHG ROOM/CARE - TELE (206)

## 2024-04-19 RX ORDER — POTASSIUM CHLORIDE 20 MEQ/1
20 TABLET, EXTENDED RELEASE ORAL 2 TIMES DAILY
Status: DISCONTINUED | OUTPATIENT
Start: 2024-04-19 | End: 2024-04-21 | Stop reason: HOSPADM

## 2024-04-19 RX ORDER — FUROSEMIDE 10 MG/ML
40 INJECTION INTRAMUSCULAR; INTRAVENOUS
Status: DISCONTINUED | OUTPATIENT
Start: 2024-04-19 | End: 2024-04-21 | Stop reason: HOSPADM

## 2024-04-19 RX ADMIN — DEXTROAMPHETAMINE SACCHARATE, AMPHETAMINE ASPARTATE, DEXTROAMPHETAMINE SULFATE, AMPHETAMINE SULFATE TABLETS, 10 MG,CLL 15 MG: 2.5; 2.5; 2.5; 2.5 TABLET ORAL at 17:50

## 2024-04-19 RX ADMIN — INSULIN GLARGINE-YFGN 21 UNITS: 100 INJECTION, SOLUTION SUBCUTANEOUS at 06:06

## 2024-04-19 RX ADMIN — ACETAMINOPHEN 1000 MG: 500 TABLET, FILM COATED ORAL at 06:04

## 2024-04-19 RX ADMIN — Medication 1 APPLICATOR: at 09:49

## 2024-04-19 RX ADMIN — ASPIRIN 81 MG: 81 TABLET, COATED ORAL at 06:02

## 2024-04-19 RX ADMIN — DEXTROAMPHETAMINE SACCHARATE, AMPHETAMINE ASPARTATE, DEXTROAMPHETAMINE SULFATE, AMPHETAMINE SULFATE TABLETS, 10 MG,CLL 15 MG: 2.5; 2.5; 2.5; 2.5 TABLET ORAL at 06:03

## 2024-04-19 RX ADMIN — Medication 1 APPLICATOR: at 20:10

## 2024-04-19 RX ADMIN — ATORVASTATIN CALCIUM 40 MG: 40 TABLET, FILM COATED ORAL at 06:04

## 2024-04-19 RX ADMIN — TRAMADOL HYDROCHLORIDE 50 MG: 50 TABLET ORAL at 06:17

## 2024-04-19 RX ADMIN — SERTRALINE HYDROCHLORIDE 50 MG: 50 TABLET ORAL at 06:03

## 2024-04-19 RX ADMIN — POTASSIUM CHLORIDE 20 MEQ: 1500 TABLET, EXTENDED RELEASE ORAL at 17:50

## 2024-04-19 RX ADMIN — METOPROLOL TARTRATE 25 MG: 25 TABLET, FILM COATED ORAL at 17:50

## 2024-04-19 RX ADMIN — METOPROLOL TARTRATE 25 MG: 25 TABLET, FILM COATED ORAL at 06:04

## 2024-04-19 RX ADMIN — INSULIN LISPRO 2 UNITS: 100 INJECTION, SOLUTION INTRAVENOUS; SUBCUTANEOUS at 17:47

## 2024-04-19 RX ADMIN — ACETAMINOPHEN 1000 MG: 500 TABLET, FILM COATED ORAL at 17:49

## 2024-04-19 RX ADMIN — DOCUSATE SODIUM 100 MG: 100 CAPSULE, LIQUID FILLED ORAL at 17:50

## 2024-04-19 RX ADMIN — SITAGLIPTIN 100 MG: 100 TABLET, FILM COATED ORAL at 06:02

## 2024-04-19 RX ADMIN — DOCUSATE SODIUM 50 MG AND SENNOSIDES 8.6 MG 1 TABLET: 8.6; 5 TABLET, FILM COATED ORAL at 20:10

## 2024-04-19 RX ADMIN — OMEPRAZOLE 20 MG: 20 CAPSULE, DELAYED RELEASE ORAL at 06:03

## 2024-04-19 RX ADMIN — MAGNESIUM SULFATE IN DEXTROSE 1 G: 10 INJECTION, SOLUTION INTRAVENOUS at 06:09

## 2024-04-19 RX ADMIN — INSULIN LISPRO 7 UNITS: 100 INJECTION, SOLUTION INTRAVENOUS; SUBCUTANEOUS at 14:34

## 2024-04-19 RX ADMIN — FUROSEMIDE 40 MG: 10 INJECTION INTRAMUSCULAR; INTRAVENOUS at 17:59

## 2024-04-19 RX ADMIN — INSULIN LISPRO 2 UNITS: 100 INJECTION, SOLUTION INTRAVENOUS; SUBCUTANEOUS at 14:32

## 2024-04-19 RX ADMIN — POTASSIUM CHLORIDE 10 MEQ: 1500 TABLET, EXTENDED RELEASE ORAL at 06:03

## 2024-04-19 RX ADMIN — INSULIN LISPRO 7 UNITS: 100 INJECTION, SOLUTION INTRAVENOUS; SUBCUTANEOUS at 09:49

## 2024-04-19 RX ADMIN — TRAMADOL HYDROCHLORIDE 50 MG: 50 TABLET ORAL at 12:21

## 2024-04-19 RX ADMIN — FUROSEMIDE 20 MG: 10 INJECTION INTRAMUSCULAR; INTRAVENOUS at 06:05

## 2024-04-19 RX ADMIN — DOCUSATE SODIUM 100 MG: 100 CAPSULE, LIQUID FILLED ORAL at 06:04

## 2024-04-19 RX ADMIN — CLOPIDOGREL BISULFATE 75 MG: 75 TABLET ORAL at 06:03

## 2024-04-19 RX ADMIN — INSULIN LISPRO 2 UNITS: 100 INJECTION, SOLUTION INTRAVENOUS; SUBCUTANEOUS at 09:53

## 2024-04-19 RX ADMIN — ACETAMINOPHEN 1000 MG: 500 TABLET, FILM COATED ORAL at 12:21

## 2024-04-19 ASSESSMENT — COGNITIVE AND FUNCTIONAL STATUS - GENERAL
MOBILITY SCORE: 18
CLIMB 3 TO 5 STEPS WITH RAILING: A LITTLE
MOBILITY SCORE: 18
TURNING FROM BACK TO SIDE WHILE IN FLAT BAD: A LITTLE
CLIMB 3 TO 5 STEPS WITH RAILING: A LITTLE
MOVING TO AND FROM BED TO CHAIR: A LITTLE
MOVING TO AND FROM BED TO CHAIR: A LITTLE
SUGGESTED CMS G CODE MODIFIER MOBILITY: CK
DAILY ACTIVITIY SCORE: 22
WALKING IN HOSPITAL ROOM: A LITTLE
HELP NEEDED FOR BATHING: A LITTLE
STANDING UP FROM CHAIR USING ARMS: A LITTLE
MOVING FROM LYING ON BACK TO SITTING ON SIDE OF FLAT BED: A LITTLE
STANDING UP FROM CHAIR USING ARMS: A LITTLE
SUGGESTED CMS G CODE MODIFIER MOBILITY: CK
MOVING FROM LYING ON BACK TO SITTING ON SIDE OF FLAT BED: A LITTLE
WALKING IN HOSPITAL ROOM: A LITTLE
SUGGESTED CMS G CODE MODIFIER DAILY ACTIVITY: CJ
DRESSING REGULAR LOWER BODY CLOTHING: A LITTLE
TURNING FROM BACK TO SIDE WHILE IN FLAT BAD: A LITTLE

## 2024-04-19 ASSESSMENT — PAIN DESCRIPTION - PAIN TYPE
TYPE: SURGICAL PAIN

## 2024-04-19 ASSESSMENT — LIFESTYLE VARIABLES
HAVE YOU EVER FELT YOU SHOULD CUT DOWN ON YOUR DRINKING: NO
ON A TYPICAL DAY WHEN YOU DRINK ALCOHOL HOW MANY DRINKS DO YOU HAVE: 0
CONSUMPTION TOTAL: NEGATIVE
EVER FELT BAD OR GUILTY ABOUT YOUR DRINKING: NO
TOTAL SCORE: 0
AVERAGE NUMBER OF DAYS PER WEEK YOU HAVE A DRINK CONTAINING ALCOHOL: 0
HAVE PEOPLE ANNOYED YOU BY CRITICIZING YOUR DRINKING: NO
ALCOHOL_USE: NO
TOTAL SCORE: 0
HOW MANY TIMES IN THE PAST YEAR HAVE YOU HAD 5 OR MORE DRINKS IN A DAY: 0
DOES PATIENT WANT TO STOP DRINKING: NO
TOTAL SCORE: 0
EVER HAD A DRINK FIRST THING IN THE MORNING TO STEADY YOUR NERVES TO GET RID OF A HANGOVER: NO

## 2024-04-19 ASSESSMENT — GAIT ASSESSMENTS
ASSISTIVE DEVICE: FRONT WHEEL WALKER
DISTANCE (FEET): 150
GAIT LEVEL OF ASSIST: STANDBY ASSIST

## 2024-04-19 ASSESSMENT — FIBROSIS 4 INDEX: FIB4 SCORE: 1.55

## 2024-04-19 NOTE — DISCHARGE PLANNING
Discharge Appointments/outpatient referrals/ and  set up:    Cardiac surgery follow up appointment made for 4-5 weeks out    Hospital discharge team/schedulers called and left VM for cardiology f/u appointment for MD or APRN within 3-4 weeks if possible.    Aortic surveillance program/vascular medicine referral not needed, orders not placed.    Anticoagulation referral/ coumadin clinic referral not needed and orders not placed .    INR draws are not indicated for CABG procedure and Plavix.    Will await PT/OT notes and medical progression to determine further discharge needs.

## 2024-04-19 NOTE — PROGRESS NOTES
Report given to MENDY Cope, on tele 8. Patient's meds and belongings with patient. VSS. Notified wife, Hayley, that patient was transferred.

## 2024-04-19 NOTE — CARE PLAN
The patient is Watcher - Medium risk of patient condition declining or worsening    Shift Goals  Clinical Goals: Maintain hemodynamic stability, ambulate, maintain comfort, manage pain  Patient Goals: Rest, pain management  Family Goals: VESNA    Progress made toward(s) clinical / shift goals:    Problem: Knowledge Deficit - Standard  Goal: Patient and family/care givers will demonstrate understanding of plan of care, disease process/condition, diagnostic tests and medications  4/19/2024 0709 by Doug Arshad R.N.  Outcome: Progressing     Problem: Pain - Standard  Goal: Alleviation of pain or a reduction in pain to the patient’s comfort goal  4/19/2024 0709 by Doug Arshad R.N.  Outcome: Progressing     Problem: Post Op Day 1 CABG/Heart Valve Replacement  Goal: Optimal care of the post op CABG/heart valve replacement Post Op Day 1  4/19/2024 0709 by Doug Arshad R.N.  Outcome: Progressing     Problem: Post Op Day 1 CABG/Heart Valve Replacement  Goal: Optimal care of the post op CABG/heart valve replacement Post Op Day 1  4/19/2024 0709 by Doug Arshad R.N.  Outcome: Progressing  4/19/2024 0640 by Doug Arshad R.N.  Outcome: Progressing  Intervention: EKG and CXR completed  Note: EKG and CXR completed  Intervention: All valve patients: PT/INR daily  Note: Not a valve pt.  Intervention: Antibiotics are discontinued within 24 hours of anesthesia end time unless indication documented for continuation beyond 24 hours  4/19/2024 0709 by Doug Arshad R.N.  Note: Abx discontinued prior to shift  4/19/2024 0642 by Doug Arshad R.N.  Note: Abx discontinued prior to shift  Intervention: Daily weights in the morning  4/19/2024 0709 by oDug Arshad R.N.  Note: Pt weighed in AM  4/19/2024 0642 by Doug Arshad R.N.  Note: Pt weighed in AM  Intervention: Up in chair for all meals  4/19/2024 0709 by Doug Arshad R.N.  Note: Pt up in chair for all meals  4/19/2024 0642 by Doug Arshad R.N.  Note: Pt up in chair for all  meals  Intervention: Ambulate in am if stable. First ambulation 25 feet. Repeat x 3 as tolerated  4/19/2024 0709 by Doug Arshad R.N.  Note: Pt ambulated 250', steady and mostly without FWW  4/19/2024 0642 by Doug Arshad R.N.  Note: Pt very self-motivated to ambulate. 250' without assistance, steady.  Intervention: Assess surgical dressing and check provider orders for potential removal  4/19/2024 0709 by Doug Arshad R.N.  Note: Surg drsgs removed prior to shift  4/19/2024 0642 by Doug Arshad R.N.  Note: Surgical dressings removed, sites cleansed  Intervention: [facility] trained RN to remove chest tubes if ordered by provider  4/19/2024 0709 by Doug Arshad R.N.  Note: Chest tubes removed prior to shift  4/19/2024 0642 by Doug Arshad R.N.  Note: Chest tubes removed prior to shift  Intervention: IS q 1 hour while awake and record best IS volume  4/19/2024 0709 by Doug Arshad R.N.  Note: Pt performs IS multiple times per hour when awake. Best 1500, base 3500  4/19/2024 0642 by Doug Arshad R.N.  Note: Pt performs IS multiple times per hour when awake, very self motivated. Best 1500, base 3500  Intervention: Knee high BRIAN hose, on during the day, off at night  4/19/2024 0709 by Doug Arshad R.N.  Note: BRIAN hose off during sleep, back on prior to ambulation  4/19/2024 0642 by Doug Arshad R.N.  Note: BRIAN hose removed during sleep, replaced in AM prior to ambulation  Intervention: Saline lock IV  Note: PIV discontinued.  Intervention: After 24th hour post-anesthesia end time, transition patient to Cardiac Surgery SQ Insulin Protocol  Note: Pt on SQ insulin  Intervention: If patient is CABG or on home beta-blocker, start/resume beta-blocker on POD 1 or POD 2 or document contraindication  Note: Pt started beta blocker       Patient is not progressing towards the following goals:

## 2024-04-19 NOTE — CARE PLAN
Problem: Pain - Standard  Goal: Alleviation of pain or a reduction in pain to the patient’s comfort goal  Outcome: Progressing     Problem: Post Op Day 1 CABG/Heart Valve Replacement  Goal: Optimal care of the post op CABG/heart valve replacement Post Op Day 1  Outcome: Progressing  Intervention: EKG and CXR completed  Note: SR freq PAC  Intervention: All valve patients: PT/INR daily  Note: N/A  Intervention: Antibiotics are discontinued within 24 hours of anesthesia end time unless indication documented for continuation beyond 24 hours  Note: completed  Intervention: Daily weights in the morning  Note: completed  Intervention: Up in chair for all meals  Note: Patient refused to get up to chair for dinner  Intervention: Ambulate in am if stable. First ambulation 25 feet. Repeat x 3 as tolerated  Note: Ambulated x1 50 feet  Intervention: Discontinue gonzalez catheter unless documented reason for continuation  Note: remains  Intervention: Assess surgical dressing and check provider orders for potential removal  Note: Dressings removed 24 hours post op  Intervention: OHS trained RN to remove chest tubes if ordered by provider  Note: Removed per order  Intervention: IS q 1 hour while awake and record best IS volume  Note: Best IS 1500  Intervention: Knee high BRIAN hose, on during the day, off at night  Note: completed  Intervention: Saline lock IV  Note: completed  Intervention: Transfer to tele status, begin VS q 4 hours  Note: Tele status tomorrow  Intervention: If patient is CABG or on home beta-blocker, start/resume beta-blocker on POD 1 or POD 2 or document contraindication  Note: Metoprolol given   The patient is Stable - Low risk of patient condition declining or worsening    Shift Goals  Clinical Goals: SBP  mmHg, pain control, IS, mobilize up to chair in the AM.  Patient Goals: Rest and sleep  Family Goals: Pt to feel better, and provide updates if there's any.    Progress made toward(s) clinical / shift  goals:  ambulated, managed pain    Patient is not progressing towards the following goals:

## 2024-04-19 NOTE — CARE PLAN
Problem: Hyperinflation  Goal: Prevent or improve atelectasis  Description: Target End Date:  3 to 4 days    1. Instruct incentive spirometry usage  2.  Perform hyperinflation therapy as indicated  Outcome: Progressing     PEP BID IS 2000

## 2024-04-19 NOTE — THERAPY
"Physical Therapy   Initial Evaluation     Patient Name: Jensen Leigh  Age:  64 y.o., Sex:  male  Medical Record #: [id number]  Today's Date: 4/19/2024     Precautions  Precautions: Fall Risk;Cardiac Precautions (See Comments);Sternal Precautions (See Comments)  Comments: 3vCABG, EF 55%, not steady on BB    Assessment  Patient is 64 y.o. male that is s/p 3vCABG with Dr. Velez on 4/17. PMHx significant for DM, HTN, HLD, stress.    He presented to PT with decreased activity tolerance which are limiting their ability to safely perform functional mobility. They mobilized as detailed below. He had decrease in BP from sitting to standing but was asymptomatic throughout with negative talk test during activity.    Additional time required for patient education to review \"Recovering from Heart Surgery\" handout; initiated education regarding:    physiology of cardiovascular system and hemodynamic response;  normal expectations after OHS;  sternal precautions;  home exercise program and appropriate activity progression;  self monitoring via RPE scale/talk test/HR;  activity schedule following OHS;  discharge concerns decision tree;  benefits of outpatient [facility].    Patient was receptive to education and demonstrated understanding but would likely benefit from reinforcement. Recommend outpatient cardiac rehab.    Will follow.      Plan    Physical Therapy Initial Treatment Plan   Treatment Plan : Bed Mobility, Equipment, Gait Training, Manual Therapy, Neuro Re-Education / Balance, Self Care / Home Evaluation, Stair Training, Therapeutic Activities, Therapeutic Exercise, Family / Caregiver Training  Treatment Frequency: 4 Times per Week  Duration: Until Therapy Goals Met    DC Equipment Recommendations: Unable to determine at this time (currently using FWW during ambulation, may progress to no AD)  Discharge Recommendations:  (outpatient cardiac rehab)       Subjective    RN cleared patient for therapy, " "patient received in chair, agreeable, reported \"they replaced some vessels\"     Objective       04/19/24 0948   Charge Group   PT Evaluation PT Evaluation High   PT Self Care / Home Evaluation (Units) 1   Total Time Spent   PT Total Time Yes   PT Evaluation Time Spent (Mins) 10   PT Self Care/Home Evaluation Time Spent (Mins) 21   PT Total Time Spent (Calculated) 31   Initial Contact Note    Initial Contact Note Order Received and Verified, Physical Therapy Evaluation in Progress with Full Report to Follow.   Precautions   Precautions Fall Risk;Cardiac Precautions (See Comments);Sternal Precautions (See Comments)   Comments 3vCABG, EF 55%, not steady on BB   Vitals   O2 (LPM) 1   O2 Delivery Device Silicone Nasal Cannula   Vitals Comments BP/HR respectively: 109/64, 81 sitting; 91/51, 92 standing; 90/56, 93 standing post activity. Asymptomatic throughout   Pain 0 - 10 Group   Therapist Pain Assessment   (no pain complaint during session)   Prior Living Situation   Prior Services None   Housing / Facility 1 Story House   Steps Into Home 3   Steps In Home 0   Bathroom Set up Walk In Shower;Grab Bars   Equipment Owned None   Lives with - Patient's Self Care Capacity Spouse   Comments Patient reported spouse is able to assist as needed   Prior Level of Functional Mobility   Bed Mobility Independent   Transfer Status Independent   Ambulation Independent   Ambulation Distance community   Assistive Devices Used None   Stairs Independent   Comments 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 demonstrated understanding of education   Active ROM Upper Body   Comments patient moved BUE functionally during session   Active ROM Lower Body    Comments WFL for mobility   Strength Lower Body   Comments " WFL for mobility   Coordination Upper Body   Coordination WDL   Coordination Lower Body    Coordination Lower Body  WDL   Balance Assessment   Sitting Balance (Static) Fair +   Sitting Balance (Dynamic) Fair +   Standing Balance (Static) Fair   Standing Balance (Dynamic) Fair   Weight Shift Sitting Fair   Weight Shift Standing Fair   Comments used FWW, no overt LOB   Bed Mobility    Comments in recliner pre and post   Gait Analysis   Gait Level Of Assist Standby Assist   Assistive Device Front Wheel Walker   Distance (Feet) 150   # of Times Distance was Traveled 1   Deviation   (decreased jessica, step length)   # of Stairs Climbed 0   Weight Bearing Status no restrictions   Vision Deficits Impacting Mobility NT   Functional Mobility   Sit to Stand Standby Assist   Bed, Chair, Wheelchair Transfer Standby Assist   Transfer Method Stand Step   ICU Target Mobility Level   ICU Mobility - Targeted Level Level 4   6 Clicks Assessment - How much HELP from from another person do you currently need... (If the patient hasn't done an activity recently, how much help from another person do you think he/she would need if he/she tried?)   Turning from your back to your side while in a flat bed without using bedrails? 3   Moving from lying on your back to sitting on the side of a flat bed without using bedrails? 3   Moving to and from a bed to a chair (including a wheelchair)? 3   Standing up from a chair using your arms (e.g., wheelchair, or bedside chair)? 3   Walking in hospital room? 3   Climbing 3-5 steps with a railing? 3   6 clicks Mobility Score 18   Patient / Family Goals    Patient / Family Goal #1 none stated   Short Term Goals    Short Term Goal # 1 Patient will ascend/descend 3 stepd with LRAD and supervision within 6tx in order to enter/exit home   Short Term Goal # 2 Patient will verbalize cardiac rehab education including sternal precautions, talk test, and appropriate activity monitoring and modification  strategies within 6tx in order to demonstrate understanding   Education Group   Education Provided Role of Physical Therapist;Cardiac Precautions;Sternal Precautions   Cardiac Precautions Patient Response Patient;Acceptance;Explanation;Verbal Demonstration;Demonstration;Handout;Action Demonstration;Reinforcement Needed   Sternal Precautions Patient Response Patient;Acceptance;Explanation;Verbal Demonstration;Demonstration;Handout;Action Demonstration;Reinforcement Needed   Role of Physical Therapist Patient Response Patient;Acceptance;Explanation;Verbal Demonstration   Physical Therapy Initial Treatment Plan    Treatment Plan  Bed Mobility;Equipment;Gait Training;Manual Therapy;Neuro Re-Education / Balance;Self Care / Home Evaluation;Stair Training;Therapeutic Activities;Therapeutic Exercise;Family / Caregiver Training   Treatment Frequency 4 Times per Week   Duration Until Therapy Goals Met   Problem List    Problems Impaired Transfers;Impaired Ambulation;Decreased Activity Tolerance;Limited Knowledge of Post-Op Precautions   Anticipated Discharge Equipment and Recommendations   DC Equipment Recommendations Unable to determine at this time  (currently using FWW during ambulation, may progress to no AD)   Discharge Recommendations   (outpatient cardiac rehab)   Interdisciplinary Plan of Care Collaboration   IDT Collaboration with  Nursing   Patient Position at End of Therapy Seated;Call Light within Reach;Tray Table within Reach;Phone within Reach  (RN at bedside)   Collaboration Comments RN aware of visit, response   Session Information   Date / Session Number  4/19-1 (1/4, 4/25)

## 2024-04-19 NOTE — PROGRESS NOTES
Cardiovascular Surgery Progress Note    Name: Jensen Leigh  MRN: [id number]  : 1960  Admit Date: 2024  5:24 AM  2 Days Post-Op     Procedure:  Procedure(s) and Anesthesia Type:     * CORONARY ARTERY BYPASS GRAFTING X3, ENDOSCOPIC VEIN HARVEST, - General     * ECHOCARDIOGRAM, TRANSESOPHAGEAL, INTRAOPERATIVE - General    Vitals:  Vitals:    24 0400 24 0500 24 0600 24 0617   BP:  125/68 127/70 127/70   Pulse: 93 94 94 99   Resp:       Temp:       TempSrc:       SpO2: 95% 95% 97% 95%   Weight:   105 kg (231 lb 0.7 oz)    Height:          No data recorded.      Respiratory:    Respiration: (!) 23, Pulse Oximetry: 95 %       Fluids:    Intake/Output Summary (Last 24 hours) at 2024 0715  Last data filed at 2024 0600  Gross per 24 hour   Intake 3404.28 ml   Output 1990 ml   Net 1414.28 ml     Admit weight: Weight: 108 kg (238 lb 1.6 oz)  Current weight: Weight: 105 kg (231 lb 0.7 oz) (24 0600)    Labs:  Recent Labs     24  1440 24  1229 24  0404 24  2320   WBC 7.8  --  12.8* 13.3*   RBC 5.46  --  4.73 4.55*   HEMOGLOBIN 16.2 14.3 14.1 13.7*   HEMATOCRIT 48.4 43.1 43.5 42.4   MCV 88.6  --  92.0 93.2   MCH 29.7  --  29.8 30.1   MCHC 33.5  --  32.4 32.3   RDW 43.4  --  47.4 48.4   PLATELETCT 199 143* 164 157*   MPV 10.2  --  10.1 10.2     Recent Labs     24  1640 24  2310 24  0404 24  2320   SODIUM 145  --  139 136   POTASSIUM 3.7 4.7 4.6 4.6   CHLORIDE 117*  --  106 102   CO2 18*  --  23 27   GLUCOSE 106*  --  170* 184*   BUN 15  --  17 21   CREATININE 0.48*  --  0.62 0.71   CALCIUM 7.4*  --  8.3* 8.6     Recent Labs     24  1440 24  1229   APTT 28.1 28.3   INR 1.06 1.20*       HgbA1c:  8    Diabetic Educator Consult:  no    Medications:  Scheduled Medications   Medication Dose Frequency    atorvastatin  40 mg DAILY    SITagliptin  100 mg DAILY    sertraline  50 mg DAILY    [START ON 2024] lisinopril  2.5 mg  DAILY    furosemide  20 mg BID DIURETIC    potassium chloride SA  10 mEq BID    insulin GLARGINE  0.2 Units/kg/day QAM INSULIN    And    insulin lispro  0.2 Units/kg/day TID AC    And    insulin lispro  2-9 Units 4X/DAY ACHS    amphetamine-dextroamphetamine  15 mg BID    Nozin nasal  swab  1 Applicator BID    metoprolol tartrate  25 mg BID    aspirin  81 mg DAILY    clopidogrel  75 mg DAILY    acetaminophen  1,000 mg Q6HRS    docusate sodium  100 mg BID    senna-docusate  1 Tablet Nightly    omeprazole  20 mg DAILY        Exam:   Physical Exam  Constitutional:       General: He is not in acute distress.     Appearance: He is well-developed. He is not diaphoretic.   Cardiovascular:      Rate and Rhythm: Normal rate and regular rhythm.      Heart sounds: Normal heart sounds. No murmur heard.     No friction rub. No gallop.   Pulmonary:      Effort: Pulmonary effort is normal. No respiratory distress.      Breath sounds: Examination of the right-lower field reveals decreased breath sounds. Examination of the left-lower field reveals decreased breath sounds. Decreased breath sounds present. No wheezing or rales.   Abdominal:      General: There is no distension.      Palpations: Abdomen is soft.      Tenderness: There is no abdominal tenderness.   Musculoskeletal:      Cervical back: Neck supple.   Skin:     General: Skin is warm and dry.      Comments: Sternal incision, EVH site   Neurological:      Mental Status: He is alert and oriented to person, place, and time.         Cardiac Medications:    ASA - Yes    Plavix - Yes    Post-operative Beta Blockers - Yes    Ace/ARB- Yes    Statin - Yes    Aldactone- No; contraindicated because of Normal EF    SGLT2i-  No contraindicated because of No; contraindicated because of Normal EF    Ejection Fraction:  55%    Telemetry:   4/18 SR  4/19 SR    Assessment/Plan:  POD 1 HDS, SR- keep pacer wires today, d/c mediastinal tubes, DM- resume home meds, gently diurese- keep  gonzalez for strict I&O, incisions- covered, ADD/depression- resume home meds    POD 2  HDS, SR, neuro intact, wounds intact, abdomen soft, fluid balance positive, wt up,  2 L NC.  Plan:  Increase diuretics. Remove gonzalez and pacing wires. IS/ambulate. Transfer to tele.     Disposition:  TBD

## 2024-04-19 NOTE — PROGRESS NOTES
4 Eyes Skin Assessment Completed by MENDY Cope and Erlinda GARCIA RN.    Head WDL  Ears dry skin right ear, left ear good  Nose WDL  Mouth WDL  Neck WDL right IJ    Breast/Chest Incision  Shoulder Blades WDL  Spine WDL  (R) Arm/Elbow/Hand WDL right radial site    (L) Arm/Elbow/Hand WDL  Abdomen Incision hernia umbilical  Groin Bruising right groin  Scrotum/Coccyx/Buttocks Redness and Blanching  (R) Leg Redness, Scab, Swelling, and Edema incision and donor site  (L) Leg Edema  (R) Heel/Foot/Toe Swelling and Edema  (L) Heel/Foot/Toe WDL          Devices In Places Tele Box      Interventions In Place Pillows    Possible Skin Injury No    Pictures Uploaded Into Epic N/A  Wound Consult Placed N/A  RN Wound Prevention Protocol Ordered No

## 2024-04-19 NOTE — CARE PLAN
Problem: Hyperinflation  Goal: Prevent or improve atelectasis  Description: Target End Date:  3 to 4 days    1. Instruct incentive spirometry usage  2.  Perform hyperinflation therapy as indicated  Outcome: Progressing        Respiratory Update  Treatment Modality: PEP  Frequency: QID  IS 1500    Pt tolerating current treatments well with no adverse reactions, will continue to monitor

## 2024-04-19 NOTE — PROGRESS NOTES
4 Eyes Skin Assessment Completed by fifi RN and yanni RN.    Head WDL  Ears WDL  Nose WDL  Mouth WDL  Neck WDL  Breast/Chest Redness and Incision  Shoulder Blades WDL  Spine WDL  (R) Arm/Elbow/Hand WDL  (L) Arm/Elbow/Hand WDL  Abdomen WDL, hernia  Groin WDL  Scrotum/Coccyx/Buttocks WDL  (R) Leg Swelling, Edema, and Incision  (L) Leg WDL  (R) Heel/Foot/Toe WDL  (L) Heel/Foot/Toe WDL          Devices In Places ECG, Blood Pressure Cuff, Pulse Ox, Spencer, SCD's, Central Line, and Nasal Cannula      Interventions In Place NC W/Ear Foams, Pillows, and Pressure Redistribution Mattress    Possible Skin Injury No    Pictures Uploaded Into Epic N/A  Wound Consult Placed N/A  RN Wound Prevention Protocol Ordered No

## 2024-04-20 ENCOUNTER — APPOINTMENT (OUTPATIENT)
Dept: RADIOLOGY | Facility: MEDICAL CENTER | Age: 64
DRG: 236 | End: 2024-04-20
Attending: THORACIC SURGERY (CARDIOTHORACIC VASCULAR SURGERY)
Payer: MEDICARE

## 2024-04-20 LAB
ANION GAP SERPL CALC-SCNC: 11 MMOL/L (ref 7–16)
BUN SERPL-MCNC: 25 MG/DL (ref 8–22)
CALCIUM SERPL-MCNC: 8.7 MG/DL (ref 8.5–10.5)
CHLORIDE SERPL-SCNC: 100 MMOL/L (ref 96–112)
CO2 SERPL-SCNC: 27 MMOL/L (ref 20–33)
CREAT SERPL-MCNC: 0.6 MG/DL (ref 0.5–1.4)
ERYTHROCYTE [DISTWIDTH] IN BLOOD BY AUTOMATED COUNT: 46.9 FL (ref 35.9–50)
GFR SERPLBLD CREATININE-BSD FMLA CKD-EPI: 107 ML/MIN/1.73 M 2
GLUCOSE BLD STRIP.AUTO-MCNC: 134 MG/DL (ref 65–99)
GLUCOSE BLD STRIP.AUTO-MCNC: 138 MG/DL (ref 65–99)
GLUCOSE BLD STRIP.AUTO-MCNC: 139 MG/DL (ref 65–99)
GLUCOSE BLD STRIP.AUTO-MCNC: 153 MG/DL (ref 65–99)
GLUCOSE BLD STRIP.AUTO-MCNC: 160 MG/DL (ref 65–99)
GLUCOSE BLD STRIP.AUTO-MCNC: 176 MG/DL (ref 65–99)
GLUCOSE SERPL-MCNC: 164 MG/DL (ref 65–99)
HCT VFR BLD AUTO: 40.8 % (ref 42–52)
HGB BLD-MCNC: 13.3 G/DL (ref 14–18)
MCH RBC QN AUTO: 29.6 PG (ref 27–33)
MCHC RBC AUTO-ENTMCNC: 32.6 G/DL (ref 32.3–36.5)
MCV RBC AUTO: 90.7 FL (ref 81.4–97.8)
PLATELET # BLD AUTO: 155 K/UL (ref 164–446)
PMV BLD AUTO: 10.2 FL (ref 9–12.9)
POTASSIUM SERPL-SCNC: 4.1 MMOL/L (ref 3.6–5.5)
RBC # BLD AUTO: 4.5 M/UL (ref 4.7–6.1)
SODIUM SERPL-SCNC: 138 MMOL/L (ref 135–145)
WBC # BLD AUTO: 13.3 K/UL (ref 4.8–10.8)

## 2024-04-20 PROCEDURE — 700102 HCHG RX REV CODE 250 W/ 637 OVERRIDE(OP): Mod: JZ | Performed by: NURSE PRACTITIONER

## 2024-04-20 PROCEDURE — 700102 HCHG RX REV CODE 250 W/ 637 OVERRIDE(OP)

## 2024-04-20 PROCEDURE — 770020 HCHG ROOM/CARE - TELE (206)

## 2024-04-20 PROCEDURE — 71046 X-RAY EXAM CHEST 2 VIEWS: CPT

## 2024-04-20 PROCEDURE — A9270 NON-COVERED ITEM OR SERVICE: HCPCS | Performed by: NURSE PRACTITIONER

## 2024-04-20 PROCEDURE — 700102 HCHG RX REV CODE 250 W/ 637 OVERRIDE(OP): Performed by: NURSE PRACTITIONER

## 2024-04-20 PROCEDURE — 80048 BASIC METABOLIC PNL TOTAL CA: CPT

## 2024-04-20 PROCEDURE — 82962 GLUCOSE BLOOD TEST: CPT

## 2024-04-20 PROCEDURE — 94669 MECHANICAL CHEST WALL OSCILL: CPT

## 2024-04-20 PROCEDURE — 700111 HCHG RX REV CODE 636 W/ 250 OVERRIDE (IP): Mod: JZ | Performed by: NURSE PRACTITIONER

## 2024-04-20 PROCEDURE — 85027 COMPLETE CBC AUTOMATED: CPT

## 2024-04-20 PROCEDURE — A9270 NON-COVERED ITEM OR SERVICE: HCPCS

## 2024-04-20 PROCEDURE — 99024 POSTOP FOLLOW-UP VISIT: CPT | Performed by: NURSE PRACTITIONER

## 2024-04-20 PROCEDURE — A9270 NON-COVERED ITEM OR SERVICE: HCPCS | Mod: JZ | Performed by: NURSE PRACTITIONER

## 2024-04-20 PROCEDURE — 306310 ANTI-EMBOLISM STOCKINGS XXLRG REG: Performed by: THORACIC SURGERY (CARDIOTHORACIC VASCULAR SURGERY)

## 2024-04-20 RX ADMIN — ACETAMINOPHEN 1000 MG: 500 TABLET, FILM COATED ORAL at 12:02

## 2024-04-20 RX ADMIN — DOCUSATE SODIUM 100 MG: 100 CAPSULE, LIQUID FILLED ORAL at 17:53

## 2024-04-20 RX ADMIN — INSULIN LISPRO 7 UNITS: 100 INJECTION, SOLUTION INTRAVENOUS; SUBCUTANEOUS at 09:03

## 2024-04-20 RX ADMIN — INSULIN LISPRO 2 UNITS: 100 INJECTION, SOLUTION INTRAVENOUS; SUBCUTANEOUS at 12:41

## 2024-04-20 RX ADMIN — SERTRALINE HYDROCHLORIDE 50 MG: 50 TABLET ORAL at 05:22

## 2024-04-20 RX ADMIN — METOPROLOL TARTRATE 25 MG: 25 TABLET, FILM COATED ORAL at 17:53

## 2024-04-20 RX ADMIN — METOPROLOL TARTRATE 25 MG: 25 TABLET, FILM COATED ORAL at 05:27

## 2024-04-20 RX ADMIN — DOCUSATE SODIUM 50 MG AND SENNOSIDES 8.6 MG 1 TABLET: 8.6; 5 TABLET, FILM COATED ORAL at 20:24

## 2024-04-20 RX ADMIN — Medication 1 APPLICATOR: at 09:00

## 2024-04-20 RX ADMIN — FUROSEMIDE 40 MG: 10 INJECTION INTRAMUSCULAR; INTRAVENOUS at 05:24

## 2024-04-20 RX ADMIN — INSULIN GLARGINE-YFGN 21 UNITS: 100 INJECTION, SOLUTION SUBCUTANEOUS at 05:47

## 2024-04-20 RX ADMIN — LISINOPRIL 2.5 MG: 5 TABLET ORAL at 05:26

## 2024-04-20 RX ADMIN — DEXTROAMPHETAMINE SACCHARATE, AMPHETAMINE ASPARTATE, DEXTROAMPHETAMINE SULFATE, AMPHETAMINE SULFATE TABLETS, 10 MG,CLL 15 MG: 2.5; 2.5; 2.5; 2.5 TABLET ORAL at 05:21

## 2024-04-20 RX ADMIN — ACETAMINOPHEN 1000 MG: 500 TABLET, FILM COATED ORAL at 05:22

## 2024-04-20 RX ADMIN — DEXTROAMPHETAMINE SACCHARATE, AMPHETAMINE ASPARTATE, DEXTROAMPHETAMINE SULFATE, AMPHETAMINE SULFATE TABLETS, 10 MG,CLL 15 MG: 2.5; 2.5; 2.5; 2.5 TABLET ORAL at 17:56

## 2024-04-20 RX ADMIN — Medication 1 APPLICATOR: at 20:24

## 2024-04-20 RX ADMIN — POTASSIUM CHLORIDE 20 MEQ: 1500 TABLET, EXTENDED RELEASE ORAL at 05:23

## 2024-04-20 RX ADMIN — SITAGLIPTIN 100 MG: 100 TABLET, FILM COATED ORAL at 05:20

## 2024-04-20 RX ADMIN — ATORVASTATIN CALCIUM 40 MG: 40 TABLET, FILM COATED ORAL at 05:23

## 2024-04-20 RX ADMIN — POTASSIUM CHLORIDE 20 MEQ: 1500 TABLET, EXTENDED RELEASE ORAL at 17:52

## 2024-04-20 RX ADMIN — CLOPIDOGREL BISULFATE 75 MG: 75 TABLET ORAL at 05:22

## 2024-04-20 RX ADMIN — ASPIRIN 81 MG: 81 TABLET, COATED ORAL at 05:21

## 2024-04-20 RX ADMIN — OMEPRAZOLE 20 MG: 20 CAPSULE, DELAYED RELEASE ORAL at 05:22

## 2024-04-20 RX ADMIN — DOCUSATE SODIUM 100 MG: 100 CAPSULE, LIQUID FILLED ORAL at 05:23

## 2024-04-20 RX ADMIN — FUROSEMIDE 40 MG: 10 INJECTION INTRAMUSCULAR; INTRAVENOUS at 17:52

## 2024-04-20 RX ADMIN — ACETAMINOPHEN 1000 MG: 500 TABLET, FILM COATED ORAL at 17:52

## 2024-04-20 ASSESSMENT — FIBROSIS 4 INDEX: FIB4 SCORE: 1.57

## 2024-04-20 NOTE — CARE PLAN
Problem: Hyperinflation  Goal: Prevent or improve atelectasis  Description: Target End Date:  3 to 4 days    1. Instruct incentive spirometry usage  2.  Perform hyperinflation therapy as indicated  Outcome: Progressing   PEP BID

## 2024-04-20 NOTE — PROGRESS NOTES
Monitor Summary:  Rhythm: SR Rate: 82--86  Ectopies: BBB, frequent PAC, occasional PVC, tachy up 185  Measurement: .10/.11/.36

## 2024-04-20 NOTE — CARE PLAN
The patient is Stable - Low risk of patient condition declining or worsening    Shift Goals  Clinical Goals: walk and ensure sternal precautions  Patient Goals: rest  Family Goals: VESNA    Progress made toward(s) clinical / shift goals:  walked 250 ft.    Patient is not progressing towards the following goals: lacks understanding of sternal precautions.      Problem: Post op day 2 CABG/Heart Valve Replacement  Goal: Optimal care of the post op CABG/heart valve replacement post op day 2  Intervention: Up in chair for all meals  Note: Pt  up in chair for dinnner.   Intervention: Ambulate 4 times daily, increasing the distance each time  Note: Pt walked 250 ft.  Intervention: IS q 1 hour while awake and record best IS volume  Note: IS currently 2000 cc

## 2024-04-20 NOTE — PROGRESS NOTES
Bedside report received from off going RN/tech: Ayan, assumed care of patient.     Fall Risk Score: HIGH RISK  Fall risk interventions in place: Place yellow fall risk ID band on patient, Provide patient/family education based on risk assessment, Place patient in room close to nursing station, Utilize bed/chair fall alarm, and Notify charge of high risk for huddle  Bed type: Regular (Tony Score less than 17 interventions in place)  Patient on cardiac monitor: Yes  IVF/IV medications: Not Applicable   Oxygen: How many liters 2L  Bedside sitter: Not Applicable   Isolation: Not applicable    Pt sleeping in semifowlers position.

## 2024-04-20 NOTE — PROGRESS NOTES
Cardiovascular Surgery Progress Note    Name: Jensen Leigh  MRN: [id number]  : 1960  Admit Date: 2024  5:24 AM  3 Days Post-Op     Procedure:  Procedure(s) and Anesthesia Type:     * CORONARY ARTERY BYPASS GRAFTING X3, ENDOSCOPIC VEIN HARVEST, - General     * ECHOCARDIOGRAM, TRANSESOPHAGEAL, INTRAOPERATIVE - General    Vitals:  Vitals:    24 0300 24 0526 24 0527 24 0610   BP: 127/79 134/80 134/80    Pulse: 85  89 88   Resp: 16   16   Temp:       TempSrc: Temporal      SpO2: 97%   99%   Weight: 104 kg (229 lb 0.9 oz)      Height:          Temp (24hrs), Av.1 °C (96.9 °F), Min:36.1 °C (96.9 °F), Max:36.1 °C (96.9 °F)      Respiratory:    Respiration: 16, Pulse Oximetry: 99 %       Fluids:    Intake/Output Summary (Last 24 hours) at 2024 0744  Last data filed at 2024 1933  Gross per 24 hour   Intake 381.68 ml   Output 380 ml   Net 1.68 ml     Admit weight: Weight: 108 kg (238 lb 1.6 oz)  Current weight: Weight: 104 kg (229 lb 0.9 oz) (24 0300)    Labs:  Recent Labs     24  0404 24  0005   WBC 12.8* 13.3* 13.3*   RBC 4.73 4.55* 4.50*   HEMOGLOBIN 14.1 13.7* 13.3*   HEMATOCRIT 43.5 42.4 40.8*   MCV 92.0 93.2 90.7   MCH 29.8 30.1 29.6   MCHC 32.4 32.3 32.6   RDW 47.4 48.4 46.9   PLATELETCT 164 157* 155*   MPV 10.1 10.2 10.2     Recent Labs     24  0404 24  2320 24  0005   SODIUM 139 136 138   POTASSIUM 4.6 4.6 4.1   CHLORIDE 106 102 100   CO2 23 27 27   GLUCOSE 170* 184* 164*   BUN 17 21 25*   CREATININE 0.62 0.71 0.60   CALCIUM 8.3* 8.6 8.7     Recent Labs     24  1229   APTT 28.3   INR 1.20*       HgbA1c:  8    Diabetic Educator Consult:  no    Medications:  Scheduled Medications   Medication Dose Frequency    furosemide  40 mg BID DIURETIC    potassium chloride SA  20 mEq BID    atorvastatin  40 mg DAILY    SITagliptin  100 mg DAILY    sertraline  50 mg DAILY    lisinopril  2.5 mg DAILY    insulin GLARGINE  0.2  Units/kg/day QAM INSULIN    And    insulin lispro  0.2 Units/kg/day TID AC    And    insulin lispro  2-9 Units 4X/DAY ACHS    amphetamine-dextroamphetamine  15 mg BID    Nozin nasal  swab  1 Applicator BID    metoprolol tartrate  25 mg BID    aspirin  81 mg DAILY    clopidogrel  75 mg DAILY    acetaminophen  1,000 mg Q6HRS    docusate sodium  100 mg BID    senna-docusate  1 Tablet Nightly    omeprazole  20 mg DAILY        Exam:   Physical Exam  Constitutional:       General: He is not in acute distress.     Appearance: He is well-developed. He is not diaphoretic.   Cardiovascular:      Rate and Rhythm: Normal rate and regular rhythm.      Heart sounds: Normal heart sounds. No murmur heard.     No friction rub. No gallop.   Pulmonary:      Effort: Pulmonary effort is normal. No respiratory distress.      Breath sounds: Examination of the right-lower field reveals decreased breath sounds. Examination of the left-lower field reveals decreased breath sounds. Decreased breath sounds present. No wheezing or rales.   Abdominal:      General: There is no distension.      Palpations: Abdomen is soft.      Tenderness: There is no abdominal tenderness.   Musculoskeletal:      Cervical back: Neck supple.   Skin:     General: Skin is warm and dry.      Comments: Sternal incision, EVH site   Neurological:      Mental Status: He is alert and oriented to person, place, and time.         Cardiac Medications:    ASA - Yes    Plavix - Yes    Post-operative Beta Blockers - Yes    Ace/ARB- Yes    Statin - Yes    Aldactone- No; contraindicated because of Normal EF    SGLT2i-  No contraindicated because of No; contraindicated because of Normal EF    Ejection Fraction:  55%    Telemetry:   4/18 SR  4/19 SR  4/20 SR    Assessment/Plan:  POD 1 HDS, SR- keep pacer wires today, d/c mediastinal tubes, DM- resume home meds, gently diurese- keep gonzalez for strict I&O, incisions- covered, ADD/depression- resume home meds    POD 2  HDS, SR,  neuro intact, wounds intact, abdomen soft, fluid balance positive, wt up,  2 L NC.  Plan:  Increase diuretics. Remove gonzalez and pacing wires. IS/ambulate. Transfer to tele.     POD 3  HDS, SR, neuro intact, wounds intact, abdomen soft +BM, fluid balance positive, wt below admission wt,  2 L NC.  Plan:  2 view CXR today. Wean oxygen as able. IS/ambulate.     Disposition:  PT- home

## 2024-04-20 NOTE — CARE PLAN
The patient is Watcher - Medium risk of patient condition declining or worsening    Shift Goals  Clinical Goals: POD#2 careplan, incision care, ambulation, morning lab draws  Patient Goals: have a BM and rest  Family Goals: VESNA    Progress made toward(s) clinical / shift goals:    Problem: Knowledge Deficit - Standard  Goal: Patient and family/care givers will demonstrate understanding of plan of care, disease process/condition, diagnostic tests and medications  Outcome: Progressing     Problem: Skin Integrity  Goal: Skin integrity is maintained or improved  Outcome: Progressing     Problem: Fall Risk  Goal: Patient will remain free from falls  Outcome: Progressing     Problem: Post op day 2 CABG/Heart Valve Replacement  Goal: Optimal care of the post op CABG/heart valve replacement post op day 2  Outcome: Progressing  Note:  Patient is still on oxygen and has dyspnea on exertion.  2 view chest xray ordered.  Intervention: FSBS: when 2 consecutive BS < 130 after post op day 2, discontinue FSBS unless patient is insulin dependent diabetic  Note: Patient's BS was 168. Insulin was held because patient didn't eat dinner.  Intervention: Daily weights in the morning  Note: Daily weight taken on stand up scale.  Intervention: Up in chair for all meals  Note: Patient has been up in chair for all meals. Currently sitting in chair.  Intervention: Ambulate 4 times daily, increasing the distance each time  Note: Patient refused to walk at beginning of shift. Patient is going to for a walk this morning.  Intervention: Stand at sink and wash up with assistance.  Clean incisions twice daily with soap and water.  Note: Incision care will be performed after morning walk.  Intervention: IS q 1 hour while awake and record best IS volume  Note: Patient is encouraged to practice IS at least 10x every hour while awake. Max IS volume is 2000.  Intervention: Consider pacer wire removal by MD  Note: Pacer wire removed prior to arrival to  T8       Patient is not progressing towards the following goals:

## 2024-04-20 NOTE — CARE PLAN
Problem: Hyperinflation  Goal: Prevent or improve atelectasis  Description: Target End Date:  3 to 4 days    1. Instruct incentive spirometry usage  2.  Perform hyperinflation therapy as indicated  Outcome: Progressing   PEP BID IS: 1550

## 2024-04-20 NOTE — PROGRESS NOTES
POD#3 and patient is still on oxygen.  2 view chest x-ray was ordered per protocol.  Radiology tech Dell reached out to RN to schedule for pt to be picked up by transport by 0600.   0554: Dell reached out to let RN know that transport is backed up and patient will be rescheduled for 2 view chest x-ray later in the morning.

## 2024-04-21 VITALS
WEIGHT: 228.62 LBS | BODY MASS INDEX: 34.65 KG/M2 | RESPIRATION RATE: 16 BRPM | SYSTOLIC BLOOD PRESSURE: 108 MMHG | DIASTOLIC BLOOD PRESSURE: 69 MMHG | HEART RATE: 94 BPM | HEIGHT: 68 IN | OXYGEN SATURATION: 92 % | TEMPERATURE: 97.9 F

## 2024-04-21 LAB
ANION GAP SERPL CALC-SCNC: 13 MMOL/L (ref 7–16)
BUN SERPL-MCNC: 31 MG/DL (ref 8–22)
CALCIUM SERPL-MCNC: 8.9 MG/DL (ref 8.5–10.5)
CHLORIDE SERPL-SCNC: 99 MMOL/L (ref 96–112)
CO2 SERPL-SCNC: 25 MMOL/L (ref 20–33)
CREAT SERPL-MCNC: 0.63 MG/DL (ref 0.5–1.4)
ERYTHROCYTE [DISTWIDTH] IN BLOOD BY AUTOMATED COUNT: 46.5 FL (ref 35.9–50)
GFR SERPLBLD CREATININE-BSD FMLA CKD-EPI: 106 ML/MIN/1.73 M 2
GLUCOSE BLD STRIP.AUTO-MCNC: 135 MG/DL (ref 65–99)
GLUCOSE BLD STRIP.AUTO-MCNC: 137 MG/DL (ref 65–99)
GLUCOSE SERPL-MCNC: 143 MG/DL (ref 65–99)
HCT VFR BLD AUTO: 38.2 % (ref 42–52)
HGB BLD-MCNC: 12.4 G/DL (ref 14–18)
MCH RBC QN AUTO: 29.2 PG (ref 27–33)
MCHC RBC AUTO-ENTMCNC: 32.5 G/DL (ref 32.3–36.5)
MCV RBC AUTO: 90.1 FL (ref 81.4–97.8)
PLATELET # BLD AUTO: 177 K/UL (ref 164–446)
PMV BLD AUTO: 10.4 FL (ref 9–12.9)
POTASSIUM SERPL-SCNC: 3.9 MMOL/L (ref 3.6–5.5)
RBC # BLD AUTO: 4.24 M/UL (ref 4.7–6.1)
SODIUM SERPL-SCNC: 137 MMOL/L (ref 135–145)
WBC # BLD AUTO: 10 K/UL (ref 4.8–10.8)

## 2024-04-21 PROCEDURE — 94669 MECHANICAL CHEST WALL OSCILL: CPT

## 2024-04-21 PROCEDURE — A9270 NON-COVERED ITEM OR SERVICE: HCPCS | Performed by: NURSE PRACTITIONER

## 2024-04-21 PROCEDURE — 85027 COMPLETE CBC AUTOMATED: CPT

## 2024-04-21 PROCEDURE — 99024 POSTOP FOLLOW-UP VISIT: CPT | Performed by: NURSE PRACTITIONER

## 2024-04-21 PROCEDURE — A9270 NON-COVERED ITEM OR SERVICE: HCPCS

## 2024-04-21 PROCEDURE — 97165 OT EVAL LOW COMPLEX 30 MIN: CPT

## 2024-04-21 PROCEDURE — 80048 BASIC METABOLIC PNL TOTAL CA: CPT

## 2024-04-21 PROCEDURE — 700102 HCHG RX REV CODE 250 W/ 637 OVERRIDE(OP): Mod: JZ | Performed by: NURSE PRACTITIONER

## 2024-04-21 PROCEDURE — A9270 NON-COVERED ITEM OR SERVICE: HCPCS | Mod: JZ | Performed by: NURSE PRACTITIONER

## 2024-04-21 PROCEDURE — 700111 HCHG RX REV CODE 636 W/ 250 OVERRIDE (IP): Mod: JZ | Performed by: NURSE PRACTITIONER

## 2024-04-21 PROCEDURE — 700102 HCHG RX REV CODE 250 W/ 637 OVERRIDE(OP): Performed by: NURSE PRACTITIONER

## 2024-04-21 PROCEDURE — 700102 HCHG RX REV CODE 250 W/ 637 OVERRIDE(OP)

## 2024-04-21 PROCEDURE — 82962 GLUCOSE BLOOD TEST: CPT | Mod: 91

## 2024-04-21 RX ORDER — POTASSIUM CHLORIDE 750 MG/1
20 TABLET, EXTENDED RELEASE ORAL DAILY
Qty: 60 EACH | Refills: 0 | Status: SHIPPED | OUTPATIENT
Start: 2024-04-21 | End: 2024-05-20

## 2024-04-21 RX ORDER — OXYCODONE HYDROCHLORIDE 5 MG/1
5 TABLET ORAL EVERY 6 HOURS PRN
Qty: 28 TABLET | Refills: 0 | Status: SHIPPED | OUTPATIENT
Start: 2024-04-21 | End: 2024-04-28

## 2024-04-21 RX ORDER — FUROSEMIDE 20 MG/1
20 TABLET ORAL DAILY
Qty: 30 TABLET | Refills: 0 | Status: SHIPPED | OUTPATIENT
Start: 2024-04-21 | End: 2024-05-20

## 2024-04-21 RX ORDER — ACETAMINOPHEN 500 MG
1000 TABLET ORAL EVERY 6 HOURS PRN
COMMUNITY
Start: 2024-04-21

## 2024-04-21 RX ORDER — CLOPIDOGREL BISULFATE 75 MG/1
75 TABLET ORAL DAILY
Qty: 30 TABLET | Refills: 2 | Status: SHIPPED | OUTPATIENT
Start: 2024-04-22 | End: 2024-05-15 | Stop reason: SDUPTHER

## 2024-04-21 RX ADMIN — METOPROLOL TARTRATE 25 MG: 25 TABLET, FILM COATED ORAL at 04:24

## 2024-04-21 RX ADMIN — FUROSEMIDE 40 MG: 10 INJECTION INTRAMUSCULAR; INTRAVENOUS at 04:27

## 2024-04-21 RX ADMIN — DOCUSATE SODIUM 100 MG: 100 CAPSULE, LIQUID FILLED ORAL at 04:26

## 2024-04-21 RX ADMIN — INSULIN GLARGINE-YFGN 21 UNITS: 100 INJECTION, SOLUTION SUBCUTANEOUS at 04:34

## 2024-04-21 RX ADMIN — POTASSIUM CHLORIDE 20 MEQ: 1500 TABLET, EXTENDED RELEASE ORAL at 04:26

## 2024-04-21 RX ADMIN — SITAGLIPTIN 100 MG: 100 TABLET, FILM COATED ORAL at 04:25

## 2024-04-21 RX ADMIN — ASPIRIN 81 MG: 81 TABLET, COATED ORAL at 04:25

## 2024-04-21 RX ADMIN — CLOPIDOGREL BISULFATE 75 MG: 75 TABLET ORAL at 04:25

## 2024-04-21 RX ADMIN — ATORVASTATIN CALCIUM 40 MG: 40 TABLET, FILM COATED ORAL at 04:25

## 2024-04-21 RX ADMIN — DEXTROAMPHETAMINE SACCHARATE, AMPHETAMINE ASPARTATE, DEXTROAMPHETAMINE SULFATE, AMPHETAMINE SULFATE TABLETS, 10 MG,CLL 15 MG: 2.5; 2.5; 2.5; 2.5 TABLET ORAL at 04:24

## 2024-04-21 RX ADMIN — SERTRALINE HYDROCHLORIDE 50 MG: 50 TABLET ORAL at 04:24

## 2024-04-21 RX ADMIN — ACETAMINOPHEN 1000 MG: 500 TABLET, FILM COATED ORAL at 04:26

## 2024-04-21 RX ADMIN — LISINOPRIL 2.5 MG: 5 TABLET ORAL at 04:25

## 2024-04-21 RX ADMIN — OMEPRAZOLE 20 MG: 20 CAPSULE, DELAYED RELEASE ORAL at 06:00

## 2024-04-21 RX ADMIN — Medication 1 APPLICATOR: at 09:00

## 2024-04-21 ASSESSMENT — COGNITIVE AND FUNCTIONAL STATUS - GENERAL
HELP NEEDED FOR BATHING: A LITTLE
DAILY ACTIVITIY SCORE: 21
DRESSING REGULAR LOWER BODY CLOTHING: A LITTLE
SUGGESTED CMS G CODE MODIFIER DAILY ACTIVITY: CJ
TOILETING: A LITTLE

## 2024-04-21 ASSESSMENT — ACTIVITIES OF DAILY LIVING (ADL): TOILETING: INDEPENDENT

## 2024-04-21 ASSESSMENT — FIBROSIS 4 INDEX: FIB4 SCORE: 1.38

## 2024-04-21 NOTE — FLOWSHEET NOTE
04/21/24 0656   Incentive Spirometry Treatment   Incentive Spirometer Volume 2000 mL   Chest Physiotherapy Treatment   $ PEP/CPT Performed PEP / Flutter

## 2024-04-21 NOTE — CARE PLAN
The patient is Stable - Low risk of patient condition declining or worsening    Shift Goals  Clinical Goals: POD#3 careplan, ambulation, morning labs, incision care  Patient Goals: comfort  Family Goals: VESNA    Progress made toward(s) clinical / shift goals:    Problem: Knowledge Deficit - Standard  Goal: Patient and family/care givers will demonstrate understanding of plan of care, disease process/condition, diagnostic tests and medications  Outcome: Progressing     Problem: Skin Integrity  Goal: Skin integrity is maintained or improved  Outcome: Progressing     Problem: Fall Risk  Goal: Patient will remain free from falls  Outcome: Progressing     Problem: Pain - Standard  Goal: Alleviation of pain or a reduction in pain to the patient’s comfort goal  Outcome: Progressing     Problem: Post Op Day 3 CABG/Heart Valve replacement  Goal: Optimal care of the post op CABG/Heart Valve replacement post op day 3  Outcome: Progressing  Intervention: Daily weights in the morning  Note: Daily weight will be taken in the morning on stand up scale  Intervention: Shower daily and clean incisions twice daily with soap and water  Note: Patient had first shower today. Central line dressing changed by dayshift.  Intervention: Up in chair for all meals  Note: Patient has been up in chair for all meals  Intervention: Ambulate 4 times daily, increasing the distance each time  Note: Patient had few walks around the unit during dayshift but refused to take the evening walk. RN will encourage patient to take the morning walk.  Intervention: IS q 1 hour while awake and record best IS volume  Note: Patient is motivated with IS and his best IS volume this evening is 1800.       Patient is not progressing towards the following goals:

## 2024-04-21 NOTE — PROGRESS NOTES
Spend 20 minutes discussing the discharge instructions as well as dietary nutrition of low sodium. Cardiac surgery book given along with HEART FAILURE BOOK for additional information of nutrition and reminders of watching the weight: 2lbs per day and 5lbs per week weight gain. Wife was also bedside during the education so they are both informed.  Central line IJ removed, no bleeding at site, no hematoma present. Tele box off and pt escorted downstairs. Both are aware of future appointments.

## 2024-04-21 NOTE — PROGRESS NOTES
Monitor Summary:  Rhythm: SR Rate: 85-98  Ectopies: rare PVC, couplet, BBB  Measurement: .07/.13/.39

## 2024-04-21 NOTE — DISCHARGE INSTRUCTIONS
Diet    Avoid Alcohol Use    Excessive alcohol use can cause poor nutrition and a lack of nutrients  which can lead to more health problems.  The best way to avoid malnutrition is to avoid alcohol.  If you must drink, limit your intake to no more than 1 drink a day for nonpregnant women and 2 drinks a day for men. One drink equals 12 oz (355 mL) of beer, 5 oz (148 mL) of wine, or 1½ oz (44 mL) of hard liquor.    Heart Healthy Diet    A Heart-Healthy diet includes increasing healthy fats and limiting unhealthy fats.  Focus on eating a balance of foods, including fruits and vegetables, low-fat or nonfat dairy, lean protein, nuts and legumes, whole grains, and heart-healthy oils and fats.  Monitor your salt (sodium) intake, especially if you have high blood pressure.  Lose weight if you are overweight. Losing just 5-10% of your body weight can help your overall health and prevent diseases such as diabetes and heart disease.  Low Sodium Diet - 2 Grams    A Low Sodium Diet - 2 Grams Diet restricts the amount of sodium to no more than 2 g (2000 mg) daily. Limiting the amount of sodium is often used to help lower blood pressure or if you have heart, liver, or kidney problems. Many foods contain sodium for flavor and or as a preservative. Look for products with no more than 500 to 600 mg of sodium per meal and no more than 150 mg per serving on the label. Remember that 2 g = 2000 mg.  Do not add salt to food.  DIVISION OF CARDIAC SURGERY   DISCHARGE INSTRUCTIONS    Activity:    NO driving for 4 weeks after surgery. You may ride as a passenger.  NO lifting, pushing, or pulling more than 10 pounds for 6 weeks.  For the next 6 weeks, keep your elbows close to your body and move within a pain-free motion when lifting, pushing or pulling.  Do not stretch both arms backwards at the same time.    Walk at least 4 times per day, there is no maximum. The goal is to increase your distance over time.  Continue using incentive  spirometer for 2 weeks or until your baseline volume is reached.  If you are going home on oxygen and you were not on oxygen prior to surgery, keep using until you are oxygen free.  Weigh yourself daily.  Call your Cardiologist for a weight gain of 3 or more pounds in 1 day or more than 5 pounds in 7 days.  Take all of your medications as prescribed. Do not use a pill box for the first month at home. If you have questions, please call your nurse navigator at [phone].  Continue to wear the BRIAN (compression) stockings for 2-4 weeks or until all swelling is gone. You may take them off when you are in bed or when your legs are elevated.    Incision Care:    Make sure to clean your incision(s) TWICE DAILY.  Once by showering AND once using the no rinse Foam cleanser provided in the hospital.  During the shower, cleanse the incision(s) with a perfume and dye free soap (Dial, Dove, Olga Spring)  Use gentle pressure and rub up and down over incision with your hands or a washcloth. Rinse off and pat incision(s) dry with clean towel.  Keep the incision open to air. No creams or lotions on your incision(s). No baths.  If there is any increased redness or swelling, separation of the incision line, or thick drainage from any of your incisions, call the Cardiac Surgeons ([phone]).      General Instructions:    You have been referred to Cardiac Rehab.  You can start Cardiac Rehab 30 days after surgery.  If you do not have an appointment at the time of discharge call [phone] to schedule an appointment.  Your Primary Care Doctor typically handles home oxygen. Oxygen may be stopped when your oxygen level is consistently greater than 90.  Check with your Primary Care Doctor if you are unsure.  Take all of your medications (including pain medications) as prescribed.  Taking medications other than prescribed can result in serious injury.    For Patients Discharged with Narcotic Pain Medication:     If a refill is needed,  understand that only 1 refill will be provided and you must come to the Cardiac Surgeons’ office for an appointment (72 hours’ notice is required to schedule and there are no weekend appointments).  If the pain medications you are discharged on are not working, you will need to bring your remaining prescription into the office in order to receive a new prescription.  If you were taking narcotics prior to your heart surgery, the Cardiac Surgeons will provide you with one prescription and additional medications will need to be provided by your pain management doctor.  Do not drink alcohol while taking narcotics.  Lost or stolen medications will not be refilled.  If medications are stolen, report to law enforcement.    Contact Cardiac Surgery at [phone] if you have any questions.

## 2024-04-21 NOTE — DISCHARGE SUMMARY
DISCHARGE SUMMARY    ADMISSION DATE: 4/17/2024    DISCHARGE DATE: 4/21/24    ADMITTING DIAGNOSES: Multivessel coronary disease, stable angina, diabetes mellitus type 2, hypertension, hyperlipidemia, right bundle branch block     DISCHARGE DIAGNOSES: Multivessel coronary disease, stable angina, diabetes mellitus type 2, hypertension, hyperlipidemia, right bundle branch block     PROCEDURES PERFORMED:   4/17/24 Dr. Velez  CORONARY ARTERY BYPASS GRAFTING X3  WITH SKELETONIZED SMITH TO LAD  RSCG TO OM1 AND RPDA  ENDOSCOPIC  HARVESTOF RIGHT GREATER SAPHENOUS VEIN, - Wound Class: Clean with Drain  ECHOCARDIOGRAM, TRANSESOPHAGEAL, INTRAOPERATIVE - Wound Class: Clean Contaminated       HISTORY OF PRESENT ILLNESS:  The patient is a 63 y.o. male with past medical history of methamphetamine use, DVT, diabetes mellitus, hypertension, DVT s/p IVC filter, depressive disorder, hyperlipidemia, obesity,  and coronary artery disease. He was seen in our clinic in September and recommended CABG. He and his wife wanted to go home and think about it. He continues to have angina with exertion that is relieved with rest or nitroglycerin. He is working full time. He denies shortness of breath, dizziness, syncope.        HOSPITAL COURSE:   POD 1 HDS, SR- keep pacer wires today, d/c mediastinal tubes, DM- resume home meds, gently diurese- keep gonzalez for strict I&O, incisions- covered, ADD/depression- resume home meds     POD 2  HDS, SR, neuro intact, wounds intact, abdomen soft, fluid balance positive, wt up,  2 L NC.  Plan:  Increase diuretics. Remove gonzalez and pacing wires. IS/ambulate. Transfer to [facility].      POD 3  HDS, SR, neuro intact, wounds intact, abdomen soft +BM, fluid balance positive, wt below admission wt,  2 L NC.  Plan:  2 view CXR today. Wean oxygen as able. IS/ambulate.      POD 4  HDS, SR, neuro intact, wounds intact, abdomen soft +BM, fluid balance positive, wt below admission wt,  room air.  Plan:  Discharge home  today, IS/ambulate.       TELEMETRY:  4/18 SR  4/19 SR  4/20 SR  4/21 SR    RECENT LABS:     Lab Results   Component Value Date/Time    SODIUM 137 04/21/2024 12:28 AM    POTASSIUM 3.9 04/21/2024 12:28 AM    CHLORIDE 99 04/21/2024 12:28 AM    CO2 25 04/21/2024 12:28 AM    GLUCOSE 143 (H) 04/21/2024 12:28 AM    BUN 31 (H) 04/21/2024 12:28 AM    CREATININE 0.63 04/21/2024 12:28 AM    GLOMRATE 90 08/23/2023 07:13 AM      Lab Results   Component Value Date/Time    WBC 10.0 04/21/2024 12:28 AM    RBC 4.24 (L) 04/21/2024 12:28 AM    HEMOGLOBIN 12.4 (L) 04/21/2024 12:28 AM    HEMATOCRIT 38.2 (L) 04/21/2024 12:28 AM    MCV 90.1 04/21/2024 12:28 AM    MCH 29.2 04/21/2024 12:28 AM    MCHC 32.5 04/21/2024 12:28 AM    MPV 10.4 04/21/2024 12:28 AM    NEUTSPOLYS 47.40 04/16/2024 02:40 PM    LYMPHOCYTES 42.20 (H) 04/16/2024 02:40 PM    MONOCYTES 7.80 04/16/2024 02:40 PM    EOSINOPHILS 1.70 04/16/2024 02:40 PM    BASOPHILS 0.60 04/16/2024 02:40 PM      Lab Results   Component Value Date/Time    PROTHROMBTM 15.3 (H) 04/17/2024 12:29 PM    INR 1.20 (H) 04/17/2024 12:29 PM        Fluids:    Intake/Output Summary (Last 24 hours) at 4/21/2024 0815  Last data filed at 4/21/2024 0600  Gross per 24 hour   Intake 490 ml   Output 375 ml   Net 115 ml     Admit weight: Weight: 108 kg (238 lb 1.6 oz)  Current weight: Weight: 104 kg (228 lb 9.9 oz) (04/21/24 8832)    ALLERGIES:     Patient has no known allergies.    EJECTION FRACTION:  55%    CARDIAC MEDICATIONS:    ASA - Yes    Plavix - Yes    Post-operative Beta Blockers - Yes    Ace/ARB- Yes    Statin - Yes    Aldactone- No; contraindicated because of Normal EF    SGLT2i-  No contraindicated because of No; contraindicated because of Normal EF    DISCHARGE MEDICATIONS:      Medication List        START taking these medications        Instructions   acetaminophen 500 MG Tabs  Commonly known as: Tylenol   Take 2 Tablets by mouth every 6 hours as needed for Moderate Pain.  Dose: 1,000 mg      clopidogrel 75 MG Tabs  Start taking on: April 22, 2024  Commonly known as: Plavix   Take 1 Tablet by mouth every day.  Dose: 75 mg     furosemide 20 MG Tabs  Commonly known as: Lasix   Take 1 Tablet by mouth every day.  Dose: 20 mg     metoprolol tartrate 25 MG Tabs  Commonly known as: Lopressor   Take 1 Tablet by mouth 2 times a day.  Dose: 25 mg     oxyCODONE immediate-release 5 MG Tabs  Commonly known as: Roxicodone   Take 1 Tablet by mouth every 6 hours as needed for Severe Pain for up to 7 days.  Dose: 5 mg     potassium chloride SA 10 MEQ Tbcr  Commonly known as: K-Dur   Take 2 Tablets by mouth every day for 30 days.  Dose: 20 mEq            CHANGE how you take these medications        Instructions   amphetamine-dextroamphetamine ER 30 MG XR capsule  What changed: Another medication with the same name was removed. Continue taking this medication, and follow the directions you see here.  Commonly known as: Adderall XR   Take 1 Capsule by mouth every morning for 30 days.  Dose: 30 mg     Jardiance 25 MG Tabs  What changed: additional instructions  Generic drug: Empagliflozin   TAKE 1 TABLET BY MOUTH ONCE DAILY IN THE MORNING .            CONTINUE taking these medications        Instructions   aspirin EC 81 MG Tbec  Commonly known as: Ecotrin   Take 2 Tabs by mouth every day.  Dose: 162 mg     atorvastatin 40 MG Tabs  Commonly known as: Lipitor   Take 1 Tablet by mouth every day.  Dose: 40 mg     lisinopril 2.5 MG Tabs  Commonly known as: Prinivil   Take 1 Tablet by mouth every day. (for kidney protection)  Dose: 2.5 mg     MULTI VITAMIN MENS PO   Take 1 Tab by mouth every day.  Dose: 1 Tablet     nitroglycerin 0.4 MG Subl  Commonly known as: Nitrostat   Place 1 Tablet under the tongue as needed for Chest Pain.  Dose: 0.4 mg     sertraline 50 MG Tabs  Commonly known as: Zoloft   Take 1 Tablet by mouth every day.  Dose: 50 mg     SITagliptin 100 MG Tabs  Commonly known as: Januvia   Take 1 Tablet by mouth every  day.  Dose: 100 mg            STOP taking these medications      amLODIPine 5 MG Tabs  Commonly known as: Norvasc     metoprolol SR 50 MG Tb24  Commonly known as: Toprol XL              NARCOTIC PAIN MEDICATIONS:   In prescribing controlled substances to this patient, I certify that I have obtained and reviewed their medical history. I have also made a good bar effort to obtain applicable records from other providers who have treated the patient .    I have conducted a physical exam and documented it. I have reviewed the patient's prescription history as maintained by the [place] Prescription Monitoring Program.     I have assessed the patient’s risk for abuse, dependency, and addiction using the validated Opioid Risk Tool.    Given the above, I believe the benefits of controlled substance therapy outweigh the risks. The reasons for prescribing controlled substances include non-narcotic, oral analgesic alternatives have been inadequate for pain control. Accordingly, I have discussed the risk and benefits, treatment plan, and alternative therapies with the patient.     Pt understands this prescription is a controlled substance which is potentially habit-forming and its use is regulated by the [facility]. It must be submitted to the pharmacy within 5 days of the date written and can not be called in or faxed to the pharmacy. Refills are subject to terms of a medicine agreement. Any refill requires a new prescription that must be obtained from this office during regular office hours Monday through Thursday 7 am to 4 pm. We ask for 72 hours notice to get an appointment for a narcotic pain medication refill. This medicine can cause nausea, significant constipation, sedation, confusion.     DIET:   Cardiac diet    DISCHARGE INSTRUCTIONS DISCUSSED WITH THE PATIENT:      1. NO driving for 4 weeks after surgery. You may ride as a passenger.  2. NO lifting of any item over 10 lbs (e.g. gallon of milk) for 6 weeks after surgery.  3.  DO walk as much as possible! Walk a minimum of once a day. Depending on your fatigue and comfort level, you may walk as much as you wish. There is no maximum.  4. Other physical activities (sex, housework, gardening, etc.) are OK after 4 weeks   5. Continue using incentive spirometer for 2 weeks, especially if going home on oxygen.    Incision Care:  1. SHOWER ONLY - no baths. Clean incision daily with plain Ivory ® soap or any other dye or perfume free soap. Then pat incision dry with clean towel. Avoid creams or lotions on the incision(s).  a. If there is any increase in redness or swelling, or separation of the incision line, or thick drainage* from any of the incisions, call right away  * Clear, thin drainage is not abnormal especially from the leg incision and/or                         chest tube sites.  2. Continue to wear your BRIAN Stockings for 4 weeks. You may take off the stockings when in bed or when the legs are elevated.    Patient instructed to call [facility] cardiac surgery at [phone]  if any increased shortness of breath, uncontrolled pain, weight gain greater than 3 pounds in 1 day or 5 pounds in 1 week, SBP >140, HR <60 or redness swelling or drainage of incisions.      FOLLOW-UP:   Future Appointments   Date Time Provider Department Center   5/14/2024  3:45 PM INDER Sanchez None   5/20/2024 12:30 PM CT RESOURCE PROVIDER CT None   6/6/2024  9:40 AM RAMOS Ann None

## 2024-04-21 NOTE — THERAPY
Occupational Therapy   Initial Evaluation     Patient Name: Jensen Leigh  Age:  64 y.o., Sex:  male  Medical Record #: [id number]  Today's Date: 4/21/2024     Precautions  Precautions: Fall Risk, Cardiac Precautions (See Comments), Sternal Precautions (See Comments)  Comments: 3vCABG, EF 55%, not steady on BB    Assessment    Patient is 64 y.o. male that is s/p 3vCABG 4/17. Other pertinent medical history includes DM, HTN, HLD, and prior DVT. Pt seen for OT evaluation. Pt stood to wash hands and performed toilet transfer w/ supv-SBA. Pt required assist to don/doff socks and was educated regarding AE for LB dressing, however declined to practice stating that his spouse will assist as needed. Pt also provided education regarding the role of OT, energy conservation, and cardiac/sternal precautions in relation to ADLs. Patient will not be actively followed for occupational therapy services at this time, however may be seen if requested by physician for 1 more visit within 30 days to address any discharge or equipment needs.      Plan    Occupational Therapy Initial Treatment Plan   Duration: Discharge Needs Only    DC Equipment Recommendations: None  Discharge Recommendations: Anticipate that the patient will have no further occupational therapy needs after discharge from the hospital      Objective     04/21/24 0922   Prior Living Situation   Prior Services None   Housing / Facility 1 Story House   Steps Into Home 3   Steps In Home 0   Bathroom Set up Walk In Shower;Built-In Shower Chair;Grab Bars   Equipment Owned None   Lives with - Patient's Self Care Capacity Spouse   Comments Pt reported that his spouse can assist as needed upon d/c.   Prior Level of ADL Function   Self Feeding Independent   Grooming / Hygiene Independent   Bathing Independent   Dressing Independent   Toileting Independent   Prior Level of IADL Function   Medication Management Independent   Laundry Independent   Kitchen Mobility Independent    Finances Independent   Home Management Independent   Shopping Independent   Prior Level Of Mobility Independent Without Device in Community;Independent Without Device in Home   Driving / Transportation Driving Independent   History of Falls   History of Falls No   Precautions   Precautions Fall Risk;Cardiac Precautions (See Comments);Sternal Precautions (See Comments)   Vitals   Pulse 94   Blood Pressure  108/69   O2 Delivery Device None - Room Air   Vitals Comments vitals remained stable throughout   Pain   Pain Scales 0 to 10 Scale    Pain 0 - 10 Group   Therapist Pain Assessment Post Activity Pain Same as Prior to Activity;Nurse Notified  (not rated, agreeable to eval)   Cognition    Cognition / Consciousness WDL   Level of Consciousness Alert   Comments Pleasant, cooperative, receptive to education   Passive ROM Upper Body   Passive ROM Upper Body WDL   Active ROM Upper Body   Active ROM Upper Body  WDL   Comments within precautions   Strength Upper Body   Upper Body Strength  WDL   Comments within precautions   Sensation Upper Body   Upper Extremity Sensation  WDL   Comments diminished light touch sensation to second and third digits on R hand that he had at baseline   Upper Body Muscle Tone   Upper Body Muscle Tone  WDL   Coordination Upper Body   Coordination WDL   Balance Assessment   Sitting Balance (Static) Fair +   Sitting Balance (Dynamic) Fair +   Standing Balance (Static) Fair   Standing Balance (Dynamic) Fair   Weight Shift Sitting Fair   Weight Shift Standing Fair   Comments w/ no AD, no LOB   Bed Mobility    Comments up to chair pre/post, reviewed log roll   ADL Assessment   Grooming Supervision;Standing  (washed hands at sink)   Lower Body Dressing Maximal Assist  (don/doff socks, educated regarding AE for LB dressing but pt declined practice stating his spouse will assist)   Toileting   (toilet transfer only)   Functional Mobility   Sit to Stand Standby Assist   Bed, Chair, Wheelchair Transfer  Standby Assist   Toilet Transfers Standby Assist   Transfer Method Stand Step   Mobility chair>bathroom>chair   Comments w/ no AD   Visual Perception   Visual Perception  Not Tested   Activity Tolerance   Sitting in Chair up to chair pre/post   Sitting Edge of Bed NT   Standing >5 min   Comments functional   Education Group   Education Provided Role of Occupational Therapist;Activities of Daily Living;Sternal Precautions;Cardiac Precautions;Energy Conservation;Adaptive Equipment   Role of Occupational Therapist Patient Response Patient;Acceptance;Explanation;Verbal Demonstration   Cardiac Precautions Patient Response Patient;Acceptance;Explanation;Demonstration;Action Demonstration;Verbal Demonstration   Sternal Precautions Patient Response Patient;Acceptance;Explanation;Demonstration;Verbal Demonstration;Action Demonstration   Energy Conservation Patient Response Patient;Acceptance;Explanation;Demonstration;Action Demonstration;Verbal Demonstration   ADL Patient Response Patient;Acceptance;Explanation;Demonstration;Verbal Demonstration;Action Demonstration   Adaptive Equipment Patient Response Patient;Acceptance;Explanation;Verbal Demonstration  (declined to practice stating that his spouse will assist)   Occupational Therapy Initial Treatment Plan    Duration Discharge Needs Only

## 2024-04-25 ENCOUNTER — OFFICE VISIT (OUTPATIENT)
Dept: MEDICAL GROUP | Facility: MEDICAL CENTER | Age: 64
End: 2024-04-25
Payer: MEDICARE

## 2024-04-25 VITALS
RESPIRATION RATE: 18 BRPM | HEIGHT: 68 IN | BODY MASS INDEX: 35.01 KG/M2 | DIASTOLIC BLOOD PRESSURE: 50 MMHG | TEMPERATURE: 98.8 F | HEART RATE: 68 BPM | OXYGEN SATURATION: 95 % | SYSTOLIC BLOOD PRESSURE: 98 MMHG | WEIGHT: 231 LBS

## 2024-04-25 DIAGNOSIS — E11.59 CORONARY ARTERY DISEASE DUE TO TYPE 2 DIABETES MELLITUS (HCC): ICD-10-CM

## 2024-04-25 DIAGNOSIS — Z95.1: ICD-10-CM

## 2024-04-25 DIAGNOSIS — Z86.718 HISTORY OF DEEP VENOUS THROMBOSIS (DVT) OF DISTAL VEIN OF RIGHT LOWER EXTREMITY: ICD-10-CM

## 2024-04-25 DIAGNOSIS — I25.10 CORONARY ARTERY DISEASE DUE TO TYPE 2 DIABETES MELLITUS (HCC): ICD-10-CM

## 2024-04-25 DIAGNOSIS — I25.84 CORONARY ARTERY DISEASE DUE TO CALCIFIED CORONARY LESION: ICD-10-CM

## 2024-04-25 DIAGNOSIS — I25.10 CORONARY ARTERY DISEASE DUE TO CALCIFIED CORONARY LESION: ICD-10-CM

## 2024-04-25 PROBLEM — I82.411 DVT OF DEEP FEMORAL VEIN, RIGHT (HCC): Status: RESOLVED | Noted: 2018-04-10 | Resolved: 2024-04-25

## 2024-04-25 PROBLEM — I20.9 ANGINA PECTORIS (HCC): Status: RESOLVED | Noted: 2023-07-12 | Resolved: 2024-04-25

## 2024-04-25 ASSESSMENT — FIBROSIS 4 INDEX: FIB4 SCORE: 1.38

## 2024-04-25 NOTE — PROGRESS NOTES
Chief Complaint   Patient presents with    [facility] Follow-up       Subjective:     HPI:   Jensen Leigh presents today with the followin. Status post coronary artery bypass with three autogenous grafts  Patient with known coronary artery disease.  The cardiothoracic surgeon used the LIMA to the LAD and right greater saphenous vein harvested endoscopically was used for the other 2 vessels.  Patient had coronary artery catheterization in August that revealed some concerning disease.  Coronary artery bypass was recommended at that time but they required a little time to make up for months to this.  However, he was having increasing symptoms including shortness of breath and chest pressure and need to use nitroglycerin frequently for his exertional angina.  He had severe right coronary artery disease.  Severe mid LAD disease, 100% stenosed left circumflex and moderate disease in his other arteries.  He did have some collaterals but insufficient.  Echocardiogram showed good ejection fraction.    2. Coronary artery disease due to calcified coronary lesion/Coronary artery disease due to type 2 diabetes mellitus (HCC)  As described above he has diffuse coronary artery disease.  He now has three-vessel bypass.  His atorvastatin does keep his LDL around 70.  He may need an increase in the dose which he will discuss with cardiology.  He is now on Plavix, aspirin and metoprolol.  I have told him he should expect to be on these about 2 years but again needs to discuss that with cardiology.  The Jardiance has been added to help with better diabetes control.  A1c just prior to surgery was at 8, certainly not ideal.    3. History of deep venous thrombosis (DVT) of distal vein of right lower extremity  Patient has persistent nonpitting swelling of the right lower extremity.  This is nonpitting.  No erythema or cellulitis changes appreciated.  The sites of the vein harvest look like they are  healing well.  There is some expected bruising.          Patient Active Problem List    Diagnosis Date Noted    Status post coronary artery bypass with three autogenous grafts 04/25/2024    History of deep venous thrombosis (DVT) of distal vein of right lower extremity 04/25/2024    Coronary artery disease due to type 2 diabetes mellitus (HCC) 04/25/2024    Hypertension, essential 11/09/2023    Dyslipidemia 11/09/2023    Coronary artery disease due to calcified coronary lesion 10/04/2023    Right bundle branch block 07/12/2023    Abnormal EKG 07/12/2023    Bilious vomiting with nausea 07/12/2023    Other fatigue 04/20/2021    Intermittent explosive disorder 01/27/2020    ADHD (attention deficit hyperactivity disorder), combined type 11/04/2019    Depressive disorder 11/04/2019    Attention deficit 07/17/2019    History of methamphetamine abuse (HCC) 07/17/2019    Onychogryphosis 05/10/2019    Erectile dysfunction due to diseases classified elsewhere 05/10/2019    Presence of inferior vena cava filter 04/10/2018    Obesity (BMI 30-39.9) 04/10/2018    Pure hypercholesterolemia 04/10/2018    Umbilical hernia        Current medicines (including changes today)  Current Outpatient Medications   Medication Sig Dispense Refill    acetaminophen (TYLENOL) 500 MG Tab Take 2 Tablets by mouth every 6 hours as needed for Moderate Pain.      clopidogrel (PLAVIX) 75 MG Tab Take 1 Tablet by mouth every day. 30 Tablet 2    furosemide (LASIX) 20 MG Tab Take 1 Tablet by mouth every day. 30 Tablet 0    potassium chloride SA (K-DUR) 10 MEQ Tab CR Take 2 Tablets by mouth every day for 30 days. 60 Each 0    metoprolol tartrate (LOPRESSOR) 25 MG Tab Take 1 Tablet by mouth 2 times a day. 60 Tablet 2    oxyCODONE immediate-release (ROXICODONE) 5 MG Tab Take 1 Tablet by mouth every 6 hours as needed for Severe Pain for up to 7 days. 28 Tablet 0    sertraline (ZOLOFT) 50 MG Tab Take 1 Tablet by mouth every day. 90 Tablet 1    Empagliflozin  "(JARDIANCE) 25 MG Tab TAKE 1 TABLET BY MOUTH ONCE DAILY IN THE MORNING . (Patient taking differently: TAKE 1 TABLET BY MOUTH ONCE DAILY IN THE EVENING.) 90 Tablet 3    lisinopril (PRINIVIL) 2.5 MG Tab Take 1 Tablet by mouth every day. (for kidney protection) 90 Tablet 3    atorvastatin (LIPITOR) 40 MG Tab Take 1 Tablet by mouth every day. 90 Tablet 2    nitroglycerin (NITROSTAT) 0.4 MG SL Tab Place 1 Tablet under the tongue as needed for Chest Pain. 25 Tablet 11    SITagliptin (JANUVIA) 100 MG Tab Take 1 Tablet by mouth every day. 90 Tablet 3    aspirin EC (ECOTRIN) 81 MG Tablet Delayed Response Take 2 Tabs by mouth every day. 30 Tab     Multiple Vitamin (MULTI VITAMIN MENS PO) Take 1 Tab by mouth every day.       No current facility-administered medications for this visit.       No Known Allergies    ROS: As per HPI       Objective:     BP 98/50 (BP Location: Left arm, Patient Position: Sitting)   Pulse 68   Temp 37.1 °C (98.8 °F) (Temporal)   Resp 18   Ht 1.727 m (5' 8\")   Wt 105 kg (231 lb)   SpO2 95%  Body mass index is 35.12 kg/m².    Physical Exam:  Constitutional: Well-developed and well-nourished. Not diaphoretic. No distress. Lucid and fluent.  Skin: Skin is warm and dry. No rash noted.  Midline incision appears to be healing well.  The incision on the thigh appears to be healing well as well.  Head: Atraumatic without lesions.  Eyes: Conjunctivae and extraocular motions are normal. Pupils are equal, round, and reactive to light. No scleral icterus.   Ears:  External ears unremarkable.   Neck: Supple, trachea midline. No thyromegaly present. No cervical or supraclavicular lymphadenopathy. No JVD or carotid bruits appreciated  Cardiovascular: Regular rate and rhythm.  Normal S1, S2 without murmur appreciated.  Chest: Effort normal. Clear to auscultation throughout. No adventitious sounds.   Abdomen: Soft, non tender, and without distention. Active bowel sounds in all four quadrants. No rebound, " guarding, masses or hepatosplenomegaly.  Extremities: No cyanosis, clubbing, erythema, nor edema.   Neurological: Alert and oriented x 3.  No tremor appreciated.  Psychiatric:  Behavior, mood, and affect are appropriate.    Hospital lab results, imaging, notes and cardiothoracic notes are reviewed and discussed.     Assessment and Plan:     64 y.o. male with the following issues:    1. Status post coronary artery bypass with three autogenous grafts        2. Coronary artery disease due to calcified coronary lesion        3. Coronary artery disease due to type 2 diabetes mellitus (HCC)        4. History of deep venous thrombosis (DVT) of distal vein of right lower extremity              Followup: Return in about 3 months (around 7/25/2024), or if symptoms worsen or fail to improve.  Needs to follow-up with PCP.

## 2024-04-26 ENCOUNTER — TELEPHONE (OUTPATIENT)
Dept: CARDIOTHORACIC SURGERY | Facility: MEDICAL CENTER | Age: 64
End: 2024-04-26
Payer: MEDICARE

## 2024-04-26 NOTE — TELEPHONE ENCOUNTER
Hospital follow up call    He is showering everyday or every other day.    he states that all incisions are healing well and approximated with no s/s of infection (redness, puss, fever, purulent drainage, or tenderness).    he states that the post op pain is well controlled.  Narcotics are being utilized. Tylenol is being utilized.    he is using the IS; volume is improved. Current volume is 2000 ml.    he is walking everyday, distance is slightly increased from the hospital.    he is obtaining daily weights. Current weight is 230 lbs which is more than the first home weight of 228 lbs. He is wearing the compression/BRIAN hose. He denies bilateral LE edema.    he is taking all prescribed meds as directed.  he has no medication questions.    he is taking vitals (HR and BP).    BP's: 105's to 120's / 60's to 70's  HR's: 80's to 90's    he has had a bowel movement since discharge.    he has no additional questions at this time. Follow up education was not needed on anything else. Follow up appointments were reviewed and I ensured they have my number if issues or concerns arise.      Follow up call time was 11 minutes.

## 2024-05-08 NOTE — PROGRESS NOTES
"CHIEF COMPLAINT: Post-op visit     PROCEDURE:   4/17/24 Dr. Velez  CORONARY ARTERY BYPASS GRAFTING X3  WITH SKELETONIZED SMITH TO LAD  RSCG TO OM1 AND RPDA  ENDOSCOPIC  HARVESTOF RIGHT GREATER SAPHENOUS VEIN, - Wound Class: Clean with Drain  ECHOCARDIOGRAM, TRANSESOPHAGEAL, INTRAOPERATIVE - Wound Class: Clean Contaminated    HPI: Patient presents to clinic for one month post op visit. He is back to work. He denies sternal pain. He is walking one to two miles per day.     /66 (BP Location: Left arm, Patient Position: Sitting, BP Cuff Size: Adult)   Pulse 89   Temp 36.7 °C (98.1 °F) (Temporal)   Ht 1.727 m (5' 8\")   Wt 105 kg (232 lb)   SpO2 92%     PHYSICAL EXAM:  Cardiac: S1S2  Neuro:  AAO x 3  Resp:  CTA  Wounds:  CDI  Sternum:  Stable      PLAN: Discharge from cardiac surgery clinic. Patient to follow up with cardiology.    Continue plavix for 3 months or per your Cardiologist's recommendations.  We reviewed post operative sternal precautions, weight limits and driving precautions moving forward.  We reviewed SBE prophylaxis.      Overall, we are very pleased with the patient’s progress and we have instructed the patient to follow-up with us in the future should they have any concerns related to their surgery. Otherwise, we will see the patient on a PRN basis. The patient will continue to follow-up with their Cardiologist and PCP.  The patient has been informed that any further prescription refills should be done through their primary care physician and/or cardiologist.  They acknowledged understanding.  Thank you for allowing us to participate in the care of this very pleasant patient and please let us know if there is any way we may be of further assistance.    "

## 2024-05-14 ENCOUNTER — APPOINTMENT (OUTPATIENT)
Dept: CARDIOLOGY | Facility: MEDICAL CENTER | Age: 64
End: 2024-05-14
Payer: MEDICARE

## 2024-05-14 NOTE — PROGRESS NOTES
Chief Complaint   Patient presents with    Coronary Artery Disease     Follow up           Subjective:   Jensen Leigh is a 64 y.o. male who presents today for follow-up post coronary artery bypass grafting x3 on 4/17/2024.     Patient of Dr. Campoverde.  Current medical problems include history of methamphetamine use, diabetes mellitus, hypertension, DVT s/p IVC filter, depressive disorder, hyperlipidemia, obesity, and coronary artery disease . Their last clinic visit was 1/23/2024 with Dr. Locke.    [facility] During the visit, the patient reports occasional chest pain, and he was referred for [facility]. Heart cath showed mild left main disease,  of RCA with collaterals from the left system, occluded proximal LCx with collaterals from LAD, and mild to moderate mid LAD stenosis status post intracoronary physiology assessment of LAD, which showed severe mid LAD disease, and he was recommended for surgical revascularization. He wanted a second opinion so he came to [facility].     He was medically managed and referred to Dr. Locke to discuss high risk PCI. After his appointment with Dr. Locke on 1/23/2024 patient was agreeable to CABG as Dr. Velez had previously recommended. Patient had a CABG x3 on 4/17/2024.    Today's visit:  Patient is accompanied by his wife at his appointment today.   Patient is doing overall well from a cardiac perspective. He denies chest pain or pressure, shortness of breath, or palpitations. He has mild pain at his incision for which is only taking tyenol. He has mild edema in his right leg where the vein graft was done. Says that he always has mild swelling in that leg after having a DVT. He denies headache or syncope. Reports two episodes of lightheadedness that resolved quickly. He is walking 1 mile a day on average without any limitations.    Patient is weighing himself daily and keeping a log. Weight has been 222-224  lbs. He is taking his blood pressure daily and has been /60s. Patient and his wife report that he is taking all his medications as prescribed without any missed doses.    Patient incisional scar is healing well without any drainage or redness. Denies fevers or chills.     Patient wanting to return to work as a . 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    Cardiovascular Risk Factors:  1. Smoking status: Former smoker  2. Type II Diabetes Mellitus: Yes   Lab Results   Component Value Date/Time    HBA1C 8.0 (H) 04/16/2024 02:40 PM    HBA1C 7.4 (A) 10/04/2023 02:01 PM     3. Hypertension: Yes  4. Dyslipidemia: Yes   Cholesterol,Tot   Date Value Ref Range Status   07/15/2023 126 100 - 199 mg/dL Final     LDL   Date Value Ref Range Status   07/15/2023 68 <100 mg/dL Final     HDL   Date Value Ref Range Status   07/15/2023 46 >=40 mg/dL Final     Triglycerides   Date Value Ref Range Status   07/15/2023 59 0 - 149 mg/dL Final     5. Family history of early Coronary Artery Disease in a first degree relative (Male less than 55 years of age; Female less than 65 years of age): No      Past Medical History:   Diagnosis Date    Anginal syndrome (HCC)     Arthritis     Blood clotting disorder (HCC)     DVT RT LEG    Clotting disorder (HCC)     Dental disorder     Dentures    Eczema     GERD (gastroesophageal reflux disease)     Heart burn     Hiatus hernia syndrome     High cholesterol     History of deep venous thrombosis (DVT) of distal vein of right lower extremity 04/25/2024    Hyperlipidemia     Indigestion     Status post coronary artery bypass with three autogenous grafts 04/25/2024    Type II or unspecified type diabetes mellitus without mention of complication, not stated as uncontrolled     Umbilical hernia          Family History   Problem Relation Age of Onset    Hyperlipidemia Mother     Cancer Mother         liver    Depression Mother     Alcohol abuse Mother     Cancer  Father         lung    Alcohol abuse Father     Depression Sister     Ovarian Cancer Neg Hx     Tubal Cancer Neg Hx     Breast Cancer Neg Hx     Colorectal Cancer Neg Hx     Peritoneal Cancer Neg Hx          Social History     Tobacco Use    Smoking status: Never    Smokeless tobacco: Never   Vaping Use    Vaping status: Never Used   Substance Use Topics    Alcohol use: Not Currently    Drug use: No         No Known Allergies      Current Outpatient Medications   Medication Sig    amphetamine-dextroamphetamine ER (ADDERALL XR, 30MG,) 30 MG XR capsule Take 30 mg by mouth every morning.    metoprolol tartrate (LOPRESSOR) 25 MG Tab Take 1 Tablet by mouth 2 times a day.    clopidogrel (PLAVIX) 75 MG Tab Take 1 Tablet by mouth every day.    acetaminophen (TYLENOL) 500 MG Tab Take 2 Tablets by mouth every 6 hours as needed for Moderate Pain.    furosemide (LASIX) 20 MG Tab Take 1 Tablet by mouth every day.    potassium chloride SA (K-DUR) 10 MEQ Tab CR Take 2 Tablets by mouth every day for 30 days.    sertraline (ZOLOFT) 50 MG Tab Take 1 Tablet by mouth every day.    Empagliflozin (JARDIANCE) 25 MG Tab TAKE 1 TABLET BY MOUTH ONCE DAILY IN THE MORNING . (Patient taking differently: TAKE 1 TABLET BY MOUTH ONCE DAILY IN THE EVENING.)    lisinopril (PRINIVIL) 2.5 MG Tab Take 1 Tablet by mouth every day. (for kidney protection)    atorvastatin (LIPITOR) 40 MG Tab Take 1 Tablet by mouth every day.    nitroglycerin (NITROSTAT) 0.4 MG SL Tab Place 1 Tablet under the tongue as needed for Chest Pain.    SITagliptin (JANUVIA) 100 MG Tab Take 1 Tablet by mouth every day.    aspirin EC (ECOTRIN) 81 MG Tablet Delayed Response Take 2 Tabs by mouth every day.    Multiple Vitamin (MULTI VITAMIN MENS PO) Take 1 Tab by mouth every day.         Review of Systems   Constitutional: Negative for malaise/fatigue.   Cardiovascular:  Positive for leg swelling. Negative for chest pain, dyspnea on exertion, near-syncope, orthopnea, palpitations,  "paroxysmal nocturnal dyspnea and syncope.   Respiratory:  Negative for shortness of breath.    Skin:  Negative for itching and poor wound healing.   Neurological:  Positive for dizziness. Negative for headaches and light-headedness.           Objective:   /64 (BP Location: Left arm, Patient Position: Sitting)   Pulse 86   Resp 16   Ht 1.727 m (5' 8\")   Wt 104 kg (229 lb)   SpO2 96%  Body mass index is 34.82 kg/m².         Physical Exam  Constitutional:       General: He is not in acute distress.     Appearance: Normal appearance.   HENT:      Head: Normocephalic and atraumatic.   Cardiovascular:      Rate and Rhythm: Normal rate and regular rhythm.      Pulses: Normal pulses.      Heart sounds: No murmur heard.  Pulmonary:      Effort: Pulmonary effort is normal. No respiratory distress.      Breath sounds: Normal breath sounds.   Chest:      Chest wall: No swelling, tenderness or crepitus.      Comments: Mid sternal incision approximated and heeling well. No redness or drainage noted.   Musculoskeletal:      Right lower leg: Edema present.      Left lower leg: No edema.      Comments: Mild edema in RLE   Neurological:      Mental Status: He is alert and oriented to person, place, and time.      Gait: Gait normal.   Psychiatric:         Behavior: Behavior normal.             Lab Results   Component Value Date/Time    SODIUM 137 04/21/2024 12:28 AM    POTASSIUM 3.9 04/21/2024 12:28 AM    CHLORIDE 99 04/21/2024 12:28 AM    CO2 25 04/21/2024 12:28 AM    GLUCOSE 143 (H) 04/21/2024 12:28 AM    BUN 31 (H) 04/21/2024 12:28 AM    CREATININE 0.63 04/21/2024 12:28 AM    GLOMRATE 90 08/23/2023 07:13 AM      Lab Results   Component Value Date/Time    WBC 10.0 04/21/2024 12:28 AM    RBC 4.24 (L) 04/21/2024 12:28 AM    HEMOGLOBIN 12.4 (L) 04/21/2024 12:28 AM    HEMATOCRIT 38.2 (L) 04/21/2024 12:28 AM    MCV 90.1 04/21/2024 12:28 AM    MCH 29.2 04/21/2024 12:28 AM    MCHC 32.5 04/21/2024 12:28 AM    MPV 10.4 04/21/2024 " "12:28 AM    NEUTSPOLYS 47.40 04/16/2024 02:40 PM    LYMPHOCYTES 42.20 (H) 04/16/2024 02:40 PM    MONOCYTES 7.80 04/16/2024 02:40 PM    EOSINOPHILS 1.70 04/16/2024 02:40 PM    BASOPHILS 0.60 04/16/2024 02:40 PM      Lab Results   Component Value Date/Time    CHOLSTRLTOT 126 07/15/2023 11:16 AM    LDL 68 07/15/2023 11:16 AM    HDL 46 07/15/2023 11:16 AM    TRIGLYCERIDE 59 07/15/2023 11:16 AM       No results found for: \"TROPONINT\"  No results found for: \"NTPROBNP\"      4/17/24 Dr. Velez  CORONARY ARTERY BYPASS GRAFTING X3  WITH SKELETONIZED SMITH TO LAD  RSVG TO OM1 AND RPDA  ENDOSCOPIC  HARVESTOF RIGHT GREATER SAPHENOUS VEIN,  Assessment:   1. S/P CABG x 3  - metoprolol tartrate (LOPRESSOR) 25 MG Tab; Take 1 Tablet by mouth 2 times a day.  Dispense: 90 Tablet; Refill: 3  - clopidogrel (PLAVIX) 75 MG Tab; Take 1 Tablet by mouth every day.  Dispense: 90 Tablet; Refill: 3    2. Status post coronary artery bypass with three autogenous grafts    3. Dyslipidemia    4. Hypertension, essential    Other orders  - amphetamine-dextroamphetamine ER (ADDERALL XR, 30MG,) 30 MG XR capsule; Take 30 mg by mouth every morning.        Medical Decision Making:  Today's Assessment / Plan:   Status post CABG x3 on 4/17/2024 LIMA to LAD RSVG to OM1, and RPDA  -Doing well postop. Incisional scar heeling well and approximated. No redness or drainage. Denies popping or clicking sensation in his chest.   -CABG: Continue Plavix 75 mg daily and  mg daily  -Continue statin: Atorvastatin 40 mg daily  -Continue BB: Metoprolol 25 mg two times daily  - referral to cardiac rehab: referral placed and discussed cardiac rehab with patient and benefit of participating  -Patient pending RTW note following CTS clearance    Hyperlipidemia  -Most recent LDL 68  -Continue atorvastatin 40 mg daily  -Goal of less than 70  -Check lipid panel in 6 months    Hypertension  - Good control  - continue metoprolol 25 mg twice a day and lisinopril 2.5 mg " daily  - goal < 130/80  -continue to monitor blood pressure at home and keep a log    DVT s/p IVC filter  -Continue  mg daily per vascular Dr. Medley recommendation    Return in about 6 months (around 11/15/2024) for Dr. Rasheed.  Sooner if problems.    LENI Gan.

## 2024-05-15 ENCOUNTER — OFFICE VISIT (OUTPATIENT)
Dept: CARDIOLOGY | Facility: MEDICAL CENTER | Age: 64
End: 2024-05-15
Payer: MEDICARE

## 2024-05-15 ENCOUNTER — TELEPHONE (OUTPATIENT)
Dept: CARDIOTHORACIC SURGERY | Facility: MEDICAL CENTER | Age: 64
End: 2024-05-15

## 2024-05-15 VITALS
OXYGEN SATURATION: 96 % | SYSTOLIC BLOOD PRESSURE: 102 MMHG | HEIGHT: 68 IN | RESPIRATION RATE: 16 BRPM | BODY MASS INDEX: 34.71 KG/M2 | DIASTOLIC BLOOD PRESSURE: 64 MMHG | WEIGHT: 229 LBS | HEART RATE: 86 BPM

## 2024-05-15 DIAGNOSIS — I10 HYPERTENSION, ESSENTIAL: ICD-10-CM

## 2024-05-15 DIAGNOSIS — Z95.1 S/P CABG X 3: ICD-10-CM

## 2024-05-15 DIAGNOSIS — Z95.1: ICD-10-CM

## 2024-05-15 DIAGNOSIS — E78.5 DYSLIPIDEMIA: ICD-10-CM

## 2024-05-15 PROCEDURE — 3074F SYST BP LT 130 MM HG: CPT

## 2024-05-15 PROCEDURE — 99214 OFFICE O/P EST MOD 30 MIN: CPT

## 2024-05-15 PROCEDURE — 3078F DIAST BP <80 MM HG: CPT

## 2024-05-15 RX ORDER — DEXTROAMPHETAMINE SACCHARATE, AMPHETAMINE ASPARTATE MONOHYDRATE, DEXTROAMPHETAMINE SULFATE AND AMPHETAMINE SULFATE 7.5; 7.5; 7.5; 7.5 MG/1; MG/1; MG/1; MG/1
30 CAPSULE, EXTENDED RELEASE ORAL EVERY MORNING
COMMUNITY
End: 2024-05-20 | Stop reason: SDUPTHER

## 2024-05-15 RX ORDER — CLOPIDOGREL BISULFATE 75 MG/1
75 TABLET ORAL DAILY
Qty: 90 TABLET | Refills: 3 | Status: SHIPPED | OUTPATIENT
Start: 2024-05-15

## 2024-05-15 ASSESSMENT — ENCOUNTER SYMPTOMS
SYNCOPE: 0
SHORTNESS OF BREATH: 0
ORTHOPNEA: 0
DIZZINESS: 1
LIGHT-HEADEDNESS: 0
NEAR-SYNCOPE: 0
DYSPNEA ON EXERTION: 0
POOR WOUND HEALING: 0
PALPITATIONS: 0
PND: 0
HEADACHES: 0

## 2024-05-15 ASSESSMENT — FIBROSIS 4 INDEX: FIB4 SCORE: 1.38

## 2024-05-15 NOTE — TELEPHONE ENCOUNTER
"Patient's wife was told he can drive 4 weeks after surgery which is today, he does not need a note for that.    Regarding his return to work, I'll be happy to write him a Mychart letter that he can print and give to his employer, his ongoing restrictions where reviewed with his wife who stated that wasn't a problem and his boss \"just needs his brain\".    Call time 3 minutes  "

## 2024-05-18 ENCOUNTER — PATIENT MESSAGE (OUTPATIENT)
Dept: BEHAVIORAL HEALTH | Facility: CLINIC | Age: 64
End: 2024-05-18
Payer: MEDICARE

## 2024-05-18 DIAGNOSIS — F90.2 ADHD (ATTENTION DEFICIT HYPERACTIVITY DISORDER), COMBINED TYPE: ICD-10-CM

## 2024-05-20 ENCOUNTER — OFFICE VISIT (OUTPATIENT)
Dept: CARDIOTHORACIC SURGERY | Facility: MEDICAL CENTER | Age: 64
End: 2024-05-20
Payer: MEDICARE

## 2024-05-20 VITALS
HEART RATE: 89 BPM | HEIGHT: 68 IN | WEIGHT: 232 LBS | SYSTOLIC BLOOD PRESSURE: 110 MMHG | BODY MASS INDEX: 35.16 KG/M2 | TEMPERATURE: 98.1 F | DIASTOLIC BLOOD PRESSURE: 66 MMHG | OXYGEN SATURATION: 92 %

## 2024-05-20 DIAGNOSIS — I25.84 CORONARY ARTERY DISEASE DUE TO CALCIFIED CORONARY LESION: ICD-10-CM

## 2024-05-20 DIAGNOSIS — I25.10 CORONARY ARTERY DISEASE DUE TO CALCIFIED CORONARY LESION: ICD-10-CM

## 2024-05-20 PROCEDURE — 99024 POSTOP FOLLOW-UP VISIT: CPT | Performed by: NURSE PRACTITIONER

## 2024-05-20 PROCEDURE — 3074F SYST BP LT 130 MM HG: CPT | Performed by: NURSE PRACTITIONER

## 2024-05-20 PROCEDURE — 3078F DIAST BP <80 MM HG: CPT | Performed by: NURSE PRACTITIONER

## 2024-05-20 ASSESSMENT — FIBROSIS 4 INDEX: FIB4 SCORE: 1.38

## 2024-05-20 NOTE — PATIENT COMMUNICATION
Received request via: Patient    Was the patient seen in the last year in this department? Yes    Does the patient have an active prescription (recently filled or refills available) for medication(s) requested? No    Pharmacy Name: [id number]    Does the patient have alf Plus and need 100 day supply (blood pressure, diabetes and cholesterol meds only)?N/A

## 2024-05-21 DIAGNOSIS — I25.10 CORONARY ARTERY DISEASE DUE TO TYPE 2 DIABETES MELLITUS (HCC): ICD-10-CM

## 2024-05-21 DIAGNOSIS — E11.59 CORONARY ARTERY DISEASE DUE TO TYPE 2 DIABETES MELLITUS (HCC): ICD-10-CM

## 2024-05-21 RX ORDER — DEXTROAMPHETAMINE SACCHARATE, AMPHETAMINE ASPARTATE MONOHYDRATE, DEXTROAMPHETAMINE SULFATE AND AMPHETAMINE SULFATE 7.5; 7.5; 7.5; 7.5 MG/1; MG/1; MG/1; MG/1
30 CAPSULE, EXTENDED RELEASE ORAL EVERY MORNING
Qty: 30 CAPSULE | Refills: 0 | Status: SHIPPED | OUTPATIENT
Start: 2024-05-24 | End: 2024-06-23

## 2024-06-09 ENCOUNTER — APPOINTMENT (OUTPATIENT)
Dept: RADIOLOGY | Facility: IMAGING CENTER | Age: 64
End: 2024-06-09
Attending: FAMILY MEDICINE
Payer: MEDICARE

## 2024-06-09 ENCOUNTER — OFFICE VISIT (OUTPATIENT)
Dept: URGENT CARE | Facility: PHYSICIAN GROUP | Age: 64
End: 2024-06-09
Payer: MEDICARE

## 2024-06-09 VITALS
BODY MASS INDEX: 35.52 KG/M2 | SYSTOLIC BLOOD PRESSURE: 94 MMHG | WEIGHT: 234.4 LBS | RESPIRATION RATE: 16 BRPM | HEIGHT: 68 IN | DIASTOLIC BLOOD PRESSURE: 64 MMHG | TEMPERATURE: 99.1 F | OXYGEN SATURATION: 89 % | HEART RATE: 80 BPM

## 2024-06-09 DIAGNOSIS — R07.9 LEFT-SIDED CHEST PAIN: ICD-10-CM

## 2024-06-09 DIAGNOSIS — S20.212A CONTUSION OF LEFT CHEST WALL, INITIAL ENCOUNTER: ICD-10-CM

## 2024-06-09 PROCEDURE — 3078F DIAST BP <80 MM HG: CPT | Performed by: FAMILY MEDICINE

## 2024-06-09 PROCEDURE — 71101 X-RAY EXAM UNILAT RIBS/CHEST: CPT | Mod: TC,FY,LT | Performed by: FAMILY MEDICINE

## 2024-06-09 PROCEDURE — 3074F SYST BP LT 130 MM HG: CPT | Performed by: FAMILY MEDICINE

## 2024-06-09 PROCEDURE — 99214 OFFICE O/P EST MOD 30 MIN: CPT | Performed by: FAMILY MEDICINE

## 2024-06-09 RX ORDER — HYDROCODONE BITARTRATE AND ACETAMINOPHEN 5; 325 MG/1; MG/1
TABLET ORAL
Qty: 20 TABLET | Refills: 0 | Status: SHIPPED | OUTPATIENT
Start: 2024-06-09 | End: 2024-06-14

## 2024-06-09 ASSESSMENT — FIBROSIS 4 INDEX: FIB4 SCORE: 1.38

## 2024-06-09 NOTE — PROGRESS NOTES
Chief Complaint:    Chief Complaint   Patient presents with    Back Pain     Pt states he fell out of the back of a truck. Pt was standing on the tail gate, lost his  on the wood rack and fell onto his left side. Fall occurred last Monday. Pt states he just had triple by pass surgery on April 17th 2024. While sitting 3-4/10 pain currently.        History of Present Illness:    Wife present. Fell out of back of truck on 6/3/24, fell onto his left side. Hurts in left upper chest - worse with taking a deep breath, raising left arm above shoulder level, coughing, or sneezing. He did not notice bruising, but on exam there is a small bruise in left upper chest. Pain is overall staying the same. S/p CABG x3  in April 2024. Wife says he does have some previously prescribed Diclofenac gel that he can use. He has been using some old previously prescribed Norco which has been helpful for him to sleep better.      Past Medical History:    Past Medical History:   Diagnosis Date    Anginal syndrome (HCC)     Arthritis     Blood clotting disorder (HCC)     DVT RT LEG    Clotting disorder (HCC)     Dental disorder     Dentures    Eczema     GERD (gastroesophageal reflux disease)     Heart burn     Hiatus hernia syndrome     High cholesterol     History of deep venous thrombosis (DVT) of distal vein of right lower extremity 04/25/2024    Hyperlipidemia     Indigestion     Status post coronary artery bypass with three autogenous grafts 04/25/2024    Type II or unspecified type diabetes mellitus without mention of complication, not stated as uncontrolled     Umbilical hernia      Past Surgical History:    Past Surgical History:   Procedure Laterality Date    MULTIPLE CORONARY ARTERY BYPASS ENDO VEIN HARVEST  04/17/2024    Procedure: CORONARY ARTERY BYPASS GRAFTING X3, ENDOSCOPIC VEIN HARVEST,;  Surgeon: Edu Velez D.O.;  Location: SURGERY [facility];  Service: Cardiothoracic    ECHOCARDIOGRAM, TRANSESOPHAGEAL,  INTRAOPERATIVE  04/17/2024    Procedure: ECHOCARDIOGRAM, TRANSESOPHAGEAL, INTRAOPERATIVE;  Surgeon: Edu Velez D.O.;  Location: SURGERY [facility];  Service: Cardiothoracic    OTHER      Rotator cup repair, IVC filter    ROTATOR CUFF REPAIR      rt shoulder    TONSILLECTOMY AND ADENOIDECTOMY       Social History:    Social History     Socioeconomic History    Marital status:      Spouse name: Not on file    Number of children: Not on file    Years of education: Not on file    Highest education level: 10th grade   Occupational History    Not on file   Tobacco Use    Smoking status: Never    Smokeless tobacco: Never   Vaping Use    Vaping status: Never Used   Substance and Sexual Activity    Alcohol use: Not Currently    Drug use: No    Sexual activity: Yes     Partners: Female     Birth control/protection: None   Other Topics Concern    Not on file   Social History Narrative    Not on file     Social Determinants of Health     Financial Resource Strain: Low Risk  (7/12/2023)    Overall Financial Resource Strain (CARDIA)     Difficulty of Paying Living Expenses: Not very hard   Food Insecurity: Patient Declined (7/12/2023)    Hunger Vital Sign     Worried About Running Out of Food in the Last Year: Patient declined     Ran Out of Food in the Last Year: Patient declined   Transportation Needs: Unknown (7/12/2023)    PRAPARE - Transportation     Lack of Transportation (Medical): Patient declined     Lack of Transportation (Non-Medical): Not on file   Physical Activity: Unknown (7/12/2023)    Exercise Vital Sign     Days of Exercise per Week: 5 days     Minutes of Exercise per Session: Patient declined   Stress: Patient Declined (7/12/2023)    [employer] - Occupational Stress Questionnaire     Feeling of Stress : Patient declined   Social Connections: Unknown (7/12/2023)    Social Connection and Isolation Panel [NHANES]     Frequency of Communication with Friends and  Family: Patient declined     Frequency of Social Gatherings with Friends and Family: Patient declined     Attends Shinto Services: Patient declined     Active Member of Clubs or Organizations: No     Attends Club or Organization Meetings: Patient declined     Marital Status:    Intimate Partner Violence: Not on file   Housing Stability: Low Risk  (7/12/2023)    Housing Stability Vital Sign     Unable to Pay for Housing in the Last Year: No     Number of Places Lived in the Last Year: 1     Unstable Housing in the Last Year: No     Family History:    Family History   Problem Relation Age of Onset    Hyperlipidemia Mother     Cancer Mother         liver    Depression Mother     Alcohol abuse Mother     Cancer Father         lung    Alcohol abuse Father     Depression Sister     Ovarian Cancer Neg Hx     Tubal Cancer Neg Hx     Breast Cancer Neg Hx     Colorectal Cancer Neg Hx     Peritoneal Cancer Neg Hx      Medications:    Current Outpatient Medications on File Prior to Visit   Medication Sig Dispense Refill    amphetamine-dextroamphetamine ER (ADDERALL XR, 30MG,) 30 MG XR capsule Take 1 Capsule by mouth every morning for 30 days. 30 Capsule 0    metoprolol tartrate (LOPRESSOR) 25 MG Tab Take 1 Tablet by mouth 2 times a day. 90 Tablet 3    clopidogrel (PLAVIX) 75 MG Tab Take 1 Tablet by mouth every day. 90 Tablet 3    acetaminophen (TYLENOL) 500 MG Tab Take 2 Tablets by mouth every 6 hours as needed for Moderate Pain.      sertraline (ZOLOFT) 50 MG Tab Take 1 Tablet by mouth every day. 90 Tablet 1    Empagliflozin (JARDIANCE) 25 MG Tab TAKE 1 TABLET BY MOUTH ONCE DAILY IN THE MORNING . (Patient taking differently: TAKE 1 TABLET BY MOUTH ONCE DAILY IN THE EVENING.) 90 Tablet 3    lisinopril (PRINIVIL) 2.5 MG Tab Take 1 Tablet by mouth every day. (for kidney protection) 90 Tablet 3    atorvastatin (LIPITOR) 40 MG Tab Take 1 Tablet by mouth every day. 90 Tablet 2    nitroglycerin (NITROSTAT) 0.4 MG SL Tab  "Place 1 Tablet under the tongue as needed for Chest Pain. 25 Tablet 11    SITagliptin (JANUVIA) 100 MG Tab Take 1 Tablet by mouth every day. 90 Tablet 3    aspirin EC (ECOTRIN) 81 MG Tablet Delayed Response Take 2 Tabs by mouth every day. 30 Tab     Multiple Vitamin (MULTI VITAMIN MENS PO) Take 1 Tab by mouth every day.       No current facility-administered medications on file prior to visit.     Allergies:    No Known Allergies      Vitals:    Vitals:    06/09/24 1114   BP: 94/64   BP Location: Left arm   Patient Position: Sitting   BP Cuff Size: Adult   Pulse: 80   Resp: 16   Temp: 37.3 °C (99.1 °F)   TempSrc: Temporal   SpO2: 89%   Weight: 106 kg (234 lb 6.4 oz)   Height: 1.727 m (5' 8\")       Physical Exam:    Constitutional: Vital signs reviewed. Appears well-developed and well-nourished. No acute distress.   Eyes: Sclera white, conjunctivae clear.  ENT: External ears normal. Hearing normal.  Cardiovascular: Regular rate and rhythm. No murmur.   Pulmonary/Chest: Respirations non-labored. Clear to auscultation bilaterally.  Musculoskeletal: Small area of mild bruising left upper chest, but this area is not tender to palpation. He has tenderness to palpation left upper chest, medial to the area of bruising. He is able to fully raise his left arm above shoulder level, although it does exacerbate the pain in his left upper chest.  Neurological: Alert and oriented to person, place, and time. Muscle tone normal. Coordination normal. Light touch and sensation normal.   Skin: No rashes or lesions. Warm, dry, normal turgor.  Psychiatric: Normal mood and affect. Behavior is normal. Judgment and thought content normal.       Diagnostics:    EKG x 2: Sinus rhythm 82. Probable left atrial enlargement. Right bundle branch block.    (Machine reads)    Looking at EKGs, patient is in sinus rhythm, has findings of RBBB, but does not have findings of acute ischemia. I compared these EKGs to most recent one done in Epic on " 24 (ordered by other provider) and I do not see any significant change.      [id number] (WITH 1-VIEW CXR) LEFT  Order: [id number]   Status: Final result       Visible to patient: Yes (seen)       Next appt: 2024 at 03:45 PM in Cardiology (INDER Gan)       Dx: Left-sided chest pain    0 Result Notes  Details    Reading Physician Reading Date Result Priority   Juanito Romero M.D.  [phone] 2024      Narrative & Impression     2024 11:28 AM     HISTORY/REASON FOR EXAM:  Pain Following Trauma; Room 2. Fell out back of truck on 6/3/24. Hurts in left upper chest. S/p CABG 2024..        TECHNIQUE/EXAM DESCRIPTION AND NUMBER OF VIEWS:  5 images of the left ribs and chest.     COMPARISON: NONE     FINDINGS:  Median sternotomy wires  No displaced fractures or acute bony changes are noted.  No airspace opacity or consolidation.  There is no evidence of a hemo or pneumothorax.  Normal cardiopericardial silhouette.     IMPRESSION:     No displaced rib fracture.     I personally reviewed the images. Rad report reviewed with them and copy of report to them.       Assessment / Plan & Medical Decision Makin. Left-sided chest pain  - EKG  - [id number] (WITH 1-VIEW CXR) LEFT; Future    2. Contusion of left chest wall, initial encounter  - HYDROcodone-acetaminophen (NORCO) 5-325 MG Tab per tablet; 1 TAB BY MOUTH EVERY 6 HOURS ONLY IF NEEDED FOR PAIN FOR UP TO 5 DAYS. MAY CAUSE DROWSINESS.  Dispense: 20 Tablet; Refill: 0       Discussed with them DDX, management options, and risks, benefits, and alternatives to treatment plan agreed upon.    Wife present. Fell out of back of truck on 6/3/24, fell onto his left side. Hurts in left upper chest - worse with taking a deep breath, raising left arm above shoulder level, coughing, or sneezing. He did not notice bruising, but on exam there is a small bruise in left upper chest. Pain is overall staying the same. S/p CABG x3   in April 2024. Wife says he does have some previously prescribed Diclofenac gel that he can use. He has been using some old previously prescribed Norco which has been helpful for him to sleep better.    Small area of mild bruising left upper chest, but this area is not tender to palpation. He has tenderness to palpation left upper chest, medial to the area of bruising. He is able to fully raise his left arm above shoulder level, although it does exacerbate the pain in his left upper chest.    EKG x 2: Sinus rhythm 82. Probable left atrial enlargement. Right bundle branch block.    (Machine reads)    Looking at EKGs, patient is in sinus rhythm, has findings of RBBB, but does not have findings of acute ischemia. I compared these EKGs to most recent one done in [facility] on 4/17/24 (ordered by other provider) and I do not see any significant change.    Left ribs + CXR: No displaced rib fracture.     EKG and x-rays of left ribs and chest do no show any acute abnormality.    Symptoms and exam findings are consistent with MSK pain secondary to fall.    Rec'd relative rest. He may use previously prescribed Diclofenac gel to area of pain as needed for anti-inflammatory effect.    Agreeable to medication prescribed.  report checked - last Rx was Dextroamp-Amphet Er 30 Mg Cap #30 filled on 5/22/24. Last narcotic Rx was Oxycodone Hcl (Ir) 5 Mg Tablet #28 on 4/21/24.    In my professional judgment, after considering each of the factors set forth in , the CS is medically necessary and appropriate.    Discussed expected course of duration, time for improvement, and to seek follow-up in Emergency Room, urgent care, or with PCP if getting worse at any time or not improving within expected time frame.

## 2024-06-20 ENCOUNTER — TELEMEDICINE (OUTPATIENT)
Dept: BEHAVIORAL HEALTH | Facility: CLINIC | Age: 64
End: 2024-06-20
Payer: MEDICARE

## 2024-06-20 DIAGNOSIS — F90.2 ADHD (ATTENTION DEFICIT HYPERACTIVITY DISORDER), COMBINED TYPE: ICD-10-CM

## 2024-06-20 DIAGNOSIS — F63.81 INTERMITTENT EXPLOSIVE DISORDER: ICD-10-CM

## 2024-06-20 DIAGNOSIS — F32.A DEPRESSIVE DISORDER: ICD-10-CM

## 2024-06-20 PROCEDURE — 99214 OFFICE O/P EST MOD 30 MIN: CPT | Mod: 95 | Performed by: PSYCHIATRY & NEUROLOGY

## 2024-06-20 RX ORDER — DEXTROAMPHETAMINE SACCHARATE, AMPHETAMINE ASPARTATE MONOHYDRATE, DEXTROAMPHETAMINE SULFATE AND AMPHETAMINE SULFATE 7.5; 7.5; 7.5; 7.5 MG/1; MG/1; MG/1; MG/1
30 CAPSULE, EXTENDED RELEASE ORAL EVERY MORNING
Qty: 30 CAPSULE | Refills: 0 | Status: SHIPPED | OUTPATIENT
Start: 2024-06-22 | End: 2024-07-22

## 2024-06-20 RX ORDER — DEXTROAMPHETAMINE SACCHARATE, AMPHETAMINE ASPARTATE MONOHYDRATE, DEXTROAMPHETAMINE SULFATE AND AMPHETAMINE SULFATE 7.5; 7.5; 7.5; 7.5 MG/1; MG/1; MG/1; MG/1
30 CAPSULE, EXTENDED RELEASE ORAL EVERY MORNING
Qty: 30 CAPSULE | Refills: 0 | Status: SHIPPED | OUTPATIENT
Start: 2024-08-23 | End: 2024-09-22

## 2024-06-20 RX ORDER — DEXTROAMPHETAMINE SACCHARATE, AMPHETAMINE ASPARTATE MONOHYDRATE, DEXTROAMPHETAMINE SULFATE AND AMPHETAMINE SULFATE 7.5; 7.5; 7.5; 7.5 MG/1; MG/1; MG/1; MG/1
30 CAPSULE, EXTENDED RELEASE ORAL EVERY MORNING
Qty: 30 CAPSULE | Refills: 0 | Status: SHIPPED | OUTPATIENT
Start: 2024-07-23 | End: 2024-08-22

## 2024-06-20 NOTE — PROGRESS NOTES
This evaluation was conducted via Zoom using secure and encrypted videoconferencing technology. The patient was in their home in the [facility].    The patient's identity was confirmed and verbal consent was obtained for this virtual visit.      PSYCHIATRY FOLLOW-UP NOTE      Name: Jensen Leigh  MRN: [id number]  : 1960  Age: 64 y.o.  Date of assessment: 2024  PCP: Mahamed Santos M.D.  Persons in attendance: Patient      REASON FOR VISIT/CHIEF COMPLAINT (as stated by Patient):  Jensen Leigh is a 64 y.o., White male, attending follow-up appointment for mood and anxiety management.      HISTORY OF PRESENT ILLNESS:  Jensen Leigh is a 64 y.o. old male with MDD, IED and ADHD comes in today for follow up. Patient was last seen 3 months ago, and following treatment planning recommendations were done:  Continue Sertraline 50 mg daily for depression and anxiety management.  Continue Adderall XR 30 mg daily for ADHD management.   Controlled medication treatment agreement signed in 2021.    History of Present Illness    The patient underwent a triple bypass surgery, which yielded satisfactory results. he reports overall improvement in his condition. He is currently on light duty at work, which restricts his lifting capacity from 10 to 25 pounds. His cardiologist had no concerns regarding his medication regimen including adderall. Post-surgery, he continued with Zoloft and Adderall. He monitors his blood pressure at home, which he reports as not in high range. He denies any symptoms of depression or mood swings post-surgery.  Agreed with plan of not titrating medications further.        CURRENT MEDICATIONS:  Current Outpatient Medications   Medication Sig Dispense Refill    amphetamine-dextroamphetamine ER (ADDERALL XR, 30MG,) 30 MG XR capsule Take 1 Capsule by mouth every morning for 30 days. 30 Capsule 0    metoprolol tartrate (LOPRESSOR) 25 MG Tab Take 1 Tablet by mouth 2 times a day. 90  Tablet 3    clopidogrel (PLAVIX) 75 MG Tab Take 1 Tablet by mouth every day. 90 Tablet 3    acetaminophen (TYLENOL) 500 MG Tab Take 2 Tablets by mouth every 6 hours as needed for Moderate Pain.      sertraline (ZOLOFT) 50 MG Tab Take 1 Tablet by mouth every day. 90 Tablet 1    Empagliflozin (JARDIANCE) 25 MG Tab TAKE 1 TABLET BY MOUTH ONCE DAILY IN THE MORNING . (Patient taking differently: TAKE 1 TABLET BY MOUTH ONCE DAILY IN THE EVENING.) 90 Tablet 3    lisinopril (PRINIVIL) 2.5 MG Tab Take 1 Tablet by mouth every day. (for kidney protection) 90 Tablet 3    atorvastatin (LIPITOR) 40 MG Tab Take 1 Tablet by mouth every day. 90 Tablet 2    nitroglycerin (NITROSTAT) 0.4 MG SL Tab Place 1 Tablet under the tongue as needed for Chest Pain. 25 Tablet 11    SITagliptin (JANUVIA) 100 MG Tab Take 1 Tablet by mouth every day. 90 Tablet 3    aspirin EC (ECOTRIN) 81 MG Tablet Delayed Response Take 2 Tabs by mouth every day. 30 Tab     Multiple Vitamin (MULTI VITAMIN MENS PO) Take 1 Tab by mouth every day.       No current facility-administered medications for this visit.       MEDICAL HISTORY  Past Medical History:   Diagnosis Date    Anginal syndrome (HCC)     Arthritis     Blood clotting disorder (HCC)     DVT RT LEG    Clotting disorder (HCC)     Dental disorder     Dentures    Eczema     GERD (gastroesophageal reflux disease)     Heart burn     Hiatus hernia syndrome     High cholesterol     History of deep venous thrombosis (DVT) of distal vein of right lower extremity 04/25/2024    Hyperlipidemia     Indigestion     Status post coronary artery bypass with three autogenous grafts 04/25/2024    Type II or unspecified type diabetes mellitus without mention of complication, not stated as uncontrolled     Umbilical hernia      Past Surgical History:   Procedure Laterality Date    MULTIPLE CORONARY ARTERY BYPASS ENDO VEIN HARVEST  04/17/2024    Procedure: CORONARY ARTERY BYPASS GRAFTING X3, ENDOSCOPIC VEIN HARVEST,;  Surgeon:  Edu Velez D.O.;  Location: SURGERY [facility];  Service: Cardiothoracic    ECHOCARDIOGRAM, TRANSESOPHAGEAL, INTRAOPERATIVE  2024    Procedure: ECHOCARDIOGRAM, TRANSESOPHAGEAL, INTRAOPERATIVE;  Surgeon: Edu Velez D.O.;  Location: SURGERY [facility];  Service: Cardiothoracic    OTHER      Rotator cup repair, IVC filter    ROTATOR CUFF REPAIR      rt shoulder    TONSILLECTOMY AND ADENOIDECTOMY         PAST PSYCHIATRIC MEDICATIONS  Zoloft  Dexedrine      REVIEW OF SYSTEMS:        Constitutional negative   Eyes negative   Ears/Nose/Mouth/Throat negative   Cardiovascular negative   Respiratory negative   Gastrointestinal negative   Genitourinary negative   Muscular negative   Integumentary negative   Neurological negative   Endocrine negative   Hematologic/Lymphatic negative     PHYSICAL EXAMINAION:  Vital signs: There were no vitals taken for this visit.  Musculoskeletal: Normal gait.   Abnormal movements: none      MENTAL STATUS EXAMINATION      General:   - Grooming and hygiene: Casual,   - Apparent distress: none,   - Behavior: Calm  - Eye Contact:  Good,   - no psychomotor agitation or retardation    - Participation: Active verbal participation  Orientation: Alert and Fully Oriented to person, place and time  Mood: Euthymic  Affect: Flexible and Full range,  Thought Process: Logical and Goal-directed  Thought Content: Denies suicidal or homicidal ideations, intent or plan   Perception: Denies auditory or visual hallucinations. No delusions noted   Attention span and concentration: Intact   Speech:Rate within normal limits and Volume within normal limits  Language: Appropriate   Insight: Good  Judgment: Good  Recent and remote memory: No gross evidence of memory deficits        DEPRESSION SCREENIN/14/2022     2:00 PM 10/4/2023     2:00 PM 2024     8:00 PM   Depression Screen (PHQ-2/PHQ-9)   PHQ-2 Total Score   0   PHQ-2 Total Score 0 0        Interpretation of PHQ-9 Total  Score   Score Severity   1-4 No Depression   5-9 Mild Depression   10-14 Moderate Depression   15-19 Moderately Severe Depression   20-27 Severe Depression    CURRENT RISK:       Suicidal: Low       Homicidal: Low       Self-Harm: Low       Relapse: Low       Crisis Safety Plan Reviewed Not Indicated       If evidence of imminent risk is present, intervention/plan:      MEDICAL RECORDS/LABS/DIAGNOSTIC TESTS REVIEWED:  No new lab since last visit   Results        [place]  records -   Reviewed     ASSESSMENT & PLAN:  Assessment & Plan    PLAN:  (1) MDD; (2) Intermittent Explosive Disorder; (3) ADHD  Stable  Continue Sertraline 50 mg daily for depression and anxiety management.  Continue Adderall XR 30 mg daily for ADHD management.   Controlled medication treatment agreement signed in Feb 2021.  Medication options, alternatives (including no medications) and medication risks/benefits/side effects were discussed in detail.  The patient was advised to call, message provider on [email or web address], or come in to the clinic if symptoms worsen or if any future questions/issues regarding their medications arise; the patient verbalized understanding and agreement.    The patient was educated to call 911, call the suicide hotline, or go to local ER if having thoughts of suicide or homicide; verbalized understanding.    Billing Coding based on:  10052 based on MDM    Return to clinic in 3-4 months or sooner if symptoms worsen.  Next Appointment: instruction provided on how to make the next appointment.     The proposed treatment plan was discussed with the patient who was provided the opportunity to ask questions and make suggestions regarding alternative treatment. Patient verbalized understanding and expressed agreement with the plan.       Eliazar Prasad M.D.  06/20/24    This note was created using voice recognition software (Dragon). The accuracy of the dictation is limited by the abilities of the software. I have reviewed the note  prior to signing, however some errors in grammar and context are still possible. If you have any questions related to this note please do not hesitate to contact our office.

## 2024-07-06 DIAGNOSIS — Z95.1 S/P CABG X 3: ICD-10-CM

## 2024-07-08 RX ORDER — CLOPIDOGREL BISULFATE 75 MG/1
75 TABLET ORAL DAILY
Qty: 90 TABLET | Refills: 3 | Status: SHIPPED | OUTPATIENT
Start: 2024-07-08

## 2024-08-26 DIAGNOSIS — E78.00 PURE HYPERCHOLESTEROLEMIA: ICD-10-CM

## 2024-08-26 RX ORDER — ATORVASTATIN CALCIUM 40 MG/1
40 TABLET, FILM COATED ORAL DAILY
Qty: 90 TABLET | Refills: 3 | Status: SHIPPED | OUTPATIENT
Start: 2024-08-26

## 2024-09-17 ENCOUNTER — PATIENT MESSAGE (OUTPATIENT)
Dept: BEHAVIORAL HEALTH | Facility: CLINIC | Age: 64
End: 2024-09-17
Payer: MEDICARE

## 2024-09-17 DIAGNOSIS — F90.2 ADHD (ATTENTION DEFICIT HYPERACTIVITY DISORDER), COMBINED TYPE: ICD-10-CM

## 2024-09-17 RX ORDER — DEXTROAMPHETAMINE SACCHARATE, AMPHETAMINE ASPARTATE MONOHYDRATE, DEXTROAMPHETAMINE SULFATE AND AMPHETAMINE SULFATE 7.5; 7.5; 7.5; 7.5 MG/1; MG/1; MG/1; MG/1
30 CAPSULE, EXTENDED RELEASE ORAL EVERY MORNING
Qty: 30 CAPSULE | Refills: 0 | Status: SHIPPED | OUTPATIENT
Start: 2024-09-21 | End: 2024-10-21

## 2024-09-17 NOTE — PATIENT COMMUNICATION
Received request via: Patient    Was the patient seen in the last year in this department? Yes    Does the patient have an active prescription (recently filled or refills available) for medication(s) requested? No    Pharmacy Name: [id number]    Does the patient have halfway Plus and need 100-day supply? (This applies to ALL medications) Patient does not have SCP

## 2024-10-10 ENCOUNTER — TELEMEDICINE (OUTPATIENT)
Dept: BEHAVIORAL HEALTH | Facility: CLINIC | Age: 64
End: 2024-10-10
Payer: MEDICARE

## 2024-10-10 DIAGNOSIS — F63.81 INTERMITTENT EXPLOSIVE DISORDER: ICD-10-CM

## 2024-10-10 DIAGNOSIS — F32.A DEPRESSIVE DISORDER: ICD-10-CM

## 2024-10-10 DIAGNOSIS — F90.2 ADHD (ATTENTION DEFICIT HYPERACTIVITY DISORDER), COMBINED TYPE: ICD-10-CM

## 2024-10-10 PROCEDURE — 99214 OFFICE O/P EST MOD 30 MIN: CPT | Mod: 95 | Performed by: PSYCHIATRY & NEUROLOGY

## 2024-10-10 RX ORDER — DEXTROAMPHETAMINE SACCHARATE, AMPHETAMINE ASPARTATE MONOHYDRATE, DEXTROAMPHETAMINE SULFATE AND AMPHETAMINE SULFATE 7.5; 7.5; 7.5; 7.5 MG/1; MG/1; MG/1; MG/1
30 CAPSULE, EXTENDED RELEASE ORAL EVERY MORNING
Qty: 30 CAPSULE | Refills: 0 | Status: SHIPPED | OUTPATIENT
Start: 2024-12-21 | End: 2025-01-20

## 2024-10-10 RX ORDER — DEXTROAMPHETAMINE SACCHARATE, AMPHETAMINE ASPARTATE MONOHYDRATE, DEXTROAMPHETAMINE SULFATE AND AMPHETAMINE SULFATE 7.5; 7.5; 7.5; 7.5 MG/1; MG/1; MG/1; MG/1
30 CAPSULE, EXTENDED RELEASE ORAL EVERY MORNING
Qty: 30 CAPSULE | Refills: 0 | Status: SHIPPED | OUTPATIENT
Start: 2024-10-20 | End: 2024-11-19

## 2024-10-10 RX ORDER — DEXTROAMPHETAMINE SACCHARATE, AMPHETAMINE ASPARTATE MONOHYDRATE, DEXTROAMPHETAMINE SULFATE AND AMPHETAMINE SULFATE 7.5; 7.5; 7.5; 7.5 MG/1; MG/1; MG/1; MG/1
30 CAPSULE, EXTENDED RELEASE ORAL EVERY MORNING
Qty: 30 CAPSULE | Refills: 0 | Status: SHIPPED | OUTPATIENT
Start: 2024-11-20 | End: 2024-12-20

## 2024-11-04 ENCOUNTER — TELEPHONE (OUTPATIENT)
Dept: CARDIOLOGY | Facility: MEDICAL CENTER | Age: 64
End: 2024-11-04
Payer: MEDICARE

## 2024-11-04 DIAGNOSIS — E11.9 TYPE 2 DIABETES MELLITUS WITHOUT COMPLICATION, WITHOUT LONG-TERM CURRENT USE OF INSULIN (HCC): ICD-10-CM

## 2024-11-04 RX ORDER — SITAGLIPTIN 100 MG/1
100 TABLET, FILM COATED ORAL DAILY
Qty: 30 TABLET | Refills: 0 | OUTPATIENT
Start: 2024-11-04

## 2024-11-04 NOTE — TELEPHONE ENCOUNTER
Called pt to remind him upcoming appt and blood work to be done before his appt, pt verbalized understanding.

## 2024-11-04 NOTE — TELEPHONE ENCOUNTER
Received request via: Pharmacy    Was the patient seen in the last year in this department? Yes    Does the patient have an active prescription (recently filled or refills available) for medication(s) requested? No    Pharmacy Name:   [facility] [place] - [address] [place] [address]  [address] [place] [address]  Phone: [phone] Fax: [phone]       Does the patient have USP Plus and need 100-day supply? (This applies to ALL medications) Patient does not have SCP

## 2024-11-09 ENCOUNTER — HOSPITAL ENCOUNTER (OUTPATIENT)
Dept: LAB | Facility: MEDICAL CENTER | Age: 64
End: 2024-11-09
Payer: MEDICARE

## 2024-11-09 DIAGNOSIS — Z95.1: ICD-10-CM

## 2024-11-09 DIAGNOSIS — Z95.1 S/P CABG X 3: ICD-10-CM

## 2024-11-09 DIAGNOSIS — E78.5 DYSLIPIDEMIA: ICD-10-CM

## 2024-11-09 LAB
CHOLEST SERPL-MCNC: 141 MG/DL (ref 100–199)
FASTING STATUS PATIENT QL REPORTED: NORMAL
HDLC SERPL-MCNC: 45 MG/DL
LDLC SERPL CALC-MCNC: 76 MG/DL
TRIGL SERPL-MCNC: 99 MG/DL (ref 0–149)

## 2024-11-09 PROCEDURE — 80061 LIPID PANEL: CPT

## 2024-11-09 PROCEDURE — 36415 COLL VENOUS BLD VENIPUNCTURE: CPT

## 2024-11-12 ENCOUNTER — PATIENT MESSAGE (OUTPATIENT)
Dept: CARDIOLOGY | Facility: MEDICAL CENTER | Age: 64
End: 2024-11-12

## 2024-11-12 ENCOUNTER — OFFICE VISIT (OUTPATIENT)
Dept: CARDIOLOGY | Facility: MEDICAL CENTER | Age: 64
End: 2024-11-12
Payer: MEDICARE

## 2024-11-12 ENCOUNTER — OFFICE VISIT (OUTPATIENT)
Dept: MEDICAL GROUP | Facility: MEDICAL CENTER | Age: 64
End: 2024-11-12
Payer: MEDICARE

## 2024-11-12 VITALS
BODY MASS INDEX: 35.61 KG/M2 | TEMPERATURE: 99.2 F | DIASTOLIC BLOOD PRESSURE: 74 MMHG | HEART RATE: 75 BPM | SYSTOLIC BLOOD PRESSURE: 112 MMHG | OXYGEN SATURATION: 94 % | WEIGHT: 235 LBS | HEIGHT: 68 IN

## 2024-11-12 VITALS
SYSTOLIC BLOOD PRESSURE: 136 MMHG | DIASTOLIC BLOOD PRESSURE: 76 MMHG | WEIGHT: 234 LBS | BODY MASS INDEX: 35.46 KG/M2 | HEIGHT: 68 IN | OXYGEN SATURATION: 95 % | HEART RATE: 78 BPM | RESPIRATION RATE: 16 BRPM

## 2024-11-12 DIAGNOSIS — E78.5 DYSLIPIDEMIA: ICD-10-CM

## 2024-11-12 DIAGNOSIS — I10 HYPERTENSION, ESSENTIAL: ICD-10-CM

## 2024-11-12 DIAGNOSIS — Z95.1: ICD-10-CM

## 2024-11-12 DIAGNOSIS — E11.59 CORONARY ARTERY DISEASE DUE TO TYPE 2 DIABETES MELLITUS (HCC): ICD-10-CM

## 2024-11-12 DIAGNOSIS — Z95.1 S/P CABG X 3: ICD-10-CM

## 2024-11-12 DIAGNOSIS — I25.10 CORONARY ARTERY DISEASE DUE TO TYPE 2 DIABETES MELLITUS (HCC): ICD-10-CM

## 2024-11-12 DIAGNOSIS — E66.9 OBESITY (BMI 30-39.9): ICD-10-CM

## 2024-11-12 DIAGNOSIS — E78.00 PURE HYPERCHOLESTEROLEMIA: ICD-10-CM

## 2024-11-12 DIAGNOSIS — E11.9 TYPE 2 DIABETES MELLITUS WITHOUT COMPLICATION, WITHOUT LONG-TERM CURRENT USE OF INSULIN (HCC): ICD-10-CM

## 2024-11-12 PROBLEM — R94.31 ABNORMAL EKG: Status: RESOLVED | Noted: 2023-07-12 | Resolved: 2024-11-12

## 2024-11-12 PROBLEM — L60.2 ONYCHOGRYPHOSIS: Status: RESOLVED | Noted: 2019-05-10 | Resolved: 2024-11-12

## 2024-11-12 PROBLEM — R11.14 BILIOUS VOMITING WITH NAUSEA: Status: RESOLVED | Noted: 2023-07-12 | Resolved: 2024-11-12

## 2024-11-12 PROBLEM — R41.840 ATTENTION OR CONCENTRATION DEFICIT: Status: RESOLVED | Noted: 2019-07-17 | Resolved: 2024-11-12

## 2024-11-12 PROBLEM — R53.83 OTHER FATIGUE: Status: RESOLVED | Noted: 2021-04-20 | Resolved: 2024-11-12

## 2024-11-12 LAB
HBA1C MFR BLD: 7.2 % (ref ?–5.8)
POCT INT CON NEG: NEGATIVE
POCT INT CON POS: POSITIVE

## 2024-11-12 PROCEDURE — 3078F DIAST BP <80 MM HG: CPT

## 2024-11-12 PROCEDURE — 3074F SYST BP LT 130 MM HG: CPT | Performed by: STUDENT IN AN ORGANIZED HEALTH CARE EDUCATION/TRAINING PROGRAM

## 2024-11-12 PROCEDURE — 3075F SYST BP GE 130 - 139MM HG: CPT

## 2024-11-12 PROCEDURE — 83036 HEMOGLOBIN GLYCOSYLATED A1C: CPT | Performed by: STUDENT IN AN ORGANIZED HEALTH CARE EDUCATION/TRAINING PROGRAM

## 2024-11-12 PROCEDURE — 99214 OFFICE O/P EST MOD 30 MIN: CPT

## 2024-11-12 PROCEDURE — 3078F DIAST BP <80 MM HG: CPT | Performed by: STUDENT IN AN ORGANIZED HEALTH CARE EDUCATION/TRAINING PROGRAM

## 2024-11-12 PROCEDURE — 99214 OFFICE O/P EST MOD 30 MIN: CPT | Performed by: STUDENT IN AN ORGANIZED HEALTH CARE EDUCATION/TRAINING PROGRAM

## 2024-11-12 PROCEDURE — 99212 OFFICE O/P EST SF 10 MIN: CPT

## 2024-11-12 RX ORDER — ATORVASTATIN CALCIUM 80 MG/1
80 TABLET, FILM COATED ORAL DAILY
Qty: 90 TABLET | Refills: 3 | Status: SHIPPED | OUTPATIENT
Start: 2024-11-12

## 2024-11-12 RX ORDER — LISINOPRIL 5 MG/1
5 TABLET ORAL DAILY
Qty: 90 TABLET | Refills: 3 | Status: SHIPPED | OUTPATIENT
Start: 2024-11-12

## 2024-11-12 ASSESSMENT — ENCOUNTER SYMPTOMS
SYNCOPE: 0
PALPITATIONS: 0
HEADACHES: 0
HEADACHES: 0
SHORTNESS OF BREATH: 0
POOR WOUND HEALING: 0
LIGHT-HEADEDNESS: 0
VOMITING: 0
FEVER: 0
DIZZINESS: 0
WEIGHT LOSS: 0
NEAR-SYNCOPE: 0
NAUSEA: 0
CHILLS: 0
DYSPNEA ON EXERTION: 0
PALPITATIONS: 0
ORTHOPNEA: 0
DIZZINESS: 0
PND: 0
WHEEZING: 0
SHORTNESS OF BREATH: 0

## 2024-11-12 ASSESSMENT — FIBROSIS 4 INDEX
FIB4 SCORE: 1.38
FIB4 SCORE: 1.38

## 2024-11-12 NOTE — PROGRESS NOTES
Chief Complaint   Patient presents with    Coronary Artery Disease     Follow up          Subjective:   Jensen Leigh is a 64 y.o. male who presents today for follow-up post coronary artery bypass grafting x3 on 4/17/2024.     Patient of Dr. Campoverde.  Current medical problems include history of methamphetamine use, diabetes mellitus, hypertension, DVT s/p IVC filter, depressive disorder, hyperlipidemia, obesity, and coronary artery disease . Their last clinic visit was 1/23/2024 with Dr. Locke.    The patient previously saw a cardiologist at [facility] Dr. Hamm 8/23/2023. During the visit, the patient reports occasional chest pain, and he was referred for [facility]. Heart cath showed mild left main disease,  of RCA with collaterals from the left system, occluded proximal LCx with collaterals from LAD, and mild to moderate mid LAD stenosis status post intracoronary physiology assessment of LAD, which showed severe mid LAD disease, and he was recommended for surgical revascularization. He wanted a second opinion so he came to [facility].     He was medically managed and referred to Dr. Locke to discuss high risk PCI. After his appointment with Dr. Locke on 1/23/2024 patient was agreeable to CABG as Dr. Velez had previously recommended. Patient had a CABG x3 on 4/17/2024.    Today's visit:  Patient is doing overall well from a cardiac perspective. He denies chest pain or pressure, shortness of breath, or palpitations. He has mild pain at his incision that he describes as pressure but it has improved since his surgery.  He denies headache or syncope. Patient is taking all medications as prescribed. Patient is back at work as a  and  and climbs ladders and stairs without any limitations.     Patient incisional scar is healing well without any drainage or redness. Denies fevers or chills.     Cardiovascular Risk Factors:  1. Smoking status: Former smoker  2. Type II Diabetes Mellitus: Yes   Lab  Results   Component Value Date/Time    HBA1C 8.0 (H) 04/16/2024 02:40 PM    HBA1C 7.4 (A) 10/04/2023 02:01 PM     3. Hypertension: Yes  4. Dyslipidemia: Yes   Cholesterol,Tot   Date Value Ref Range Status   07/15/2023 126 100 - 199 mg/dL Final     LDL   Date Value Ref Range Status   07/15/2023 68 <100 mg/dL Final     HDL   Date Value Ref Range Status   07/15/2023 46 >=40 mg/dL Final     Triglycerides   Date Value Ref Range Status   07/15/2023 59 0 - 149 mg/dL Final     5. Family history of early Coronary Artery Disease in a first degree relative (Male less than 55 years of age; Female less than 65 years of age): No      Past Medical History:   Diagnosis Date    Anginal syndrome (HCC)     Arthritis     Blood clotting disorder (HCC)     DVT RT LEG    Clotting disorder (HCC)     Dental disorder     Dentures    Eczema     GERD (gastroesophageal reflux disease)     Heart burn     Hiatus hernia syndrome     High cholesterol     History of deep venous thrombosis (DVT) of distal vein of right lower extremity 04/25/2024    Hyperlipidemia     Indigestion     Status post coronary artery bypass with three autogenous grafts 04/25/2024    Type II or unspecified type diabetes mellitus without mention of complication, not stated as uncontrolled     Umbilical hernia          Family History   Problem Relation Age of Onset    Hyperlipidemia Mother     Cancer Mother         liver    Depression Mother     Alcohol abuse Mother     Cancer Father         lung    Alcohol abuse Father     Depression Sister     Ovarian Cancer Neg Hx     Tubal Cancer Neg Hx     Breast Cancer Neg Hx     Colorectal Cancer Neg Hx     Peritoneal Cancer Neg Hx          Social History     Tobacco Use    Smoking status: Never    Smokeless tobacco: Never   Vaping Use    Vaping status: Never Used   Substance Use Topics    Alcohol use: Not Currently    Drug use: No         No Known Allergies      Current Outpatient Medications   Medication Sig    lisinopril (PRINIVIL) 5  "MG Tab Take 1 Tablet by mouth every day. (for kidney protection)    atorvastatin (LIPITOR) 80 MG tablet Take 1 Tablet by mouth every day.    SITagliptin (JANUVIA) 100 MG Tab Take 1 Tablet by mouth every day.    sertraline (ZOLOFT) 50 MG Tab Take 1 Tablet by mouth every day.    amphetamine-dextroamphetamine ER (ADDERALL XR) 30 MG XR capsule Take 1 Capsule by mouth every morning for 30 days.    metoprolol tartrate (LOPRESSOR) 25 MG Tab Take 1 Tablet by mouth 2 times a day.    Empagliflozin (JARDIANCE) 25 MG Tab TAKE 1 TABLET BY MOUTH ONCE DAILY IN THE MORNING . (Patient taking differently: TAKE 1 TABLET BY MOUTH ONCE DAILY IN THE EVENING.)    nitroglycerin (NITROSTAT) 0.4 MG SL Tab Place 1 Tablet under the tongue as needed for Chest Pain.    aspirin EC (ECOTRIN) 81 MG Tablet Delayed Response Take 2 Tabs by mouth every day.    Multiple Vitamin (MULTI VITAMIN MENS PO) Take 1 Tab by mouth every day.         Review of Systems   Constitutional: Negative for malaise/fatigue.   Cardiovascular:  Negative for chest pain, dyspnea on exertion, leg swelling, near-syncope, orthopnea, palpitations, paroxysmal nocturnal dyspnea and syncope.   Respiratory:  Negative for shortness of breath.    Skin:  Negative for itching and poor wound healing.   Neurological:  Negative for dizziness, headaches and light-headedness.           Objective:   /76 (BP Location: Left arm, Patient Position: Sitting)   Pulse 78   Resp 16   Ht 1.727 m (5' 8\")   Wt 106 kg (234 lb)   SpO2 95%  Body mass index is 35.58 kg/m².         Physical Exam  Constitutional:       General: He is not in acute distress.     Appearance: Normal appearance.   HENT:      Head: Normocephalic and atraumatic.   Cardiovascular:      Rate and Rhythm: Normal rate and regular rhythm.      Pulses: Normal pulses.      Heart sounds: No murmur heard.  Pulmonary:      Effort: Pulmonary effort is normal. No respiratory distress.      Breath sounds: Normal breath sounds.   Chest: " "     Chest wall: No swelling, tenderness or crepitus.      Comments: Mid sternal incision approximated and heeling well. No redness or drainage noted.   Musculoskeletal:      Right lower leg: No edema.      Left lower leg: No edema.   Neurological:      Mental Status: He is alert and oriented to person, place, and time.      Gait: Gait normal.   Psychiatric:         Behavior: Behavior normal.             Lab Results   Component Value Date/Time    SODIUM 137 04/21/2024 12:28 AM    POTASSIUM 3.9 04/21/2024 12:28 AM    CHLORIDE 99 04/21/2024 12:28 AM    CO2 25 04/21/2024 12:28 AM    GLUCOSE 143 (H) 04/21/2024 12:28 AM    BUN 31 (H) 04/21/2024 12:28 AM    CREATININE 0.63 04/21/2024 12:28 AM    GLOMRATE 90 08/23/2023 07:13 AM      Lab Results   Component Value Date/Time    WBC 10.0 04/21/2024 12:28 AM    RBC 4.24 (L) 04/21/2024 12:28 AM    HEMOGLOBIN 12.4 (L) 04/21/2024 12:28 AM    HEMATOCRIT 38.2 (L) 04/21/2024 12:28 AM    MCV 90.1 04/21/2024 12:28 AM    MCH 29.2 04/21/2024 12:28 AM    MCHC 32.5 04/21/2024 12:28 AM    MPV 10.4 04/21/2024 12:28 AM    NEUTSPOLYS 47.40 04/16/2024 02:40 PM    LYMPHOCYTES 42.20 (H) 04/16/2024 02:40 PM    MONOCYTES 7.80 04/16/2024 02:40 PM    EOSINOPHILS 1.70 04/16/2024 02:40 PM    BASOPHILS 0.60 04/16/2024 02:40 PM      Lab Results   Component Value Date/Time    CHOLSTRLTOT 141 11/09/2024 07:08 AM    LDL 76 11/09/2024 07:08 AM    HDL 45 11/09/2024 07:08 AM    TRIGLYCERIDE 99 11/09/2024 07:08 AM       No results found for: \"TROPONINT\"  No results found for: \"NTPROBNP\"      4/17/24 Dr. Velez  CORONARY ARTERY BYPASS GRAFTING X3  WITH SKELETONIZED SMITH TO LAD  RSVG TO OM1 AND RPDA  ENDOSCOPIC  HARVESTOF RIGHT GREATER SAPHENOUS VEIN,  Assessment:   1. Coronary artery disease due to type 2 diabetes mellitus (HCC)    2. S/P CABG x 3    3. Status post coronary artery bypass with three autogenous grafts    4. Dyslipidemia  - Lipid Profile; Future    5. Hypertension, essential  - lisinopril " (PRINIVIL) 5 MG Tab; Take 1 Tablet by mouth every day. (for kidney protection)  Dispense: 90 Tablet; Refill: 3    6. Type 2 diabetes mellitus without complication, without long-term current use of insulin (HCC)  - lisinopril (PRINIVIL) 5 MG Tab; Take 1 Tablet by mouth every day. (for kidney protection)  Dispense: 90 Tablet; Refill: 3    7. Pure hypercholesterolemia  - atorvastatin (LIPITOR) 80 MG tablet; Take 1 Tablet by mouth every day.  Dispense: 90 Tablet; Refill: 3          Medical Decision Making:  Today's Assessment / Plan:   Status post CABG x3 on 4/17/2024 LIMA to LAD RSVG to OM1, and RPDA  -Doing well postop. Incisional scar heeling well and approximated. No redness or drainage. Denies popping or clicking sensation in his chest.   -CABG: Stop Plavix 75 mg daily and  mg daily  -Continue statin: Increase Atorvastatin 80 mg daily  -Continue BB: Metoprolol 25 mg two times daily  - referral to cardiac rehab: patient not participating in rehab due to work schedule    Hyperlipidemia  -Most recent LDL 76  -Increase atorvastatin 80 mg daily  -Goal of less than 70  -Check lipid panel in 6 months    Hypertension  - Good control  - continue metoprolol 25 mg twice a day   -Increase lisinopril 5 mg daily  - goal < 130/80  -continue to monitor blood pressure at home and keep a log    DVT s/p IVC filter  -Continue  mg daily per vascular Dr. Medley recommendation    Return in about 6 months (around 5/12/2025) for Vicky GODINEZ.  Sooner if problems.    LENI Gan.

## 2024-11-12 NOTE — PATIENT INSTRUCTIONS
Increase lisinopril 5 mg daily    Start taking blood pressure daily and keep a log. [facility] what [facility] have been    Increase atorvastatin 80 mg daily    Stop plavix.    Continue aspirin 81 mg daily

## 2024-11-12 NOTE — PROGRESS NOTES
"Subjective:     CC:     HPI:   Jensen presents today with    PMH HTN, HLD, NIDDM 2, CAD s/p CABG x3 3/17/2024, DVT status post IVC filter, MDD, ADHD  Specialist  Cardiology  [facility]al [facility]  Cardiothoracic surgery    Since last visit underwent coronary artery bypass x 3, cardiology and cardiothoracic medicine    Overall doing well. Denies cp, sob, reed, swelling       Health Maintenance:     ROS:  Review of Systems   Constitutional:  Negative for chills, fever and weight loss.   HENT:  Negative for hearing loss.    Respiratory:  Negative for shortness of breath and wheezing.    Cardiovascular:  Negative for chest pain and palpitations.   Gastrointestinal:  Negative for nausea and vomiting.   Genitourinary:  Negative for frequency and urgency.   Skin:  Negative for rash.   Neurological:  Negative for dizziness and headaches.       Objective:     Exam:  /74 (BP Location: Left arm)   Pulse 75   Temp 37.3 °C (99.2 °F)   Ht 1.727 m (5' 8\")   Wt 107 kg (235 lb)   SpO2 94%   BMI 35.73 kg/m²  Body mass index is 35.73 kg/m².    Physical Exam  Constitutional:       Appearance: Normal appearance.   Cardiovascular:      Rate and Rhythm: Normal rate and regular rhythm.   Pulmonary:      Effort: Pulmonary effort is normal.      Breath sounds: Normal breath sounds.   Musculoskeletal:      Cervical back: Normal range of motion and neck supple.   Lymphadenopathy:      Cervical: No cervical adenopathy.   Neurological:      Mental Status: He is alert.     Diabetic foot exam: No lesions or calluses noted. 2+ pedal pulses. Sensation intact with 10 out of 10 on monofilament test.        Labs:     Assessment & Plan:     64 y.o. male with the following -       1. Type 2 diabetes mellitus without complication, without long-term current use of insulin (HCC)  Chronic, stable  A1c back to baseline 7.2  Continue Jardiance 25 mg, Januvia 100 mg daily, unable to tolerate metformin  - POCT Hemoglobin A1C  - Diabetic " Monofilament LE Exam  - Microalbumin Creat Ratio Urine (Lab Collect); Future    2. Coronary artery disease due to type 2 diabetes mellitus (HCC)  5. Status post coronary artery bypass with three autogenous grafts  Chronic, stable  Continue aspirin 81 mg and atorvastatin 80 mg daily, taking without any issues  Adherent to metoprolol 25 mg twice daily, lisinopril 5 mg    3. Obesity (BMI 30-39.9)  Chronic, stable  - Patient identified as having weight management issue.  Appropriate orders and counseling given.    4. Hypertension, essential  Chronic, stable blood pressure 112/74 today        Return in about 6 months (around 5/12/2025) for Lab review, Med check.    Please note that this dictation was created using voice recognition software. I have made every reasonable attempt to correct obvious errors, but I expect that there are errors of grammar and possibly content that I did not discover before finalizing the note.

## 2024-11-12 NOTE — LETTER
[facility]  Mahamed Santos M.D.  [address]  [place] [address]  Fax: [phone]   Authorization for Release/Disclosure of   Protected Health Information   Name: JENSEN BOB : 1960 SSN: [id number]   Address: [address] [place] [address]  [place] [address] Phone:    [phone] (home) [phone] (work)   I authorize the entity listed below to release/disclose the PHI below to:   [facility]/Mahamed Santos M.D. and Mahamed Santos M.D.   Provider or Entity Name:  [facility]   Address   City, State, [facility]   Phone:      Fax:     Reason for request: continuity of care   Information to be released:    [  ] LAST COLONOSCOPY,  including any PATH REPORT and follow-up  [  ] LAST FIT/COLOGUARD RESULT [  ] LAST DEXA  [  ] LAST MAMMOGRAM  [  ] LAST PAP  [  ] LAST LABS [ xxx ] RETINA EXAM REPORT  [  ] IMMUNIZATION RECORDS  [  ] Release all info      [  ] Check here and initial the line next to each item to release ALL health information INCLUDING  _____ Care and treatment for drug and / or alcohol abuse  _____ HIV testing, infection status, or AIDS  _____ Genetic Testing    DATES OF SERVICE OR TIME PERIOD TO BE DISCLOSED: _____________  I understand and acknowledge that:  * This Authorization may be revoked at any time by you in writing, except if your health information has already been used or disclosed.  * Your health information that will be used or disclosed as a result of you signing this authorization could be re-disclosed by the recipient. If this occurs, your re-disclosed health information may no longer be protected by State or Federal laws.  * You may refuse to sign this Authorization. Your refusal will not affect your ability to obtain treatment.  * This Authorization becomes effective upon signing and will  on (date) __________.      If no date is indicated, this Authorization will  one (1) year from the signature date.    Name: Jensen Bob  Signature:  Date:   11/12/2024     PLEASE FAX REQUESTED RECORDS BACK TO: [phone]

## 2024-12-02 NOTE — PATIENT COMMUNICATION
Replied to pt via [facility]trispheret regarding recommendations, see encounter.    Awaiting BP log to be received.

## 2024-12-03 ENCOUNTER — PATIENT MESSAGE (OUTPATIENT)
Dept: CARDIOLOGY | Facility: MEDICAL CENTER | Age: 64
End: 2024-12-03
Payer: MEDICARE

## 2024-12-04 ENCOUNTER — PATIENT MESSAGE (OUTPATIENT)
Dept: CARDIOLOGY | Facility: MEDICAL CENTER | Age: 64
End: 2024-12-04
Payer: MEDICARE

## 2024-12-17 DIAGNOSIS — F90.2 ADHD (ATTENTION DEFICIT HYPERACTIVITY DISORDER), COMBINED TYPE: ICD-10-CM

## 2024-12-17 RX ORDER — DEXTROAMPHETAMINE SACCHARATE, AMPHETAMINE ASPARTATE MONOHYDRATE, DEXTROAMPHETAMINE SULFATE AND AMPHETAMINE SULFATE 3.75; 3.75; 3.75; 3.75 MG/1; MG/1; MG/1; MG/1
30 CAPSULE, EXTENDED RELEASE ORAL EVERY MORNING
Qty: 60 CAPSULE | Refills: 0 | Status: SHIPPED | OUTPATIENT
Start: 2024-12-17 | End: 2025-01-16

## 2025-01-12 DIAGNOSIS — Z95.1 S/P CABG X 3: ICD-10-CM

## 2025-01-14 ENCOUNTER — PATIENT MESSAGE (OUTPATIENT)
Dept: BEHAVIORAL HEALTH | Facility: CLINIC | Age: 65
End: 2025-01-14
Payer: MEDICARE

## 2025-01-14 DIAGNOSIS — F90.2 ADHD (ATTENTION DEFICIT HYPERACTIVITY DISORDER), COMBINED TYPE: ICD-10-CM

## 2025-01-14 RX ORDER — METOPROLOL TARTRATE 25 MG/1
25 TABLET, FILM COATED ORAL 2 TIMES DAILY
Qty: 90 TABLET | Refills: 3 | Status: SHIPPED | OUTPATIENT
Start: 2025-01-14

## 2025-01-15 RX ORDER — DEXTROAMPHETAMINE SACCHARATE, AMPHETAMINE ASPARTATE MONOHYDRATE, DEXTROAMPHETAMINE SULFATE AND AMPHETAMINE SULFATE 7.5; 7.5; 7.5; 7.5 MG/1; MG/1; MG/1; MG/1
30 CAPSULE, EXTENDED RELEASE ORAL EVERY MORNING
Qty: 30 CAPSULE | Refills: 0 | Status: SHIPPED | OUTPATIENT
Start: 2025-01-18 | End: 2025-02-17

## 2025-01-15 RX ORDER — DEXTROAMPHETAMINE SACCHARATE, AMPHETAMINE ASPARTATE MONOHYDRATE, DEXTROAMPHETAMINE SULFATE AND AMPHETAMINE SULFATE 3.75; 3.75; 3.75; 3.75 MG/1; MG/1; MG/1; MG/1
30 CAPSULE, EXTENDED RELEASE ORAL EVERY MORNING
Qty: 60 CAPSULE | Refills: 0 | Status: CANCELLED
Start: 2025-01-15 | End: 2025-02-14

## 2025-01-15 NOTE — PATIENT COMMUNICATION
Pt req we resend adderall 30mg RX pharmacy canceled the RX accidentally        Received request via: Patient    Was the patient seen in the last year in this department? Yes    Does the patient have an active prescription (recently filled or refills available) for medication(s) requested? No    Pharmacy Name: [id number]    Does the patient have half-way Plus and need 100-day supply? (This applies to ALL medications) Patient does not have SCP

## 2025-02-04 DIAGNOSIS — E11.9 TYPE 2 DIABETES MELLITUS WITHOUT COMPLICATION, WITHOUT LONG-TERM CURRENT USE OF INSULIN (HCC): ICD-10-CM

## 2025-02-05 RX ORDER — EMPAGLIFLOZIN 25 MG/1
TABLET, FILM COATED ORAL
Qty: 90 TABLET | Refills: 0 | Status: SHIPPED | OUTPATIENT
Start: 2025-02-05

## 2025-02-05 NOTE — TELEPHONE ENCOUNTER
Received request via: Pharmacy    Was the patient seen in the last year in this department? Yes    Does the patient have an active prescription (recently filled or refills available) for medication(s) requested? No    Pharmacy Name:   [facility] [place] - [address] [place] [address]  [address] [place] [id number]  Phone: [phone] Fax: [phone]       Does the patient have [facility]ent[facility] Plus and need 100-day supply? (This applies to ALL medications) Patient does not have SCP

## 2025-02-11 ENCOUNTER — TELEMEDICINE (OUTPATIENT)
Dept: BEHAVIORAL HEALTH | Facility: CLINIC | Age: 65
End: 2025-02-11
Payer: MEDICARE

## 2025-02-11 DIAGNOSIS — F32.A DEPRESSIVE DISORDER: ICD-10-CM

## 2025-02-11 DIAGNOSIS — F63.81 INTERMITTENT EXPLOSIVE DISORDER: ICD-10-CM

## 2025-02-11 DIAGNOSIS — F90.2 ADHD (ATTENTION DEFICIT HYPERACTIVITY DISORDER), COMBINED TYPE: ICD-10-CM

## 2025-02-11 RX ORDER — DEXTROAMPHETAMINE SACCHARATE, AMPHETAMINE ASPARTATE MONOHYDRATE, DEXTROAMPHETAMINE SULFATE AND AMPHETAMINE SULFATE 7.5; 7.5; 7.5; 7.5 MG/1; MG/1; MG/1; MG/1
30 CAPSULE, EXTENDED RELEASE ORAL EVERY MORNING
Qty: 30 CAPSULE | Refills: 0 | Status: SHIPPED | OUTPATIENT
Start: 2025-04-19 | End: 2025-05-19

## 2025-02-11 RX ORDER — DEXTROAMPHETAMINE SACCHARATE, AMPHETAMINE ASPARTATE MONOHYDRATE, DEXTROAMPHETAMINE SULFATE AND AMPHETAMINE SULFATE 7.5; 7.5; 7.5; 7.5 MG/1; MG/1; MG/1; MG/1
30 CAPSULE, EXTENDED RELEASE ORAL EVERY MORNING
Qty: 30 CAPSULE | Refills: 0 | Status: SHIPPED | OUTPATIENT
Start: 2025-03-19 | End: 2025-04-18

## 2025-02-11 RX ORDER — DEXTROAMPHETAMINE SACCHARATE, AMPHETAMINE ASPARTATE MONOHYDRATE, DEXTROAMPHETAMINE SULFATE AND AMPHETAMINE SULFATE 7.5; 7.5; 7.5; 7.5 MG/1; MG/1; MG/1; MG/1
30 CAPSULE, EXTENDED RELEASE ORAL EVERY MORNING
Qty: 30 CAPSULE | Refills: 0 | Status: SHIPPED | OUTPATIENT
Start: 2025-02-16 | End: 2025-03-18

## 2025-02-11 ASSESSMENT — ANXIETY QUESTIONNAIRES
5. BEING SO RESTLESS THAT IT IS HARD TO SIT STILL: NOT AT ALL
7. FEELING AFRAID AS IF SOMETHING AWFUL MIGHT HAPPEN: NOT AT ALL
7. FEELING AFRAID AS IF SOMETHING AWFUL MIGHT HAPPEN: NOT AT ALL
3. WORRYING TOO MUCH ABOUT DIFFERENT THINGS: NOT AT ALL
4. TROUBLE RELAXING: NOT AT ALL
2. NOT BEING ABLE TO STOP OR CONTROL WORRYING: NOT AT ALL
IF YOU CHECKED OFF ANY PROBLEMS ON THIS QUESTIONNAIRE, HOW DIFFICULT HAVE THESE PROBLEMS MADE IT FOR YOU TO DO YOUR WORK, TAKE CARE OF THINGS AT HOME, OR GET ALONG WITH OTHER PEOPLE: NOT DIFFICULT AT ALL
4. TROUBLE RELAXING: NOT AT ALL
2. NOT BEING ABLE TO STOP OR CONTROL WORRYING: NOT AT ALL
5. BEING SO RESTLESS THAT IT IS HARD TO SIT STILL: NOT AT ALL
6. BECOMING EASILY ANNOYED OR IRRITABLE: NOT AT ALL
GAD7 TOTAL SCORE: 0
1. FEELING NERVOUS, ANXIOUS, OR ON EDGE: NOT AT ALL
1. FEELING NERVOUS, ANXIOUS, OR ON EDGE: NOT AT ALL
IF YOU CHECKED OFF ANY PROBLEMS ON THIS QUESTIONNAIRE, HOW DIFFICULT HAVE THESE PROBLEMS MADE IT FOR YOU TO DO YOUR WORK, TAKE CARE OF THINGS AT HOME, OR GET ALONG WITH OTHER PEOPLE: NOT DIFFICULT AT ALL
3. WORRYING TOO MUCH ABOUT DIFFERENT THINGS: NOT AT ALL
6. BECOMING EASILY ANNOYED OR IRRITABLE: NOT AT ALL

## 2025-02-11 ASSESSMENT — PATIENT HEALTH QUESTIONNAIRE - PHQ9
3. TROUBLE FALLING OR STAYING ASLEEP OR SLEEPING TOO MUCH: NOT AT ALL
4. FEELING TIRED OR HAVING LITTLE ENERGY: 1
9. THOUGHTS THAT YOU WOULD BE BETTER OFF DEAD, OR OF HURTING YOURSELF: 0
8. MOVING OR SPEAKING SO SLOWLY THAT OTHER PEOPLE COULD HAVE NOTICED. OR THE OPPOSITE, BEING SO FIGETY OR RESTLESS THAT YOU HAVE BEEN MOVING AROUND A LOT MORE THAN USUAL: 0
1. LITTLE INTEREST OR PLEASURE IN DOING THINGS: NOT AT ALL
5. POOR APPETITE OR OVEREATING: NOT AT ALL
5. POOR APPETITE OR OVEREATING: 0
1. LITTLE INTEREST OR PLEASURE IN DOING THINGS: 0
3. TROUBLE FALLING OR STAYING ASLEEP OR SLEEPING TOO MUCH: 0
6. FEELING BAD ABOUT YOURSELF - OR THAT YOU ARE A FAILURE OR HAVE LET YOURSELF OR YOUR FAMILY DOWN: NOT AT ALL
10. IF YOU CHECKED OFF ANY PROBLEMS, HOW DIFFICULT HAVE THESE PROBLEMS MADE IT FOR YOU TO DO YOUR WORK, TAKE CARE OF THINGS AT HOME, OR GET ALONG WITH OTHER PEOPLE: NOT DIFFICULT AT ALL
2. FEELING DOWN, DEPRESSED, IRRITABLE, OR HOPELESS: NOT AT ALL
6. FEELING BAD ABOUT YOURSELF - OR THAT YOU ARE A FAILURE OR HAVE LET YOURSELF OR YOUR FAMILY DOWN: 0
7. TROUBLE CONCENTRATING ON THINGS, SUCH AS READING THE NEWSPAPER OR WATCHING TELEVISION: NOT AT ALL
5. POOR APPETITE OR OVEREATING: 0 - NOT AT ALL
2. FEELING DOWN, DEPRESSED, IRRITABLE, OR HOPELESS: 0
8. MOVING OR SPEAKING SO SLOWLY THAT OTHER PEOPLE COULD HAVE NOTICED. OR THE OPPOSITE, BEING SO FIGETY OR RESTLESS THAT YOU HAVE BEEN MOVING AROUND A LOT MORE THAN USUAL: NOT AT ALL
9. THOUGHTS THAT YOU WOULD BE BETTER OFF DEAD, OR OF HURTING YOURSELF: NOT AT ALL
7. TROUBLE CONCENTRATING ON THINGS, SUCH AS READING THE NEWSPAPER OR WATCHING TELEVISION: 0
4. FEELING TIRED OR HAVING LITTLE ENERGY: SEVERAL DAYS
SUM OF ALL RESPONSES TO PHQ QUESTIONS 1-9: 1
SUM OF ALL RESPONSES TO PHQ QUESTIONS 1-9: 1

## 2025-02-11 NOTE — PROGRESS NOTES
This evaluation was conducted via Teams using secure and encrypted videoconferencing technology. The patient was in their home in the [facility].    The patient's identity was confirmed and verbal consent was obtained for this virtual visit.      PSYCHIATRY FOLLOW-UP NOTE      Name: Jensen Leigh  MRN: [id number]  : 1960  Age: 64 y.o.  Date of assessment: 2025  PCP: Mahamed Santos M.D.  Persons in attendance: Patient      REASON FOR VISIT/CHIEF COMPLAINT (as stated by Patient):  Jensen Leigh is a 64 y.o., White male, attending follow-up appointment for mood and anxiety management.      HISTORY OF PRESENT ILLNESS:  Jensen Leigh is a 64 y.o. old male with MDD, IED and ADHD comes in today for follow up. Patient was last seen 4 months ago, and following treatment planning recommendations were done:  Continue Sertraline 50 mg daily for depression and anxiety management.  Continue Adderall XR 30 mg daily for ADHD management.   Controlled medication treatment agreement signed in 2021.    History of Present Illness    Underwent CABG X3. Recovering well. Blood pressure stable. He reports a good recovery following his cardiac surgery, with no complaints of respiratory distress or anginal symptoms.   He has resumed full-time work for the past month without any associated breathing difficulties or chest pain.   His current medication regimen of Zoloft and Adderall appears to be well-tolerated.  Agreed with not titrating medications.      CURRENT MEDICATIONS:  Current Outpatient Medications   Medication Sig Dispense Refill    JARDIANCE 25 MG Tab TAKE 1 TABLET BY MOUTH ONCE DAILY IN THE MORNING 90 Tablet 0    amphetamine-dextroamphetamine ER (ADDERALL XR) 30 MG XR capsule Take 1 Capsule by mouth every morning for 30 days. 30 Capsule 0    metoprolol tartrate (LOPRESSOR) 25 MG Tab Take 1 Tablet by mouth 2 times a day. 90 Tablet 3    lisinopril (PRINIVIL) 5 MG Tab Take 1 Tablet by mouth every  day. (for kidney protection) 90 Tablet 3    atorvastatin (LIPITOR) 80 MG tablet Take 1 Tablet by mouth every day. 90 Tablet 3    SITagliptin (JANUVIA) 100 MG Tab Take 1 Tablet by mouth every day. 90 Tablet 1    sertraline (ZOLOFT) 50 MG Tab Take 1 Tablet by mouth every day. 90 Tablet 1    nitroglycerin (NITROSTAT) 0.4 MG SL Tab Place 1 Tablet under the tongue as needed for Chest Pain. 25 Tablet 11    aspirin EC (ECOTRIN) 81 MG Tablet Delayed Response Take 2 Tabs by mouth every day. 30 Tab     Multiple Vitamin (MULTI VITAMIN MENS PO) Take 1 Tab by mouth every day.       No current facility-administered medications for this visit.       MEDICAL HISTORY  Past Medical History:   Diagnosis Date    Anginal syndrome (HCC)     Arthritis     Blood clotting disorder (HCC)     DVT RT LEG    Clotting disorder (HCC)     Dental disorder     Dentures    Eczema     GERD (gastroesophageal reflux disease)     Heart burn     Hiatus hernia syndrome     High cholesterol     History of deep venous thrombosis (DVT) of distal vein of right lower extremity 04/25/2024    Hyperlipidemia     Indigestion     Status post coronary artery bypass with three autogenous grafts 04/25/2024    Type II or unspecified type diabetes mellitus without mention of complication, not stated as uncontrolled     Umbilical hernia      Past Surgical History:   Procedure Laterality Date    MULTIPLE CORONARY ARTERY BYPASS ENDO VEIN HARVEST  04/17/2024    Procedure: CORONARY ARTERY BYPASS GRAFTING X3, ENDOSCOPIC VEIN HARVEST,;  Surgeon: Edu Velez D.O.;  Location: SURGERY [facility];  Service: Cardiothoracic    ECHOCARDIOGRAM, TRANSESOPHAGEAL, INTRAOPERATIVE  04/17/2024    Procedure: ECHOCARDIOGRAM, TRANSESOPHAGEAL, INTRAOPERATIVE;  Surgeon: Edu Velez D.O.;  Location: SURGERY [facility];  Service: Cardiothoracic    OTHER      Rotator cup repair, IVC filter    ROTATOR CUFF REPAIR      rt shoulder    TONSILLECTOMY AND ADENOIDECTOMY         PAST  PSYCHIATRIC MEDICATIONS  Zoloft  Adderall, Dexedrine    REVIEW OF SYSTEMS:        Constitutional negative   Eyes negative   Ears/Nose/Mouth/Throat negative   Cardiovascular negative   Respiratory negative   Gastrointestinal negative   Genitourinary negative   Muscular negative   Integumentary negative   Neurological negative   Endocrine negative   Hematologic/Lymphatic negative     PHYSICAL EXAMINAION:  Vital signs: There were no vitals taken for this visit.  Musculoskeletal: Normal gait.   Abnormal movements: none      MENTAL STATUS EXAMINATION      General:   - Grooming and hygiene: Casual,   - Apparent distress: none,   - Behavior: Calm  - Eye Contact:  Good,   - no psychomotor agitation or retardation    - Participation: Active verbal participation  Orientation: Alert and Fully Oriented to person, place and time  Mood: Euthymic  Affect: Flexible and Full range,  Thought Process: Logical and Goal-directed  Thought Content: Denies suicidal or homicidal ideations, intent or plan   Perception: Denies auditory or visual hallucinations. No delusions noted   Attention span and concentration: Intact   Speech:Rate within normal limits and Volume within normal limits  Language: Appropriate   Insight: Good  Judgment: Good  Recent and remote memory: No gross evidence of memory deficits        DEPRESSION SCREENIN/14/2022     2:00 PM 10/4/2023     2:00 PM 2024     8:00 PM   Depression Screen (PHQ-2/PHQ-9)   PHQ-2 Total Score   0   PHQ-2 Total Score 0 0        Interpretation of PHQ-9 Total Score   Score Severity   1-4 No Depression   5-9 Mild Depression   10-14 Moderate Depression   15-19 Moderately Severe Depression   20-27 Severe Depression    CURRENT RISK:       Suicidal: Low       Homicidal: Low       Self-Harm: Low       Relapse: Low       Crisis Safety Plan Reviewed Not Indicated       If evidence of imminent risk is present, intervention/plan:      MEDICAL RECORDS/LABS/DIAGNOSTIC TESTS REVIEWED:  No new  lab since last visit     [facility] records -   Reviewed     PLAN:  (1) MDD; (2) Intermittent Explosive Disorder; (3) ADHD  Stable  Continue Sertraline 50 mg daily for depression and anxiety management.  Continue Adderall XR 30 mg daily for ADHD management.   Controlled medication treatment agreement signed in Feb 2021.  Medication options, alternatives (including no medications) and medication risks/benefits/side effects were discussed in detail.  The patient was advised to call, message provider on MyChart, or come in to the clinic if symptoms worsen or if any future questions/issues regarding their medications arise; the patient verbalized understanding and agreement.    The patient was educated to call 911, call the suicide hotline, or go to local ER if having thoughts of suicide or homicide; verbalized understanding.      Billing Coding based on:  128[address] based on MDM    Return to clinic in 3-4 months or sooner if symptoms worsen.  Next Appointment: instruction provided on how to make the next appointment.     The proposed treatment plan was discussed with the patient who was provided the opportunity to ask questions and make suggestions regarding alternative treatment. Patient verbalized understanding and expressed agreement with the plan.       Eliazar Prasad M.D.  02/11/25    This note was created using voice recognition software (Dragon). The accuracy of the dictation is limited by the abilities of the software. I have reviewed the note prior to signing, however some errors in grammar and context are still possible. If you have any questions related to this note please do not hesitate to contact our office.

## 2025-05-02 ENCOUNTER — TELEPHONE (OUTPATIENT)
Dept: CARDIOLOGY | Facility: MEDICAL CENTER | Age: 65
End: 2025-05-02
Payer: MEDICARE

## 2025-05-02 NOTE — TELEPHONE ENCOUNTER
Called pt in regards to lab work that was ordered at previous OV. LVM for call back to see if the labs were completed somewhere outside of [facility]. Pt has follow up appointment scheduled with Vicky Jordan  on 5.13.2025.

## 2025-05-03 ENCOUNTER — HOSPITAL ENCOUNTER (OUTPATIENT)
Dept: LAB | Facility: MEDICAL CENTER | Age: 65
End: 2025-05-03
Payer: MEDICARE

## 2025-05-03 DIAGNOSIS — E78.5 DYSLIPIDEMIA: ICD-10-CM

## 2025-05-03 LAB
CHOLEST SERPL-MCNC: 131 MG/DL (ref 100–199)
FASTING STATUS PATIENT QL REPORTED: NORMAL
HDLC SERPL-MCNC: 47 MG/DL
LDLC SERPL CALC-MCNC: 67 MG/DL
TRIGL SERPL-MCNC: 84 MG/DL (ref 0–149)

## 2025-05-03 PROCEDURE — 80061 LIPID PANEL: CPT

## 2025-05-03 PROCEDURE — 36415 COLL VENOUS BLD VENIPUNCTURE: CPT

## 2025-05-05 ENCOUNTER — RESULTS FOLLOW-UP (OUTPATIENT)
Dept: CARDIOLOGY | Facility: MEDICAL CENTER | Age: 65
End: 2025-05-05
Payer: MEDICARE

## 2025-05-08 ENCOUNTER — TELEPHONE (OUTPATIENT)
Dept: FAMILY PLANNING/WOMEN'S HEALTH CLINIC | Facility: PHYSICIAN GROUP | Age: 65
End: 2025-05-08
Payer: MEDICARE

## 2025-05-08 NOTE — TELEPHONE ENCOUNTER
Message: Called patient to schedule annual Comprehensive Health Assessment visit with the [facility]. Wife spouse will talk to him.

## 2025-05-13 ENCOUNTER — PATIENT MESSAGE (OUTPATIENT)
Dept: BEHAVIORAL HEALTH | Facility: CLINIC | Age: 65
End: 2025-05-13
Payer: MEDICARE

## 2025-05-13 ENCOUNTER — OFFICE VISIT (OUTPATIENT)
Dept: CARDIOLOGY | Facility: MEDICAL CENTER | Age: 65
End: 2025-05-13
Payer: COMMERCIAL

## 2025-05-13 VITALS
HEIGHT: 68 IN | WEIGHT: 231 LBS | BODY MASS INDEX: 35.01 KG/M2 | HEART RATE: 93 BPM | DIASTOLIC BLOOD PRESSURE: 60 MMHG | RESPIRATION RATE: 16 BRPM | SYSTOLIC BLOOD PRESSURE: 116 MMHG | OXYGEN SATURATION: 93 %

## 2025-05-13 DIAGNOSIS — E78.5 DYSLIPIDEMIA: ICD-10-CM

## 2025-05-13 DIAGNOSIS — I25.10 CORONARY ARTERY DISEASE INVOLVING NATIVE CORONARY ARTERY OF NATIVE HEART WITHOUT ANGINA PECTORIS: ICD-10-CM

## 2025-05-13 DIAGNOSIS — Z95.1: ICD-10-CM

## 2025-05-13 DIAGNOSIS — Z95.1 S/P CABG X 3: ICD-10-CM

## 2025-05-13 DIAGNOSIS — Z86.718 HISTORY OF DEEP VENOUS THROMBOSIS (DVT) OF DISTAL VEIN OF RIGHT LOWER EXTREMITY: ICD-10-CM

## 2025-05-13 DIAGNOSIS — F90.2 ADHD (ATTENTION DEFICIT HYPERACTIVITY DISORDER), COMBINED TYPE: ICD-10-CM

## 2025-05-13 DIAGNOSIS — I10 HYPERTENSION, ESSENTIAL: ICD-10-CM

## 2025-05-13 PROCEDURE — 99213 OFFICE O/P EST LOW 20 MIN: CPT

## 2025-05-13 PROCEDURE — 3074F SYST BP LT 130 MM HG: CPT

## 2025-05-13 PROCEDURE — 3078F DIAST BP <80 MM HG: CPT

## 2025-05-13 PROCEDURE — 99214 OFFICE O/P EST MOD 30 MIN: CPT

## 2025-05-13 RX ORDER — DEXTROAMPHETAMINE SACCHARATE, AMPHETAMINE ASPARTATE MONOHYDRATE, DEXTROAMPHETAMINE SULFATE AND AMPHETAMINE SULFATE 7.5; 7.5; 7.5; 7.5 MG/1; MG/1; MG/1; MG/1
30 CAPSULE, EXTENDED RELEASE ORAL EVERY MORNING
Qty: 30 CAPSULE | Refills: 0 | Status: SHIPPED | OUTPATIENT
Start: 2025-05-17 | End: 2025-06-16

## 2025-05-13 RX ORDER — EZETIMIBE 10 MG/1
10 TABLET ORAL DAILY
Qty: 90 TABLET | Refills: 3 | Status: SHIPPED | OUTPATIENT
Start: 2025-05-13

## 2025-05-13 ASSESSMENT — ENCOUNTER SYMPTOMS
SHORTNESS OF BREATH: 0
PALPITATIONS: 0
ORTHOPNEA: 0
PND: 0
POOR WOUND HEALING: 0
NEAR-SYNCOPE: 0
SYNCOPE: 0
DIZZINESS: 0
HEADACHES: 0
DYSPNEA ON EXERTION: 0
LIGHT-HEADEDNESS: 0

## 2025-05-13 ASSESSMENT — FIBROSIS 4 INDEX: FIB4 SCORE: 1.4

## 2025-05-13 NOTE — PROGRESS NOTES
Chief Complaint   Patient presents with    Coronary Artery Disease     F/v Dx:Coronary artery disease due to type 2 diabetes mellitus (HCC)    Coronary Artery Bypass Graft (CABG)     F/v Dx:S/P CABG x 3    Dyslipidemia          Subjective:   Jensen Leigh is a 64 y.o. male who presents today for follow-up post coronary artery bypass grafting x3 on 4/17/2024.     Patient of Dr. Campoverde.  Current medical problems include history of methamphetamine use, diabetes mellitus, hypertension, DVT s/p IVC filter, depressive disorder, hyperlipidemia, obesity, and coronary artery disease . Their last clinic visit was 11/12/2024 with myself.    Interval history:  The patient previously saw a cardiologist at [facility] Dr. Hamm 8/23/2023. During the visit, the patient reports occasional chest pain, and he was referred for [facility]. Heart cath showed mild left main disease,  of RCA with collaterals from the left system, occluded proximal LCx with collaterals from LAD, and mild to moderate mid LAD stenosis status post intracoronary physiology assessment of LAD, which showed severe mid LAD disease, and he was recommended for surgical revascularization. He wanted a second opinion so he came to [facility].     He was medically managed and referred to Dr. Locke to discuss high risk PCI. After his appointment with Dr. Locke on 1/23/2024 patient was agreeable to CABG as Dr. Velez had previously recommended. Patient had a CABG x3 on 4/17/2024.    today's visit:  Patient is doing overall well from a cardiac perspective. He denies chest pain or pressure, shortness of breath, palpitations, orthopnea, PND, edema, dizziness/lightheadedness or syncope. Patient is taking all medications as prescribed. Patient works as a  and  and climbs ladders and stairs without any limitations. He does not take his blood pressure at home currently. HE does not smoke, use recreational drugs or alcohol.    Cardiovascular Risk  Factors:  1. Smoking status: Former smoker  2. Type II Diabetes Mellitus: Yes   Lab Results   Component Value Date/Time    HBA1C 7.2 (A) 11/12/2024 04:26 PM    HBA1C 8.0 (H) 04/16/2024 02:40 PM     3. Hypertension: Yes  4. Dyslipidemia: Yes   Cholesterol,Tot   Date Value Ref Range Status   07/15/2023 126 100 - 199 mg/dL Final     LDL   Date Value Ref Range Status   07/15/2023 68 <100 mg/dL Final     HDL   Date Value Ref Range Status   07/15/2023 46 >=40 mg/dL Final     Triglycerides   Date Value Ref Range Status   07/15/2023 59 0 - 149 mg/dL Final     5. Family history of early Coronary Artery Disease in a first degree relative (Male less than 55 years of age; Female less than 65 years of age): No      Past Medical History:   Diagnosis Date    Anginal syndrome (HCC)     Arthritis     Blood clotting disorder (HCC)     DVT RT LEG    Clotting disorder (HCC)     Dental disorder     Dentures    Eczema     GERD (gastroesophageal reflux disease)     Heart burn     Hiatus hernia syndrome     High cholesterol     History of deep venous thrombosis (DVT) of distal vein of right lower extremity 04/25/2024    Hyperlipidemia     Indigestion     Status post coronary artery bypass with three autogenous grafts 04/25/2024    Type II or unspecified type diabetes mellitus without mention of complication, not stated as uncontrolled     Umbilical hernia          Family History   Problem Relation Age of Onset    Hyperlipidemia Mother     Cancer Mother         liver    Depression Mother     Alcohol abuse Mother     Cancer Father         lung    Alcohol abuse Father     Depression Sister     Ovarian Cancer Neg Hx     Tubal Cancer Neg Hx     Breast Cancer Neg Hx     Colorectal Cancer Neg Hx     Peritoneal Cancer Neg Hx          Social History     Tobacco Use    Smoking status: Never    Smokeless tobacco: Never   Vaping Use    Vaping status: Never Used   Substance Use Topics    Alcohol use: Not Currently    Drug use: No         No Known  "Allergies      Current Outpatient Medications   Medication Sig    [START ON 5/17/2025] amphetamine-dextroamphetamine ER (ADDERALL XR) 30 MG XR capsule Take 1 Capsule by mouth every morning for 30 days.    ezetimibe (ZETIA) 10 MG Tab Take 1 Tablet by mouth every day.    sertraline (ZOLOFT) 50 MG Tab Take 1 Tablet by mouth every day.    JARDIANCE 25 MG Tab TAKE 1 TABLET BY MOUTH ONCE DAILY IN THE MORNING    metoprolol tartrate (LOPRESSOR) 25 MG Tab Take 1 Tablet by mouth 2 times a day.    lisinopril (PRINIVIL) 5 MG Tab Take 1 Tablet by mouth every day. (for kidney protection)    atorvastatin (LIPITOR) 80 MG tablet Take 1 Tablet by mouth every day.    SITagliptin (JANUVIA) 100 MG Tab Take 1 Tablet by mouth every day.    nitroglycerin (NITROSTAT) 0.4 MG SL Tab Place 1 Tablet under the tongue as needed for Chest Pain.    aspirin EC (ECOTRIN) 81 MG Tablet Delayed Response Take 2 Tabs by mouth every day.    Multiple Vitamin (MULTI VITAMIN MENS PO) Take 1 Tab by mouth every day.         Review of Systems   Constitutional: Negative for malaise/fatigue.   Cardiovascular:  Negative for chest pain, dyspnea on exertion, leg swelling, near-syncope, orthopnea, palpitations, paroxysmal nocturnal dyspnea and syncope.   Respiratory:  Negative for shortness of breath.    Skin:  Negative for itching and poor wound healing.   Neurological:  Negative for dizziness, headaches and light-headedness.           Objective:   /60 (BP Location: Left arm, Patient Position: Sitting, BP Cuff Size: Adult)   Pulse 93   Resp 16   Ht 1.727 m (5' 8\")   Wt 105 kg (231 lb)   SpO2 93%  Body mass index is 35.12 kg/m².         Physical Exam  Constitutional:       General: He is not in acute distress.     Appearance: Normal appearance.   HENT:      Head: Normocephalic and atraumatic.   Cardiovascular:      Rate and Rhythm: Normal rate and regular rhythm.      Pulses: Normal pulses.      Heart sounds: No murmur heard.  Pulmonary:      Effort: " "Pulmonary effort is normal. No respiratory distress.      Breath sounds: Normal breath sounds.   Chest:      Chest wall: No swelling, tenderness or crepitus.   Musculoskeletal:      Right lower leg: No edema.      Left lower leg: No edema.   Neurological:      Mental Status: He is alert and oriented to person, place, and time.      Gait: Gait normal.   Psychiatric:         Behavior: Behavior normal.             Lab Results   Component Value Date/Time    SODIUM 137 04/21/2024 12:28 AM    POTASSIUM 3.9 04/21/2024 12:28 AM    CHLORIDE 99 04/21/2024 12:28 AM    CO2 25 04/21/2024 12:28 AM    GLUCOSE 143 (H) 04/21/2024 12:28 AM    BUN 31 (H) 04/21/2024 12:28 AM    CREATININE 0.63 04/21/2024 12:28 AM    GLOMRATE 90 08/23/2023 07:13 AM      Lab Results   Component Value Date/Time    WBC 10.0 04/21/2024 12:28 AM    RBC 4.24 (L) 04/21/2024 12:28 AM    HEMOGLOBIN 12.4 (L) 04/21/2024 12:28 AM    HEMATOCRIT 38.2 (L) 04/21/2024 12:28 AM    MCV 90.1 04/21/2024 12:28 AM    MCH 29.2 04/21/2024 12:28 AM    MCHC 32.5 04/21/2024 12:28 AM    MPV 10.4 04/21/2024 12:28 AM    NEUTSPOLYS 47.40 04/16/2024 02:40 PM    LYMPHOCYTES 42.20 (H) 04/16/2024 02:40 PM    MONOCYTES 7.80 04/16/2024 02:40 PM    EOSINOPHILS 1.70 04/16/2024 02:40 PM    BASOPHILS 0.60 04/16/2024 02:40 PM      Lab Results   Component Value Date/Time    CHOLSTRLTOT 131 05/03/2025 07:44 AM    LDL 67 05/03/2025 07:44 AM    HDL 47 05/03/2025 07:44 AM    TRIGLYCERIDE 84 05/03/2025 07:44 AM       No results found for: \"TROPONINT\"  No results found for: \"NTPROBNP\"      4/17/24 Dr. Velez  CORONARY ARTERY BYPASS GRAFTING X3  WITH SKELETONIZED SMITH TO LAD  RSVG TO OM1 AND RPDA  ENDOSCOPIC  HARVESTOF RIGHT GREATER SAPHENOUS VEIN,  Assessment:   1. Coronary artery disease involving native coronary artery of native heart without angina pectoris    2. Status post coronary artery bypass with three autogenous grafts  - ezetimibe (ZETIA) 10 MG Tab; Take 1 Tablet by mouth every day.  " Dispense: 90 Tablet; Refill: 3  - Comp Metabolic Panel; Future  - Lipid Profile; Future  - Lipoprotein (a); Future    3. S/P CABG x 3  - ezetimibe (ZETIA) 10 MG Tab; Take 1 Tablet by mouth every day.  Dispense: 90 Tablet; Refill: 3  - Comp Metabolic Panel; Future  - Lipid Profile; Future  - Lipoprotein (a); Future    4. Dyslipidemia  - ezetimibe (ZETIA) 10 MG Tab; Take 1 Tablet by mouth every day.  Dispense: 90 Tablet; Refill: 3  - Comp Metabolic Panel; Future  - Lipid Profile; Future  - Lipoprotein (a); Future    5. Hypertension, essential    6. History of deep venous thrombosis (DVT) of distal vein of right lower extremity            Medical Decision Making:  Today's Assessment / Plan:   Status post CABG x3 on 4/17/2024 LIMA to LAD RSVG to OM1, and RPDA  -Doing well postop. Incisional scar heeling well and approximated. No redness or drainage. Denies popping or clicking sensation in his chest.   -CABG: continue  mg daily  -Continue statin:continue Atorvastatin 80 mg daily and start zetia 10 mg daily  -Continue BB: Metoprolol 25 mg two times daily  - referral to cardiac rehab: patient did not participate due to work and location  -Recommend at least 150min of moderate-intensity aerobic activity per week  -Discussed recommendation for continued diet and lifestyle modifications    Hyperlipidemia  -Most recent LDL 67  -continue atorvastatin 80 mg daily  -start zetia 10 mg daily  -Goal of less than 55  -Check lipid panel in 3 months including LPa    Hypertension  - Good control  - continue metoprolol 25 mg twice a day   -continue lisinopril 5 mg daily  - goal < 130/80  -continue to monitor blood pressure at home and keep a log    DVT s/p IVC filter  -Continue  mg daily per vascular Dr. Medley recommendation    Return in about 6 months (around 11/13/2025) for Vicky GODINEZ.  Sooner if problems.    LENI Gan.

## 2025-05-13 NOTE — PATIENT COMMUNICATION
pts pharmacy does not have 30mg nor 15mg instock they will have to order it ahead of time but in order to do that they will need that months prescription sent over can you enter that thank you           Received request via: Patient    Was the patient seen in the last year in this department? Yes    Does the patient have an active prescription (recently filled or refills available) for medication(s) requested? No    Pharmacy Name: [facility]    Does the patient have custodial Plus and need 100-day supply? (This applies to ALL medications) Patient does not have SCP

## 2025-05-28 DIAGNOSIS — E11.9 TYPE 2 DIABETES MELLITUS WITHOUT COMPLICATION, WITHOUT LONG-TERM CURRENT USE OF INSULIN (HCC): ICD-10-CM

## 2025-05-28 RX ORDER — SITAGLIPTIN 100 MG/1
100 TABLET, FILM COATED ORAL DAILY
Qty: 90 TABLET | Refills: 2 | Status: SHIPPED | OUTPATIENT
Start: 2025-05-28

## 2025-05-28 NOTE — TELEPHONE ENCOUNTER
Received request via: Pharmacy    Was the patient seen in the last year in this department? Yes    Does the patient have an active prescription (recently filled or refills available) for medication(s) requested? No    Pharmacy Name:   [facility] [address] [place] [address]              Does the patient have custodial Plus and need 100-day supply? (This applies to ALL medications) Patient does not have SCP

## 2025-06-02 ENCOUNTER — PATIENT MESSAGE (OUTPATIENT)
Dept: CARDIOLOGY | Facility: MEDICAL CENTER | Age: 65
End: 2025-06-02
Payer: MEDICARE

## 2025-06-02 NOTE — PATIENT COMMUNICATION
Call placed to patient's spouse, reports patient was taking the Zetia as prescribed and then developed chest pressure, spouse recommended he stop the medication as she had similar symptoms when trying medication for cholesterol. On Saturday 5/31 while returning home from out of town, patient developed similar chest pressure, patient had to stop on the side of the road due to nausea, dry heaving and chest pressure. Spouse reports patient had eaten pizza and drank mt dew prior to this happening. They did not have nitro with them as pt has not had to use nitro in some time. After approximately 10-15 minutes the pain subsided. Patient's fitbit reported heart rate of 72 at the time. Advised to collect blood pressure and heart rate after taking bp medication. Spouse concerned at this time, inquiring if patient should be seen sooner.    HL- please advise.

## 2025-06-03 ENCOUNTER — TELEMEDICINE (OUTPATIENT)
Dept: BEHAVIORAL HEALTH | Facility: CLINIC | Age: 65
End: 2025-06-03
Payer: MEDICARE

## 2025-06-03 DIAGNOSIS — F63.81 INTERMITTENT EXPLOSIVE DISORDER: Primary | ICD-10-CM

## 2025-06-03 DIAGNOSIS — F90.2 ADHD (ATTENTION DEFICIT HYPERACTIVITY DISORDER), COMBINED TYPE: ICD-10-CM

## 2025-06-03 DIAGNOSIS — F32.A DEPRESSIVE DISORDER: ICD-10-CM

## 2025-06-03 PROCEDURE — 99214 OFFICE O/P EST MOD 30 MIN: CPT | Mod: 95 | Performed by: PSYCHIATRY & NEUROLOGY

## 2025-06-03 RX ORDER — DEXTROAMPHETAMINE SACCHARATE, AMPHETAMINE ASPARTATE MONOHYDRATE, DEXTROAMPHETAMINE SULFATE AND AMPHETAMINE SULFATE 7.5; 7.5; 7.5; 7.5 MG/1; MG/1; MG/1; MG/1
30 CAPSULE, EXTENDED RELEASE ORAL EVERY MORNING
Qty: 30 CAPSULE | Refills: 0 | Status: SHIPPED | OUTPATIENT
Start: 2025-07-16 | End: 2025-08-15

## 2025-06-03 RX ORDER — DEXTROAMPHETAMINE SACCHARATE, AMPHETAMINE ASPARTATE MONOHYDRATE, DEXTROAMPHETAMINE SULFATE AND AMPHETAMINE SULFATE 7.5; 7.5; 7.5; 7.5 MG/1; MG/1; MG/1; MG/1
30 CAPSULE, EXTENDED RELEASE ORAL EVERY MORNING
Qty: 30 CAPSULE | Refills: 0 | Status: SHIPPED | OUTPATIENT
Start: 2025-08-16 | End: 2025-09-15

## 2025-06-03 RX ORDER — DEXTROAMPHETAMINE SACCHARATE, AMPHETAMINE ASPARTATE MONOHYDRATE, DEXTROAMPHETAMINE SULFATE AND AMPHETAMINE SULFATE 7.5; 7.5; 7.5; 7.5 MG/1; MG/1; MG/1; MG/1
30 CAPSULE, EXTENDED RELEASE ORAL EVERY MORNING
Qty: 30 CAPSULE | Refills: 0 | Status: SHIPPED | OUTPATIENT
Start: 2025-06-15 | End: 2025-06-25 | Stop reason: SDUPTHER

## 2025-06-03 NOTE — PROGRESS NOTES
This evaluation was conducted via Teams using secure and encrypted videoconferencing technology. The patient was in their home in the [facility].    The patient's identity was confirmed and verbal consent was obtained for this virtual visit.      PSYCHIATRY FOLLOW-UP NOTE      Name: Jensen Leigh  MRN: [id number]  : 1960  Age: 65 y.o.  Date of assessment: 6/3/2025  PCP: Mahamed Santos M.D.  Persons in attendance: Patient      REASON FOR VISIT/CHIEF COMPLAINT (as stated by Patient):  Jensen Leigh is a 65 y.o., White male, attending follow-up appointment for mood and anxiety management.      HISTORY OF PRESENT ILLNESS:  Jensen Leigh is a 65 y.o. old male with MDD, ADHD and IED comes in today for follow up. Patient was last seen 3 month ago, and following treatment planning recommendations were done:  Continue Sertraline 50 mg daily for depression and anxiety management.  Continue Adderall XR 30 mg daily for ADHD management.   Controlled medication treatment agreement signed in 2021.    History of Present Illness    He reports no significant changes in his cardiac health, with both his blood pressure and heart rate remaining stable. He has a home blood pressure monitor, and his wife confirms that his readings have been within the normal range. He is not experiencing any chest pain, headaches, or pressure sensations in his head. He recently underwent blood work, which showed very good cholesterol levels.  He continues to be employed full-time, although he has reduced his working hours from 12 to 9-10 hours per day, which he finds somewhat frustrating. He acknowledges the need to moderate his workload due to his age.   He experiences mild fatigue in the afternoons but does not report any increased irritability or anxiety.   His current medication regimen, which includes Zoloft and Adderall, appears to be effective.    Pertinent Negatives:  - No chest pain  - No headaches  - No pressure  sensations in the head  - No increased irritability  - No high anxiety      CURRENT MEDICATIONS:  Current Medications[1]    MEDICAL HISTORY  Past Medical History[2]  Past Surgical History[3]      PAST PSYCHIATRIC MEDICATIONS  Zoloft  Adderall, Dexedrine    REVIEW OF SYSTEMS:        Constitutional negative   Eyes negative   Ears/Nose/Mouth/Throat negative   Cardiovascular negative   Respiratory negative   Gastrointestinal negative   Genitourinary negative   Muscular negative   Integumentary negative   Neurological negative   Endocrine negative   Hematologic/Lymphatic negative     PHYSICAL EXAMINAION:  Vital signs: There were no vitals taken for this visit.  Musculoskeletal: Normal gait.   Abnormal movements: none      MENTAL STATUS EXAMINATION      General:   - Grooming and hygiene: Casual,   - Apparent distress: none,   - Behavior: Calm  - Eye Contact:  Good,   - no psychomotor agitation or retardation    - Participation: Active verbal participation  Orientation: Alert and Fully Oriented to person, place and time  Mood: Euthymic  Affect: Flexible and Full range,  Thought Process: Logical and Goal-directed  Thought Content: Denies suicidal or homicidal ideations, intent or plan   Perception: Denies auditory or visual hallucinations. No delusions noted   Attention span and concentration: Intact   Speech:Rate within normal limits  Language: Appropriate   Insight: Good  Judgment: Good  Recent and remote memory: No gross evidence of memory deficits        DEPRESSION SCREENING:      10/4/2023     2:00 PM 4/18/2024     8:00 PM 2/11/2025     3:00 PM   Depression Screen (PHQ-2/PHQ-9)   PHQ-2 Total Score  0    PHQ-2 Total Score 0     PHQ-9 Total Score   1       Interpretation of PHQ-9 Total Score   Score Severity   1-4 No Depression   5-9 Mild Depression   10-14 Moderate Depression   15-19 Moderately Severe Depression   20-27 Severe Depression    CURRENT RISK:       Suicidal: Low       Homicidal: Low       Self-Harm: Low        Relapse: Low       Crisis Safety Plan Reviewed Not Indicated       If evidence of imminent risk is present, intervention/plan:      MEDICAL RECORDS/LABS/DIAGNOSTIC TESTS REVIEWED:  No new lab since last visit     [place]  records -   Reviewed     PLAN:  (1) MDD; (2) Intermittent Explosive Disorder; (3) ADHD  Stable  Continue Sertraline 50 mg daily for depression and anxiety management.  Continue Adderall XR 30 mg daily for ADHD management.   Controlled medication treatment agreement signed in Feb 2021.  Medication options, alternatives (including no medications) and medication risks/benefits/side effects were discussed in detail.  The patient was advised to call, message provider on [employer], or come in to the clinic if symptoms worsen or if any future questions/issues regarding their medications arise; the patient verbalized understanding and agreement.    The patient was educated to call 911, call the suicide hotline, or go to local ER if having thoughts of suicide or homicide; verbalized understanding.      Billing Coding based on:  [address]251 based on MDM    Return to clinic in 3 months or sooner if symptoms worsen.  Next Appointment: instruction provided on how to make the next appointment.     The proposed treatment plan was discussed with the patient who was provided the opportunity to ask questions and make suggestions regarding alternative treatment. Patient verbalized understanding and expressed agreement with the plan.       Eliazar Prasad M.D.  06/03/25    This note was created using voice recognition software (Dragon). The accuracy of the dictation is limited by the abilities of the software. I have reviewed the note prior to signing, however some errors in grammar and context are still possible. If you have any questions related to this note please do not hesitate to contact our office.            [1]   Current Outpatient Medications   Medication Sig Dispense Refill    SITagliptin (JANUVIA) 100 MG Tab Take  1 tablet by mouth once daily 90 Tablet 2    amphetamine-dextroamphetamine ER (ADDERALL XR) 30 MG XR capsule Take 1 Capsule by mouth every morning for 30 days. 30 Capsule 0    ezetimibe (ZETIA) 10 MG Tab Take 1 Tablet by mouth every day. 90 Tablet 3    sertraline (ZOLOFT) 50 MG Tab Take 1 Tablet by mouth every day. 90 Tablet 3    JARDIANCE 25 MG Tab TAKE 1 TABLET BY MOUTH ONCE DAILY IN THE MORNING 90 Tablet 0    metoprolol tartrate (LOPRESSOR) 25 MG Tab Take 1 Tablet by mouth 2 times a day. 90 Tablet 3    lisinopril (PRINIVIL) 5 MG Tab Take 1 Tablet by mouth every day. (for kidney protection) 90 Tablet 3    atorvastatin (LIPITOR) 80 MG tablet Take 1 Tablet by mouth every day. 90 Tablet 3    nitroglycerin (NITROSTAT) 0.4 MG SL Tab Place 1 Tablet under the tongue as needed for Chest Pain. 25 Tablet 11    aspirin EC (ECOTRIN) 81 MG Tablet Delayed Response Take 2 Tabs by mouth every day. 30 Tab     Multiple Vitamin (MULTI VITAMIN MENS PO) Take 1 Tab by mouth every day.       No current facility-administered medications for this visit.   [2]   Past Medical History:  Diagnosis Date    Anginal syndrome (HCC)     Arthritis     Blood clotting disorder (HCC)     DVT RT LEG    Clotting disorder (HCC)     Dental disorder     Dentures    Eczema     GERD (gastroesophageal reflux disease)     Heart burn     Hiatus hernia syndrome     High cholesterol     History of deep venous thrombosis (DVT) of distal vein of right lower extremity 04/25/2024    Hyperlipidemia     Indigestion     Status post coronary artery bypass with three autogenous grafts 04/25/2024    Type II or unspecified type diabetes mellitus without mention of complication, not stated as uncontrolled     Umbilical hernia    [3]   Past Surgical History:  Procedure Laterality Date    MULTIPLE CORONARY ARTERY BYPASS ENDO VEIN HARVEST  04/17/2024    Procedure: CORONARY ARTERY BYPASS GRAFTING X3, ENDOSCOPIC VEIN HARVEST,;  Surgeon: Edu Velez D.O.;  Location:  SURGERY [facility];  Service: Cardiothoracic    ECHOCARDIOGRAM, TRANSESOPHAGEAL, INTRAOPERATIVE  04/17/2024    Procedure: ECHOCARDIOGRAM, TRANSESOPHAGEAL, INTRAOPERATIVE;  Surgeon: Edu Velez D.O.;  [place];  Service: Cardiothoracic    OTHER      Rotator cup repair, IVC filter    ROTATOR CUFF REPAIR      rt shoulder    TONSILLECTOMY AND ADENOIDECTOMY        Yanely Lundy OR RN

## 2025-06-05 ENCOUNTER — PATIENT MESSAGE (OUTPATIENT)
Dept: CARDIOLOGY | Facility: MEDICAL CENTER | Age: 65
End: 2025-06-05
Payer: MEDICARE

## 2025-06-05 DIAGNOSIS — I20.89 STABLE ANGINA (HCC): ICD-10-CM

## 2025-06-05 DIAGNOSIS — I25.84 CORONARY ARTERY DISEASE DUE TO CALCIFIED CORONARY LESION: ICD-10-CM

## 2025-06-05 DIAGNOSIS — I25.10 CORONARY ARTERY DISEASE DUE TO CALCIFIED CORONARY LESION: ICD-10-CM

## 2025-06-05 RX ORDER — NITROGLYCERIN 0.4 MG/1
0.4 TABLET SUBLINGUAL PRN
Qty: 25 TABLET | Refills: 6 | Status: SHIPPED | OUTPATIENT
Start: 2025-06-05 | End: 2025-06-24 | Stop reason: SDUPTHER

## 2025-06-05 NOTE — PATIENT COMMUNICATION
LENI Gan.     6/4/25  2:42 PM  Ok to stop zetia if not tolerating dose. Continue atorvastatin. Did symptoms resolve after stopping medication and have they happened since episode? We can prescribe nitroglycerin as needed 0.4 mg every 5 mins not to exceed 3 doses. If reoccurs patient to follow up sooner. ER precautions for increased or worsening chest pain.

## 2025-06-22 ENCOUNTER — PATIENT MESSAGE (OUTPATIENT)
Dept: CARDIOLOGY | Facility: MEDICAL CENTER | Age: 65
End: 2025-06-22
Payer: MEDICARE

## 2025-06-24 ENCOUNTER — OFFICE VISIT (OUTPATIENT)
Dept: CARDIOLOGY | Facility: MEDICAL CENTER | Age: 65
End: 2025-06-24
Payer: MEDICARE

## 2025-06-24 VITALS
SYSTOLIC BLOOD PRESSURE: 112 MMHG | RESPIRATION RATE: 12 BRPM | DIASTOLIC BLOOD PRESSURE: 76 MMHG | HEIGHT: 68 IN | OXYGEN SATURATION: 95 % | HEART RATE: 74 BPM | BODY MASS INDEX: 34.71 KG/M2 | WEIGHT: 229 LBS

## 2025-06-24 DIAGNOSIS — I20.89 STABLE ANGINA (HCC): ICD-10-CM

## 2025-06-24 DIAGNOSIS — E78.5 DYSLIPIDEMIA: ICD-10-CM

## 2025-06-24 DIAGNOSIS — Z86.718 HISTORY OF DEEP VENOUS THROMBOSIS (DVT) OF DISTAL VEIN OF RIGHT LOWER EXTREMITY: ICD-10-CM

## 2025-06-24 DIAGNOSIS — I25.10 CORONARY ARTERY DISEASE DUE TO CALCIFIED CORONARY LESION: ICD-10-CM

## 2025-06-24 DIAGNOSIS — I10 HYPERTENSION, ESSENTIAL: Primary | ICD-10-CM

## 2025-06-24 DIAGNOSIS — Z95.1 S/P CABG X 3: ICD-10-CM

## 2025-06-24 DIAGNOSIS — I25.84 CORONARY ARTERY DISEASE DUE TO CALCIFIED CORONARY LESION: ICD-10-CM

## 2025-06-24 DIAGNOSIS — E11.9 TYPE 2 DIABETES MELLITUS WITHOUT COMPLICATION, WITHOUT LONG-TERM CURRENT USE OF INSULIN (HCC): ICD-10-CM

## 2025-06-24 PROCEDURE — 93005 ELECTROCARDIOGRAM TRACING: CPT | Mod: TC

## 2025-06-24 PROCEDURE — 99213 OFFICE O/P EST LOW 20 MIN: CPT

## 2025-06-24 PROCEDURE — 99214 OFFICE O/P EST MOD 30 MIN: CPT

## 2025-06-24 PROCEDURE — 3078F DIAST BP <80 MM HG: CPT

## 2025-06-24 PROCEDURE — 3074F SYST BP LT 130 MM HG: CPT

## 2025-06-24 RX ORDER — METOPROLOL TARTRATE 37.5 MG/1
37.5 TABLET ORAL 2 TIMES DAILY
Qty: 90 TABLET | Refills: 3 | Status: SHIPPED | OUTPATIENT
Start: 2025-06-24

## 2025-06-24 RX ORDER — NITROGLYCERIN 0.4 MG/1
0.4 TABLET SUBLINGUAL PRN
Qty: 25 TABLET | Refills: 6 | Status: SHIPPED | OUTPATIENT
Start: 2025-06-24

## 2025-06-24 RX ORDER — ISOSORBIDE MONONITRATE 30 MG/1
30 TABLET, EXTENDED RELEASE ORAL EVERY MORNING
Qty: 90 TABLET | Refills: 3 | Status: SHIPPED | OUTPATIENT
Start: 2025-06-24

## 2025-06-24 ASSESSMENT — ENCOUNTER SYMPTOMS
DYSPNEA ON EXERTION: 0
ORTHOPNEA: 0
PND: 0
SHORTNESS OF BREATH: 0
PALPITATIONS: 0
POOR WOUND HEALING: 0
DIZZINESS: 0
HEADACHES: 0
SYNCOPE: 0
NEAR-SYNCOPE: 0
NAUSEA: 1
LIGHT-HEADEDNESS: 0

## 2025-06-24 ASSESSMENT — FIBROSIS 4 INDEX: FIB4 SCORE: 1.4

## 2025-06-24 NOTE — PATIENT INSTRUCTIONS
START ISOSORBIDE MONONITRATE (IMDUR) 30 MG EVERY MORNING    INCREASE THE METOPROLOL 37.5 MG TWICE A DAY    START TAKING BLOOD PRESSURE DAILY AND KEEP A LOG    REPEAT AN ECHOCARDIOGRAM AND STRESS TEST

## 2025-06-24 NOTE — PATIENT COMMUNICATION
HL:ALBERTA  See routing comments     =====================================    Phone No. Called: [phone]     Call outcome: Spoke with merary Hardy    Discussion / Message:    Wife states that he appears to be having symptoms again Chest pain and dry heaves. HE recently had chest pain and needed his nitroglycerin which relieve him. She said it has been happening more frequently. Happened 3 weeks ago and 2 days ago. He's been complaining about being tired. His BP and HR are normal. Wife states he has been putting off and he finally agreed to be seen after much convincing. I advised I would let HL know her concerns.

## 2025-06-24 NOTE — PROGRESS NOTES
Chief Complaint   Patient presents with    Hypertension     F/V DX: Hypertension, essential      Coronary Artery Disease     F/V DX: Coronary artery disease involving native coronary artery of native heart without angina pectoris    Dyslipidemia     F/V DX: Dyslipidemia          Subjective:   Jensen Leigh is a 64 y.o. male who presents today for follow-up post coronary artery bypass grafting x3 on 4/17/2024.     Patient of Dr. Campoverde.  Current medical problems include history of methamphetamine use, diabetes mellitus, hypertension, DVT s/p IVC filter, depressive disorder, hyperlipidemia, obesity, and coronary artery disease . Their last clinic visit was 5/13/2025 with myself.    Interval history:  The patient previously saw a cardiologist at [facility] Dr. Hamm 8/23/2023. During the visit, the patient reports occasional chest pain, and he was referred for [facility]. Heart cath showed mild left main disease,  of RCA with collaterals from the left system, occluded proximal LCx with collaterals from LAD, and mild to moderate mid LAD stenosis status post intracoronary physiology assessment of LAD, which showed severe mid LAD disease, and he was recommended for surgical revascularization. He wanted a second opinion so he came to [facility].     He was medically managed and referred to Dr. Locke to discuss high risk PCI. After his appointment with Dr. Locke on 1/23/2024 patient was agreeable to CABG as Dr. Velez had previously recommended. Patient had a CABG x3 on 4/17/2024.    5/13/2025 visit:  Patient is doing overall well from a cardiac perspective. He denies chest pain or pressure, shortness of breath, palpitations, orthopnea, PND, edema, dizziness/lightheadedness or syncope. Patient is taking all medications as prescribed. Patient works as a  and  and climbs ladders and stairs without any limitations. He does not take his blood pressure at home currently. HE does not smoke, use  fever recreational drugs or alcohol.    Today's visit:  Patients wife had called and left a message related to two episodes of chest pain and nausea that the patient has experienced since our last follow up. He reports in the last month he has had to take his nitroglycerin 3-4 times. He says that he will exert himself and sometimes have no symptoms but most recently he was in the yard doing work when he felt chest pain start and came in and get a nitroglycerin which resolved the chest pain. He denies dizziness, lightheadedness, orthopnea, PND, edema, shortness of breath, palpitations or syncope. He denies any recent changes in the last month, recent illness, new medications or OTC. He had stopped the zetia over concern it was causing GI symptoms.     Cardiovascular Risk Factors:  1. Smoking status: Former smoker  2. Type II Diabetes Mellitus: Yes   Lab Results   Component Value Date/Time    HBA1C 7.2 (A) 11/12/2024 04:26 PM    HBA1C 8.0 (H) 04/16/2024 02:40 PM     3. Hypertension: Yes  4. Dyslipidemia: Yes   Cholesterol,Tot   Date Value Ref Range Status   07/15/2023 126 100 - 199 mg/dL Final     LDL   Date Value Ref Range Status   07/15/2023 68 <100 mg/dL Final     HDL   Date Value Ref Range Status   07/15/2023 46 >=40 mg/dL Final     Triglycerides   Date Value Ref Range Status   07/15/2023 59 0 - 149 mg/dL Final     5. Family history of early Coronary Artery Disease in a first degree relative (Male less than 55 years of age; Female less than 65 years of age): No      Past Medical History:   Diagnosis Date    Anginal syndrome (HCC)     Arthritis     Blood clotting disorder (HCC)     DVT RT LEG    Clotting disorder (HCC)     Dental disorder     Dentures    Eczema     GERD (gastroesophageal reflux disease)     Heart burn     Hiatus hernia syndrome     High cholesterol     History of deep venous thrombosis (DVT) of distal vein of right lower extremity 04/25/2024    Hyperlipidemia     Indigestion     Status post coronary  artery bypass with three autogenous grafts 04/25/2024    Type II or unspecified type diabetes mellitus without mention of complication, not stated as uncontrolled     Umbilical hernia          Family History   Problem Relation Age of Onset    Hyperlipidemia Mother     Cancer Mother         liver    Depression Mother     Alcohol abuse Mother     Cancer Father         lung    Alcohol abuse Father     Depression Sister     Ovarian Cancer Neg Hx     Tubal Cancer Neg Hx     Breast Cancer Neg Hx     Colorectal Cancer Neg Hx     Peritoneal Cancer Neg Hx          Social History     Tobacco Use    Smoking status: Never    Smokeless tobacco: Never   Vaping Use    Vaping status: Never Used   Substance Use Topics    Alcohol use: Not Currently    Drug use: No         No Known Allergies      Current Outpatient Medications   Medication Sig    isosorbide mononitrate SR (IMDUR) 30 MG TABLET SR 24 HR Take 1 Tablet by mouth every morning.    nitroglycerin (NITROSTAT) 0.4 MG SL Tab Place 1 Tablet under the tongue as needed for Chest Pain.    metoprolol tartrate 37.5 MG Tab Take 37.5 mg by mouth 2 times a day.    amphetamine-dextroamphetamine ER (ADDERALL XR) 30 MG XR capsule Take 1 Capsule by mouth every morning for 30 days.    [START ON 7/16/2025] amphetamine-dextroamphetamine ER (ADDERALL XR) 30 MG XR capsule Take 1 Capsule by mouth every morning for 30 days.    [START ON 8/16/2025] amphetamine-dextroamphetamine ER (ADDERALL XR) 30 MG XR capsule Take 1 Capsule by mouth every morning for 30 days.    aspirin EC (ECOTRIN) 81 MG Tablet Delayed Response Take 2 Tabs by mouth every day.    Multiple Vitamin (MULTI VITAMIN MENS PO) Take 1 Tab by mouth every day.    sertraline (ZOLOFT) 50 MG Tab Take 1 Tablet by mouth every day.    SITagliptin (JANUVIA) 100 MG Tab Take 1 tablet by mouth once daily    ezetimibe (ZETIA) 10 MG Tab Take 1 Tablet by mouth every day.    JARDIANCE 25 MG Tab TAKE 1 TABLET BY MOUTH ONCE DAILY IN THE MORNING     "lisinopril (PRINIVIL) 5 MG Tab Take 1 Tablet by mouth every day. (for kidney protection)    atorvastatin (LIPITOR) 80 MG tablet Take 1 Tablet by mouth every day.         Review of Systems   Constitutional: Negative for malaise/fatigue.   Cardiovascular:  Positive for chest pain. Negative for dyspnea on exertion, leg swelling, near-syncope, orthopnea, palpitations, paroxysmal nocturnal dyspnea and syncope.   Respiratory:  Negative for shortness of breath.    Skin:  Negative for itching and poor wound healing.   Gastrointestinal:  Positive for nausea.   Neurological:  Negative for dizziness, headaches and light-headedness.           Objective:   /76 (BP Location: Left arm, Patient Position: Sitting, BP Cuff Size: Adult)   Pulse 74   Resp 12   Ht 1.727 m (5' 8\")   Wt 104 kg (229 lb)   SpO2 95%  Body mass index is 34.82 kg/m².         Physical Exam  Constitutional:       General: He is not in acute distress.     Appearance: Normal appearance.   HENT:      Head: Normocephalic and atraumatic.   Cardiovascular:      Rate and Rhythm: Normal rate and regular rhythm.      Pulses: Normal pulses.      Heart sounds: No murmur heard.  Pulmonary:      Effort: Pulmonary effort is normal. No respiratory distress.      Breath sounds: Normal breath sounds.   Chest:      Chest wall: No swelling, tenderness or crepitus.   Musculoskeletal:      Right lower leg: No edema.      Left lower leg: No edema.   Neurological:      Mental Status: He is alert and oriented to person, place, and time.      Gait: Gait normal.   Psychiatric:         Behavior: Behavior normal.             Lab Results   Component Value Date/Time    SODIUM 137 04/21/2024 12:28 AM    POTASSIUM 3.9 04/21/2024 12:28 AM    CHLORIDE 99 04/21/2024 12:28 AM    CO2 25 04/21/2024 12:28 AM    GLUCOSE 143 (H) 04/21/2024 12:28 AM    BUN 31 (H) 04/21/2024 12:28 AM    CREATININE 0.63 04/21/2024 12:28 AM    GLOMRATE 90 08/23/2023 07:13 AM      Lab Results   Component Value " "Date/Time    WBC 10.0 04/21/2024 12:28 AM    RBC 4.24 (L) 04/21/2024 12:28 AM    HEMOGLOBIN 12.4 (L) 04/21/2024 12:28 AM    HEMATOCRIT 38.2 (L) 04/21/2024 12:28 AM    MCV 90.1 04/21/2024 12:28 AM    MCH 29.2 04/21/2024 12:28 AM    MCHC 32.5 04/21/2024 12:28 AM    MPV 10.4 04/21/2024 12:28 AM    NEUTSPOLYS 47.40 04/16/2024 02:40 PM    LYMPHOCYTES 42.20 (H) 04/16/2024 02:40 PM    MONOCYTES 7.80 04/16/2024 02:40 PM    EOSINOPHILS 1.70 04/16/2024 02:40 PM    BASOPHILS 0.60 04/16/2024 02:40 PM      Lab Results   Component Value Date/Time    CHOLSTRLTOT 131 05/03/2025 07:44 AM    LDL 67 05/03/2025 07:44 AM    HDL 47 05/03/2025 07:44 AM    TRIGLYCERIDE 84 05/03/2025 07:44 AM       No results found for: \"TROPONINT\"  No results found for: \"NTPROBNP\"      4/17/24 Dr. Velez  CORONARY ARTERY BYPASS GRAFTING X3  WITH SKELETONIZED SMITH TO LAD  RSVG TO OM1 AND RPDA  ENDOSCOPIC  HARVESTOF RIGHT GREATER SAPHENOUS VEIN,  Assessment:   1. Stable angina (HCC)  - isosorbide mononitrate SR (IMDUR) 30 MG TABLET SR 24 HR; Take 1 Tablet by mouth every morning.  Dispense: 90 Tablet; Refill: 3  - nitroglycerin (NITROSTAT) 0.4 MG SL Tab; Place 1 Tablet under the tongue as needed for Chest Pain.  Dispense: 25 Tablet; Refill: 6  - NM-CARDIAC STRESS TEST; Future  - EC-ECHOCARDIOGRAM COMPLETE W/O CONT; Future    2. Coronary artery disease due to calcified coronary lesion  - isosorbide mononitrate SR (IMDUR) 30 MG TABLET SR 24 HR; Take 1 Tablet by mouth every morning.  Dispense: 90 Tablet; Refill: 3  - nitroglycerin (NITROSTAT) 0.4 MG SL Tab; Place 1 Tablet under the tongue as needed for Chest Pain.  Dispense: 25 Tablet; Refill: 6  - NM-CARDIAC STRESS TEST; Future  - EC-ECHOCARDIOGRAM COMPLETE W/O CONT; Future    3. Hypertension, essential  - EKG    4. S/P CABG x 3  - isosorbide mononitrate SR (IMDUR) 30 MG TABLET SR 24 HR; Take 1 Tablet by mouth every morning.  Dispense: 90 Tablet; Refill: 3  - metoprolol tartrate 37.5 MG Tab; Take 37.5 mg by " mouth 2 times a day.  Dispense: 90 Tablet; Refill: 3  - NM-CARDIAC STRESS TEST; Future  - EC-ECHOCARDIOGRAM COMPLETE W/O CONT; Future    5. Dyslipidemia    6. History of deep venous thrombosis (DVT) of distal vein of right lower extremity              Medical Decision Making:  Today's Assessment / Plan:   Angina  Status post CABG x3 on 4/17/2024 [place] to LAD RSVG to OM1, and RPDA  -patient reports in the last month 4 episodes for chest pain where he took nitroglycerin. He says when he does not take nitro the chest pain will last 1-2 mins.   -CABG: continue  mg daily  -statin:continue Atorvastatin 80 mg daily   -BB: increase Metoprolol 37.5 mg two times daily  -start isosorbide mononitrate 30 mg every morning  -echocardiogram ordered to evaluate any wall motion abnormalities  -stress test ordered for ischemic workup  - referral to cardiac rehab: patient did not participate due to work and location  -Discussed recommendation for continued diet and lifestyle modifications    Hyperlipidemia  -Most recent LDL 67  -continue atorvastatin 80 mg daily  -Goal of less than 55  -Check lipid panel in 2 months including LPa    Hypertension  - Good control  - increase Metoprolol 37.5 mg two times daily  -continue lisinopril 5 mg daily  - goal < 130/80  -continue to monitor blood pressure at home and keep a log    DVT s/p IVC filter  -Continue  mg daily per vascular Dr. Medley recommendation    Return in about 3 months (around 9/24/2025) for iVcky GODINEZ.  Sooner if problems.    LENI Gan.

## 2025-06-25 ENCOUNTER — PATIENT MESSAGE (OUTPATIENT)
Dept: BEHAVIORAL HEALTH | Facility: CLINIC | Age: 65
End: 2025-06-25
Payer: MEDICARE

## 2025-06-25 DIAGNOSIS — F90.2 ADHD (ATTENTION DEFICIT HYPERACTIVITY DISORDER), COMBINED TYPE: ICD-10-CM

## 2025-06-25 LAB — EKG IMPRESSION: NORMAL

## 2025-06-25 RX ORDER — EMPAGLIFLOZIN 25 MG/1
1 TABLET, FILM COATED ORAL EVERY MORNING
Qty: 90 TABLET | Refills: 1 | Status: SHIPPED | OUTPATIENT
Start: 2025-06-25

## 2025-06-25 RX ORDER — DEXTROAMPHETAMINE SACCHARATE, AMPHETAMINE ASPARTATE MONOHYDRATE, DEXTROAMPHETAMINE SULFATE AND AMPHETAMINE SULFATE 7.5; 7.5; 7.5; 7.5 MG/1; MG/1; MG/1; MG/1
30 CAPSULE, EXTENDED RELEASE ORAL EVERY MORNING
Qty: 40 CAPSULE | Refills: 0 | Status: SHIPPED | OUTPATIENT
Start: 2025-07-05 | End: 2025-08-14

## 2025-06-25 NOTE — PATIENT COMMUNICATION
pt was only able to  20 tabs last month and he is now req. the additional 10 tabs to be sent over if possible + the 30 tab of this month?       Received request via: Patient    Was the patient seen in the last year in this department? Yes    Does the patient have an active prescription (recently filled or refills available) for medication(s) requested? No    Pharmacy Name: [id number]     Does the patient have [facility] and need 100-day supply? (This applies to ALL medications) Patient does not have SCP

## 2025-06-26 ENCOUNTER — TELEPHONE (OUTPATIENT)
Dept: CARDIOLOGY | Facility: MEDICAL CENTER | Age: 65
End: 2025-06-26
Payer: MEDICARE

## 2025-06-26 NOTE — TELEPHONE ENCOUNTER
Caller: Hayley - Wife    Topic/issue: Patient has been having some issues. Patient had a triple bypass surgery last year but still seems to be feeling generally unwell. Patient also had a question about the EKG, as it continues to show RBBB. Does this mean that the surgery did not work? Please call Hayley back to discuss.    Hayley also has more questions about the testing that has been ordered.     Callback Number: [phone]    Thank you,  Amee OROZCO

## 2025-06-26 NOTE — TELEPHONE ENCOUNTER
Call placed to patient's spouse to advise the cardiac stress test will need to be completed in order to obtain further information. Spouse is very concerned as EKG's continue to have RBBB, spouse unsure if the CABG x 3 was effective, advised this office did not complete the CABG, but after chart review patient's after care appointments were noting patient to be doing well. Spouse is concerned that the EKG on 4/18/2024 shows a myocardial infarct, she is unsure if patient was ever made aware of this, she was not made aware of this and would like to know why patient was discharged if an infarct was present. Furthermore, she is concerned as to the stress test that is ordered as she was told that patient would have to fast and he would not be able to be in contact with her or their animals for two days. She would like an alternate stress test ordered as this is not an test he is going to be able to complete. Attempted to inform spouse the stress test would only require not eating for 4 hours prior and there is no documentation in our instructions that advise patient to refrain from contact from family and pets for two days. Spouse insisting on requesting for an alternate test as she was also told patient would be on a treadmill for two hours.    HL-please review and advise, thank you

## 2025-06-27 NOTE — TELEPHONE ENCOUNTER
Call placed to patient's spouse, advised of the below message. Spouse is concerned as she wants to know if patient still has a blockage in his arteries, does not believe the stress test is going to determine if there is blockage. Spouse was under the impression she was going to receive a phone call from  today. Offered to have a virtual visit with  in order for patient and spouse to have all questions answered, spouse declined. Spouse inquired if she should discuss with Dr. Rasheed as she inquired if  is only working with the muscle of the heart, advised Patient is free to see whomever he would like to see, however,  has reviewed his chart and the EKG is unchanged from previous. Spouse is still concerned that there was an infarct on the 4/18 hospital EKG, advised as this was completed in the hospital, unable to speak to why it was not mentioned to her or patient prior to discharge. Spouse verbalized she will speak with patient over the weekend and discuss how they will proceed.    LENI Gan.    6/26/25  4:38 PM  I have not heard of this precaution before. I advise he move forward with ischemic testing. His EKG findings of RBBB are unchanged and no concerning acute ischemic findings on office EKG.

## 2025-07-07 NOTE — Clinical Note
Member Name: Jensen Leigh   Member Number: [id number]   Reference Number: [id number]   Approved Services: Echos and EKG   Approved Service Dates: 06/24/2025 - 09/22/2025   Requesting Provider: Vicky Jordan   Requested Provider: [facility]     Dear Jensen Leigh:     The following medical service(s) requested by Vicky Jordan have been approved:    Procedure Code Procedure Code Name Requested Quantity Approved Quantity Status   07648 (CPT®) IN ECHO HEART XTHORACIC,COMPLETE W DOPPLER 1 1 Authorized       Approved Quantity means the number of visits approved for medication treatments and/or medical services.    The services should be provided by [facility] no later than 09/22/2025. Please contact the provider listed below with any questions.     Provider Information:  [facility]  [phone]    Your plan benefit may require a deductible, co-payment or coinsurance for these services. This authorization does not guarantee [facility] will pay the claim for services that you receive. Payment by [facility] for these services is subject to the terms of your Evidence of Coverage or Summary Plan Description, your eligibility at the time of service, and confirmation of benefit coverage.    For any questions or additional information, please contact Customer Service:    [facility]  Customer Service: [phone] or toll free [phone]  TTY users dial: [phone]   Call Center Hours: Mon - Fri 7 AM to 8 PM PST   Office Hours: Mon - Fri 8 AM to 5 PM [facility]   E-mail: [email or web address]   Website: [email or web address]       This information is available for free in other languages. Please contact Customer Service at the phone number above for more information. [facility] complies with applicable Federal civil rights laws and does not discriminate on the basis of race, color, national origin, age, disability or  sex.      Sincerely,     Healthcare Utilization Management Department     Cc: [facility]   Vicky Jordan

## 2025-07-17 ENCOUNTER — HOSPITAL ENCOUNTER (OUTPATIENT)
Dept: CARDIOLOGY | Facility: MEDICAL CENTER | Age: 65
End: 2025-07-17
Payer: MEDICARE

## 2025-07-17 DIAGNOSIS — I25.84 CORONARY ARTERY DISEASE DUE TO CALCIFIED CORONARY LESION: ICD-10-CM

## 2025-07-17 DIAGNOSIS — I20.89 STABLE ANGINA (HCC): ICD-10-CM

## 2025-07-17 DIAGNOSIS — Z95.1 S/P CABG X 3: ICD-10-CM

## 2025-07-17 DIAGNOSIS — I25.10 CORONARY ARTERY DISEASE DUE TO CALCIFIED CORONARY LESION: ICD-10-CM

## 2025-07-17 LAB
LV EJECT FRACT  99904: 65
LV EJECT FRACT MOD 2C 99903: 60.7
LV EJECT FRACT MOD 4C 99902: 63.42
LV EJECT FRACT MOD BP 99901: 62.81

## 2025-07-17 PROCEDURE — 93306 TTE W/DOPPLER COMPLETE: CPT

## 2025-07-17 PROCEDURE — 700117 HCHG RX CONTRAST REV CODE 255

## 2025-07-17 PROCEDURE — 93306 TTE W/DOPPLER COMPLETE: CPT | Mod: 26 | Performed by: INTERNAL MEDICINE

## 2025-07-17 RX ADMIN — HUMAN ALBUMIN MICROSPHERES AND PERFLUTREN 3 ML: 10; .22 INJECTION, SOLUTION INTRAVENOUS at 14:30

## 2025-07-17 NOTE — Clinical Note
Member Name: Jensen Leigh   Member Number: [id number]   Reference Number: [id number]   Approved Services: Echos and EKG   Approved Service Dates: 06/24/2025 - 09/22/2025   Requesting Provider: Vicky Jordan   Requested Provider: [facility]     Dear Jensen Leigh:     The following medical service(s) requested by Vicky Jordan have been approved:    Procedure Code Procedure Code Name Requested Quantity Approved Quantity Status   11176 (CPT®) SC ECHO HEART XTHORACIC,COMPLETE W DOPPLER 1 1 Authorized       Approved Quantity means the number of visits approved for medication treatments and/or medical services.    The services should be provided by [facility] no later than 09/22/2025. Please contact the provider listed below with any questions. Your plan benefit may require a deductible, co-payment, or co-insurance for these services.    Provider Information:  [facility]  [phone]    Important Note for Members:    This authorization does not guarantee that [facility] or [facility] will pay for your care. Payment depends on your plan details, if you're eligible for coverage on the day you receive care, and whether the service follows plan rules. These include things such as reviewing if the service is medically necessary. We recommend reviewing your Evidence of Coverage before receiving any care.    Important Note for Providers:     Payment by [facility] or [facility] for these services is subject to the terms of the Evidence of Coverage or Summary Plan Description, eligibility at the time of service, standard processing procedures and confirmation of benefit coverage. All claims are subject to standard processing procedures, including but not limited to coding edits, medical necessity determinations, and other plan policies in effect at the time of claim adjudication. Providers are required to follow all [facility]  Administrative Guidelines and requirements.    For any questions or additional information, please contact Customer Service:    [employer]  Customer Service: [phone] or toll free [phone]  TTY users dial: [phone]   Call Center Hours: Mon - Fri 7 AM to 8 PM PST   Office Hours: Mon - Fri 8 AM to 5 PM PST   E-mail: [email or web address]   Website: [email or web address]       This information is available for free in other languages. Please contact Customer Service at the phone number above for more information. [employer] complies with applicable Federal civil rights laws and does not discriminate on the basis of race, color, national origin, age, disability or sex.      Sincerely,     Healthcare Utilization Management Department     Cc: [facility]   Vicky Jordan

## 2025-07-18 ENCOUNTER — RESULTS FOLLOW-UP (OUTPATIENT)
Dept: CARDIOLOGY | Facility: MEDICAL CENTER | Age: 65
End: 2025-07-18
Payer: MEDICARE

## (undated) DEVICE — SET BIFURCATED BLOOD - (48EA/CS)

## (undated) DEVICE — STOPCOCK MALE 4-WAY - (50/CA)

## (undated) DEVICE — SUTURE 4-0 30CM STRATAFIX SPIRAL PS-2 (12EA/BX)

## (undated) DEVICE — SUCTION INSTRUMENT YANKAUER BULBOUS TIP W/O VENT (50EA/CA)

## (undated) DEVICE — BAG SPONGE COUNT 10.25 X 32 - BLUE (250/CA)

## (undated) DEVICE — SUTURE 6-0 PROLENE RB-2 D/A 30 (36PK/BX)"

## (undated) DEVICE — DECANTER FLD BLS - (50/CA)

## (undated) DEVICE — GLOVE BIOGEL PI INDICATOR SZ 7.5 SURGICAL PF LF -(50/BX 4BX/CA)

## (undated) DEVICE — SENSOR OXIMETER ADULT SPO2 RD SET (20EA/BX)

## (undated) DEVICE — SPRING BULLDOG 1/2 FORCE BLUE - (10/BX)

## (undated) DEVICE — GOWN SURGICAL XX-LARGE - (28EA/CA) SIRUS NON REINFORCED

## (undated) DEVICE — LEAD PACING TEMP MYO - (12/BX)

## (undated) DEVICE — GLOVE BIOGEL PI ORTHO SZ 8 PF LF (40PR/BX)

## (undated) DEVICE — SODIUM CHL. INJ. 0.9% 500ML (24EA/CA 50CA/PF)

## (undated) DEVICE — SUTURE 5 SURGICAL STEEL V-40 - (12/BX) CCS CURRENT

## (undated) DEVICE — BLADE SURGICAL #15 - (50/BX 3BX/CA)

## (undated) DEVICE — CORETEMP DRAPE FORM-FITTED EASY DROPANDGO DRAPE FOR USE ON THE CORETEMP FLUID MANAGEMENT 56IN X 56IN

## (undated) DEVICE — GLOVE BIOGEL PI ORTHO SZ 7 PF LF (40PR/BX)

## (undated) DEVICE — TRAY SURESTEP FOLEY TEMP SENSING 16FR (10EA/CA) ORDER  #18764 FOR TEMP FOLEY ONLY

## (undated) DEVICE — TUBE CHEST 32FR. RIGHT ANGLED (10EA/CA)

## (undated) DEVICE — CHLORAPREP 26 ML APPLICATOR - ORANGE TINT(25/CA)

## (undated) DEVICE — BLADE BEAVER 6900 MINI SHARP ALL AROUND (20/CA)

## (undated) DEVICE — GLOVE BIOGEL PI INDICATOR SZ 8.0 SURGICAL PF LF -(50/BX 4BX/CA)

## (undated) DEVICE — SET FLUID WARMING STANDARD FLOW - (10/CA)

## (undated) DEVICE — DRAPESURG STERI-DRAPE LONG - (10/BX 4BX/CA)

## (undated) DEVICE — KIT RADIAL ARTERY 20GA W/MAX BARRIER AND BIOPATCH  (5EA/CA) #10740 IS FOR THE SET RADIAL ARTERIAL

## (undated) DEVICE — GLOVE BIOGEL SZ 8.5 SURGICAL PF LTX - (50PR/BX 4BX/CA)

## (undated) DEVICE — SENSOR CEREBRAL AND SOMATIC MONITORING (20/CA)

## (undated) DEVICE — PACK VEIN - (19/CA)

## (undated) DEVICE — SUTURE 2-0 VICRYL PLUS CT-1 36 (36PK/BX)"

## (undated) DEVICE — SYRINGE NON SAFETY 3 CC 21 GA X 1 1/2 IN (100/BX 8BX/CA)

## (undated) DEVICE — PACK CV DRAPING/BASIN 2PART - (1/CA)

## (undated) DEVICE — GLOVE SZ 8 BIOGEL PI MICRO - PF LF (50PR/BX)

## (undated) DEVICE — CANISTER SUCTION 3000ML MECHANICAL FILTER AUTO SHUTOFF MEDI-VAC NONSTERILE LF DISP  (40EA/CA)

## (undated) DEVICE — SYS DLV COST CLS RM TEMP - INJECTATE (CO-SET II) (10EA/CA)

## (undated) DEVICE — SUTURE 0 ETHIBOND MO6 C/R - (12/BX) 8-18 INCH ETHICON

## (undated) DEVICE — PUNCH 4MM SHRP CNCL TIP DL - (6/BX)

## (undated) DEVICE — LACTATED RINGERS INJ 1000 ML - (14EA/CA 60CA/PF)

## (undated) DEVICE — KIT ENDOHARVEST SYSTEM 8 - MUST ORDER 5 AT A TIME

## (undated) DEVICE — COVER LIGHT HANDLE ALC PLUS DISP (18EA/BX)

## (undated) DEVICE — BLADE STERNUM SAW SURGICAL 32.0 X 6.4 MM STERILE (1/EA)

## (undated) DEVICE — ELECTRODE DUAL RETURN W/ CORD - (50/PK)

## (undated) DEVICE — SODIUM CHL IRRIGATION 0.9% 1000ML (12EA/CA)

## (undated) DEVICE — SUTURE 2-0 ETHIBOND SH D/A 36 (36PK/BX)"

## (undated) DEVICE — SOD. CHL. INJ. 0.9% 1000 ML - (14EA/CA 60CA/PF)

## (undated) DEVICE — SUTURE 7-0 PROLENE 24BV175-6 - EP8735H (36/BX)"

## (undated) DEVICE — INSERT STEALTH #5 - (10/BX)

## (undated) DEVICE — GLOVE BIOGEL SZ 8 SURGICAL PF LTX - (50PR/BX 4BX/CA)

## (undated) DEVICE — BAG RESUSCITATION DISPOSABLE - WITH MASK (10 EA/CA)

## (undated) DEVICE — COVER CAMERA LENS LIGHT HANDLE STUBBY DISPOSABLE

## (undated) DEVICE — WAX BONE ALKYLENE OXIDE HEMOSTASIS OSTENE 3.5GM (10EA/CA)

## (undated) DEVICE — PAD LAP STERILE 18 X 18 - (5/PK 40PK/CA)

## (undated) DEVICE — TUBING CLEARLINK DUO-VENT - C-FLO (48EA/CA)

## (undated) DEVICE — TUBE CHEST 32FR. STRAIGHT - (10EA/CA)

## (undated) DEVICE — GOWN SURGEONS X-LARGE - DISP. (30/CA)

## (undated) DEVICE — BLOWER/MISTER (5EA/PK)

## (undated) DEVICE — DERMABOND ADVANCED - (12EA/BX)

## (undated) DEVICE — SET LEADWIRE 5 LEAD BEDSIDE DISPOSABLE ECG (1SET OF 5/EA)

## (undated) DEVICE — TUBE E-T HI-LO CUFF 8.5MM (10EA/PK)

## (undated) DEVICE — TRAY MULTI-LUMEN 7FR PRESSURE W/MAX BARRIER AND BIOPATCH - (5/CA)

## (undated) DEVICE — SUTURE OHS

## (undated) DEVICE — SYSTEM CHEST DRAIN ADULT/PEDS W/AUTO TRANSFUSION CAPABILITY SAHARA (6EA/CA)

## (undated) DEVICE — KIT TOURNIQUET DLP (40EA/PK)

## (undated) DEVICE — SET TUBING PNEUMOCLEAR HIGH FLOW SMOKE EVACUATION (10EA/BX)

## (undated) DEVICE — SET EXTENSION WITH 2 PORTS (48EA/CA) ***PART #2C8610 IS A SUBSTITUTE*****

## (undated) DEVICE — GLOVE BIOGEL INDICATOR SZ 9 SURGICAL PF LTX - (160/CA)

## (undated) DEVICE — MICRODRIP PRIMARY VENTED 60 (48EA/CA) THIS WAS PART #2C8428 WHICH WAS DISCONTINUED

## (undated) DEVICE — TRANSDUCER BIFURCATED MONITORING KIT (10EA/CA)

## (undated) DEVICE — SUTURE 3-0 PROLENE SH 36 (36PK/BX)"